# Patient Record
Sex: MALE | Race: WHITE | NOT HISPANIC OR LATINO | Employment: FULL TIME | URBAN - METROPOLITAN AREA
[De-identification: names, ages, dates, MRNs, and addresses within clinical notes are randomized per-mention and may not be internally consistent; named-entity substitution may affect disease eponyms.]

---

## 2017-01-05 RX ORDER — RIZATRIPTAN BENZOATE 10 MG/1
10 TABLET, ORALLY DISINTEGRATING ORAL ONCE AS NEEDED
Qty: 27 TABLET | Refills: 3 | Status: SHIPPED | OUTPATIENT
Start: 2017-01-05 | End: 2017-03-28 | Stop reason: SDUPTHER

## 2017-01-24 DIAGNOSIS — E78.00 ELEVATED CHOLESTEROL: ICD-10-CM

## 2017-01-24 DIAGNOSIS — E34.9 TESTOSTERONE DEFICIENCY: Primary | ICD-10-CM

## 2017-01-31 ENCOUNTER — RESULTS ENCOUNTER (OUTPATIENT)
Dept: FAMILY MEDICINE CLINIC | Facility: CLINIC | Age: 42
End: 2017-01-31

## 2017-01-31 DIAGNOSIS — E78.00 ELEVATED CHOLESTEROL: ICD-10-CM

## 2017-01-31 DIAGNOSIS — E34.9 TESTOSTERONE DEFICIENCY: ICD-10-CM

## 2017-02-07 ENCOUNTER — OFFICE VISIT (OUTPATIENT)
Dept: ORTHOPEDIC SURGERY | Facility: CLINIC | Age: 42
End: 2017-02-07

## 2017-02-07 VITALS — WEIGHT: 252 LBS | HEIGHT: 73 IN | BODY MASS INDEX: 33.4 KG/M2 | TEMPERATURE: 97.6 F

## 2017-02-07 DIAGNOSIS — M25.562 CHRONIC PAIN OF BOTH KNEES: Primary | ICD-10-CM

## 2017-02-07 DIAGNOSIS — G89.29 CHRONIC PAIN OF BOTH KNEES: Primary | ICD-10-CM

## 2017-02-07 DIAGNOSIS — M25.561 CHRONIC PAIN OF BOTH KNEES: Primary | ICD-10-CM

## 2017-02-07 LAB
ALBUMIN SERPL-MCNC: 4.7 G/DL (ref 3.5–5.5)
ALBUMIN/GLOB SERPL: 2.6 {RATIO} (ref 1.1–2.5)
ALP SERPL-CCNC: 65 IU/L (ref 39–117)
ALT SERPL-CCNC: 60 IU/L (ref 0–44)
AST SERPL-CCNC: 38 IU/L (ref 0–40)
BILIRUB SERPL-MCNC: 0.4 MG/DL (ref 0–1.2)
BUN SERPL-MCNC: 14 MG/DL (ref 6–24)
BUN/CREAT SERPL: 15 (ref 9–20)
CALCIUM SERPL-MCNC: 9.5 MG/DL (ref 8.7–10.2)
CHLORIDE SERPL-SCNC: 101 MMOL/L (ref 96–106)
CHOLEST SERPL-MCNC: 143 MG/DL (ref 100–199)
CO2 SERPL-SCNC: 27 MMOL/L (ref 18–29)
CONV COMMENT: ABNORMAL
CREAT SERPL-MCNC: 0.92 MG/DL (ref 0.76–1.27)
ERYTHROCYTE [DISTWIDTH] IN BLOOD BY AUTOMATED COUNT: 13.8 % (ref 12.3–15.4)
GLOBULIN SER CALC-MCNC: 1.8 G/DL (ref 1.5–4.5)
GLUCOSE SERPL-MCNC: 108 MG/DL (ref 65–99)
HCT VFR BLD AUTO: 45.2 % (ref 37.5–51)
HDLC SERPL-MCNC: 44 MG/DL
HGB BLD-MCNC: 15.3 G/DL (ref 12.6–17.7)
LDLC SERPL CALC-MCNC: 63 MG/DL (ref 0–99)
MCH RBC QN AUTO: 30.7 PG (ref 26.6–33)
MCHC RBC AUTO-ENTMCNC: 33.8 G/DL (ref 31.5–35.7)
MCV RBC AUTO: 91 FL (ref 79–97)
PLATELET # BLD AUTO: 285 X10E3/UL (ref 150–379)
POTASSIUM SERPL-SCNC: 4.5 MMOL/L (ref 3.5–5.2)
PROT SERPL-MCNC: 6.5 G/DL (ref 6–8.5)
RBC # BLD AUTO: 4.98 X10E6/UL (ref 4.14–5.8)
SODIUM SERPL-SCNC: 143 MMOL/L (ref 134–144)
TESTOST FREE SERPL-MCNC: 2.1 PG/ML (ref 6.8–21.5)
TESTOST SERPL-MCNC: 221 NG/DL (ref 348–1197)
TRIGL SERPL-MCNC: 178 MG/DL (ref 0–149)
VLDLC SERPL CALC-MCNC: 36 MG/DL (ref 5–40)
WBC # BLD AUTO: 6 X10E3/UL (ref 3.4–10.8)

## 2017-02-07 PROCEDURE — 20610 DRAIN/INJ JOINT/BURSA W/O US: CPT | Performed by: ORTHOPAEDIC SURGERY

## 2017-02-07 RX ORDER — METHYLPREDNISOLONE ACETATE 80 MG/ML
80 INJECTION, SUSPENSION INTRA-ARTICULAR; INTRALESIONAL; INTRAMUSCULAR; SOFT TISSUE
Status: COMPLETED | OUTPATIENT
Start: 2017-02-07 | End: 2017-02-07

## 2017-02-07 RX ORDER — LANOLIN ALCOHOL/MO/W.PET/CERES
1000 CREAM (GRAM) TOPICAL DAILY
COMMUNITY
End: 2017-12-27

## 2017-02-07 RX ORDER — BUPIVACAINE HYDROCHLORIDE 5 MG/ML
2 INJECTION, SOLUTION PERINEURAL
Status: COMPLETED | OUTPATIENT
Start: 2017-02-07 | End: 2017-02-07

## 2017-02-07 RX ORDER — PNV NO.95/FERROUS FUM/FOLIC AC 28MG-0.8MG
TABLET ORAL
COMMUNITY
End: 2017-09-21

## 2017-02-07 RX ORDER — PYRIDOXINE HCL (VITAMIN B6) 100 MG
100 TABLET ORAL DAILY
COMMUNITY
End: 2017-12-27

## 2017-02-07 RX ORDER — LIDOCAINE HYDROCHLORIDE 10 MG/ML
2 INJECTION, SOLUTION INFILTRATION; PERINEURAL
Status: COMPLETED | OUTPATIENT
Start: 2017-02-07 | End: 2017-02-07

## 2017-02-07 RX ORDER — TOPIRAMATE 25 MG/1
25 CAPSULE, COATED PELLETS ORAL 2 TIMES DAILY
COMMUNITY
End: 2017-03-28 | Stop reason: ALTCHOICE

## 2017-02-07 RX ADMIN — BUPIVACAINE HYDROCHLORIDE 2 ML: 5 INJECTION, SOLUTION PERINEURAL at 15:37

## 2017-02-07 RX ADMIN — METHYLPREDNISOLONE ACETATE 80 MG: 80 INJECTION, SUSPENSION INTRA-ARTICULAR; INTRALESIONAL; INTRAMUSCULAR; SOFT TISSUE at 15:37

## 2017-02-07 RX ADMIN — LIDOCAINE HYDROCHLORIDE 2 ML: 10 INJECTION, SOLUTION INFILTRATION; PERINEURAL at 15:37

## 2017-02-07 NOTE — PROGRESS NOTES
Knee Joint Injection      Patient: Arelis Munson  YOB: 1975    Chief Complaints: Knee pain  bilaterally    Subjective:  This problem is not new to this examiner.     History of Present Illness: Pt gets intermittent  injections with good relief. Is here for repeat injection. Understands options. The pain is a generalized joint line tenderness.  It has been progressive in nature but remains intermittent.  Worsened by prolonged standing or walking and squatting activities. Has had improvement in the past with ice/heat, rest, and injections. TIMING:  The pain is described as CHRONIC.  LOCATION: medial joint line tenderness. AGGRAVATING FACTORS:  Is worsened by prolonged standing, sitting, walking and squatting activities.  ASSOCIATED SYMPTOMS:  swelling, tenderness, and aching.      Allergies: No Known Allergies    Medications:   Home Medications:  Current Outpatient Prescriptions on File Prior to Visit   Medication Sig   • azelastine (ASTELIN) 0.1 % nasal spray 2 sprays into each nostril 2 (two) times a day. Use in each nostril as directed   • B Complex Vitamins (VITAMIN B COMPLEX PO) Take  by mouth daily.   • esomeprazole (nexIUM) 40 MG capsule Take 1 capsule by mouth Every Night.   • etodolac (LODINE) 500 MG tablet Take 500 mg by mouth 2 (two) times a day.   • fluticasone (VERAMYST) 27.5 MCG/SPRAY nasal spray 2 sprays into each nostril daily.   • gabapentin (NEURONTIN) 600 MG tablet Take 1 tablet by mouth 3 (Three) Times a Day.   • GuaiFENesin (MUCINEX PO) Take  by mouth 2 (two) times a day.   • Magnesium 500 MG capsule Take 500 mg by mouth every night.   • melatonin 1 MG tablet Take 3 mg by mouth every night.   • montelukast (SINGULAIR) 10 MG tablet Take 1 tablet by mouth Every Night.   • Multiple Vitamin (MULTI VITAMIN DAILY PO) Take  by mouth daily.   • olopatadine (PATANASE) 0.6 % solution nasal solution 2 sprays by Each Nare route 2 (Two) Times a Day.   • PROVENTIL  (90 BASE) MCG/ACT  inhaler Inhale 2 puffs every 6 (six) hours as needed.   • PULMICORT FLEXHALER 180 MCG/ACT inhaler Inhale 1 puff 2 (Two) Times a Day.   • rizatriptan MLT (MAXALT-MLT) 10 MG disintegrating tablet Take 1 tablet by mouth 1 (One) Time As Needed for migraine.   • selenium sulfide (SELSUN) 2.5 % shampoo Apply  topically Daily As Needed for dandruff.   • testosterone (ANDROGEL) 50 MG/5GM (1%) gel gel Place 100 mg on the skin Daily.   • traMADol (ULTRAM) 50 MG tablet Take 2 tablets by mouth 3 (Three) Times a Day As Needed for moderate pain (4-6).   • VIAGRA 100 MG tablet Take 1 tablet by mouth As Needed for erectile dysfunction.   • VYTORIN 10-40 MG per tablet Take 1 tablet by mouth Every Night.   • zolpidem CR (AMBIEN CR) 12.5 MG CR tablet Take 12.5 mg by mouth at night as needed.     No current facility-administered medications on file prior to visit.      Current Medications:  Scheduled Meds:  Continuous Infusions:  No current facility-administered medications for this visit.   PRN Meds:.    I have reviewed the patient's medical history in detail and updated the computerized patient record.  Review and summarization of old records include:    Past Medical History   Diagnosis Date   • Asthma    • Elevated cholesterol    • Environmental allergies    • Gastroesophageal reflux disease without esophagitis 7/11/2016   • Migraine with aura and without status migrainosus, not intractable 7/11/2016   • Sleep apnea    • Testosterone deficiency 7/11/2016        Past Surgical History   Procedure Laterality Date   • Nose surgery     • Carpal tunnel release     • Knee surgery          Social History     Occupational History   • Not on file.     Social History Main Topics   • Smoking status: Former Smoker     Years: 31.00     Types: Cigarettes   • Smokeless tobacco: Never Used      Comment: Quit 2004   • Alcohol use 1.2 oz/week     2 Shots of liquor per week   • Drug use: No   • Sexual activity: Defer    Social History     Social  History Narrative        Family History   Problem Relation Age of Onset   • Lung cancer Mother    • Lung cancer Father        ROS: 14 point review of systems was performed and was negative except for documented findings in HPI and today's encounter.     Allergies: No Known Allergies  Constitutional:  Denies fever, shaking or chills   Eyes:  Denies change in visual acuity   HENT:  Denies nasal congestion or sore throat   Respiratory:  Denies cough or shortness of breath   Cardiovascular:  Denies chest pain or severe LE edema   GI:  Denies abdominal pain, nausea, vomiting, bloody stools or diarrhea   Musculoskeletal:  Numbness, tingling, or loss of motor function only as noted above in history of present illness.  : Denies painful urination or hematuria  Integument:  Denies rash, lesion or ulceration   Neurologic:  Denies headache or focal weakness  Endocrine:  Denies lymphadenopathy  Psych:  Denies confusion or change in mental status   Hem:  Denies active bleeding    Physical Exam:  Body mass index is 33.25 kg/(m^2).  Vitals:    02/07/17 1531   Temp: 97.6 °F (36.4 °C)     Vital Signs:  reviewed  Constitutional: Awake alert and oriented x3, well developed, no acute distress, non-toxic appearance.  EYES: symmetric, sclera clear  ENT:  Normocephalic, Atraumatic.   Respiratory:  No respiratory distress, No wheezing  CV: pulse regular, no palpitations or pallor.  GI:  Abdomen soft, non-tender.   Vascular:  Intact distal pulses, No cyanosis, no signs or symptoms of DVT.  Neurologic: Sensation grossly intact to the involved extremity, No focal deficits noted.   Neck: No tenderness, Supple.  Integument: warm, dry, no ulcerations.   Psychiatric:  Oriented, no pathological affect.  Musculoskeletal:    Affected knee(s):  Painful gait with a subtle limp, positive for synovitis, swelling, joint effusion with crepitation.  Lachman negative  Posterior drawer negative  Paulo's negative  Patellofemoral grind +  Sensation  grossly intact to light touch throughout the lower extremity  Skin is intact  Distal pulses are palpable  No signs or symptoms of DVT        Procedure:  Large Joint Arthrocentesis  Date/Time: 2/7/2017 3:37 PM  Consent given by: patient  Site marked: site marked  Timeout: Immediately prior to procedure a time out was called to verify the correct patient, procedure, equipment, support staff and site/side marked as required   Supporting Documentation  Indications: pain and joint swelling   Procedure Details  [place] knee - R knee  Preparation: Patient was prepped and draped in the usual sterile fashion  Needle size: 22 G  Approach: anterolateral  Medications administered: 2 mL bupivacaine; 2 mL lidocaine 1 %; 80 mg methylPREDNISolone acetate 80 MG/ML  Patient tolerance: patient tolerated the procedure well with no immediate complications    Large Joint Arthrocentesis  Date/Time: 2/7/2017 3:37 PM  Consent given by: patient  Site marked: site marked  Timeout: Immediately prior to procedure a time out was called to verify the correct patient, procedure, equipment, support staff and site/side marked as required   Supporting Documentation  Indications: pain and joint swelling   Procedure Details  [place] knee - L knee  Preparation: Patient was prepped and draped in the usual sterile fashion  Needle size: 22 G  Approach: anterolateral  Medications administered: 2 mL bupivacaine; 2 mL lidocaine 1 %; 80 mg methylPREDNISolone acetate 80 MG/ML  Patient tolerance: patient tolerated the procedure well with no immediate complications          Assessment.  osteoarthritis bilateral knee(s).      Plan: Is to proceed with injection  Follow up as indicated.  Ice, elevate, and rest as needed.  Additional interventions include: Cortisone Injection. See procedure note.    2/7/2017

## 2017-02-15 ENCOUNTER — TELEPHONE (OUTPATIENT)
Dept: ORTHOPEDIC SURGERY | Facility: CLINIC | Age: 42
End: 2017-02-15

## 2017-02-15 ENCOUNTER — DOCUMENTATION (OUTPATIENT)
Dept: ORTHOPEDIC SURGERY | Facility: CLINIC | Age: 42
End: 2017-02-15

## 2017-02-27 DIAGNOSIS — G43.109 MIGRAINE WITH AURA AND WITHOUT STATUS MIGRAINOSUS, NOT INTRACTABLE: ICD-10-CM

## 2017-02-27 RX ORDER — GABAPENTIN 600 MG/1
TABLET ORAL
Qty: 270 TABLET | Refills: 3 | Status: SHIPPED | OUTPATIENT
Start: 2017-02-27 | End: 2017-03-28 | Stop reason: SDUPTHER

## 2017-03-27 ENCOUNTER — TELEPHONE (OUTPATIENT)
Dept: ORTHOPEDIC SURGERY | Facility: CLINIC | Age: 42
End: 2017-03-27

## 2017-03-27 RX ORDER — ETODOLAC 500 MG/1
500 TABLET, FILM COATED ORAL 2 TIMES DAILY
Qty: 180 TABLET | Refills: 3 | Status: SHIPPED | OUTPATIENT
Start: 2017-03-27 | End: 2018-01-24

## 2017-03-28 ENCOUNTER — OFFICE VISIT (OUTPATIENT)
Dept: FAMILY MEDICINE CLINIC | Facility: CLINIC | Age: 42
End: 2017-03-28

## 2017-03-28 VITALS
SYSTOLIC BLOOD PRESSURE: 138 MMHG | HEIGHT: 73 IN | OXYGEN SATURATION: 95 % | HEART RATE: 79 BPM | WEIGHT: 259.1 LBS | BODY MASS INDEX: 34.34 KG/M2 | DIASTOLIC BLOOD PRESSURE: 88 MMHG

## 2017-03-28 DIAGNOSIS — G43.109 MIGRAINE WITH AURA AND WITHOUT STATUS MIGRAINOSUS, NOT INTRACTABLE: ICD-10-CM

## 2017-03-28 DIAGNOSIS — E34.9 TESTOSTERONE DEFICIENCY: ICD-10-CM

## 2017-03-28 DIAGNOSIS — M17.0 PRIMARY OSTEOARTHRITIS OF BOTH KNEES: Primary | ICD-10-CM

## 2017-03-28 DIAGNOSIS — E78.00 ELEVATED CHOLESTEROL: ICD-10-CM

## 2017-03-28 PROCEDURE — 99214 OFFICE O/P EST MOD 30 MIN: CPT | Performed by: INTERNAL MEDICINE

## 2017-03-28 RX ORDER — TRAMADOL HYDROCHLORIDE 50 MG/1
100 TABLET ORAL 3 TIMES DAILY PRN
Qty: 180 TABLET | Refills: 2 | Status: SHIPPED | OUTPATIENT
Start: 2017-03-28 | End: 2017-06-29 | Stop reason: SDUPTHER

## 2017-03-28 RX ORDER — GABAPENTIN 600 MG/1
600 TABLET ORAL 3 TIMES DAILY
Qty: 270 TABLET | Refills: 3 | Status: SHIPPED | OUTPATIENT
Start: 2017-03-28 | End: 2017-06-29 | Stop reason: SDUPTHER

## 2017-03-28 RX ORDER — SELENIUM SULFIDE 2.5 MG/100ML
LOTION TOPICAL DAILY PRN
Qty: 354 ML | Refills: 3 | Status: SHIPPED | OUTPATIENT
Start: 2017-03-28 | End: 2017-12-27 | Stop reason: SDUPTHER

## 2017-03-28 RX ORDER — RIZATRIPTAN BENZOATE 10 MG/1
10 TABLET, ORALLY DISINTEGRATING ORAL ONCE AS NEEDED
Qty: 27 TABLET | Refills: 3 | Status: SHIPPED | OUTPATIENT
Start: 2017-03-28 | End: 2017-12-26 | Stop reason: SDUPTHER

## 2017-03-28 RX ORDER — TESTOSTERONE GEL, 1% 10 MG/G
100 GEL TRANSDERMAL DAILY
Qty: 180 PACKAGE | Refills: 1 | Status: SHIPPED | OUTPATIENT
Start: 2017-03-28 | End: 2017-08-09 | Stop reason: SDUPTHER

## 2017-03-28 RX ORDER — EZETIMIBE/SIMVASTATIN 10 MG-40MG
1 TABLET ORAL NIGHTLY
Qty: 90 TABLET | Refills: 3 | Status: SHIPPED | OUTPATIENT
Start: 2017-03-28 | End: 2017-07-10 | Stop reason: CLARIF

## 2017-03-28 RX ORDER — MONTELUKAST SODIUM 10 MG/1
10 TABLET ORAL NIGHTLY
Qty: 90 TABLET | Refills: 3 | Status: SHIPPED | OUTPATIENT
Start: 2017-03-28 | End: 2017-12-27 | Stop reason: SDUPTHER

## 2017-03-28 RX ORDER — SILDENAFIL CITRATE 100 MG
100 TABLET ORAL AS NEEDED
Qty: 30 TABLET | Refills: 3 | Status: SHIPPED | OUTPATIENT
Start: 2017-03-28 | End: 2017-11-14 | Stop reason: SDUPTHER

## 2017-03-28 RX ORDER — OLOPATADINE HYDROCHLORIDE 665 UG/1
2 SPRAY NASAL 2 TIMES DAILY
Qty: 91.5 G | Refills: 3 | Status: SHIPPED | OUTPATIENT
Start: 2017-03-28 | End: 2017-12-27 | Stop reason: SDUPTHER

## 2017-03-28 NOTE — PROGRESS NOTES
Subjective   Arelis Munson is a 42 y.o. male who presents today for:    Knee Pain (CSE 3 month)    History of Present Illness   Knee pain- OA. Chronic, intermittent, but in a recurrently persistent fashion, affecting both knees, worse with prolonged standing and bending. Treated by Dr. Tyrell Eddy with steroid injections with good relief. He also wears a hinged brace on the right knee and a brace on the left at times. He takes Lodine every day as well.      Between injections, he uses tramadol 100 mg on the order of 2 tablets 2-3 times per day, to keep himself functional. He reports that this helps take the edge off the pain, allows him to continue work, without causing excess sedation or constipation.      The tramadol also helps with burning right upper extremity pain that developed after an ulnar nerve release. It goes up to his shoulder at times, and is usually aggravated by work.     Migraine: Long-standing history of migraines, usually with an aura that allows him to take his Maxalt with good benefit. Topamax for prophylaxis decreased the frequency of his migraine headaches over the years. However, he felt forgetful, so we had him stop in September, with improvement in his alertness but an increase in migraine frequency.      We started gabapentin for the migraines with good benefit and improvement regarding his ulnar neuropathy/elbow pain.    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  Co-worker developed typical hepatitis symptoms; patient denies any similar symptoms.     Mr. Munson  reports that he has quit smoking. His smoking use included Cigarettes. He quit after 31.00 years of use. He has never used smokeless tobacco. He reports that he drinks about 1.2 oz of alcohol per week  He reports that he does not use illicit drugs.         Current Outpatient Prescriptions:   •  azelastine (ASTELIN) 0.1 % nasal  spray, 2 sprays into each nostril 2 (two) times a day. Use in each nostril as directed, Disp: , Rfl:   •  B Complex Vitamins (VITAMIN B COMPLEX PO), Take  by mouth daily., Disp: , Rfl:   •  Cholecalciferol (VITAMIN D3) 5000 UNITS capsule capsule, Take 5,000 Units by mouth Daily., Disp: , Rfl:   •  esomeprazole (nexIUM) 40 MG capsule, Take 1 capsule by mouth Every Night., Disp: 90 capsule, Rfl: 3  •  etodolac (LODINE) 500 MG tablet, Take 1 tablet by mouth 2 (Two) Times a Day., Disp: 180 tablet, Rfl: 3  •  fluticasone (VERAMYST) 27.5 MCG/SPRAY nasal spray, 2 sprays into each nostril daily., Disp: , Rfl:   •  gabapentin (NEURONTIN) 600 MG tablet, TAKE 1 TABLET THREE TIMES A DAY, Disp: 270 tablet, Rfl: 3  •  GuaiFENesin (MUCINEX PO), Take  by mouth 2 (two) times a day., Disp: , Rfl:   •  Krill Oil Omega-3 500 MG capsule, Take  by mouth., Disp: , Rfl:   •  Magnesium 500 MG capsule, Take 500 mg by mouth every night., Disp: , Rfl:   •  melatonin 1 MG tablet, Take 3 mg by mouth every night., Disp: , Rfl:   •  Misc Natural Products (OSTEO BI-FLEX TRIPLE STRENGTH PO), Take  by mouth., Disp: , Rfl:   •  montelukast (SINGULAIR) 10 MG tablet, Take 1 tablet by mouth Every Night., Disp: 90 tablet, Rfl: 3  •  Multiple Vitamin (MULTI VITAMIN DAILY PO), Take  by mouth daily., Disp: , Rfl:   •  olopatadine (PATANASE) 0.6 % solution nasal solution, 2 sprays by Each Nare route 2 (Two) Times a Day., Disp: 91.5 g, Rfl: 3  •  PROVENTIL  (90 BASE) MCG/ACT inhaler, Inhale 2 puffs every 6 (six) hours as needed., Disp: , Rfl:   •  PULMICORT FLEXHALER 180 MCG/ACT inhaler, Inhale 1 puff 2 (Two) Times a Day., Disp: 3 inhaler, Rfl: 3  •  pyridoxine (VITAMIN B-6) 100 MG tablet tablet, Take 100 mg by mouth Daily., Disp: , Rfl:   •  rizatriptan MLT (MAXALT-MLT) 10 MG disintegrating tablet, Take 1 tablet by mouth 1 (One) Time As Needed for migraine., Disp: 27 tablet, Rfl: 3  •  selenium sulfide (SELSUN) 2.5 % shampoo, Apply  topically Daily As  Needed for dandruff., Disp: 354 mL, Rfl: 3  •  testosterone (ANDROGEL) 50 MG/5GM (1%) gel gel, Place 100 mg on the skin Daily., Disp: 180 package, Rfl: 1  •  topiramate (TOPAMAX) 25 MG capsule, Take 25 mg by mouth 2 (Two) Times a Day., Disp: , Rfl:   •  traMADol (ULTRAM) 50 MG tablet, Take 2 tablets by mouth 3 (Three) Times a Day As Needed for moderate pain (4-6)., Disp: 180 tablet, Rfl: 2  •  VIAGRA 100 MG tablet, Take 1 tablet by mouth As Needed for erectile dysfunction., Disp: 30 tablet, Rfl: 3  •  vitamin B-12 (CYANOCOBALAMIN) 1000 MCG tablet, Take 1,000 mcg by mouth Daily., Disp: , Rfl:   •  VYTORIN 10-40 MG per tablet, Take 1 tablet by mouth Every Night., Disp: 90 tablet, Rfl: 1  •  zolpidem CR (AMBIEN CR) 12.5 MG CR tablet, Take 12.5 mg by mouth at night as needed., Disp: , Rfl:       The following portions of the patient's history were reviewed and updated as appropriate: allergies, current medications, past social history and problem list.    Review of Systems   Constitutional: Negative for diaphoresis and fatigue.   HENT: Positive for congestion, postnasal drip, rhinorrhea and sinus pressure. Negative for trouble swallowing.    Eyes: Negative for visual disturbance.   Respiratory: Positive for apnea, cough and shortness of breath. Negative for chest tightness and wheezing (rarely).    Cardiovascular: Negative for chest pain, palpitations and leg swelling.   Gastrointestinal: Negative for abdominal distention, abdominal pain, diarrhea and nausea.        Dyspepsia/reflux, controlled with the PPI.   Endocrine:        Low libido, improved with testosterone.   Genitourinary: Negative for difficulty urinating, hematuria and urgency.        Erectile dysfunction   Musculoskeletal: Negative for myalgias.   Skin: Negative for color change (No jaundice).        Scaling/flaking of the skin of the scalp.   Allergic/Immunologic: Positive for environmental allergies. Negative for immunocompromised state.   Neurological:  "Negative for dizziness, syncope, numbness and headaches.   Hematological: Does not bruise/bleed easily.   Psychiatric/Behavioral: Positive for sleep disturbance (treated by Dr. Kong). The patient is not nervous/anxious.        Objective   Vitals:    03/28/17 1101   BP: 138/88   BP [place] Right arm   Patient Position: Sitting   Cuff Size: Large Adult   Pulse: 79   SpO2: 95%   Weight: 259 lb 1.6 oz (118 kg)   Height: 73\" (185.4 cm)     Physical Exam   Constitutional: He is oriented to person, place, and time. He appears well-developed and well-nourished. No distress.   Eyes: No scleral icterus.   Cardiovascular: Normal rate and regular rhythm.    Pulmonary/Chest: Effort normal and breath sounds normal.   Neurological: He is alert and oriented to person, place, and time.   Psychiatric: He has a normal mood and affect. His behavior is normal.       Assessment/Plan   Arelis was seen today for knee pain.    Diagnoses and all orders for this visit:    Primary osteoarthritis of both knees  -     traMADol (ULTRAM) 50 MG tablet; Take 2 tablets by mouth 3 (Three) Times a Day As Needed for Moderate Pain (4-6).    Migraine with aura and without status migrainosus, not intractable  -     gabapentin (NEURONTIN) 600 MG tablet; Take 1 tablet by mouth 3 (Three) Times a Day.    Elevated cholesterol    Testosterone deficiency    Other orders  -     testosterone (ANDROGEL) 50 MG/5GM (1%) gel gel; Place 100 mg on the skin Daily.  -     selenium sulfide (SELSUN) 2.5 % shampoo; Apply  topically Daily As Needed for dandruff.  -     montelukast (SINGULAIR) 10 MG tablet; Take 1 tablet by mouth Every Night.  -     VIAGRA 100 MG tablet; Take 1 tablet by mouth As Needed for erectile dysfunction.  -     olopatadine (PATANASE) 0.6 % solution nasal solution; 2 sprays by Each Nare route 2 (Two) Times a Day.  -     rizatriptan MLT (MAXALT-MLT) 10 MG disintegrating tablet; Take 1 tablet by mouth 1 (One) Time As Needed for migraine.  -     " VYTORIN 10-40 MG per tablet; Take 1 tablet by mouth Every Night.    Labs reviewed.  We will keep an eye on the liver enzymes and test for Hep C with the next set of labs.    MARY BETH report was obtained and reviewed.  He will continue the tramadol (and ice and knee braces) for the bilateral knee pain.    He is doing well on the gabapentin; continue it unchanged.    Although testosterone levels were low on recent labs, he feels good on the current dose.  Therefore, we will make no change.  Continue to monitor the levels.    Lipids look good, with slightly elevated trigs.  Continue the Vytorin unchanged.

## 2017-04-13 ENCOUNTER — HOSPITAL ENCOUNTER (OUTPATIENT)
Dept: SLEEP MEDICINE | Facility: HOSPITAL | Age: 42
Discharge: HOME | End: 2017-04-13
Admitting: INTERNAL MEDICINE

## 2017-04-13 PROCEDURE — 99213 OFFICE O/P EST LOW 20 MIN: CPT | Performed by: INTERNAL MEDICINE

## 2017-04-13 PROCEDURE — G0463 HOSPITAL OUTPT CLINIC VISIT: HCPCS

## 2017-04-22 PROBLEM — Z99.89 OSA ON CPAP: Status: ACTIVE | Noted: 2017-04-22

## 2017-04-22 PROBLEM — G47.33 OSA ON CPAP: Status: ACTIVE | Noted: 2017-04-22

## 2017-04-22 NOTE — PROGRESS NOTES
[facility] Note April 13, 2017      Patient Care Team:  Jovon Aviles MD as PCP - General (Internal Medicine)    Chief Complaint:  NITA     Interval History:   The patient was last seen by me in June 2016.  He is stable unchanged without new complaints.  He goes to bed 11 PM and awakens at 5:30 AM.  His [place] Sleepiness Scale is normal between 2 and 4.    Review of Systems:  Recorded on the Sleep Questionnaire.  Unremarkable except for nasal congestion and postnasal drip, occasional wheezing with shortness of breath, and ear pain.    Social History:  He does not smoke cigarettes.  2 alcoholic drinks per month.  2 sodas, 2 coffees, and 1 tea a day.    Allergies:  No known medical allergies.     Medication Review:  His list was reviewed.      Vital Signs:  Height 70 inches and his weight has increased from 245 up to 260.  He is obese with a body mass index of 37-38.    Physical Exam:    Constitutional:  Well developed white male and appears in no apparent distress.  Awake & oriented times 3.  Normal mood with normal recent and remote memory and normal judgement.  Eyes:  Conjunctivae normal.  Oropharynx:  moist mucous membranes without exudate and a large tongue and normal uvula and class III MP airway.      Results Review:  DME is [facility] and he uses a fullface mask.  Downloads between October 15, 2016 and April 12, 2017 compliances 99% and average usage is 6 hours and 39 minutes and average AHI is normal without significant leak.  His average auto CPAP pressure is 12.2.  It has increased from 10.9 due to his weight gain.  His auto CPAP is 5-14.       Impression:   Obstructive sleep apnea adequately treated with auto titrating CPAP with good compliance and usage and no complaints of hypersomnolence.  Psychophysiologic insomnia adequately treated with Ambien CR 12.5 mg by mouth daily at bedtime.  The patient along for work shift work.      Plan:  Good sleep hygiene measures should be  maintained.  Weight loss would be beneficial in this patient who is obese by BMI.    Continue auto CPAP as he is doing.  His auto CPAP will be changed to 7-16.  Weight loss would be beneficial.    The patient will follow up 8-9 months.      Ronni Kong MD  04/22/17  12:15 PM      Addendum:  I did complete new prescriptions for Ambien CR 12.5 mg #90 with 1 refill.  Proventil HFA 2 puffs every 4 hours when necessary shortness of air.  Pulmicort 181 or 2 puffs every morning.  Ronni Kong M.D.  4/22/2017

## 2017-05-09 ENCOUNTER — CLINICAL SUPPORT (OUTPATIENT)
Dept: ORTHOPEDIC SURGERY | Facility: CLINIC | Age: 42
End: 2017-05-09

## 2017-05-09 VITALS — WEIGHT: 260 LBS | BODY MASS INDEX: 34.46 KG/M2 | TEMPERATURE: 98.2 F | HEIGHT: 73 IN

## 2017-05-09 DIAGNOSIS — M17.0 PRIMARY OSTEOARTHRITIS OF BOTH KNEES: Primary | ICD-10-CM

## 2017-05-09 PROCEDURE — 73562 X-RAY EXAM OF KNEE 3: CPT | Performed by: NURSE PRACTITIONER

## 2017-05-09 PROCEDURE — 20610 DRAIN/INJ JOINT/BURSA W/O US: CPT | Performed by: NURSE PRACTITIONER

## 2017-05-09 RX ORDER — METHYLPREDNISOLONE ACETATE 80 MG/ML
80 INJECTION, SUSPENSION INTRA-ARTICULAR; INTRALESIONAL; INTRAMUSCULAR; SOFT TISSUE
Status: COMPLETED | OUTPATIENT
Start: 2017-05-09 | End: 2017-05-09

## 2017-05-09 RX ORDER — LIDOCAINE HYDROCHLORIDE 10 MG/ML
2 INJECTION, SOLUTION INFILTRATION; PERINEURAL
Status: COMPLETED | OUTPATIENT
Start: 2017-05-09 | End: 2017-05-09

## 2017-05-09 RX ORDER — BUPIVACAINE HYDROCHLORIDE 2.5 MG/ML
2 INJECTION, SOLUTION INFILTRATION; PERINEURAL
Status: COMPLETED | OUTPATIENT
Start: 2017-05-09 | End: 2017-05-09

## 2017-05-09 RX ADMIN — LIDOCAINE HYDROCHLORIDE 2 ML: 10 INJECTION, SOLUTION INFILTRATION; PERINEURAL at 16:10

## 2017-05-09 RX ADMIN — BUPIVACAINE HYDROCHLORIDE 2 ML: 2.5 INJECTION, SOLUTION INFILTRATION; PERINEURAL at 16:10

## 2017-05-09 RX ADMIN — METHYLPREDNISOLONE ACETATE 80 MG: 80 INJECTION, SUSPENSION INTRA-ARTICULAR; INTRALESIONAL; INTRAMUSCULAR; SOFT TISSUE at 16:10

## 2017-05-26 ENCOUNTER — TRANSCRIBE ORDERS (OUTPATIENT)
Dept: ADMINISTRATIVE | Facility: HOSPITAL | Age: 42
End: 2017-05-26

## 2017-05-26 ENCOUNTER — LAB (OUTPATIENT)
Dept: LAB | Facility: HOSPITAL | Age: 42
End: 2017-05-26
Attending: ORTHOPAEDIC SURGERY

## 2017-05-26 ENCOUNTER — HOSPITAL ENCOUNTER (OUTPATIENT)
Dept: GENERAL RADIOLOGY | Facility: HOSPITAL | Age: 42
Discharge: HOME | End: 2017-05-26
Attending: ORTHOPAEDIC SURGERY | Admitting: ORTHOPAEDIC SURGERY

## 2017-05-26 ENCOUNTER — HOSPITAL ENCOUNTER (OUTPATIENT)
Dept: CARDIOLOGY | Facility: HOSPITAL | Age: 42
Discharge: HOME | End: 2017-05-26
Attending: ORTHOPAEDIC SURGERY

## 2017-05-26 DIAGNOSIS — Z01.818 PREOP TESTING: ICD-10-CM

## 2017-05-26 DIAGNOSIS — Z01.818 PREOP EXAMINATION: ICD-10-CM

## 2017-05-26 DIAGNOSIS — Z01.818 PREOP TESTING: Primary | ICD-10-CM

## 2017-05-26 DIAGNOSIS — Z01.818 PRE-OP TESTING: ICD-10-CM

## 2017-05-26 DIAGNOSIS — Z01.818 PREOP EXAMINATION: Primary | ICD-10-CM

## 2017-05-26 LAB
ALBUMIN SERPL-MCNC: 4.7 G/DL (ref 3.5–5.2)
ALBUMIN/GLOB SERPL: 1.7 G/DL
ALP SERPL-CCNC: 56 U/L (ref 39–117)
ALT SERPL W P-5'-P-CCNC: 51 U/L (ref 1–41)
ANION GAP SERPL CALCULATED.3IONS-SCNC: 14.1 MMOL/L
AST SERPL-CCNC: 28 U/L (ref 1–40)
BASOPHILS # BLD AUTO: 0.06 10*3/MM3 (ref 0–0.2)
BASOPHILS NFR BLD AUTO: 1 % (ref 0–1.5)
BILIRUB SERPL-MCNC: 0.6 MG/DL (ref 0.1–1.2)
BUN BLD-MCNC: 15 MG/DL (ref 6–20)
BUN/CREAT SERPL: 15.2 (ref 7–25)
CALCIUM SPEC-SCNC: 9.8 MG/DL (ref 8.6–10.5)
CHLORIDE SERPL-SCNC: 101 MMOL/L (ref 98–107)
CO2 SERPL-SCNC: 25.9 MMOL/L (ref 22–29)
CREAT BLD-MCNC: 0.99 MG/DL (ref 0.76–1.27)
DEPRECATED RDW RBC AUTO: 47.8 FL (ref 37–54)
EOSINOPHIL # BLD AUTO: 0.06 10*3/MM3 (ref 0–0.7)
EOSINOPHIL NFR BLD AUTO: 1 % (ref 0.3–6.2)
ERYTHROCYTE [DISTWIDTH] IN BLOOD BY AUTOMATED COUNT: 14.2 % (ref 11.5–14.5)
GFR SERPL CREATININE-BSD FRML MDRD: 82 ML/MIN/1.73
GLOBULIN UR ELPH-MCNC: 2.8 GM/DL
GLUCOSE BLD-MCNC: 91 MG/DL (ref 65–99)
HCT VFR BLD AUTO: 46.3 % (ref 40.4–52.2)
HGB BLD-MCNC: 15.9 G/DL (ref 13.7–17.6)
IMM GRANULOCYTES # BLD: 0 10*3/MM3 (ref 0–0.03)
IMM GRANULOCYTES NFR BLD: 0 % (ref 0–0.5)
LYMPHOCYTES # BLD AUTO: 2.37 10*3/MM3 (ref 0.9–4.8)
LYMPHOCYTES NFR BLD AUTO: 38 % (ref 19.6–45.3)
MCH RBC QN AUTO: 31.3 PG (ref 27–32.7)
MCHC RBC AUTO-ENTMCNC: 34.3 G/DL (ref 32.6–36.4)
MCV RBC AUTO: 91.1 FL (ref 79.8–96.2)
MONOCYTES # BLD AUTO: 0.41 10*3/MM3 (ref 0.2–1.2)
MONOCYTES NFR BLD AUTO: 6.6 % (ref 5–12)
NEUTROPHILS # BLD AUTO: 3.34 10*3/MM3 (ref 1.9–8.1)
NEUTROPHILS NFR BLD AUTO: 53.4 % (ref 42.7–76)
PLATELET # BLD AUTO: 289 10*3/MM3 (ref 140–500)
PMV BLD AUTO: 9.6 FL (ref 6–12)
POTASSIUM BLD-SCNC: 4.1 MMOL/L (ref 3.5–5.2)
PROT SERPL-MCNC: 7.5 G/DL (ref 6–8.5)
RBC # BLD AUTO: 5.08 10*6/MM3 (ref 4.6–6)
SODIUM BLD-SCNC: 141 MMOL/L (ref 136–145)
WBC NRBC COR # BLD: 6.24 10*3/MM3 (ref 4.5–10.7)

## 2017-05-26 PROCEDURE — 85025 COMPLETE CBC W/AUTO DIFF WBC: CPT

## 2017-05-26 PROCEDURE — 93005 ELECTROCARDIOGRAM TRACING: CPT | Performed by: ORTHOPAEDIC SURGERY

## 2017-05-26 PROCEDURE — 71020 HC CHEST PA AND LATERAL: CPT

## 2017-05-26 PROCEDURE — 36415 COLL VENOUS BLD VENIPUNCTURE: CPT

## 2017-05-26 PROCEDURE — 80053 COMPREHEN METABOLIC PANEL: CPT

## 2017-05-26 PROCEDURE — 93010 ELECTROCARDIOGRAM REPORT: CPT | Performed by: INTERNAL MEDICINE

## 2017-06-29 ENCOUNTER — OFFICE VISIT (OUTPATIENT)
Dept: FAMILY MEDICINE CLINIC | Facility: CLINIC | Age: 42
End: 2017-06-29

## 2017-06-29 VITALS
DIASTOLIC BLOOD PRESSURE: 74 MMHG | SYSTOLIC BLOOD PRESSURE: 114 MMHG | HEART RATE: 96 BPM | HEIGHT: 73 IN | OXYGEN SATURATION: 97 % | BODY MASS INDEX: 30.07 KG/M2 | WEIGHT: 226.9 LBS

## 2017-06-29 DIAGNOSIS — G43.109 MIGRAINE WITH AURA AND WITHOUT STATUS MIGRAINOSUS, NOT INTRACTABLE: ICD-10-CM

## 2017-06-29 DIAGNOSIS — M17.0 PRIMARY OSTEOARTHRITIS OF BOTH KNEES: ICD-10-CM

## 2017-06-29 PROCEDURE — 99214 OFFICE O/P EST MOD 30 MIN: CPT | Performed by: INTERNAL MEDICINE

## 2017-06-29 RX ORDER — GLUCOSAMINE/CHONDR SU A SOD 750-600 MG
TABLET ORAL
COMMUNITY
End: 2017-09-21

## 2017-06-29 RX ORDER — THEANINE 100 MG
CAPSULE ORAL
COMMUNITY
End: 2017-09-21

## 2017-06-29 RX ORDER — DOCUSATE SODIUM 250 MG
250 CAPSULE ORAL DAILY
COMMUNITY

## 2017-06-29 RX ORDER — TRAMADOL HYDROCHLORIDE 50 MG/1
100 TABLET ORAL 3 TIMES DAILY PRN
Qty: 180 TABLET | Refills: 2 | Status: SHIPPED | OUTPATIENT
Start: 2017-06-29 | End: 2017-09-21 | Stop reason: SDUPTHER

## 2017-06-29 RX ORDER — OXYCODONE HYDROCHLORIDE AND ACETAMINOPHEN 5; 325 MG/1; MG/1
TABLET ORAL
COMMUNITY
Start: 2017-06-12 | End: 2017-06-29

## 2017-06-29 RX ORDER — ASPIRIN 81 MG/1
81 TABLET ORAL DAILY
COMMUNITY
End: 2018-01-26 | Stop reason: HOSPADM

## 2017-06-29 RX ORDER — HYDROCODONE BITARTRATE AND ACETAMINOPHEN 5; 325 MG/1; MG/1
TABLET ORAL
COMMUNITY
Start: 2017-05-24 | End: 2017-06-29

## 2017-06-29 RX ORDER — CAFFEINE 200 MG
200 TABLET ORAL EVERY 4 HOURS PRN
COMMUNITY
End: 2017-09-21

## 2017-06-30 RX ORDER — GABAPENTIN 600 MG/1
600 TABLET ORAL 3 TIMES DAILY
Qty: 270 TABLET | Refills: 1
Start: 2017-06-30 | End: 2017-09-21 | Stop reason: SDUPTHER

## 2017-07-10 RX ORDER — EZETIMIBE AND SIMVASTATIN 10; 40 MG/1; MG/1
1 TABLET ORAL NIGHTLY
Qty: 90 TABLET | Refills: 3 | Status: SHIPPED | OUTPATIENT
Start: 2017-07-10 | End: 2017-12-27 | Stop reason: SDUPTHER

## 2017-07-17 DIAGNOSIS — E78.00 ELEVATED CHOLESTEROL: Primary | ICD-10-CM

## 2017-07-17 DIAGNOSIS — K21.9 GASTROESOPHAGEAL REFLUX DISEASE WITHOUT ESOPHAGITIS: ICD-10-CM

## 2017-07-17 DIAGNOSIS — J45.909 UNCOMPLICATED ASTHMA, UNSPECIFIED ASTHMA SEVERITY: ICD-10-CM

## 2017-07-17 DIAGNOSIS — Z13.9 SCREENING: ICD-10-CM

## 2017-07-17 DIAGNOSIS — E34.9 TESTOSTERONE DEFICIENCY: ICD-10-CM

## 2017-07-22 ENCOUNTER — RESULTS ENCOUNTER (OUTPATIENT)
Dept: FAMILY MEDICINE CLINIC | Facility: CLINIC | Age: 42
End: 2017-07-22

## 2017-07-22 DIAGNOSIS — Z13.9 SCREENING: ICD-10-CM

## 2017-07-23 ENCOUNTER — RESULTS ENCOUNTER (OUTPATIENT)
Dept: FAMILY MEDICINE CLINIC | Facility: CLINIC | Age: 42
End: 2017-07-23

## 2017-07-23 DIAGNOSIS — E78.00 ELEVATED CHOLESTEROL: ICD-10-CM

## 2017-07-23 DIAGNOSIS — J45.909 UNCOMPLICATED ASTHMA, UNSPECIFIED ASTHMA SEVERITY: ICD-10-CM

## 2017-07-23 DIAGNOSIS — K21.9 GASTROESOPHAGEAL REFLUX DISEASE WITHOUT ESOPHAGITIS: ICD-10-CM

## 2017-07-23 DIAGNOSIS — E34.9 TESTOSTERONE DEFICIENCY: ICD-10-CM

## 2017-07-26 LAB — HCV AB S/CO SERPL IA: 0.1 S/CO RATIO (ref 0–0.9)

## 2017-07-28 LAB
ALBUMIN SERPL-MCNC: 4.5 G/DL (ref 3.5–5.5)
ALBUMIN/GLOB SERPL: 2.3 {RATIO} (ref 1.2–2.2)
ALP SERPL-CCNC: 64 IU/L (ref 39–117)
ALT SERPL-CCNC: 37 IU/L (ref 0–44)
AST SERPL-CCNC: 24 IU/L (ref 0–40)
BILIRUB SERPL-MCNC: 0.3 MG/DL (ref 0–1.2)
BUN SERPL-MCNC: 15 MG/DL (ref 6–24)
BUN/CREAT SERPL: 17 (ref 9–20)
CALCIUM SERPL-MCNC: 9.4 MG/DL (ref 8.7–10.2)
CHLORIDE SERPL-SCNC: 101 MMOL/L (ref 96–106)
CHOLEST SERPL-MCNC: 151 MG/DL (ref 100–199)
CO2 SERPL-SCNC: 26 MMOL/L (ref 18–29)
CREAT SERPL-MCNC: 0.88 MG/DL (ref 0.76–1.27)
ERYTHROCYTE [DISTWIDTH] IN BLOOD BY AUTOMATED COUNT: 15.1 % (ref 12.3–15.4)
GLOBULIN SER CALC-MCNC: 2 G/DL (ref 1.5–4.5)
GLUCOSE SERPL-MCNC: 106 MG/DL (ref 65–99)
HCT VFR BLD AUTO: 42 % (ref 37.5–51)
HDLC SERPL-MCNC: 32 MG/DL
HGB BLD-MCNC: 13.8 G/DL (ref 12.6–17.7)
LDLC SERPL CALC-MCNC: ABNORMAL MG/DL (ref 0–99)
MCH RBC QN AUTO: 30.3 PG (ref 26.6–33)
MCHC RBC AUTO-ENTMCNC: 32.9 G/DL (ref 31.5–35.7)
MCV RBC AUTO: 92 FL (ref 79–97)
PLATELET # BLD AUTO: 301 X10E3/UL (ref 150–379)
POTASSIUM SERPL-SCNC: 4.2 MMOL/L (ref 3.5–5.2)
PROT SERPL-MCNC: 6.5 G/DL (ref 6–8.5)
RBC # BLD AUTO: 4.55 X10E6/UL (ref 4.14–5.8)
SODIUM SERPL-SCNC: 143 MMOL/L (ref 134–144)
TESTOST FREE MFR SERPL: 1.28 % (ref 1.5–4.2)
TESTOST FREE SERPL-MCNC: 1.52 NG/DL (ref 5–21)
TESTOST SERPL-MCNC: 119 NG/DL (ref 264–916)
TRIGL SERPL-MCNC: 582 MG/DL (ref 0–149)
VLDLC SERPL CALC-MCNC: ABNORMAL MG/DL (ref 5–40)
WBC # BLD AUTO: 6.7 X10E3/UL (ref 3.4–10.8)

## 2017-07-31 DIAGNOSIS — M17.0 PRIMARY OSTEOARTHRITIS OF BOTH KNEES: ICD-10-CM

## 2017-07-31 RX ORDER — TESTOSTERONE GEL, 1% 10 MG/G
100 GEL TRANSDERMAL DAILY
Qty: 180 PACKAGE | Refills: 1 | Status: CANCELLED | OUTPATIENT
Start: 2017-07-31

## 2017-07-31 RX ORDER — TRAMADOL HYDROCHLORIDE 50 MG/1
100 TABLET ORAL 3 TIMES DAILY PRN
Qty: 180 TABLET | Refills: 2 | Status: CANCELLED | OUTPATIENT
Start: 2017-07-31

## 2017-08-03 ENCOUNTER — OFFICE VISIT (OUTPATIENT)
Dept: FAMILY MEDICINE CLINIC | Facility: CLINIC | Age: 42
End: 2017-08-03

## 2017-08-03 VITALS
BODY MASS INDEX: 33.31 KG/M2 | HEART RATE: 91 BPM | OXYGEN SATURATION: 94 % | WEIGHT: 251.3 LBS | DIASTOLIC BLOOD PRESSURE: 86 MMHG | SYSTOLIC BLOOD PRESSURE: 126 MMHG | HEIGHT: 73 IN

## 2017-08-03 DIAGNOSIS — E34.9 TESTOSTERONE DEFICIENCY: Primary | ICD-10-CM

## 2017-08-03 DIAGNOSIS — E78.00 ELEVATED CHOLESTEROL: ICD-10-CM

## 2017-08-03 DIAGNOSIS — G43.109 MIGRAINE WITH AURA AND WITHOUT STATUS MIGRAINOSUS, NOT INTRACTABLE: ICD-10-CM

## 2017-08-03 DIAGNOSIS — G47.00 INSOMNIA, UNSPECIFIED TYPE: ICD-10-CM

## 2017-08-03 PROCEDURE — 99213 OFFICE O/P EST LOW 20 MIN: CPT | Performed by: INTERNAL MEDICINE

## 2017-08-03 RX ORDER — MELOXICAM 15 MG/1
TABLET ORAL
Refills: 0 | COMMUNITY
Start: 2017-07-10 | End: 2017-08-03 | Stop reason: ALTCHOICE

## 2017-08-03 RX ORDER — TRAZODONE HYDROCHLORIDE 50 MG/1
TABLET ORAL
Qty: 60 TABLET | Refills: 1 | Status: SHIPPED | OUTPATIENT
Start: 2017-08-03 | End: 2017-11-04 | Stop reason: SDUPTHER

## 2017-08-03 NOTE — PROGRESS NOTES
Subjective   Arelis Munson is a 42 y.o. male who presents today for:    Osteoarthritis (5 week f/u & review labs) and Hyperlipidemia    History of Present Illness   Knee pain- OA. Chronic, intermittent, but in a recurrently persistent fashion, affecting both knees, worse with prolonged standing and bending. Treated by Dr. Tyrell Eddy with steroid injections with good relief. He also wears a hinged brace on the right knee and a brace on the left at times. He takes Lodine every day as well.      Between injections, he uses tramadol 100 mg on the order of 2 tablets 2-3 times per day, to keep himself functional. He reports that this helps take the edge off the pain, allows him to continue work, without causing excess sedation or constipation.      The tramadol also helps with burning right upper extremity pain that developed after an ulnar nerve release. It goes up to his shoulder at times, and is usually aggravated by work.  We started gabapentin for the migraines with good benefit and improvement regarding his ulnar neuropathy/elbow pain.    History of testosterone deficiency going back at least to 2013, treated with injections initially, but now with topical testosterone supplements. He has tolerated these well, noting an improvement in his strength and stamina. It helps with his libido as well. He still uses Viagra for erectile dysfunction. He is maintained on Vytorin for hyperlipidemia; he denies any cardiovascular symptoms.  Levels were too high last year, so we had him cut back form 100 mg to 50 mg daily.  He felt less energetic and libido declined, so we increased the dose to 75 mg daily.  He has not felt much differently.    Mr. Munson  reports that he has quit smoking. His smoking use included Cigarettes. He quit after 31.00 years of use. He has never used smokeless tobacco. He reports that he drinks about 1.2 oz of alcohol per week  He reports that he does not use illicit drugs.     No Known  Allergies    Current Outpatient Prescriptions:   •  aspirin 81 MG EC tablet, Take 81 mg by mouth Daily., Disp: , Rfl:   •  azelastine (ASTELIN) 0.1 % nasal spray, 2 sprays into each nostril 2 (two) times a day. Use in each nostril as directed, Disp: , Rfl:   •  B Complex Vitamins (VITAMIN B COMPLEX PO), Take  by mouth daily., Disp: , Rfl:   •  caffeine 200 MG tablet, Take 200 mg by mouth Every 4 (Four) Hours As Needed., Disp: , Rfl:   •  Cholecalciferol (VITAMIN D3) 5000 UNITS capsule capsule, Take 5,000 Units by mouth Daily., Disp: , Rfl:   •  COCONUT OIL PO, Take  by mouth., Disp: , Rfl:   •  diclofenac (VOLTAREN) 1 % gel gel, Apply 4 g topically 4 (Four) Times a Day., Disp: 4 tube, Rfl: 3  •  docusate sodium (COLACE) 250 MG capsule, Take 250 mg by mouth Daily., Disp: , Rfl:   •  esomeprazole (nexIUM) 40 MG capsule, Take 1 capsule by mouth Every Night., Disp: 90 capsule, Rfl: 3  •  etodolac (LODINE) 500 MG tablet, Take 1 tablet by mouth 2 (Two) Times a Day., Disp: 180 tablet, Rfl: 3  •  ezetimibe-simvastatin (VYTORIN) 10-40 MG per tablet, Take 1 tablet by mouth Every Night., Disp: 90 tablet, Rfl: 3  •  FIBER PO, Take  by mouth., Disp: , Rfl:   •  fluticasone (VERAMYST) 27.5 MCG/SPRAY nasal spray, 2 sprays into each nostril daily., Disp: , Rfl:   •  gabapentin (NEURONTIN) 600 MG tablet, Take 1 tablet by mouth 3 (Three) Times a Day., Disp: 270 tablet, Rfl: 1  •  Ginkgo Biloba 120 MG tablet, Take  by mouth., Disp: , Rfl:   •  GuaiFENesin (MUCINEX PO), Take  by mouth 2 (two) times a day., Disp: , Rfl:   •  Krill Oil Omega-3 500 MG capsule, Take  by mouth., Disp: , Rfl:   •  L-Theanine 100 MG capsule, Take  by mouth., Disp: , Rfl:   •  Magnesium 500 MG capsule, Take 500 mg by mouth every night., Disp: , Rfl:   •  melatonin 1 MG tablet, Take 3 mg by mouth every night., Disp: , Rfl:   •  meloxicam (MOBIC) 15 MG tablet, TAKE 1 Tablet BY MOUTH ONCE DAILY, Disp: , Rfl: 0  •  Misc Natural Products (OSTEO BI-FLEX TRIPLE  STRENGTH PO), Take  by mouth., Disp: , Rfl:   •  montelukast (SINGULAIR) 10 MG tablet, Take 1 tablet by mouth Every Night., Disp: 90 tablet, Rfl: 3  •  Multiple Vitamin (MULTI VITAMIN DAILY PO), Take  by mouth daily., Disp: , Rfl:   •  olopatadine (PATANASE) 0.6 % solution nasal solution, 2 sprays by Each Nare route 2 (Two) Times a Day., Disp: 91.5 g, Rfl: 3  •  Potassium 99 MG tablet, Take  by mouth., Disp: , Rfl:   •  PROVENTIL  (90 BASE) MCG/ACT inhaler, Inhale 2 puffs every 6 (six) hours as needed., Disp: , Rfl:   •  PULMICORT FLEXHALER 180 MCG/ACT inhaler, Inhale 1 puff 2 (Two) Times a Day., Disp: 3 inhaler, Rfl: 3  •  pyridoxine (VITAMIN B-6) 100 MG tablet tablet, Take 100 mg by mouth Daily., Disp: , Rfl:   •  rizatriptan MLT (MAXALT-MLT) 10 MG disintegrating tablet, Take 1 tablet by mouth 1 (One) Time As Needed for migraine., Disp: 27 tablet, Rfl: 3  •  SALINE NASAL SPRAY NA, into each nostril., Disp: , Rfl:   •  selenium sulfide (SELSUN) 2.5 % shampoo, Apply  topically Daily As Needed for dandruff., Disp: 354 mL, Rfl: 3  •  testosterone (ANDROGEL) 50 MG/5GM (1%) gel gel, Place 100 mg on the skin Daily., Disp: 180 package, Rfl: 1  •  traMADol (ULTRAM) 50 MG tablet, Take 2 tablets by mouth 3 (Three) Times a Day As Needed for Moderate Pain (4-6)., Disp: 180 tablet, Rfl: 2  •  VIAGRA 100 MG tablet, Take 1 tablet by mouth As Needed for erectile dysfunction., Disp: 30 tablet, Rfl: 3  •  vitamin B-12 (CYANOCOBALAMIN) 1000 MCG tablet, Take 1,000 mcg by mouth Daily., Disp: , Rfl:   •  zolpidem CR (AMBIEN CR) 12.5 MG CR tablet, Take 12.5 mg by mouth at night as needed., Disp: , Rfl:       Review of Systems   Constitutional: Negative for diaphoresis and fatigue.   HENT: Positive for congestion, postnasal drip, rhinorrhea and sinus pressure. Negative for trouble swallowing.    Eyes: Negative for visual disturbance.   Respiratory: Positive for apnea, cough and shortness of breath. Negative for chest tightness and  "wheezing (rarely).    Cardiovascular: Negative for chest pain, palpitations and leg swelling.   Gastrointestinal: Negative for abdominal distention, abdominal pain, diarrhea and nausea.        Dyspepsia/reflux, controlled with the PPI.   Endocrine:        Low libido, improved with testosterone.   Genitourinary: Negative for difficulty urinating, hematuria and urgency.        Erectile dysfunction   Musculoskeletal: Negative for myalgias.   Skin: Negative for color change (No jaundice).        Scaling/flaking of the skin of the scalp.   Allergic/Immunologic: Positive for environmental allergies. Negative for immunocompromised state.   Neurological: Negative for dizziness, syncope, numbness and headaches.   Hematological: Does not bruise/bleed easily.   Psychiatric/Behavioral: Positive for sleep disturbance (treated by Dr. Kong). The patient is not nervous/anxious.        Objective   Vitals:    08/03/17 1609   BP: 126/86   BP [place] Left arm   Patient Position: Sitting   Cuff Size: Large Adult   Pulse: 91   SpO2: 94%   Weight: 251 lb 4.8 oz (114 kg)   Height: 73\" (185.4 cm)     Physical Exam   Constitutional: He is oriented to person, place, and time. He appears well-developed and well-nourished. No distress.   Eyes: Conjunctivae are normal. No scleral icterus.   No xanthelasma.   Cardiovascular: Normal rate, regular rhythm and normal heart sounds.    Neurological: He is alert and oriented to person, place, and time. Coordination normal.   Psychiatric: He has a normal mood and affect. His behavior is normal.       Assessment/Plan   Arelis was seen today for osteoarthritis and hyperlipidemia.    Diagnoses and all orders for this visit:    Testosterone deficiency  Comments:  Contnue the current dose, unchanged, until we find out about testosterone implants.  He will likely need a referral to a urologist or an endocrinologist for testosterone implants.    Insomnia, unspecified type  Comments:  Try to walk off the " Ambien.  Written instructions were given to the patient today.  Orders:  -     traZODone (DESYREL) 50 MG tablet; 1-2 po qhs prn insomnia    Migraine with aura and without status migrainosus, not intractable  Comments:  Continue the gabapentin. This has worked better than the Topamax.    Elevated cholesterol  Comments:  Numbers don't make sense, so we will recheck the next time labs are needed.

## 2017-08-08 ENCOUNTER — CLINICAL SUPPORT (OUTPATIENT)
Dept: ORTHOPEDIC SURGERY | Facility: CLINIC | Age: 42
End: 2017-08-08

## 2017-08-08 VITALS — TEMPERATURE: 97.6 F | BODY MASS INDEX: 33.13 KG/M2 | HEIGHT: 73 IN | WEIGHT: 250 LBS

## 2017-08-08 DIAGNOSIS — M25.561 CHRONIC PAIN OF BOTH KNEES: Primary | ICD-10-CM

## 2017-08-08 DIAGNOSIS — M25.562 CHRONIC PAIN OF BOTH KNEES: Primary | ICD-10-CM

## 2017-08-08 DIAGNOSIS — G89.29 CHRONIC PAIN OF BOTH KNEES: Primary | ICD-10-CM

## 2017-08-08 DIAGNOSIS — M17.0 PRIMARY OSTEOARTHRITIS OF BOTH KNEES: ICD-10-CM

## 2017-08-08 PROCEDURE — 99212 OFFICE O/P EST SF 10 MIN: CPT | Performed by: NURSE PRACTITIONER

## 2017-08-08 PROCEDURE — 20610 DRAIN/INJ JOINT/BURSA W/O US: CPT | Performed by: NURSE PRACTITIONER

## 2017-08-08 RX ORDER — BUPIVACAINE HYDROCHLORIDE 5 MG/ML
2 INJECTION, SOLUTION PERINEURAL
Status: COMPLETED | OUTPATIENT
Start: 2017-08-08 | End: 2017-08-08

## 2017-08-08 RX ORDER — METHYLPREDNISOLONE ACETATE 80 MG/ML
80 INJECTION, SUSPENSION INTRA-ARTICULAR; INTRALESIONAL; INTRAMUSCULAR; SOFT TISSUE
Status: COMPLETED | OUTPATIENT
Start: 2017-08-08 | End: 2017-08-08

## 2017-08-08 RX ORDER — LIDOCAINE HYDROCHLORIDE 10 MG/ML
2 INJECTION, SOLUTION INFILTRATION; PERINEURAL
Status: COMPLETED | OUTPATIENT
Start: 2017-08-08 | End: 2017-08-08

## 2017-08-08 RX ADMIN — LIDOCAINE HYDROCHLORIDE 2 ML: 10 INJECTION, SOLUTION INFILTRATION; PERINEURAL at 08:13

## 2017-08-08 RX ADMIN — BUPIVACAINE HYDROCHLORIDE 2 ML: 5 INJECTION, SOLUTION PERINEURAL at 08:13

## 2017-08-08 RX ADMIN — METHYLPREDNISOLONE ACETATE 80 MG: 80 INJECTION, SUSPENSION INTRA-ARTICULAR; INTRALESIONAL; INTRAMUSCULAR; SOFT TISSUE at 08:13

## 2017-08-08 NOTE — PROGRESS NOTES
Patient Name: Arelis Munson   YOB: 1975  Referring Primary Care Physician: Jovon Aviles MD      Chief Complaint:    Chief Complaint   Patient presents with   • Left Knee - Follow-up   • Right Knee - Follow-up        Subjective:    HPI:   Arelis Munson is a pleasant 42 y.o. year old who presents today for evaluation of   Chief Complaint   Patient presents with   • Left Knee - Follow-up   • Right Knee - Follow-up   .  KNEE: TIMING:  The pain is described as ACUTE on CHRONIC.  LOCATION: medial joint line tenderness. AGGRAVATING FACTORS:  Is worsened by prolonged standing, sitting, walking and squatting activities.  ASSOCIATED SYMPTOMS:  swelling, tenderness, and aching. OTHER SYMPTOMS denies popping, locking or catching. RELIEVING FACTORS:  Previous treatment has included cortisone injections  OTC Tylenol  activtiy modification  ice/heat/rest.    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     Medications:   Home Medications:  Current Outpatient Prescriptions on File Prior to Visit   Medication Sig   • aspirin 81 MG EC tablet Take 81 mg by mouth Daily.   • azelastine (ASTELIN) 0.1 % nasal spray 2 sprays into each nostril 2 (two) times a day. Use in each nostril as directed   • B Complex Vitamins (VITAMIN B COMPLEX PO) Take  by mouth daily.   • caffeine 200 MG tablet Take 200 mg by mouth Every 4 (Four) Hours As Needed.   • Cholecalciferol (VITAMIN D3) 5000 UNITS capsule capsule Take 5,000 Units by mouth Daily.   • COCONUT OIL PO Take  by mouth.   • diclofenac (VOLTAREN) 1 % gel gel Apply 4 g topically 4 (Four) Times a Day.   • docusate sodium (COLACE) 250 MG capsule Take 250 mg by mouth Daily.   • esomeprazole (nexIUM) 40 MG capsule Take 1 capsule by mouth Every Night.   • etodolac (LODINE) 500 MG tablet Take 1 tablet by mouth 2 (Two) Times a Day.   • ezetimibe-simvastatin (VYTORIN) 10-40 MG per tablet Take 1 tablet by mouth Every Night.   • FIBER PO Take  by mouth.   • fluticasone (VERAMYST)  27.5 MCG/SPRAY nasal spray 2 sprays into each nostril daily.   • gabapentin (NEURONTIN) 600 MG tablet Take 1 tablet by mouth 3 (Three) Times a Day.   • Ginkgo Biloba 120 MG tablet Take  by mouth.   • GuaiFENesin (MUCINEX PO) Take  by mouth 2 (two) times a day.   • Krill Oil Omega-3 500 MG capsule Take  by mouth.   • L-Theanine 100 MG capsule Take  by mouth.   • Magnesium 500 MG capsule Take 500 mg by mouth every night.   • melatonin 1 MG tablet Take 3 mg by mouth every night.   • Misc Natural Products (OSTEO BI-FLEX TRIPLE STRENGTH PO) Take  by mouth.   • montelukast (SINGULAIR) 10 MG tablet Take 1 tablet by mouth Every Night.   • Multiple Vitamin (MULTI VITAMIN DAILY PO) Take  by mouth daily.   • olopatadine (PATANASE) 0.6 % solution nasal solution 2 sprays by Each Nare route 2 (Two) Times a Day.   • Potassium 99 MG tablet Take  by mouth.   • PROVENTIL  (90 BASE) MCG/ACT inhaler Inhale 2 puffs every 6 (six) hours as needed.   • PULMICORT FLEXHALER 180 MCG/ACT inhaler Inhale 1 puff 2 (Two) Times a Day.   • pyridoxine (VITAMIN B-6) 100 MG tablet tablet Take 100 mg by mouth Daily.   • rizatriptan MLT (MAXALT-MLT) 10 MG disintegrating tablet Take 1 tablet by mouth 1 (One) Time As Needed for migraine.   • SALINE NASAL SPRAY NA into each nostril.   • selenium sulfide (SELSUN) 2.5 % shampoo Apply  topically Daily As Needed for dandruff.   • testosterone (ANDROGEL) 50 MG/5GM (1%) gel gel Place 100 mg on the skin Daily.   • traMADol (ULTRAM) 50 MG tablet Take 2 tablets by mouth 3 (Three) Times a Day As Needed for Moderate Pain (4-6).   • traZODone (DESYREL) 50 MG tablet 1-2 po qhs prn insomnia   • VIAGRA 100 MG tablet Take 1 tablet by mouth As Needed for erectile dysfunction.   • vitamin B-12 (CYANOCOBALAMIN) 1000 MCG tablet Take 1,000 mcg by mouth Daily.   • zolpidem CR (AMBIEN CR) 12.5 MG CR tablet Take 12.5 mg by mouth at night as needed.     No current facility-administered medications on file prior to visit.       Current Medications:  Scheduled Meds:  Continuous Infusions:  No current facility-administered medications for this visit.   PRN Meds:.    I have reviewed the patient's medical history in detail and updated the computerized patient record.  Review and summarization of old records includes:    Past Medical History:   Diagnosis Date   • Asthma    • Elevated cholesterol    • Environmental allergies    • Gastroesophageal reflux disease without esophagitis 7/11/2016   • Migraine with aura and without status migrainosus, not intractable 7/11/2016   • Sleep apnea    • Testosterone deficiency 7/11/2016        Past Surgical History:   Procedure Laterality Date   • CARPAL TUNNEL RELEASE     • KNEE SURGERY     • NOSE SURGERY          Social History     Occupational History   • Not on file.     Social History Main Topics   • Smoking status: Former Smoker     Years: 31.00     Types: Cigarettes   • Smokeless tobacco: Never Used      Comment: Quit 2004   • Alcohol use 1.2 oz/week     2 Shots of liquor per week   • Drug use: No   • Sexual activity: Defer      Social History     Social History Narrative        Family History   Problem Relation Age of Onset   • Lung cancer Mother    • Lung cancer Father        ROS: 14 point review of systems was performed and all other systems were reviewed and are negative except for documented findings in HPI and today's encounter.     Allergies: No Known Allergies  Constitutional:  Denies fever, shaking or chills   Eyes:  Denies change in visual acuity   HENT:  Denies nasal congestion or sore throat   Respiratory:  Denies cough or shortness of breath   Cardiovascular:  Denies chest pain or severe LE edema   GI:  Denies abdominal pain, nausea, vomiting, bloody stools or diarrhea   Musculoskeletal:  Numbness, tingling, pain, or loss of motor function only as noted above in history of present illness.  : Denies painful urination or hematuria  Integument:  Denies rash, lesion or ulceration    Neurologic:  Denies headache or focal weakness  Endocrine:  Denies lymphadenopathy  Psych:  Denies confusion or change in mental status   Hem:  Denies active bleeding    Objective:    Physical Exam: 42 y.o. male  Body mass index is 32.98 kg/(m^2)., @WT@, @HT@  Vitals:    08/08/17 0811   Temp: 97.6 °F (36.4 °C)     Vital signs reviewed.   General Appearance:    Alert, cooperative, in no acute distress                  Eyes: conjunctiva clear  ENT: external ears and nose atraumatic  CV: no peripheral edema  Resp: normal respiratory effort  Skin: no rashes or wounds; normal turgor  Psych: mood and affect appropriate  Lymph: no nodes appreciated  Neuro: gross sensation intact  Vascular:  Palpable peripheral pulse in noted extremity  Musculoskeletal Extremities: KNEE Exam: medial joint line tenderness with crepitation, synovitis, swelling, and joint effusion bilateral knee.    Radiology:   Imaging done previously in the office and discussed at length with the patient:    Indication: pain related symptoms,  Views: 3V AP, LAT & 40 degree PA bilateral knee(s)   Findings: severe end-stage arthritis (bone on bone, subchondral sclerosis/cysts, osteophytes)  Comparison views: viewed last xray done in the office.       Assessment:     ICD-10-CM ICD-9-CM   1. Chronic pain of both knees M25.561 719.46    M25.562 338.29    G89.29    2. Primary osteoarthritis of both knees M17.0 715.16        Large Joint Arthrocentesis  Date/Time: 8/8/2017 8:13 AM  Consent given by: patient  Site marked: site marked  Timeout: Immediately prior to procedure a time out was called to verify the correct patient, procedure, equipment, support staff and site/side marked as required   Supporting Documentation  Indications: pain and joint swelling   Procedure Details  [place] knee - R knee  Preparation: Patient was prepped and draped in the usual sterile fashion  Needle size: 22 G  Approach: anterolateral  Medications administered: 2 mL bupivacaine; 2  mL lidocaine 1 %; 80 mg methylPREDNISolone acetate 80 MG/ML  Patient tolerance: patient tolerated the procedure well with no immediate complications    Large Joint Arthrocentesis  Date/Time: 8/8/2017 8:13 AM  Consent given by: patient  Site marked: site marked  Timeout: Immediately prior to procedure a time out was called to verify the correct patient, procedure, equipment, support staff and site/side marked as required   Supporting Documentation  Indications: pain and joint swelling   Procedure Details  [place] knee - L knee  Preparation: Patient was prepped and draped in the usual sterile fashion  Needle size: 22 G  Approach: anterolateral  Medications administered: 2 mL bupivacaine; 2 mL lidocaine 1 %; 80 mg methylPREDNISolone acetate 80 MG/ML  Patient tolerance: patient tolerated the procedure well with no immediate complications             Plan: Biomechanics of pertinent body area discussed.  Risks, benefits, alternatives, comparisons, and complications of accepted medicines, injections, recommendations, surgical procedures, and therapies explained and education provided in laymen's terms. The patient was given the opportunity to ask questions and they were answerved to their satisfaction.   Natural history and expected course discussed. Questions answered.  Advice on benefits of, and types of regular/moderate exercise.  Lifestyle measures for weight loss with biomechanical explanations for weight loss and how this affects orthopedic condition.  Cortisone Injection. See procedure note.  OTC analgesics as needed with dosage warning and instructions given.  Cryotherapy/brachy therapy as indicated with instructions.   10 min spent face to face with patient >5 min spent counseling about natural history and expected course of assessed complaint. Questions answered.  Advised on knee strengthening exercises, stressed importance of these with progression of arthritis, demonstrated exercises to patient with repeat  demonstration and verb of understanding.   Rest, ice, compression, and elevation (RICE) therapy.  Pertinent educational materials distributed.  IS doing some PT following right ankle fx and encouraged to do some knee strengthening exercises.     8/8/2017   MJR

## 2017-08-08 NOTE — PATIENT INSTRUCTIONS
[facility]    KNEE HOME EXERCISES      It is important that you maintain and possibly improve your strength and range of motion of your knee.      •  Walk frequently for short intervals  •  Using a cane or walker to allow you to walk safely if needed  •  Avoid sitting for long periods of time to avoid cramping and swelling of your leg   •  Do exercises either on a bed or in a chair.  •  If any of the exercises cause you pain, either eliminate that exercise or decrease          the motion or repetitions      Start exercises with 10 repetitions and increase to 30 repetitions.  Do two sets of each exercise each day    ANKLE PUMPS    1. Move your feet up and down as if you are pumping on a gas pedal       STRAIGHT LEG RAISES    1. Lie flat with your injured leg straight.  Keep the other leg bent.  2. Tighten the injured leg's thigh muscle.  3. Lift leg, with knee locked in the straight position no higher than the other knee for  seconds  4. Lower leg slowly. Rest for 5 seconds      SHORT ARC QUAD    1. Lie with both knees bent over a pillow  2. Straighten both knees  3. Hold 5 seconds  4. Bend knees back to starting position and repeat sequence       QUADRICEPS     1. Lie flat with your injured let straight.  Keep the other leg bent.  2. Tighten the injured leg's thigh muscle by trying to move your kneecap toward the  thigh.  3. Make your leg as stiff as you can.  4. Hold for 5 seconds and relax for 5 seconds        HAMSTRING STRETCH    1. Lie flat with your injured leg straight. Keep the other leg bent  2. Press your heel of your injured leg into the floor for 5 seconds       OR  1. Sit with injured leg out straight.   2. Loop a sheet around the ball of foot and toes.  3. Gently pull on the sheet, keeping the leg straight  4. Hold for 10-30 seconds      KNEE FLEXION-EXTENSION SITTING     1. Sit with injured let bent as shown  2. Straighten leg at the knee  3. Hold 5 seconds  4. Bend knee back  to starting position   5. Rest for 2-5 seconds and repeat sequence       HEEL SLIDES     1. Lie on back with legs straight  2. Slide heels toward buttocks  3. Return to starting position       HEEL SLIDS WITH SHEET    1. Lie on your back with a sheet wrapped around your foot  2. Pull on the sheet to assist you in bending your knee and sliding your heel toward  your buttocks  3. Hold for 5 seconds        KNEE FLEXION STRETCH    1. Sit in a chair  2. Bend bad knee back as far as you can   3. Hold stretch for 5-10 seconds      CHAIR PUSH UPS    1. Sit in a chair with arm rests  2. Place hands on armrests  3. Straighten arms, raising buttocks up off of chair         WALL SLIDES    1. Stand with your back and head against a wall.    2. Keep your feet shoulder width apart and 6-12 inches from the wall  3. Slowly slide down the wall until your knees are slightly bent.    4. Hold for 5 seconds and then slowly slide back up  5. As you get stronger, then you can slowly increase the bend in your knees, but do  not drop your buttocks below the level of your knees       STEP UPS    1. Stand with your foot on your injured leg on a 3-5 inch support (such as a block of  wood)  2. Place other foot on the floor  3. Shift your weight onto the foot on the block and try to straighten the knee and  raising the other foot off of the floor  4. Slowly lower your foot until only the heel touches the floor

## 2017-08-09 RX ORDER — TESTOSTERONE GEL, 1% 10 MG/G
100 GEL TRANSDERMAL DAILY
Qty: 180 PACKAGE | Refills: 1 | OUTPATIENT
Start: 2017-08-09 | End: 2017-12-27 | Stop reason: SDUPTHER

## 2017-09-13 DIAGNOSIS — E34.9 TESTOSTERONE DEFICIENCY: ICD-10-CM

## 2017-09-13 DIAGNOSIS — E78.00 ELEVATED CHOLESTEROL: Primary | ICD-10-CM

## 2017-09-14 ENCOUNTER — TELEPHONE (OUTPATIENT)
Dept: SLEEP MEDICINE | Facility: HOSPITAL | Age: 42
End: 2017-09-14

## 2017-09-14 ENCOUNTER — HOSPITAL ENCOUNTER (OUTPATIENT)
Dept: SLEEP MEDICINE | Facility: HOSPITAL | Age: 42
Discharge: HOME | End: 2017-09-14
Admitting: INTERNAL MEDICINE

## 2017-09-14 PROCEDURE — 99213 OFFICE O/P EST LOW 20 MIN: CPT | Performed by: INTERNAL MEDICINE

## 2017-09-14 PROCEDURE — G0463 HOSPITAL OUTPT CLINIC VISIT: HCPCS

## 2017-09-16 NOTE — PROGRESS NOTES
[facility] Note       Patient Care Team:  Jovon Aviles MD as PCP - General (Internal Medicine)  Tyrell Eddy MD as Consulting Physician (Orthopedic Surgery)  Ronni Kong MD as Consulting Physician (Sleep Medicine)    Chief Complaint:  NITA     Interval History:   The patient was last seen by me in April of this year.  He states he is unchanged.  He goes to bed 11 PM and awakens between 5:30 or 6 AM.  He awakens once or twice due to congestion.  [place] Sleepiness Scale is normal at 4 or 5.  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    Since I last saw him, he broke his ankle and May of this year.  He required ORIF.    Review of Systems:  Recorded on the Sleep Questionnaire.  Unremarkable except for nasal congestion and postnasal drip, cough, ADAM, and ear pain.    Social History:  He does not smoke cigarettes.  2 or 3 alcoholic drinks per week.  He states he drinks a lot of soda and coffee.    Allergies:  No known medical allergies.     Medication Review:  His list was reviewed.      Vital Signs:  Height 70 inches and weight 245 and he is obese with a body mass index of 35-36.    Physical Exam:    Constitutional:  Well developed white male and appears in no apparent distress.  Awake & oriented times 3.  Normal mood with normal recent and remote memory and normal judgement.  Eyes:  Conjunctivae normal.  Oropharynx:  moist mucous membranes without exudate and a large tongue and normal uvula and class III MP airway.      Results Review:  DME is N[facility] and he uses a fullface mask.  Downloads between March 18 and September 13, 2017 compliances 95% and average usage is 6 hours and 35 minutes and average AHI is 5.7 with a borderline leak.  Average auto CPAP pressure is 12.1 and his auto CPAP is 7-16.       Impression:   Obstructive sleep apnea nearly adequately treated with auto titrating CPAP with good compliance and usage and no complaints of hypersomnolence.  Psychophysiologic insomnia  previously treated with Ambien.  The patient is trying to transition to trazodone.      Plan:  Good sleep hygiene measures should be maintained.  Weight loss would be beneficial in this patient who is obese by BMI.  The patient is benefiting from the treatment being provided.     The patient will continue auto CPAP.  Auto CPAP pressures will be changed to 8-14.  I've encouraged the patient did take trazodone 50 mg nightly.  He should take his Ambien every other day for 1 week then every third day for one week and then every fourth day for one week and stop it.    The patient will call for any problems and will follow up in 8-9 months.      Ronni Kong MD  09/16/17  9:05 AM

## 2017-09-17 ENCOUNTER — RESULTS ENCOUNTER (OUTPATIENT)
Dept: FAMILY MEDICINE CLINIC | Facility: CLINIC | Age: 42
End: 2017-09-17

## 2017-09-17 DIAGNOSIS — E78.00 ELEVATED CHOLESTEROL: ICD-10-CM

## 2017-09-17 DIAGNOSIS — E34.9 TESTOSTERONE DEFICIENCY: ICD-10-CM

## 2017-09-21 ENCOUNTER — OFFICE VISIT (OUTPATIENT)
Dept: FAMILY MEDICINE CLINIC | Facility: CLINIC | Age: 42
End: 2017-09-21

## 2017-09-21 VITALS
DIASTOLIC BLOOD PRESSURE: 86 MMHG | BODY MASS INDEX: 32.91 KG/M2 | WEIGHT: 248.3 LBS | OXYGEN SATURATION: 98 % | HEART RATE: 79 BPM | HEIGHT: 73 IN | SYSTOLIC BLOOD PRESSURE: 120 MMHG

## 2017-09-21 DIAGNOSIS — G47.00 INSOMNIA, UNSPECIFIED TYPE: ICD-10-CM

## 2017-09-21 DIAGNOSIS — M17.0 PRIMARY OSTEOARTHRITIS OF BOTH KNEES: ICD-10-CM

## 2017-09-21 DIAGNOSIS — M25.561 CHRONIC PAIN OF BOTH KNEES: Primary | ICD-10-CM

## 2017-09-21 DIAGNOSIS — G43.109 MIGRAINE WITH AURA AND WITHOUT STATUS MIGRAINOSUS, NOT INTRACTABLE: ICD-10-CM

## 2017-09-21 DIAGNOSIS — G89.29 CHRONIC PAIN OF BOTH KNEES: Primary | ICD-10-CM

## 2017-09-21 DIAGNOSIS — M25.562 CHRONIC PAIN OF BOTH KNEES: Primary | ICD-10-CM

## 2017-09-21 DIAGNOSIS — Z79.899 ENCOUNTER FOR LONG-TERM (CURRENT) USE OF MEDICATIONS: ICD-10-CM

## 2017-09-21 DIAGNOSIS — J30.9 ALLERGIC RHINITIS, UNSPECIFIED ALLERGIC RHINITIS TRIGGER, UNSPECIFIED RHINITIS SEASONALITY: ICD-10-CM

## 2017-09-21 PROCEDURE — 99214 OFFICE O/P EST MOD 30 MIN: CPT | Performed by: INTERNAL MEDICINE

## 2017-09-21 RX ORDER — LIDOCAINE 50 MG/G
OINTMENT TOPICAL
Refills: 1 | COMMUNITY
Start: 2017-09-19 | End: 2017-09-21

## 2017-09-21 RX ORDER — GABAPENTIN 600 MG/1
600 TABLET ORAL 3 TIMES DAILY
Qty: 270 TABLET | Refills: 1
Start: 2017-09-21 | End: 2017-09-21 | Stop reason: SDUPTHER

## 2017-09-21 RX ORDER — TRAMADOL HYDROCHLORIDE 50 MG/1
100 TABLET ORAL 3 TIMES DAILY PRN
Qty: 180 TABLET | Refills: 2 | Status: SHIPPED | OUTPATIENT
Start: 2017-09-21 | End: 2017-12-27 | Stop reason: SDUPTHER

## 2017-09-21 RX ORDER — GABAPENTIN 600 MG/1
600 TABLET ORAL 3 TIMES DAILY
Qty: 270 TABLET | Refills: 1 | Status: SHIPPED | OUTPATIENT
Start: 2017-09-21 | End: 2017-12-27 | Stop reason: SDUPTHER

## 2017-09-21 NOTE — PROGRESS NOTES
Subjective   Arelis Munson is a 42 y.o. male who presents today for:    Osteoarthritis (CSE)    History of Present Illness   Knee pain- OA. Chronic, intermittent, but in a recurrently persistent fashion, affecting both knees, worse with prolonged standing and bending. Treated by Dr. Tyrell Eddy with steroid injections with good relief. He also wears a hinged brace on the right knee and a brace on the left at times. He takes Lodine every day as well.      Between injections, he uses tramadol 100 mg on the order of 2 tablets 2-3 times per day, to keep himself functional. He reports that this helps take the edge off the pain, allows him to continue work, without causing excess sedation or constipation.      The tramadol also helps with burning right upper extremity pain that developed after an ulnar nerve release. It goes up to his shoulder at times, and is usually aggravated by work.  We started gabapentin for the migraines with good benefit and improvement regarding his ulnar neuropathy/elbow pain.    He also has chronic insomnia for which he has been taking Ambien. At our last visit, we asked him to start cutting back on Ambien and try taking trazodone instead, gradually increasing the dose if necessary. He has done so, but still takes Ambien, one half tablet nightly.    Mr. Munson  reports that he has quit smoking. His smoking use included Cigarettes. He quit after 31.00 years of use. He has never used smokeless tobacco. He reports that he drinks about 1.2 oz of alcohol per week  He reports that he does not use illicit drugs.     No Known Allergies    Current Outpatient Prescriptions:   •  aspirin 81 MG EC tablet, Take 81 mg by mouth Daily., Disp: , Rfl:   •  azelastine (ASTELIN) 0.1 % nasal spray, 2 sprays into each nostril 2 (two) times a day. Use in each nostril as directed, Disp: , Rfl:   •  B Complex Vitamins (VITAMIN B COMPLEX PO), Take  by mouth daily., Disp: , Rfl:   •  Cholecalciferol (VITAMIN D3) 5000  UNITS capsule capsule, Take 5,000 Units by mouth Daily., Disp: , Rfl:   •  diclofenac (VOLTAREN) 1 % gel gel, Apply 4 g topically 4 (Four) Times a Day., Disp: 4 tube, Rfl: 3  •  docusate sodium (COLACE) 250 MG capsule, Take 250 mg by mouth Daily., Disp: , Rfl:   •  esomeprazole (nexIUM) 40 MG capsule, Take 1 capsule by mouth Every Night., Disp: 90 capsule, Rfl: 3  •  etodolac (LODINE) 500 MG tablet, Take 1 tablet by mouth 2 (Two) Times a Day., Disp: 180 tablet, Rfl: 3  •  ezetimibe-simvastatin (VYTORIN) 10-40 MG per tablet, Take 1 tablet by mouth Every Night., Disp: 90 tablet, Rfl: 3  •  FIBER PO, Take  by mouth., Disp: , Rfl:   •  fluticasone (VERAMYST) 27.5 MCG/SPRAY nasal spray, 2 sprays into each nostril daily., Disp: , Rfl:   •  gabapentin (NEURONTIN) 600 MG tablet, Take 1 tablet by mouth 3 (Three) Times a Day., Disp: 270 tablet, Rfl: 1  •  melatonin 1 MG tablet, Take 3 mg by mouth every night., Disp: , Rfl:   •  Misc Natural Products (OSTEO BI-FLEX TRIPLE STRENGTH PO), Take  by mouth., Disp: , Rfl:   •  montelukast (SINGULAIR) 10 MG tablet, Take 1 tablet by mouth Every Night., Disp: 90 tablet, Rfl: 3  •  Multiple Vitamin (MULTI VITAMIN DAILY PO), Take  by mouth daily., Disp: , Rfl:   •  olopatadine (PATANASE) 0.6 % solution nasal solution, 2 sprays by Each Nare route 2 (Two) Times a Day., Disp: 91.5 g, Rfl: 3  •  Potassium 99 MG tablet, Take  by mouth., Disp: , Rfl:   •  PROVENTIL  (90 BASE) MCG/ACT inhaler, Inhale 2 puffs every 6 (six) hours as needed., Disp: , Rfl:   •  PULMICORT FLEXHALER 180 MCG/ACT inhaler, Inhale 1 puff 2 (Two) Times a Day., Disp: 3 inhaler, Rfl: 3  •  pyridoxine (VITAMIN B-6) 100 MG tablet tablet, Take 100 mg by mouth Daily., Disp: , Rfl:   •  rizatriptan MLT (MAXALT-MLT) 10 MG disintegrating tablet, Take 1 tablet by mouth 1 (One) Time As Needed for migraine., Disp: 27 tablet, Rfl: 3  •  SALINE NASAL SPRAY NA, into each nostril., Disp: , Rfl:   •  selenium sulfide (SELSUN) 2.5 %  "shampoo, Apply  topically Daily As Needed for dandruff., Disp: 354 mL, Rfl: 3  •  testosterone (ANDROGEL) 50 MG/5GM (1%) gel gel, Place 100 mg on the skin Daily., Disp: 180 package, Rfl: 1  •  traMADol (ULTRAM) 50 MG tablet, Take 2 tablets by mouth 3 (Three) Times a Day As Needed for Moderate Pain (4-6)., Disp: 180 tablet, Rfl: 2  •  traZODone (DESYREL) 50 MG tablet, 1-2 po qhs prn insomnia, Disp: 60 tablet, Rfl: 1  •  VIAGRA 100 MG tablet, Take 1 tablet by mouth As Needed for erectile dysfunction., Disp: 30 tablet, Rfl: 3  •  GuaiFENesin (MUCINEX PO), Take  by mouth 2 (two) times a day., Disp: , Rfl:   •  vitamin B-12 (CYANOCOBALAMIN) 1000 MCG tablet, Take 1,000 mcg by mouth Daily., Disp: , Rfl:   •  zolpidem CR (AMBIEN CR) 12.5 MG CR tablet, Take 12.5 mg by mouth at night as needed., Disp: , Rfl:       Review of Systems   HENT: Negative for trouble swallowing.    Respiratory: Positive for apnea. Negative for chest tightness and wheezing (rarely).    Cardiovascular: Negative for chest pain, palpitations and leg swelling.   Gastrointestinal: Negative for abdominal pain and nausea.        Dyspepsia/reflux, controlled with the PPI.   Endocrine:        Low libido, improved with testosterone.   Genitourinary: Negative for urgency.        Erectile dysfunction   Musculoskeletal: Positive for arthralgias.   Allergic/Immunologic: Positive for environmental allergies. Negative for immunocompromised state.   Neurological: Positive for headaches (rare on the gabapentin). Negative for dizziness, syncope and numbness.   Psychiatric/Behavioral: Positive for sleep disturbance. The patient is not nervous/anxious.          Objective   Vitals:    09/21/17 1627   BP: 120/86   BP [place] Left arm   Patient Position: Sitting   Cuff Size: Large Adult   Pulse: 79   SpO2: 98%   Weight: 248 lb 4.8 oz (113 kg)   Height: 73\" (185.4 cm)     Physical Exam  Well-developed, well-nourished, in no acute distress.  Mood is upbeat affect is " appropriate.  Insight and judgment are good.  No periorbital edema.  No evidence of active synovitis in any extremity. Functional range of motion present in all extremities.  Station and gait are normal. No gross motor neuro deficit.    Assessment/Plan   Arelis was seen today for osteoarthritis.    Diagnoses and all orders for this visit:    Chronic pain of both knees    Primary osteoarthritis of both knees  -     traMADol (ULTRAM) 50 MG tablet; Take 2 tablets by mouth 3 (Three) Times a Day As Needed for Moderate Pain .    Insomnia, unspecified type    Allergic rhinitis, unspecified allergic rhinitis trigger, unspecified rhinitis seasonality  -     fluticasone (VERAMYST) 27.5 MCG/SPRAY nasal spray; 1 spray into each nostril 2 (Two) Times a Day.    Migraine with aura and without status migrainosus, not intractable  -     gabapentin (NEURONTIN) 600 MG tablet; Take 1 tablet by mouth 3 (Three) Times a Day.    Encounter for long-term (current) use of medications  -     [id number] 7+Oxycodone-[facility] report was obtained and reviewed during the course of that office visit today. Medications were refilled as ordered. He continues to do well on the current regimen, so we'll make no changes. Return in 3 months.

## 2017-09-22 LAB
AMPHETAMINES UR QL SCN: NEGATIVE NG/ML
BARBITURATES UR QL SCN: NEGATIVE NG/ML
BENZODIAZ UR QL: NEGATIVE NG/ML
BZE UR QL: NEGATIVE NG/ML
CANNABINOIDS UR QL SCN: NEGATIVE NG/ML
OPIATES UR QL SCN: NEGATIVE NG/ML
OXYCODONE+OXYMORPHONE UR QL SCN: NEGATIVE NG/ML
PCP UR QL: NEGATIVE NG/ML

## 2017-11-04 DIAGNOSIS — G47.00 INSOMNIA, UNSPECIFIED TYPE: ICD-10-CM

## 2017-11-07 RX ORDER — TRAZODONE HYDROCHLORIDE 50 MG/1
TABLET ORAL
Qty: 60 TABLET | Refills: 1 | Status: SHIPPED | OUTPATIENT
Start: 2017-11-07 | End: 2017-12-27 | Stop reason: ALTCHOICE

## 2017-11-09 ENCOUNTER — TRANSCRIBE ORDERS (OUTPATIENT)
Dept: ADMINISTRATIVE | Facility: HOSPITAL | Age: 42
End: 2017-11-09

## 2017-11-09 DIAGNOSIS — S82.891A CLOSED RIGHT ANKLE FRACTURE, INITIAL ENCOUNTER: Primary | ICD-10-CM

## 2017-11-14 ENCOUNTER — HOSPITAL ENCOUNTER (OUTPATIENT)
Dept: CT IMAGING | Facility: HOSPITAL | Age: 42
Discharge: HOME | End: 2017-11-14
Attending: ORTHOPAEDIC SURGERY | Admitting: ORTHOPAEDIC SURGERY

## 2017-11-14 ENCOUNTER — CLINICAL SUPPORT (OUTPATIENT)
Dept: ORTHOPEDIC SURGERY | Facility: CLINIC | Age: 42
End: 2017-11-14

## 2017-11-14 VITALS — BODY MASS INDEX: 32.87 KG/M2 | HEIGHT: 73 IN | WEIGHT: 248 LBS | TEMPERATURE: 98.5 F

## 2017-11-14 DIAGNOSIS — G89.29 CHRONIC PAIN OF BOTH KNEES: Primary | ICD-10-CM

## 2017-11-14 DIAGNOSIS — S82.891A CLOSED RIGHT ANKLE FRACTURE, INITIAL ENCOUNTER: ICD-10-CM

## 2017-11-14 DIAGNOSIS — M25.562 CHRONIC PAIN OF BOTH KNEES: Primary | ICD-10-CM

## 2017-11-14 DIAGNOSIS — M25.561 CHRONIC PAIN OF BOTH KNEES: Primary | ICD-10-CM

## 2017-11-14 PROCEDURE — 73562 X-RAY EXAM OF KNEE 3: CPT | Performed by: NURSE PRACTITIONER

## 2017-11-14 PROCEDURE — 73700 CT LOWER EXTREMITY W/O DYE: CPT

## 2017-11-14 PROCEDURE — 99212 OFFICE O/P EST SF 10 MIN: CPT | Performed by: NURSE PRACTITIONER

## 2017-11-14 PROCEDURE — 20610 DRAIN/INJ JOINT/BURSA W/O US: CPT | Performed by: NURSE PRACTITIONER

## 2017-11-14 RX ORDER — BUPIVACAINE HYDROCHLORIDE 5 MG/ML
2 INJECTION, SOLUTION EPIDURAL; INTRACAUDAL
Status: COMPLETED | OUTPATIENT
Start: 2017-11-14 | End: 2017-11-14

## 2017-11-14 RX ORDER — LIDOCAINE HYDROCHLORIDE 10 MG/ML
2 INJECTION, SOLUTION EPIDURAL; INFILTRATION; INTRACAUDAL; PERINEURAL
Status: COMPLETED | OUTPATIENT
Start: 2017-11-14 | End: 2017-11-14

## 2017-11-14 RX ORDER — METHYLPREDNISOLONE ACETATE 80 MG/ML
80 INJECTION, SUSPENSION INTRA-ARTICULAR; INTRALESIONAL; INTRAMUSCULAR; SOFT TISSUE
Status: COMPLETED | OUTPATIENT
Start: 2017-11-14 | End: 2017-11-14

## 2017-11-14 RX ORDER — SILDENAFIL CITRATE 100 MG
TABLET ORAL
Qty: 30 TABLET | Refills: 3 | Status: SHIPPED | OUTPATIENT
Start: 2017-11-14 | End: 2017-12-27 | Stop reason: SDUPTHER

## 2017-11-14 RX ADMIN — BUPIVACAINE HYDROCHLORIDE 2 ML: 5 INJECTION, SOLUTION EPIDURAL; INTRACAUDAL at 14:18

## 2017-11-14 RX ADMIN — METHYLPREDNISOLONE ACETATE 80 MG: 80 INJECTION, SUSPENSION INTRA-ARTICULAR; INTRALESIONAL; INTRAMUSCULAR; SOFT TISSUE at 14:18

## 2017-11-14 RX ADMIN — LIDOCAINE HYDROCHLORIDE 2 ML: 10 INJECTION, SOLUTION EPIDURAL; INFILTRATION; INTRACAUDAL; PERINEURAL at 14:18

## 2017-11-14 NOTE — PROGRESS NOTES
Patient: Arelis Munson  YOB: 1975    Chief Complaints:  bilateral knee pain    Subjective:    History of Present Illness: Here today for knee pain. The pain is a generalized joint tenderness.  It has been progressive in nature but remains intermittent.  Worsened by prolonged standing or walking and squatting activities. Has had improvement in the past with ice/heat, rest, and injections.     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     Allergies: No Known Allergies    Medications:   Home Medications:  Current Outpatient Prescriptions on File Prior to Visit   Medication Sig   • aspirin 81 MG EC tablet Take 81 mg by mouth Daily.   • azelastine (ASTELIN) 0.1 % nasal spray 2 sprays into each nostril 2 (two) times a day. Use in each nostril as directed   • B Complex Vitamins (VITAMIN B COMPLEX PO) Take  by mouth daily.   • Cholecalciferol (VITAMIN D3) 5000 UNITS capsule capsule Take 5,000 Units by mouth Daily.   • diclofenac (VOLTAREN) 1 % gel gel Apply 4 g topically 4 (Four) Times a Day.   • docusate sodium (COLACE) 250 MG capsule Take 250 mg by mouth Daily.   • esomeprazole (nexIUM) 40 MG capsule Take 1 capsule by mouth Every Night.   • etodolac (LODINE) 500 MG tablet Take 1 tablet by mouth 2 (Two) Times a Day.   • ezetimibe-simvastatin (VYTORIN) 10-40 MG per tablet Take 1 tablet by mouth Every Night.   • FIBER PO Take  by mouth.   • fluticasone (VERAMYST) 27.5 MCG/SPRAY nasal spray 1 spray into each nostril 2 (Two) Times a Day.   • gabapentin (NEURONTIN) 600 MG tablet Take 1 tablet by mouth 3 (Three) Times a Day.   • GuaiFENesin (MUCINEX PO) Take  by mouth 2 (two) times a day.   • melatonin 1 MG tablet Take 3 mg by mouth every night.   • Misc Natural Products (OSTEO BI-FLEX TRIPLE STRENGTH PO) Take  by mouth.   • montelukast (SINGULAIR) 10 MG tablet Take 1 tablet by mouth Every Night.   • Multiple Vitamin (MULTI VITAMIN DAILY PO) Take  by mouth daily.   • olopatadine (PATANASE) 0.6 % solution nasal  solution 2 sprays by Each Nare route 2 (Two) Times a Day.   • Potassium 99 MG tablet Take  by mouth.   • PROVENTIL  (90 BASE) MCG/ACT inhaler Inhale 2 puffs every 6 (six) hours as needed.   • PULMICORT FLEXHALER 180 MCG/ACT inhaler Inhale 1 puff 2 (Two) Times a Day.   • pyridoxine (VITAMIN B-6) 100 MG tablet tablet Take 100 mg by mouth Daily.   • rizatriptan MLT (MAXALT-MLT) 10 MG disintegrating tablet Take 1 tablet by mouth 1 (One) Time As Needed for migraine.   • SALINE NASAL SPRAY NA into each nostril.   • selenium sulfide (SELSUN) 2.5 % shampoo Apply  topically Daily As Needed for dandruff.   • testosterone (ANDROGEL) 50 MG/5GM (1%) gel gel Place 100 mg on the skin Daily.   • traMADol (ULTRAM) 50 MG tablet Take 2 tablets by mouth 3 (Three) Times a Day As Needed for Moderate Pain .   • traZODone (DESYREL) 50 MG tablet TAKE ONE TO TWO TABLETS BY MOUTH EVERY NIGHT AT BEDTIME AS NEEDED FOR INSOMNIA   • vitamin B-12 (CYANOCOBALAMIN) 1000 MCG tablet Take 1,000 mcg by mouth Daily.   • zolpidem CR (AMBIEN CR) 12.5 MG CR tablet Take 12.5 mg by mouth at night as needed.   • [DISCONTINUED] VIAGRA 100 MG tablet Take 1 tablet by mouth As Needed for erectile dysfunction.     No current facility-administered medications on file prior to visit.      Current Medications:  Scheduled Meds:  Continuous Infusions:  No current facility-administered medications for this visit.   PRN Meds:.    I have reviewed the patient's medical history in detail and updated the computerized patient record.  Review and summarization of old records include:    Past Medical History:   Diagnosis Date   • Asthma    • Elevated cholesterol    • Environmental allergies    • Gastroesophageal reflux disease without esophagitis 7/11/2016   • Migraine with aura and without status migrainosus, not intractable 7/11/2016   • Sleep apnea    • Testosterone deficiency 7/11/2016        Past Surgical History:   Procedure Laterality Date   • CARPAL TUNNEL RELEASE      • KNEE SURGERY     • NOSE SURGERY          Social History     Occupational History   • Not on file.     Social History Main Topics   • Smoking status: Former Smoker     Years: 31.00     Types: Cigarettes   • Smokeless tobacco: Never Used      Comment: Quit 2004   • Alcohol use 1.2 oz/week     2 Shots of liquor per week   • Drug use: No   • Sexual activity: Defer    Social History     Social History Narrative        Family History   Problem Relation Age of Onset   • Lung cancer Mother    • Lung cancer Father        ROS: 14 point review of systems was performed and was negative except for documented findings in HPI and today's encounter.     Allergies: No Known Allergies  Constitutional:  Denies fever, shaking or chills   Eyes:  Denies change in visual acuity   HENT:  Denies nasal congestion or sore throat   Respiratory:  Denies cough or shortness of breath   Cardiovascular:  Denies chest pain or severe LE edema   GI:  Denies abdominal pain, nausea, vomiting, bloody stools or diarrhea   Musculoskeletal:  Numbness, tingling, or loss of motor function only as noted above in history of present illness.  : Denies painful urination or hematuria  Integument:  Denies rash, lesion or ulceration   Neurologic:  Denies headache or focal weakness  Endocrine:  Denies lymphadenopathy  Psych:  Denies confusion or change in mental status   Hem:  Denies active bleeding    Physical Exam:  Body mass index is 32.72 kg/(m^2).  Vitals:    11/14/17 1409   Temp: 98.5 °F (36.9 °C)     Vital Signs:  reviewed  Constitutional: Awake alert and oriented x3, well developed, no acute distress, non-toxic appearance.  EYES: symmetric, sclera clear  ENT:  Normocephalic, Atraumatic.   Respiratory:  No respiratory distress, No wheezing  CV: pulse regular, no palpitations or pallor.  GI:  Abdomen soft, non-tender.   Vascular:  Intact distal pulses, No cyanosis, no signs or symptoms of DVT.  Neurologic: Sensation grossly intact to the involved  extremity, No focal deficits noted.   Neck: No tenderness, Supple.  Integument: warm, dry, no ulcerations.   Psychiatric:  Oriented, no pathological affect.  Musculoskeletal:    Affected knee(s):  Painful gait with a subtle limp, positive for synovitis, swelling, joint effusion with crepitation.  Lachman negative  Posterior drawer negative  Paulo's negative  Patellofemoral grind +  Sensation grossly intact to light touch throughout the lower extremity  Skin is intact  Distal pulses are palpable  No signs or symptoms of DVT        Diagnostic Data:     Imaging was done today, images were personally viewed and discussed at length with the patient:    Indication: pain related symptoms,  Views: 3V AP, LAT & 40 degree PA bilateral knee(s)   Findings: severe end-stage arthritis (bone on bone, subchondral sclerosis/cysts, osteophytes)  Comparison views: viewed last xray done in the office.     Procedure:  Large Joint Arthrocentesis  Date/Time: 11/14/2017 2:18 PM  Consent given by: patient  Site marked: site marked  Timeout: Immediately prior to procedure a time out was called to verify the correct patient, procedure, equipment, support staff and site/side marked as required   Supporting Documentation  Indications: pain and joint swelling   Procedure Details  [place] knee - R knee  Preparation: Patient was prepped and draped in the usual sterile fashion  Needle size: 22 G  Approach: anterolateral  Medications administered: 80 mg methylPREDNISolone acetate 80 MG/ML; 2 mL bupivacaine (PF) 0.5 %; 2 mL lidocaine PF 1% 1 %  Patient tolerance: patient tolerated the procedure well with no immediate complications    Large Joint Arthrocentesis  Date/Time: 11/14/2017 2:18 PM  Consent given by: patient  Site marked: site marked  Timeout: Immediately prior to procedure a time out was called to verify the correct patient, procedure, equipment, support staff and site/side marked as required   Supporting Documentation  Indications:  pain and joint swelling   Procedure Details  [place] knee - L knee  Preparation: Patient was prepped and draped in the usual sterile fashion  Needle size: 22 G  Approach: anterolateral  Medications administered: 80 mg methylPREDNISolone acetate 80 MG/ML; 2 mL lidocaine PF 1% 1 %; 2 mL bupivacaine (PF) 0.5 %  Patient tolerance: patient tolerated the procedure well with no immediate complications          Assessment. Severe arthritis bilateral knee(s).      Plan: Is to proceed with injection  Follow up as indicated.  Ice, elevate, and rest as needed.  Additional interventions include:  15 min spent face to face with patient 9 min spent counseling about natural history and expected course of assessed complaint and reviewed treatment options that have been tried and not tried and those currently available. Questions answered.    Natural history and expected course of this patient's diagnosis discussed along with evaluation of therapies. Questions answered.  Advice on benefits of, and types of regular/moderate exercise including biomechanical forces involved as it pertains to this complaint, with a goal of 5 min at least 7 times a week.    Address and update of wt loss, physical exercises, use of assistive devices, and monitoring of Medications per orders to address ortho complaints; Evaluation and discussion of safety, precautions, side effects, and warnings given especially of long term NSAID therapy.  Lifestyle measures for weight loss, suggest starting at 0lbs, with biomechanical explanations for weight loss and how this affects orthopedic condition.  Cortisone Injection. See procedure note.  OTC analgesics as needed with dosage warning and instructions given.  Prescription medication given with instructions, warnings, and precautions.   Advised on strengthening exercises, stressed importance of these with progression of arthritis, demonstrated exercises to patient with repeat demonstration and verb of  understanding.   Has been doing PT exercises and these and knee brace are helping.   11/14/2017  MJR

## 2017-12-05 ENCOUNTER — TRANSCRIBE ORDERS (OUTPATIENT)
Dept: LAB | Facility: HOSPITAL | Age: 42
End: 2017-12-05

## 2017-12-05 ENCOUNTER — LAB (OUTPATIENT)
Dept: LAB | Facility: HOSPITAL | Age: 42
End: 2017-12-05
Attending: ORTHOPAEDIC SURGERY

## 2017-12-05 DIAGNOSIS — S82.401K: ICD-10-CM

## 2017-12-05 DIAGNOSIS — S82.402K: Primary | ICD-10-CM

## 2017-12-05 DIAGNOSIS — S82.401K: Primary | ICD-10-CM

## 2017-12-05 DIAGNOSIS — S82.402K: ICD-10-CM

## 2017-12-05 LAB
25(OH)D3 SERPL-MCNC: 33.8 NG/ML (ref 30–100)
CRP SERPL-MCNC: 0.16 MG/DL (ref 0–0.5)
DEPRECATED RDW RBC AUTO: 46.5 FL (ref 37–54)
ERYTHROCYTE [DISTWIDTH] IN BLOOD BY AUTOMATED COUNT: 13.7 % (ref 11.5–14.5)
ERYTHROCYTE [SEDIMENTATION RATE] IN BLOOD: 1 MM/HR (ref 0–15)
HCT VFR BLD AUTO: 44.3 % (ref 40.4–52.2)
HGB BLD-MCNC: 15 G/DL (ref 13.7–17.6)
MCH RBC QN AUTO: 31.3 PG (ref 27–32.7)
MCHC RBC AUTO-ENTMCNC: 33.9 G/DL (ref 32.6–36.4)
MCV RBC AUTO: 92.3 FL (ref 79.8–96.2)
PLATELET # BLD AUTO: 250 10*3/MM3 (ref 140–500)
PMV BLD AUTO: 9.5 FL (ref 6–12)
RBC # BLD AUTO: 4.8 10*6/MM3 (ref 4.6–6)
TSH SERPL DL<=0.05 MIU/L-ACNC: 4.8 MIU/ML (ref 0.27–4.2)
WBC NRBC COR # BLD: 5.69 10*3/MM3 (ref 4.5–10.7)

## 2017-12-05 PROCEDURE — 84443 ASSAY THYROID STIM HORMONE: CPT

## 2017-12-05 PROCEDURE — 85027 COMPLETE CBC AUTOMATED: CPT

## 2017-12-05 PROCEDURE — 36415 COLL VENOUS BLD VENIPUNCTURE: CPT

## 2017-12-05 PROCEDURE — 82306 VITAMIN D 25 HYDROXY: CPT

## 2017-12-05 PROCEDURE — 85652 RBC SED RATE AUTOMATED: CPT

## 2017-12-05 PROCEDURE — 86140 C-REACTIVE PROTEIN: CPT

## 2017-12-23 LAB
ALBUMIN SERPL-MCNC: 4.5 G/DL (ref 3.5–5.5)
ALBUMIN/GLOB SERPL: 2.4 {RATIO} (ref 1.2–2.2)
ALP SERPL-CCNC: 61 IU/L (ref 39–117)
ALT SERPL-CCNC: 31 IU/L (ref 0–44)
AST SERPL-CCNC: 14 IU/L (ref 0–40)
BILIRUB SERPL-MCNC: 0.2 MG/DL (ref 0–1.2)
BUN SERPL-MCNC: 23 MG/DL (ref 6–24)
BUN/CREAT SERPL: 23 (ref 9–20)
CALCIUM SERPL-MCNC: 9.4 MG/DL (ref 8.7–10.2)
CHLORIDE SERPL-SCNC: 102 MMOL/L (ref 96–106)
CHOLEST SERPL-MCNC: 129 MG/DL (ref 100–199)
CO2 SERPL-SCNC: 26 MMOL/L (ref 18–29)
CREAT SERPL-MCNC: 0.98 MG/DL (ref 0.76–1.27)
ERYTHROCYTE [DISTWIDTH] IN BLOOD BY AUTOMATED COUNT: 14.2 % (ref 12.3–15.4)
GLOBULIN SER CALC-MCNC: 1.9 G/DL (ref 1.5–4.5)
GLUCOSE SERPL-MCNC: 99 MG/DL (ref 65–99)
HCT VFR BLD AUTO: 44.6 % (ref 37.5–51)
HDLC SERPL-MCNC: 42 MG/DL
HGB BLD-MCNC: 15.3 G/DL (ref 13–17.7)
LDLC SERPL CALC-MCNC: 44 MG/DL (ref 0–99)
MCH RBC QN AUTO: 30.4 PG (ref 26.6–33)
MCHC RBC AUTO-ENTMCNC: 34.3 G/DL (ref 31.5–35.7)
MCV RBC AUTO: 89 FL (ref 79–97)
PLATELET # BLD AUTO: 305 X10E3/UL (ref 150–379)
POTASSIUM SERPL-SCNC: 4.6 MMOL/L (ref 3.5–5.2)
PROT SERPL-MCNC: 6.4 G/DL (ref 6–8.5)
RBC # BLD AUTO: 5.03 X10E6/UL (ref 4.14–5.8)
SODIUM SERPL-SCNC: 142 MMOL/L (ref 134–144)
TESTOST FREE MFR SERPL: 3.02 % (ref 1.5–4.2)
TESTOST FREE SERPL-MCNC: 10.12 NG/DL (ref 5–21)
TESTOST SERPL-MCNC: 335 NG/DL (ref 264–916)
TRIGL SERPL-MCNC: 215 MG/DL (ref 0–149)
VLDLC SERPL CALC-MCNC: 43 MG/DL (ref 5–40)
WBC # BLD AUTO: 6.4 X10E3/UL (ref 3.4–10.8)

## 2017-12-25 RX ORDER — ESOMEPRAZOLE MAGNESIUM 40 MG/1
CAPSULE, DELAYED RELEASE ORAL
Qty: 90 CAPSULE | Refills: 3 | Status: CANCELLED | OUTPATIENT
Start: 2017-12-25

## 2017-12-26 RX ORDER — RIZATRIPTAN BENZOATE 10 MG/1
TABLET, ORALLY DISINTEGRATING ORAL
Qty: 27 TABLET | Refills: 0 | Status: SHIPPED | OUTPATIENT
Start: 2017-12-26 | End: 2018-01-08 | Stop reason: SDUPTHER

## 2017-12-27 ENCOUNTER — OFFICE VISIT (OUTPATIENT)
Dept: FAMILY MEDICINE CLINIC | Facility: CLINIC | Age: 42
End: 2017-12-27

## 2017-12-27 VITALS
SYSTOLIC BLOOD PRESSURE: 128 MMHG | BODY MASS INDEX: 34.35 KG/M2 | OXYGEN SATURATION: 97 % | HEIGHT: 73 IN | DIASTOLIC BLOOD PRESSURE: 76 MMHG | HEART RATE: 88 BPM | WEIGHT: 259.2 LBS

## 2017-12-27 DIAGNOSIS — G47.00 INSOMNIA, UNSPECIFIED TYPE: ICD-10-CM

## 2017-12-27 DIAGNOSIS — E34.9 TESTOSTERONE DEFICIENCY: ICD-10-CM

## 2017-12-27 DIAGNOSIS — M17.0 PRIMARY OSTEOARTHRITIS OF BOTH KNEES: Primary | ICD-10-CM

## 2017-12-27 DIAGNOSIS — E78.00 ELEVATED CHOLESTEROL: ICD-10-CM

## 2017-12-27 DIAGNOSIS — G43.109 MIGRAINE WITH AURA AND WITHOUT STATUS MIGRAINOSUS, NOT INTRACTABLE: ICD-10-CM

## 2017-12-27 DIAGNOSIS — Z91.09 ENVIRONMENTAL ALLERGIES: ICD-10-CM

## 2017-12-27 PROCEDURE — 99214 OFFICE O/P EST MOD 30 MIN: CPT | Performed by: INTERNAL MEDICINE

## 2017-12-27 RX ORDER — SELENIUM SULFIDE 2.5 MG/100ML
LOTION TOPICAL DAILY PRN
Qty: 354 ML | Refills: 3 | Status: SHIPPED | OUTPATIENT
Start: 2017-12-27 | End: 2020-10-12 | Stop reason: SDUPTHER

## 2017-12-27 RX ORDER — MONTELUKAST SODIUM 10 MG/1
10 TABLET ORAL NIGHTLY
Qty: 90 TABLET | Refills: 3 | Status: SHIPPED | OUTPATIENT
Start: 2017-12-27 | End: 2018-04-09 | Stop reason: SDUPTHER

## 2017-12-27 RX ORDER — OLOPATADINE HYDROCHLORIDE 665 UG/1
2 SPRAY NASAL 2 TIMES DAILY
Qty: 91.5 G | Refills: 3 | Status: SHIPPED | OUTPATIENT
Start: 2017-12-27 | End: 2018-04-09 | Stop reason: SDUPTHER

## 2017-12-27 RX ORDER — TRAMADOL HYDROCHLORIDE 50 MG/1
100 TABLET ORAL 3 TIMES DAILY PRN
Qty: 180 TABLET | Refills: 2 | Status: SHIPPED | OUTPATIENT
Start: 2017-12-27 | End: 2018-01-26 | Stop reason: HOSPADM

## 2017-12-27 RX ORDER — ZOLPIDEM TARTRATE 12.5 MG/1
12.5 TABLET, FILM COATED, EXTENDED RELEASE ORAL NIGHTLY PRN
Start: 2017-12-27 | End: 2018-04-12

## 2017-12-27 RX ORDER — GABAPENTIN 600 MG/1
600 TABLET ORAL 3 TIMES DAILY
Qty: 270 TABLET | Refills: 1 | Status: SHIPPED | OUTPATIENT
Start: 2017-12-27 | End: 2018-06-08 | Stop reason: SDUPTHER

## 2017-12-27 RX ORDER — ESOMEPRAZOLE MAGNESIUM 40 MG/1
40 CAPSULE, DELAYED RELEASE ORAL NIGHTLY
Qty: 90 CAPSULE | Refills: 3 | Status: SHIPPED | OUTPATIENT
Start: 2017-12-27 | End: 2018-06-08 | Stop reason: SDUPTHER

## 2017-12-27 RX ORDER — TESTOSTERONE GEL, 1% 10 MG/G
100 GEL TRANSDERMAL DAILY
Qty: 180 PACKAGE | Refills: 2 | Status: SHIPPED | OUTPATIENT
Start: 2017-12-27 | End: 2018-06-08 | Stop reason: SDUPTHER

## 2017-12-27 RX ORDER — MELOXICAM 15 MG/1
TABLET ORAL
Refills: 0 | COMMUNITY
Start: 2017-12-07 | End: 2017-12-27 | Stop reason: ALTCHOICE

## 2017-12-27 RX ORDER — SILDENAFIL CITRATE 100 MG
100 TABLET ORAL AS NEEDED
Qty: 30 TABLET | Refills: 3 | Status: SHIPPED | OUTPATIENT
Start: 2017-12-27 | End: 2018-02-22 | Stop reason: SDUPTHER

## 2017-12-27 RX ORDER — EZETIMIBE AND SIMVASTATIN 10; 40 MG/1; MG/1
1 TABLET ORAL NIGHTLY
Qty: 90 TABLET | Refills: 3 | Status: SHIPPED | OUTPATIENT
Start: 2017-12-27 | End: 2018-06-08 | Stop reason: SDUPTHER

## 2017-12-27 NOTE — PROGRESS NOTES
Subjective   Arelis Munson is a 42 y.o. male who presents today for:    Knee Pain (CSE) and Insomnia    History of Present Illness     Knee pain- OA. Chronic, intermittent, but in a recurrently persistent fashion, affecting both knees, worse with prolonged standing and bending. Treated by Dr. Tyrell Eddy with steroid injections with good relief. He also wears a hinged brace on the right knee and a brace on the left at times. He takes Lodine every day as well.  He was also started on meloxicam by another provider (for his ankle) and has been taking both.      Between injections, he uses tramadol 100 mg on the order of 2 tablets 2-3 times per day, to keep himself functional. He reports that this helps take the edge off the pain, allows him to continue work, without causing excess sedation or constipation.      The tramadol also helps with burning right upper extremity pain that developed after an ulnar nerve release. It goes up to his shoulder at times, and is usually aggravated by work.  We started gabapentin for the migraines with good benefit and improvement regarding his ulnar neuropathy/elbow pain.     He also has chronic insomnia for which he has been taking Ambien. At our last visit, we asked him to start cutting back on Ambien and try taking trazodone instead, gradually increasing the dose if necessary. He has done away with the Ambien but still has some nights with poor sleep onset and  early awakenings (due to vivid dreams).  He feels his memory has been worsening since making this change (and since injuring his ankle and being off work).    He has testosterone deficiency treated with testosterone gel.  He has struggled with fluctuating levels until recently.  He feels his libido and stamina have not responded to the supplement.    He has chronic allergic rhinitis that has been better controlled with Patanase than Astelin.    Mr. Munson  reports that he has quit smoking. His smoking use included  Cigarettes. He quit after 31.00 years of use. He has never used smokeless tobacco. He reports that he drinks about 1.2 oz of alcohol per week  He reports that he does not use illicit drugs.     No Known Allergies    Current Outpatient Prescriptions:   •  aspirin 81 MG EC tablet, Take 81 mg by mouth Daily., Disp: , Rfl:   •  B Complex Vitamins (VITAMIN B COMPLEX PO), Take  by mouth daily., Disp: , Rfl:   •  Cholecalciferol (VITAMIN D3) 5000 UNITS capsule capsule, Take 5,000 Units by mouth Daily., Disp: , Rfl:   •  diclofenac (VOLTAREN) 1 % gel gel, Apply 4 g topically 4 (Four) Times a Day., Disp: 4 tube, Rfl: 3  •  docusate sodium (COLACE) 250 MG capsule, Take 250 mg by mouth Daily., Disp: , Rfl:   •  esomeprazole (nexIUM) 40 MG capsule, Take 1 capsule by mouth Every Night., Disp: 90 capsule, Rfl: 3  •  etodolac (LODINE) 500 MG tablet, Take 1 tablet by mouth 2 (Two) Times a Day., Disp: 180 tablet, Rfl: 3  •  ezetimibe-simvastatin (VYTORIN) 10-40 MG per tablet, Take 1 tablet by mouth Every Night., Disp: 90 tablet, Rfl: 3  •  FIBER PO, Take  by mouth., Disp: , Rfl:   •  fluticasone (VERAMYST) 27.5 MCG/SPRAY nasal spray, 1 spray into each nostril 2 (Two) Times a Day., Disp: 30 g, Rfl: 3  •  gabapentin (NEURONTIN) 600 MG tablet, Take 1 tablet by mouth 3 (Three) Times a Day., Disp: 270 tablet, Rfl: 1  •  GuaiFENesin (MUCINEX PO), Take  by mouth 2 (two) times a day., Disp: , Rfl:   •  melatonin 1 MG tablet, Take 3 mg by mouth every night., Disp: , Rfl:   •  montelukast (SINGULAIR) 10 MG tablet, Take 1 tablet by mouth Every Night., Disp: 90 tablet, Rfl: 3  •  Multiple Vitamin (MULTI VITAMIN DAILY PO), Take  by mouth daily., Disp: , Rfl:   •  olopatadine (PATANASE) 0.6 % solution nasal solution, 2 sprays by Each Nare route 2 (Two) Times a Day., Disp: 91.5 g, Rfl: 3  •  Potassium 99 MG tablet, Take  by mouth., Disp: , Rfl:   •  PROVENTIL  (90 BASE) MCG/ACT inhaler, Inhale 2 puffs every 6 (six) hours as needed., Disp: ,  Rfl:   •  PULMICORT FLEXHALER 180 MCG/ACT inhaler, Inhale 1 puff 2 (Two) Times a Day., Disp: 3 inhaler, Rfl: 3  •  rizatriptan MLT (MAXALT-MLT) 10 MG disintegrating tablet, PLACE 1 TABLET ON TONGUE ONE TIME AS NEEDED FOR MIGRAINE, Disp: 27 tablet, Rfl: 0  •  SALINE NASAL SPRAY NA, into each nostril., Disp: , Rfl:   •  selenium sulfide (SELSUN) 2.5 % shampoo, Apply  topically Daily As Needed for dandruff., Disp: 354 mL, Rfl: 3  •  testosterone (ANDROGEL) 50 MG/5GM (1%) gel gel, Place 100 mg on the skin Daily., Disp: 180 package, Rfl: 1  •  traMADol (ULTRAM) 50 MG tablet, Take 2 tablets by mouth 3 (Three) Times a Day As Needed for Moderate Pain ., Disp: 180 tablet, Rfl: 2  •  traZODone (DESYREL) 50 MG tablet, TAKE ONE TO TWO TABLETS BY MOUTH EVERY NIGHT AT BEDTIME AS NEEDED FOR INSOMNIA, Disp: 60 tablet, Rfl: 1  •  VIAGRA 100 MG tablet, TAKE 1 TABLET AS NEEDED FOR ERECTILE DYSFUNCTION, Disp: 30 tablet, Rfl: 3  •  meloxicam (MOBIC) 15 MG tablet, TAKE 1 Tablet BY MOUTH ONCE DAILY, Disp: , Rfl: 0      Review of Systems   HENT: Negative for trouble swallowing.    Respiratory: Positive for apnea. Negative for chest tightness and wheezing (rarely).    Cardiovascular: Negative for chest pain, palpitations and leg swelling.   Gastrointestinal: Negative for abdominal pain and nausea.        Dyspepsia/reflux, controlled with the PPI.   Endocrine:        Low libido, improved with testosterone.   Genitourinary: Negative for urgency.        Erectile dysfunction   Musculoskeletal: Positive for arthralgias.   Allergic/Immunologic: Positive for environmental allergies. Negative for immunocompromised state.   Neurological: Positive for headaches (rare on the gabapentin). Negative for dizziness, syncope and numbness.   Psychiatric/Behavioral: Positive for sleep disturbance. The patient is not nervous/anxious.          Objective   Vitals:    12/27/17 0933   BP: 128/76   BP [place] Right arm   Patient Position: Sitting   Cuff Size:  "Large Adult   Pulse: 88   SpO2: 97%   Weight: 118 kg (259 lb 3.2 oz)   Height: 185.4 cm (73\")     Physical Exam    Overweight male in no acute distress.  Sclerae are anicteric the conjunctiva are pink.  Turbinates are boggy. Clear discharge noted.  Regular rate and rhythm. No murmur appreciated.  Station, gait, and coordination are normal.  Alert and oriented x4 answers all questions appropriate. Insight and judgment appeared intact.    Assessment/Plan   Arelis was seen today for knee pain and insomnia.    Diagnoses and all orders for this visit:    Primary osteoarthritis of both knees  -     traMADol (ULTRAM) 50 MG tablet; Take 2 tablets by mouth 3 (Three) Times a Day As Needed for Moderate Pain .    Insomnia, unspecified type  -     zolpidem CR (AMBIEN CR) 12.5 MG CR tablet; Take 1 tablet by mouth At Night As Needed for Sleep.    Testosterone deficiency  -     testosterone (ANDROGEL) 50 MG/5GM (1%) gel gel; Place 100 mg on the skin Daily.  -     VIAGRA 100 MG tablet; Take 1 tablet by mouth As Needed for erectile dysfunction.    Elevated cholesterol  -     ezetimibe-simvastatin (VYTORIN) 10-40 MG per tablet; Take 1 tablet by mouth Every Night.    Migraine with aura and without status migrainosus, not intractable  -     gabapentin (NEURONTIN) 600 MG tablet; Take 1 tablet by mouth 3 (Three) Times a Day.    Chronic allergic rhinitis/Environmental allergies  -     fluticasone (VERAMYST) 27.5 MCG/SPRAY nasal spray; 1 spray into each nostril Daily.  -     olopatadine (PATANASE) 0.6 % solution nasal solution; 2 sprays by Each Nare route 2 (Two) Times a Day.      Where the knee pain is concerned, he will continue with the tramadol unchanged.  He will also continue with the Lodine twice a day; he will stop taking meloxicam was on his medication list.      Because of the memory issues, we will stop the trazodone and have him go back to taking the Ambien CR 12.5 mg at bedtime as prescribed by Dr. Kong.  Concern over the " memory issues that started while he was on Ambien was addressed today.  Increasing the trazodone is likely to cause more memory problems going forward.  We will see if there is some improvement once we stop the trazodone and restart the Ambien.    Labs were reviewed with the patient today as well.  His testosterone levels are all within the normal limits on the current dose of testosterone gel.  He would still like to pursue testosterone implants to see if that levels out his response to the testosterone.  We will see if a neurology group in [place] does testosterone implants and make that referral if possible.    Cholesterol levels look good, except for the triglycerides.      MARY BETH report was obtained and reviewed.

## 2018-01-04 ENCOUNTER — TRANSCRIBE ORDERS (OUTPATIENT)
Dept: ADMINISTRATIVE | Facility: HOSPITAL | Age: 43
End: 2018-01-04

## 2018-01-04 DIAGNOSIS — S82.853A CLOSED TRIMALLEOLAR FRACTURE OF ANKLE WITH HIGH FIBULAR FRACTURE: ICD-10-CM

## 2018-01-04 DIAGNOSIS — S82.839A CLOSED TRIMALLEOLAR FRACTURE OF ANKLE WITH HIGH FIBULAR FRACTURE: ICD-10-CM

## 2018-01-04 DIAGNOSIS — M25.571 RIGHT ANKLE PAIN, UNSPECIFIED CHRONICITY: Primary | ICD-10-CM

## 2018-01-08 RX ORDER — RIZATRIPTAN BENZOATE 10 MG/1
10 TABLET, ORALLY DISINTEGRATING ORAL ONCE AS NEEDED
Qty: 27 TABLET | Refills: 0 | Status: SHIPPED | OUTPATIENT
Start: 2018-01-08 | End: 2018-06-08 | Stop reason: SDUPTHER

## 2018-01-15 ENCOUNTER — HOSPITAL ENCOUNTER (OUTPATIENT)
Dept: CT IMAGING | Facility: HOSPITAL | Age: 43
Discharge: HOME | End: 2018-01-15
Attending: ORTHOPAEDIC SURGERY | Admitting: ORTHOPAEDIC SURGERY

## 2018-01-15 DIAGNOSIS — M25.571 RIGHT ANKLE PAIN, UNSPECIFIED CHRONICITY: ICD-10-CM

## 2018-01-15 DIAGNOSIS — S82.853A CLOSED TRIMALLEOLAR FRACTURE OF ANKLE WITH HIGH FIBULAR FRACTURE: ICD-10-CM

## 2018-01-15 DIAGNOSIS — S82.839A CLOSED TRIMALLEOLAR FRACTURE OF ANKLE WITH HIGH FIBULAR FRACTURE: ICD-10-CM

## 2018-01-15 PROCEDURE — 73700 CT LOWER EXTREMITY W/O DYE: CPT

## 2018-01-16 RX ORDER — OLOPATADINE HYDROCHLORIDE 665 UG/1
SPRAY NASAL
Qty: 91.5 G | Refills: 3 | Status: ON HOLD | OUTPATIENT
Start: 2018-01-16 | End: 2018-01-26 | Stop reason: SDUPTHER

## 2018-01-24 ENCOUNTER — APPOINTMENT (OUTPATIENT)
Dept: PREADMISSION TESTING | Facility: HOSPITAL | Age: 43
End: 2018-01-24

## 2018-01-24 VITALS
BODY MASS INDEX: 35.14 KG/M2 | HEIGHT: 71 IN | OXYGEN SATURATION: 96 % | HEART RATE: 69 BPM | TEMPERATURE: 98.2 F | RESPIRATION RATE: 16 BRPM | SYSTOLIC BLOOD PRESSURE: 127 MMHG | WEIGHT: 251 LBS | DIASTOLIC BLOOD PRESSURE: 80 MMHG

## 2018-01-24 LAB
ALBUMIN SERPL-MCNC: 4.8 G/DL (ref 3.5–5.2)
ALBUMIN/GLOB SERPL: 2.4 G/DL
ALP SERPL-CCNC: 62 U/L (ref 39–117)
ALT SERPL W P-5'-P-CCNC: 41 U/L (ref 1–41)
ANION GAP SERPL CALCULATED.3IONS-SCNC: 11.7 MMOL/L
AST SERPL-CCNC: 20 U/L (ref 1–40)
BASOPHILS # BLD AUTO: 0.09 10*3/MM3 (ref 0–0.2)
BASOPHILS NFR BLD AUTO: 1.3 % (ref 0–1.5)
BILIRUB SERPL-MCNC: 0.6 MG/DL (ref 0.1–1.2)
BUN BLD-MCNC: 15 MG/DL (ref 6–20)
BUN/CREAT SERPL: 13.4 (ref 7–25)
CALCIUM SPEC-SCNC: 9.6 MG/DL (ref 8.6–10.5)
CHLORIDE SERPL-SCNC: 101 MMOL/L (ref 98–107)
CO2 SERPL-SCNC: 30.3 MMOL/L (ref 22–29)
CREAT BLD-MCNC: 1.12 MG/DL (ref 0.76–1.27)
DEPRECATED RDW RBC AUTO: 48 FL (ref 37–54)
EOSINOPHIL # BLD AUTO: 0.1 10*3/MM3 (ref 0–0.7)
EOSINOPHIL NFR BLD AUTO: 1.5 % (ref 0.3–6.2)
ERYTHROCYTE [DISTWIDTH] IN BLOOD BY AUTOMATED COUNT: 14.1 % (ref 11.5–14.5)
GFR SERPL CREATININE-BSD FRML MDRD: 71 ML/MIN/1.73
GLOBULIN UR ELPH-MCNC: 2 GM/DL
GLUCOSE BLD-MCNC: 96 MG/DL (ref 65–99)
HCT VFR BLD AUTO: 45.9 % (ref 40.4–52.2)
HGB BLD-MCNC: 15.3 G/DL (ref 13.7–17.6)
IMM GRANULOCYTES # BLD: 0.02 10*3/MM3 (ref 0–0.03)
IMM GRANULOCYTES NFR BLD: 0.3 % (ref 0–0.5)
LYMPHOCYTES # BLD AUTO: 3.22 10*3/MM3 (ref 0.9–4.8)
LYMPHOCYTES NFR BLD AUTO: 46.9 % (ref 19.6–45.3)
MCH RBC QN AUTO: 31 PG (ref 27–32.7)
MCHC RBC AUTO-ENTMCNC: 33.3 G/DL (ref 32.6–36.4)
MCV RBC AUTO: 93.1 FL (ref 79.8–96.2)
MONOCYTES # BLD AUTO: 0.56 10*3/MM3 (ref 0.2–1.2)
MONOCYTES NFR BLD AUTO: 8.2 % (ref 5–12)
NEUTROPHILS # BLD AUTO: 2.88 10*3/MM3 (ref 1.9–8.1)
NEUTROPHILS NFR BLD AUTO: 41.8 % (ref 42.7–76)
PLATELET # BLD AUTO: 281 10*3/MM3 (ref 140–500)
PMV BLD AUTO: 9.1 FL (ref 6–12)
POTASSIUM BLD-SCNC: 4.3 MMOL/L (ref 3.5–5.2)
PROT SERPL-MCNC: 6.8 G/DL (ref 6–8.5)
RBC # BLD AUTO: 4.93 10*6/MM3 (ref 4.6–6)
SODIUM BLD-SCNC: 143 MMOL/L (ref 136–145)
WBC NRBC COR # BLD: 6.87 10*3/MM3 (ref 4.5–10.7)

## 2018-01-24 PROCEDURE — 36415 COLL VENOUS BLD VENIPUNCTURE: CPT

## 2018-01-24 PROCEDURE — 80053 COMPREHEN METABOLIC PANEL: CPT | Performed by: ORTHOPAEDIC SURGERY

## 2018-01-24 PROCEDURE — 85025 COMPLETE CBC W/AUTO DIFF WBC: CPT | Performed by: ORTHOPAEDIC SURGERY

## 2018-01-24 PROCEDURE — 93010 ELECTROCARDIOGRAM REPORT: CPT | Performed by: INTERNAL MEDICINE

## 2018-01-24 PROCEDURE — 93005 ELECTROCARDIOGRAM TRACING: CPT

## 2018-01-24 NOTE — DISCHARGE INSTRUCTIONS
Take the following medications the morning of surgery with a small sip of water:    GABAPENTIN  TRAMADOL IFNEEDED  PULMICORT INHALER  PROVENTIL INHALER      General Instructions:  • Do not eat solid food after midnight the night before surgery.  • You may drink clear liquids day of surgery but must stop at least one hour before your hospital arrival time @ 0445 AM STOP DRINKING!!!  • It is beneficial for you to have a clear drink that contains carbohydrates the day of surgery.  We suggest a 12 to 20 ounce bottle of Gatorade or Powerade for non-diabetic patients or a 12 to 20 ounce bottle of G2 or Powerade Zero for diabetic patients.     Clear liquids are liquids you can see through.  Nothing red in color.     Plain water                               Sports drinks  Sodas                                   Gelatin (Jell-O)  Fruit juices without pulp such as white grape juice and apple juice  Popsicles that contain no fruit or yogurt  Tea or coffee (no cream or milk added)  Gatorade / Powerade  G2 / Powerade Zero    • Patients who avoid smoking, chewing tobacco and alcohol for 4 weeks prior to surgery have a reduced risk of post-operative complications.  Quit smoking as many days before surgery as you can.  • Do not smoke, use chewing tobacco or drink alcohol the day of surgery.   • Bring your C-PAP machine.  • Bring any papers given to you in the doctor’s office.  • Wear clean comfortable clothes and socks.  • Do not wear contact lenses or make-up.  Bring a case for your glasses.   • Bring crutches or walker if applicable.  • Remove all piercings.  Leave jewelry and any other valuables at home.  • Hair extensions with metal clips must be removed prior to surgery.  • The Pre-Admission Testing nurse will instruct you to bring medications if unable to obtain an accurate list in Pre-Admission Testing.          Preventing a Surgical Site Infection:  • For 2 to 3 days before surgery, avoid shaving with a razor because the  razor can irritate skin and make it easier to develop an infection.  • The night prior to surgery sleep in a clean bed with clean clothing.  Do not allow pets to sleep with you.  • Shower on the morning of surgery using a fresh bar of anti-bacterial soap (such as Dial) and clean washcloth.  Dry with a clean towel and dress in clean clothing.  • Ask your surgeon if you will be receiving antibiotics prior to surgery.  • Make sure you, your family, and all healthcare providers clean their hands with soap and water or an alcohol based hand  before caring for you or your wound.    Day of surgery: 01/26/2018 ARRIVE @ 0545 AM REPORT TO [facility]  Upon arrival, a Pre-op nurse and Anesthesiologist will review your health history, obtain vital signs, and answer questions you may have.  The only belongings needed at this time will be your home medications and if applicable your C-PAP machine.  If you are staying overnight your family can leave the rest of your belongings in the car and bring them to your room later.  A Pre-op nurse will start an IV and you may receive medication in preparation for surgery, including something to help you relax.  Your family will be able to see you in the Pre-op area.  While you are in surgery your family should notify the waiting room  if they leave the waiting room area and provide a contact phone number.    Please be aware that surgery does come with discomfort.  We want to make every effort to control your discomfort so please discuss any uncontrolled symptoms with your nurse.   Your doctor will most likely have prescribed pain medications.      If you are going home after surgery you will receive individualized written care instructions before being discharged.  A responsible adult must drive you to and from the hospital on the day of your surgery and stay with you for 24 hours.    If you are staying overnight following surgery, you will be transported to your hospital  room following the recovery period.  [facility] has all private rooms.    If you have any questions please call Pre-Admission Testing at [phone].  Deductibles and co-payments are collected on the day of service. Please be prepared to pay the required co-pay, deductible or deposit on the day of service as defined by your plan.

## 2018-01-26 ENCOUNTER — APPOINTMENT (OUTPATIENT)
Dept: GENERAL RADIOLOGY | Facility: HOSPITAL | Age: 43
End: 2018-01-26

## 2018-01-26 ENCOUNTER — ANESTHESIA (OUTPATIENT)
Dept: PERIOP | Facility: HOSPITAL | Age: 43
End: 2018-01-26

## 2018-01-26 ENCOUNTER — ANESTHESIA EVENT (OUTPATIENT)
Dept: PERIOP | Facility: HOSPITAL | Age: 43
End: 2018-01-26

## 2018-01-26 ENCOUNTER — HOSPITAL ENCOUNTER (OUTPATIENT)
Facility: HOSPITAL | Age: 43
Setting detail: HOSPITAL OUTPATIENT SURGERY
Discharge: HOME | End: 2018-01-26
Attending: ORTHOPAEDIC SURGERY | Admitting: ORTHOPAEDIC SURGERY

## 2018-01-26 VITALS
HEART RATE: 83 BPM | TEMPERATURE: 98 F | OXYGEN SATURATION: 97 % | RESPIRATION RATE: 16 BRPM | DIASTOLIC BLOOD PRESSURE: 78 MMHG | SYSTOLIC BLOOD PRESSURE: 121 MMHG

## 2018-01-26 PROCEDURE — 25010000002 ONDANSETRON PER 1 MG: Performed by: NURSE ANESTHETIST, CERTIFIED REGISTERED

## 2018-01-26 PROCEDURE — 25010000003 CEFAZOLIN IN DEXTROSE 2-4 GM/100ML-% SOLUTION: Performed by: ORTHOPAEDIC SURGERY

## 2018-01-26 PROCEDURE — 25010000002 ROPIVACAINE PER 1 MG: Performed by: ANESTHESIOLOGY

## 2018-01-26 PROCEDURE — 76000 FLUOROSCOPY <1 HR PHYS/QHP: CPT

## 2018-01-26 PROCEDURE — 25010000002 MIDAZOLAM PER 1 MG: Performed by: ANESTHESIOLOGY

## 2018-01-26 PROCEDURE — 25010000002 PROPOFOL 10 MG/ML EMULSION: Performed by: NURSE ANESTHETIST, CERTIFIED REGISTERED

## 2018-01-26 PROCEDURE — 25010000002 DEXAMETHASONE PER 1 MG: Performed by: ANESTHESIOLOGY

## 2018-01-26 PROCEDURE — 73610 X-RAY EXAM OF ANKLE: CPT

## 2018-01-26 PROCEDURE — C1713 ANCHOR/SCREW BN/BN,TIS/BN: HCPCS | Performed by: ORTHOPAEDIC SURGERY

## 2018-01-26 DEVICE — IMPLANTABLE DEVICE: Type: IMPLANTABLE DEVICE | Site: ANKLE | Status: FUNCTIONAL

## 2018-01-26 DEVICE — SUT/ANCH JUGGERKNOT SFT RIGID SHT SZ1 1.4MM W/BIT: Type: IMPLANTABLE DEVICE | Site: ANKLE | Status: FUNCTIONAL

## 2018-01-26 RX ORDER — PROMETHAZINE HYDROCHLORIDE 25 MG/ML
6.25 INJECTION, SOLUTION INTRAMUSCULAR; INTRAVENOUS ONCE AS NEEDED
Status: DISCONTINUED | OUTPATIENT
Start: 2018-01-26 | End: 2018-01-26 | Stop reason: HOSPADM

## 2018-01-26 RX ORDER — NALOXONE HCL 0.4 MG/ML
0.4 VIAL (ML) INJECTION AS NEEDED
Status: DISCONTINUED | OUTPATIENT
Start: 2018-01-26 | End: 2018-01-26 | Stop reason: HOSPADM

## 2018-01-26 RX ORDER — DIPHENHYDRAMINE HYDROCHLORIDE 50 MG/ML
12.5 INJECTION INTRAMUSCULAR; INTRAVENOUS
Status: DISCONTINUED | OUTPATIENT
Start: 2018-01-26 | End: 2018-01-26 | Stop reason: HOSPADM

## 2018-01-26 RX ORDER — ACETAMINOPHEN 650 MG/1
650 SUPPOSITORY RECTAL ONCE AS NEEDED
Status: DISCONTINUED | OUTPATIENT
Start: 2018-01-26 | End: 2018-01-26 | Stop reason: HOSPADM

## 2018-01-26 RX ORDER — PROMETHAZINE HYDROCHLORIDE 25 MG/1
25 TABLET ORAL ONCE AS NEEDED
Status: DISCONTINUED | OUTPATIENT
Start: 2018-01-26 | End: 2018-01-26 | Stop reason: HOSPADM

## 2018-01-26 RX ORDER — ROPIVACAINE HYDROCHLORIDE 2 MG/ML
INJECTION, SOLUTION EPIDURAL; INFILTRATION; PERINEURAL AS NEEDED
Status: DISCONTINUED | OUTPATIENT
Start: 2018-01-26 | End: 2018-01-26 | Stop reason: SURG

## 2018-01-26 RX ORDER — CEFAZOLIN SODIUM 2 G/100ML
2 INJECTION, SOLUTION INTRAVENOUS ONCE
Status: COMPLETED | OUTPATIENT
Start: 2018-01-26 | End: 2018-01-26

## 2018-01-26 RX ORDER — MIDAZOLAM HYDROCHLORIDE 1 MG/ML
1 INJECTION INTRAMUSCULAR; INTRAVENOUS
Status: DISCONTINUED | OUTPATIENT
Start: 2018-01-26 | End: 2018-01-26 | Stop reason: HOSPADM

## 2018-01-26 RX ORDER — HYDRALAZINE HYDROCHLORIDE 20 MG/ML
5 INJECTION INTRAMUSCULAR; INTRAVENOUS
Status: DISCONTINUED | OUTPATIENT
Start: 2018-01-26 | End: 2018-01-26 | Stop reason: HOSPADM

## 2018-01-26 RX ORDER — PROPOFOL 10 MG/ML
VIAL (ML) INTRAVENOUS AS NEEDED
Status: DISCONTINUED | OUTPATIENT
Start: 2018-01-26 | End: 2018-01-26 | Stop reason: SURG

## 2018-01-26 RX ORDER — FAMOTIDINE 10 MG/ML
20 INJECTION, SOLUTION INTRAVENOUS ONCE
Status: COMPLETED | OUTPATIENT
Start: 2018-01-26 | End: 2018-01-26

## 2018-01-26 RX ORDER — NALBUPHINE HCL 10 MG/ML
2 AMPUL (ML) INJECTION EVERY 4 HOURS PRN
Status: DISCONTINUED | OUTPATIENT
Start: 2018-01-26 | End: 2018-01-26 | Stop reason: HOSPADM

## 2018-01-26 RX ORDER — LIDOCAINE HYDROCHLORIDE 10 MG/ML
0.5 INJECTION, SOLUTION EPIDURAL; INFILTRATION; INTRACAUDAL; PERINEURAL ONCE AS NEEDED
Status: DISCONTINUED | OUTPATIENT
Start: 2018-01-26 | End: 2018-01-26 | Stop reason: HOSPADM

## 2018-01-26 RX ORDER — SODIUM CHLORIDE 0.9 % (FLUSH) 0.9 %
1-10 SYRINGE (ML) INJECTION AS NEEDED
Status: DISCONTINUED | OUTPATIENT
Start: 2018-01-26 | End: 2018-01-26 | Stop reason: HOSPADM

## 2018-01-26 RX ORDER — SODIUM CHLORIDE, SODIUM LACTATE, POTASSIUM CHLORIDE, AND CALCIUM CHLORIDE .6; .31; .03; .02 G/100ML; G/100ML; G/100ML; G/100ML
IRRIGANT IRRIGATION AS NEEDED
Status: DISCONTINUED | OUTPATIENT
Start: 2018-01-26 | End: 2018-01-26 | Stop reason: HOSPADM

## 2018-01-26 RX ORDER — ROCURONIUM BROMIDE 10 MG/ML
INJECTION, SOLUTION INTRAVENOUS AS NEEDED
Status: DISCONTINUED | OUTPATIENT
Start: 2018-01-26 | End: 2018-01-26 | Stop reason: SURG

## 2018-01-26 RX ORDER — SODIUM CHLORIDE, SODIUM LACTATE, POTASSIUM CHLORIDE, CALCIUM CHLORIDE 600; 310; 30; 20 MG/100ML; MG/100ML; MG/100ML; MG/100ML
9 INJECTION, SOLUTION INTRAVENOUS CONTINUOUS
Status: DISCONTINUED | OUTPATIENT
Start: 2018-01-26 | End: 2018-01-26 | Stop reason: HOSPADM

## 2018-01-26 RX ORDER — ACETAMINOPHEN 325 MG/1
650 TABLET ORAL ONCE
Status: COMPLETED | OUTPATIENT
Start: 2018-01-26 | End: 2018-01-26

## 2018-01-26 RX ORDER — MIDAZOLAM HYDROCHLORIDE 1 MG/ML
2 INJECTION INTRAMUSCULAR; INTRAVENOUS
Status: DISCONTINUED | OUTPATIENT
Start: 2018-01-26 | End: 2018-01-26 | Stop reason: HOSPADM

## 2018-01-26 RX ORDER — DEXAMETHASONE SODIUM PHOSPHATE 4 MG/ML
INJECTION, SOLUTION INTRA-ARTICULAR; INTRALESIONAL; INTRAMUSCULAR; INTRAVENOUS; SOFT TISSUE AS NEEDED
Status: DISCONTINUED | OUTPATIENT
Start: 2018-01-26 | End: 2018-01-26 | Stop reason: SURG

## 2018-01-26 RX ORDER — ONDANSETRON 2 MG/ML
INJECTION INTRAMUSCULAR; INTRAVENOUS AS NEEDED
Status: DISCONTINUED | OUTPATIENT
Start: 2018-01-26 | End: 2018-01-26 | Stop reason: SURG

## 2018-01-26 RX ORDER — FENTANYL CITRATE 50 UG/ML
50 INJECTION, SOLUTION INTRAMUSCULAR; INTRAVENOUS
Status: DISCONTINUED | OUTPATIENT
Start: 2018-01-26 | End: 2018-01-26 | Stop reason: HOSPADM

## 2018-01-26 RX ORDER — LIDOCAINE HYDROCHLORIDE 20 MG/ML
INJECTION, SOLUTION INFILTRATION; PERINEURAL AS NEEDED
Status: DISCONTINUED | OUTPATIENT
Start: 2018-01-26 | End: 2018-01-26 | Stop reason: SURG

## 2018-01-26 RX ORDER — OXYCODONE AND ACETAMINOPHEN 7.5; 325 MG/1; MG/1
1 TABLET ORAL EVERY 4 HOURS PRN
Qty: 60 TABLET | Refills: 0 | Status: SHIPPED | OUTPATIENT
Start: 2018-01-26 | End: 2018-03-09

## 2018-01-26 RX ORDER — PROMETHAZINE HYDROCHLORIDE 25 MG/1
25 SUPPOSITORY RECTAL ONCE AS NEEDED
Status: DISCONTINUED | OUTPATIENT
Start: 2018-01-26 | End: 2018-01-26 | Stop reason: HOSPADM

## 2018-01-26 RX ORDER — ACETAMINOPHEN 325 MG/1
650 TABLET ORAL ONCE AS NEEDED
Status: DISCONTINUED | OUTPATIENT
Start: 2018-01-26 | End: 2018-01-26 | Stop reason: HOSPADM

## 2018-01-26 RX ORDER — OXYCODONE HYDROCHLORIDE AND ACETAMINOPHEN 5; 325 MG/1; MG/1
1 TABLET ORAL ONCE AS NEEDED
Status: DISCONTINUED | OUTPATIENT
Start: 2018-01-26 | End: 2018-01-26 | Stop reason: HOSPADM

## 2018-01-26 RX ORDER — ROPIVACAINE HYDROCHLORIDE 5 MG/ML
INJECTION, SOLUTION EPIDURAL; INFILTRATION; PERINEURAL AS NEEDED
Status: DISCONTINUED | OUTPATIENT
Start: 2018-01-26 | End: 2018-01-26 | Stop reason: SURG

## 2018-01-26 RX ORDER — NALBUPHINE HCL 10 MG/ML
10 AMPUL (ML) INJECTION EVERY 4 HOURS PRN
Status: DISCONTINUED | OUTPATIENT
Start: 2018-01-26 | End: 2018-01-26 | Stop reason: HOSPADM

## 2018-01-26 RX ADMIN — ROCURONIUM BROMIDE 40 MG: 10 INJECTION INTRAVENOUS at 07:13

## 2018-01-26 RX ADMIN — ACETAMINOPHEN 650 MG: 325 TABLET ORAL at 06:09

## 2018-01-26 RX ADMIN — PROPOFOL 200 MG: 10 INJECTION, EMULSION INTRAVENOUS at 07:13

## 2018-01-26 RX ADMIN — ROPIVACAINE HYDROCHLORIDE 20 ML: 5 INJECTION, SOLUTION EPIDURAL; INFILTRATION; PERINEURAL at 06:28

## 2018-01-26 RX ADMIN — FAMOTIDINE 20 MG: 10 INJECTION INTRAVENOUS at 06:10

## 2018-01-26 RX ADMIN — ROPIVACAINE HYDROCHLORIDE 10 ML: 2 INJECTION, SOLUTION EPIDURAL; INFILTRATION at 06:28

## 2018-01-26 RX ADMIN — SUGAMMADEX 200 MG: 100 INJECTION, SOLUTION INTRAVENOUS at 09:13

## 2018-01-26 RX ADMIN — SODIUM CHLORIDE, POTASSIUM CHLORIDE, SODIUM LACTATE AND CALCIUM CHLORIDE 9 ML/HR: 600; 310; 30; 20 INJECTION, SOLUTION INTRAVENOUS at 09:43

## 2018-01-26 RX ADMIN — CEFAZOLIN SODIUM 2 G: 2 INJECTION, SOLUTION INTRAVENOUS at 07:17

## 2018-01-26 RX ADMIN — MIDAZOLAM 1 MG: 1 INJECTION INTRAMUSCULAR; INTRAVENOUS at 06:19

## 2018-01-26 RX ADMIN — DEXAMETHASONE SODIUM PHOSPHATE 8 MG: 4 INJECTION, SOLUTION INTRAMUSCULAR; INTRAVENOUS at 07:26

## 2018-01-26 RX ADMIN — SODIUM CHLORIDE, POTASSIUM CHLORIDE, SODIUM LACTATE AND CALCIUM CHLORIDE 9 ML/HR: 600; 310; 30; 20 INJECTION, SOLUTION INTRAVENOUS at 06:01

## 2018-01-26 RX ADMIN — MIDAZOLAM 2 MG: 1 INJECTION INTRAMUSCULAR; INTRAVENOUS at 06:11

## 2018-01-26 RX ADMIN — LIDOCAINE HYDROCHLORIDE 100 MG: 20 INJECTION, SOLUTION INFILTRATION; PERINEURAL at 07:13

## 2018-01-26 RX ADMIN — ONDANSETRON 4 MG: 2 INJECTION INTRAMUSCULAR; INTRAVENOUS at 09:05

## 2018-01-26 RX ADMIN — DEXAMETHASONE SODIUM PHOSPHATE 4 MG: 4 INJECTION, SOLUTION INTRAMUSCULAR; INTRAVENOUS at 06:28

## 2018-01-26 NOTE — PLAN OF CARE
Problem: Patient Care Overview (Adult)  Goal: Discharge Needs Assessment  Outcome: Outcome(s) achieved Date Met: 01/26/18 01/26/18 1102   Discharge Needs Assessment   Concerns To Be Addressed denies needs/concerns at this time   Discharge Disposition home or self-care

## 2018-01-26 NOTE — ANESTHESIA PROCEDURE NOTES
Peripheral Block    Patient location during procedure: pre-op  Start time: 1/26/2018 6:29 AM  Stop time: 1/26/2018 6:35 AM  Reason for block: at surgeon's request and post-op pain management  Performed by  CRNA: TEVIN TOBAR  Preanesthetic Checklist  Completed: patient identified, site marked, surgical consent, pre-op evaluation, timeout performed, IV checked, risks and benefits discussed and monitors and equipment checked  Prep:  Sterile barriers:gloves  Prep: ChloraPrep  Patient monitoring: blood pressure monitoring, continuous pulse oximetry and EKG  Procedure  Sedation:yes    Guidance:ultrasound guided  ULTRASOUND INTERPRETATION. Using ultrasound guidance a 22 G gauge needle was placed in close proximity to the nerve, at which point, under ultrasound guidance anesthetic was injected in the area of the nerve and spread of the anesthesia was seen on ultrasound in close proximity thereto.  There were no abnormalities seen on ultrasound; a digital image was taken; and the patient tolerated the procedure with no complications. Images:still images obtained    Laterality:right  Block Type:saphenous  Injection Technique:single-shot  Needle Type:short-bevel  Needle Gauge:22 G    Medications  Comment:    Local Injected:ropivacaine 0.2% Local Amount Injected:10mL  Post Assessment  Injection Assessment: negative aspiration for heme, no paresthesia on injection and incremental injection  Patient Tolerance:comfortable throughout block  Complications:no

## 2018-01-26 NOTE — PLAN OF CARE
Problem: Patient Care Overview (Adult)  Goal: Plan of Care Review  Outcome: Ongoing (interventions implemented as appropriate)   01/26/18 0705   Coping/Psychosocial Response Interventions   Plan Of Care Reviewed With patient;spouse   Patient Care Overview   Progress improving

## 2018-01-26 NOTE — ANESTHESIA PROCEDURE NOTES
Peripheral Block    Patient location during procedure: post-op  Start time: 1/26/2018 6:22 AM  Stop time: 1/26/2018 6:29 AM  Reason for block: at surgeon's request and post-op pain management  Performed by  Anesthesiologist: TEVIN TOBAR  Preanesthetic Checklist  Completed: patient identified, site marked, surgical consent, pre-op evaluation, timeout performed, IV checked, risks and benefits discussed and monitors and equipment checked  Prep:  Sterile barriers:gloves  Prep: ChloraPrep  Patient monitoring: blood pressure monitoring, continuous pulse oximetry and EKG  Procedure  Sedation:yes    Guidance:ultrasound guided  ULTRASOUND INTERPRETATION.  Using ultrasound guidance a 22 G gauge needle was placed in close proximity to the sciatic nerve, at which point, under ultrasound guidance anesthetic was injected in the area of the nerve and spread of the anesthesia was seen on ultrasound in close proximity thereto.  There were no abnormalities seen on ultrasound; a digital image was taken; and the patient tolerated the procedure with no complications. Images:still images obtained    Laterality:right  Block Type:sciatic  Injection Technique:single-shot  Needle Type:short-bevel  Needle Gauge:22 G    Medications  Comment:Dexamethasone 4mg added to injectate  Local Injected:ropivacaine 0.5% Local Amount Injected:20mL  Post Assessment  Injection Assessment: negative aspiration for heme, no paresthesia on injection and incremental injection  Patient Tolerance:comfortable throughout block  Complications:no

## 2018-01-26 NOTE — ANESTHESIA POSTPROCEDURE EVALUATION
Patient: Arelis Munson    Procedure Summary     Date Anesthesia Start Anesthesia Stop Room / Location    01/26/18 0709 0922  [facility]  [facility]       Procedure Diagnosis Surgeon Provider    RT ANKLE ARTHROSCOPY SUBCHONDROPLASTY TALUS  (Right Ankle); HARDWARE REMOVAL POSS LATERAL LIGAMENT RECONSTRUCTION  (Right Ankle) No diagnosis on file. MD Wellington Joseph Jr., MD          Anesthesia Type: regional  Last vitals  BP   113/76 (01/26/18 1031)   Temp   36.7 °C (98 °F) (01/26/18 1031)   Pulse   83 (01/26/18 1031)   Resp   16 (01/26/18 1031)     SpO2   94 % (01/26/18 1031)     Post Anesthesia Care and Evaluation    Patient location during evaluation: bedside  Patient participation: complete - patient participated  Level of consciousness: awake and alert  Pain management: adequate  Airway patency: patent  Anesthetic complications: No anesthetic complications    Cardiovascular status: acceptable  Respiratory status: acceptable  Hydration status: acceptable    Comments: /76 (BP [place] Left arm, Patient Position: Sitting)  Pulse 83  Temp 36.7 °C (98 °F)  Resp 16  SpO2 94%

## 2018-01-26 NOTE — OP NOTE
Procedure Note    Arelis Munson  1/26/2018    Pre-op Diagnosis:   1.  Right ankle medial talar dome/body stress response/fracture  2.  Painful retained hardware, right ankle  3.  Right ankle instability       Post-Op Diagnosis Codes:  Same    Procedure:  1.  Right ankle arthroscopy with extensive debridement  2.  Open treatment of right medial talar dome/body stress response/fracture  3.  Removal of hardware, right fibula  4.  Right ankle lateral ligament reconstruction    Surgeon(s):  Lebron Spivey Jr., MD    Anesthesia: Regional plus LMA    Estimated Blood Loss: 5cc    Specimens:                None      Drains:   None    Complications:   None apparent    Disposition:   Stable to PACU for recovery    Indications for procedure:  The patient is a pleasant 42-year-old male who underwent open reduction internal fixation of a displaced right lateral malleolus fracture by another surgeon.  He had persistent medial and lateral ankle pain as well as subjective instability.  CT confirmed successful union of the fracture in near-anatomic position.  MRI confirmed severe medial talar body/dome edema/stress fracture.  This was refractory to all appropriate nonoperative management.  The above listed procedures were recommended. The risks/benefits of surgery, including pain, infection, wound healing problems, need for future procedures, mal/nonunion, DVT/PE, cardiac event, and/or death were discussed, and the patient elected to proceed with surgery.    Procedure in detail:  The correct patient was identified in preoperative holding.  All risks and benefits of surgery were again discussed in detail, and the patient agreed to proceed with surgery.  The operative extremity was confirmed and marked by myself.  Operative consent reviewed and confirmed to be signed by the patient and myself.    At this time, the patient was wheeled to the operative theatre and placed supine on the OR table.  FELECIA/SCD placed on nonoperative leg.  Anesthesia was induced smoothly by our anesthesia colleagues.   Well padded nonsterile tourniquet placed on the upper thigh. The right lower extremity was prepped and draped in standard sterile fashion.  Appropriate presurgical timeout was performed, confirming correct patient, correct extremity, correct procedure, availability of sterile instruments/implants, and the administration of intravenous antibiotics in the form of Ancef within one hour of skin incision.      The ankle was first examined under anesthesia.   He was found to have moderate anterior translation of the talus without firm endpoint on anterior drawer.      The foot was plantarflexed/inverted and the course of the superficial peroneal nerve is identified across the ankle and marked out.  The ankle stirrup ([facility] ankle distractor) was applied to the foot and gentle distraction applied across the ankle joint.  The ankle mortise, anterior tibialis tendon, and standard anteromedial and anterolateral ankle portals were marked out. The anteromedial portal (just medial to the anterior tibialis tendon) was tested by injection of several cc's of sterile saline solution into the ankle with an 18-gauge needle, confirming adequate placement by visualization/palpation of the outpouching of the anterolateral ankle capsule. The anteromedial portal was established with a vertical skin nick through skin only, followed by subcutaneous dissection down to and through capsule with a small curved hemostat, being careful to avoid the saphenous nerve and vein and anterior tibialis tendon.  A blunt-tipped trochar with arthroscopic cannula is used to enter the anteromedial ankle joint and then the small joint arthroscope was introduced into the ankle joint. The anterolateral portal is established with an 18-gauge needle under direct arthroscopic visualization.  A vertical portal was made through skin only and careful dissection down to capsule made with a small curved  hemostat, avoiding any branches of the superficial peroneal nerve. Diagnostic arthroscopy ensued with the following findings:     Medial gutter: Significant adhesions and impinging synovitis.  Deep deltoid ligament intact. Chondral surfaces of medial talar body and medial malleolus intact.  Lateral gutter/trifurcation (tibiotalofibular region): Significant adhesions and impinging synovitis.  AITFL intact superiorly.  Syndesmosis normal.  Chondral surfaces of distal fibula and lateral talar body intact.   Evidence of anterior translation of the talus with stress exam.  Anterior ankle gutter: Significant impinging synovitis in the anterior joint.  Posterior ankle: PITFL/transverse tibiofibular ligament intact, no loose body   Distal tibial plafond chondral surface: Mild grade I/II chondromalacia in the central portion  Dorsal talar chondral surface: Mild grade I/II chondromalacia in the central portion    A 3.5mm shaver was used to debride the impinging synovitis in the medial gutter, anterior joint, lateral gutter.  It was also used to perform a chondroplasty of the area of mild chondromalacia in the central joint on both the tibial plafond and talar chondral surface.    At this time, the trocar from the Guille Accufill set was advanced under biplanar fluoroscopy into the medial talar body.  Idealized position was confirmed on biplanar fluoroscopy and was confirmed to be in the area of severe stress response/fracture identified on preoperative MRI.  2 cc of Accufill were gently introduced to the medial talar body, and confirmed to have excellent penetration into the area of the stress edema/fracture in the medial talar body.  It was confirmed that there was no egress of Accufill into the ankle joint via arthroscopy.    Once the Accufill had cured, any remaining debris was removed from the joint with the shaver.  Joint fluid evacuated and all arthroscopic instruments removed from the ankle.  The portal incisions were  closed with Nylon in interrupted fashion.     Attention was then turned to the lateral ankle.  His prior longitudinal incision over the distal fibula was reopened with a 15 blade.  Dissection was carried down bluntly taking care to identify and protect the branches of the superficial peroneal nerve.  The Synthes distal fibular anatomic plate was encountered and cleared of soft tissue using an elevator.  Using the appropriate screwdrivers, the distal locking screws and proximal cortical screws were removed without difficulty.  The plate was then freed from the lateral fibular cortex and removed.  The interfrag screw was identified and removed with the appropriate screwdriver.  The lateral fibular surface was examined.  All fractures appeared well healed.  The ankle was examined under stress fluoroscopy, there is no evidence of syndesmotic or medial instability.    The incision was then extended in curvilinear fashion from the distal tip of the fibula. Careful subcutaneous dissection is carried down. The inferior extensor retinaculum is identified and mobilized. The peroneals are protected and retracted inferiorly allowing visualization and assessment of the calcaneofibular ligament, which was found to be insufficient. The lateral ankle capsuloligamentous complex (ATFL and CFL) is incised and carefully taken off the distal anterior fibula. A subperiosteal dissection is used to elevate a small periosteal flap on the distal fibula. A rongeur is used to create a trough of cancellous bone across the anterior distal fibula. Two 1.4 Biomet Juggernaut suture anchors (double loaded with #1 Maxbraid) are placed in the anterior distal fibula after predrilling at the anatomic footprints for the ATFL and CFL. The anchors are tested and achieve stable fixation. The exposed portion of the ankle joint is thoroughly irrigated out of any debris. The sutures are passed in the appropriate position through the elevated,  "capsuloligamentous sleeve, which is then advanced back to the prepared cancellous bed on the distal fibula, retensioning the ligaments. The sutures are tied sequentially while holding a posterior drawer on the talus, with the ATFL and CFL tensioned in dorsiflexion/eversion and plantarflexion/eversion, respectively. The inferior extensor retinaculum is advanced over this repair in a pants-over-vest fashion back to the periosteal flap with polysorb suture to create the Harrison modification. Excellent stability is restored to the ankle. The incision is then closed with Biosyn in the subcutaneous tissue and staples on the skin. The skin was cleaned and dried and a sterile dressing applied, followed by a well-padded, short leg splint (posterior slab and stirrup) with the ankle in neutral/slight eversion position. The tourniquet had been released and brisk capillary refill returned to all toes.  The patient was awoken from anesthesia and taken to PACU for recovery without apparent complication.    Postoperative Plan:  Weightbearing status: Non-weightbearing in the splint  DVT Prophylaxis: Aspirin 325mg daily;  Patient will be instructed to elevate as much as possible (\"toes above the nose\") and to get up and move around at least once per hour while awake to help minimize risk of blood clots.          Leborn Spivey Jr, MD     Date: 1/26/2018  Time: 9:47 AM        "

## 2018-01-26 NOTE — PLAN OF CARE
Problem: Patient Care Overview (Adult)  Goal: Plan of Care Review  Outcome: Ongoing (interventions implemented as appropriate)   01/26/18 0978   Coping/Psychosocial Response Interventions   Plan Of Care Reviewed With patient   Patient Care Overview   Progress improving

## 2018-01-26 NOTE — PLAN OF CARE
Problem: Perioperative Period (Adult)  Goal: Signs and Symptoms of Listed Potential Problems Will be Absent or Manageable (Perioperative Period)  Outcome: Ongoing (interventions implemented as appropriate)   01/26/18 0933   Perioperative Period   Problems Assessed (Perioperative Period) all   Problems Present (Perioperative Period) none

## 2018-01-26 NOTE — PLAN OF CARE
Problem: Perioperative Period (Adult)  Goal: Signs and Symptoms of Listed Potential Problems Will be Absent or Manageable (Perioperative Period)  Outcome: Ongoing (interventions implemented as appropriate)   01/26/18 0700   Perioperative Period   Problems Assessed (Perioperative Period) all   Problems Present (Perioperative Period) none

## 2018-01-26 NOTE — ANESTHESIA PROCEDURE NOTES
Airway  Urgency: elective    Airway not difficult    General Information and Staff    Patient location during procedure: OR  Anesthesiologist: SHAKA FRANCO  CRNA: LUCIEN GONZALEZ    Indications and Patient Condition  Indications for airway management: airway protection    Preoxygenated: yes  MILS maintained throughout  Mask difficulty assessment: 1 - vent by mask    Final Airway Details  Final airway type: endotracheal airway      Successful airway: ETT  Cuffed: yes   Successful intubation technique: direct laryngoscopy  Facilitating devices/methods: intubating stylet  Endotracheal tube insertion site: oral  Blade: Manzanares  Blade size: #2  ETT size: 7.5 mm  Cormack-Lehane Classification: grade I - full view of glottis  Placement verified by: chest auscultation and capnometry   Measured from: lips  ETT to lips (cm): 22  Number of attempts at approach: 1    Additional Comments  Smooth iv induction with no complications

## 2018-01-26 NOTE — ANESTHESIA PREPROCEDURE EVALUATION
Anesthesia Evaluation     no history of anesthetic complications:  NPO Solid Status: > 8 hours  NPO Liquid Status: > 2 hours     Airway   Mallampati: III  TM distance: >3 FB  Neck ROM: full  possible difficult intubation  Comment: Small left sided tongue apthous like  ulcer  Dental - normal exam     Comment: Permanent upper retainer    Pulmonary - normal exam   (+) asthma, sleep apnea,   Cardiovascular   Exercise tolerance: good (4-7 METS)    ECG reviewed  Rhythm: regular    (+) hyperlipidemia  (-) murmur, carotid bruits      Neuro/Psych  (+) headaches,     GI/Hepatic/Renal/Endo    (+)  GERD,     Musculoskeletal     Abdominal  - normal exam   Substance History      OB/GYN          Other                                              Anesthesia Plan    ASA 2     regional   (  D/W R&B of GA including but not limited to: heart, lung, liver, kidney, neurologic problems, positioning injuries, dental damage, corneal abrasion and TMJ.  .Risks of peripheral nerve block were discussed with patient including but not limited to: inadequate block, bleeding, infection, persistent numbness or weakness, nerve damage, painful dysasthesia and need to protect limb while numb.)  intravenous induction   Anesthetic plan and risks discussed with patient.

## 2018-01-26 NOTE — PLAN OF CARE
Problem: Patient Care Overview (Adult)  Goal: Plan of Care Review  Outcome: Outcome(s) achieved Date Met: 01/26/18 01/26/18 1102   Coping/Psychosocial Response Interventions   Plan Of Care Reviewed With patient;spouse   Patient Care Overview   Progress improving

## 2018-01-26 NOTE — PLAN OF CARE
Problem: Perioperative Period (Adult)  Goal: Signs and Symptoms of Listed Potential Problems Will be Absent or Manageable (Perioperative Period)  Outcome: Outcome(s) achieved Date Met: 01/26/18 01/26/18 1102   Perioperative Period   Problems Assessed (Perioperative Period) all   Problems Present (Perioperative Period) none

## 2018-01-26 NOTE — PLAN OF CARE
Problem: Patient Care Overview (Adult)  Goal: Discharge Needs Assessment  Outcome: Ongoing (interventions implemented as appropriate)   01/26/18 0705   Discharge Needs Assessment   Concerns To Be Addressed denies needs/concerns at this time   Equipment Needed After Discharge crutches;other (see comments)  (already has crutches)

## 2018-01-26 NOTE — PERIOPERATIVE NURSING NOTE
HARDWARE REMOVED FROM RIGHT ANKLE WAS PLACED IN STERILE SPECIMEN CUP AND LABELED WITH PT STICKER AND SENT TO [facility] IN [facility] WITH STICKERS AND PAPER WORK

## 2018-02-22 RX ORDER — SILDENAFIL 100 MG/1
100 TABLET, FILM COATED ORAL AS NEEDED
Qty: 30 TABLET | Refills: 3 | Status: SHIPPED | OUTPATIENT
Start: 2018-02-22 | End: 2018-03-19 | Stop reason: SDUPTHER

## 2018-02-24 ENCOUNTER — TELEPHONE (OUTPATIENT)
Dept: SLEEP MEDICINE | Facility: HOSPITAL | Age: 43
End: 2018-02-24

## 2018-02-24 NOTE — TELEPHONE ENCOUNTER
The patient's wife called requesting a refill for Ambien CR 12.5 mg 1 by mouth daily at bedtime.  #90 with 1 refill completed.

## 2018-03-08 DIAGNOSIS — E78.00 ELEVATED CHOLESTEROL: ICD-10-CM

## 2018-03-08 DIAGNOSIS — Z79.899 ENCOUNTER FOR LONG-TERM (CURRENT) USE OF MEDICATIONS: ICD-10-CM

## 2018-03-08 DIAGNOSIS — E34.9 TESTOSTERONE DEFICIENCY: Primary | ICD-10-CM

## 2018-03-09 ENCOUNTER — OFFICE VISIT (OUTPATIENT)
Dept: FAMILY MEDICINE CLINIC | Facility: CLINIC | Age: 43
End: 2018-03-09

## 2018-03-09 VITALS
SYSTOLIC BLOOD PRESSURE: 106 MMHG | BODY MASS INDEX: 35.5 KG/M2 | DIASTOLIC BLOOD PRESSURE: 86 MMHG | OXYGEN SATURATION: 96 % | HEIGHT: 71 IN | WEIGHT: 253.6 LBS | HEART RATE: 80 BPM

## 2018-03-09 DIAGNOSIS — M25.562 CHRONIC PAIN OF BOTH KNEES: Primary | ICD-10-CM

## 2018-03-09 DIAGNOSIS — G43.109 MIGRAINE WITH AURA AND WITHOUT STATUS MIGRAINOSUS, NOT INTRACTABLE: ICD-10-CM

## 2018-03-09 DIAGNOSIS — M25.561 CHRONIC PAIN OF BOTH KNEES: Primary | ICD-10-CM

## 2018-03-09 DIAGNOSIS — G89.29 CHRONIC PAIN OF BOTH KNEES: Primary | ICD-10-CM

## 2018-03-09 DIAGNOSIS — M17.0 PRIMARY OSTEOARTHRITIS OF BOTH KNEES: ICD-10-CM

## 2018-03-09 PROCEDURE — 99213 OFFICE O/P EST LOW 20 MIN: CPT | Performed by: INTERNAL MEDICINE

## 2018-03-09 RX ORDER — ASPIRIN 81 MG/1
81 TABLET ORAL DAILY
COMMUNITY

## 2018-03-09 RX ORDER — ETODOLAC 500 MG/1
500 TABLET, FILM COATED ORAL 2 TIMES DAILY
COMMUNITY
End: 2018-04-09 | Stop reason: SDUPTHER

## 2018-03-09 RX ORDER — TRAMADOL HYDROCHLORIDE 50 MG/1
50 TABLET ORAL EVERY 6 HOURS PRN
Qty: 180 TABLET | Refills: 2 | Status: SHIPPED | OUTPATIENT
Start: 2018-03-09 | End: 2018-05-02 | Stop reason: SDUPTHER

## 2018-03-09 RX ORDER — TRAMADOL HYDROCHLORIDE 50 MG/1
50 TABLET ORAL EVERY 6 HOURS PRN
COMMUNITY
Start: 2018-03-01 | End: 2018-03-09 | Stop reason: SDUPTHER

## 2018-03-09 NOTE — PROGRESS NOTES
Subjective   Arelis Munson is a 43 y.o. male who presents today for:    Knee Pain (CSE)    History of Present Illness     Knee pain- OA. Chronic, intermittent, but in a recurrently persistent fashion, affecting both knees, worse with prolonged standing and bending. Treated by Dr. Tyrell Eddy with steroid injections with good relief. He also wears a hinged brace on the right knee and a brace on the left at times. He takes Lodine every day as well.  He was also started on meloxicam by another provider (for his ankle) and has been taking both.      Between injections, he uses tramadol 100 mg on the order of 2 tablets 2-3 times per day, to keep himself functional. He reports that this helps take the edge off the pain, allows him to continue work, without causing excess sedation or constipation.      The tramadol also helps with burning right upper extremity pain that developed after an ulnar nerve release. It goes up to his shoulder at times, and is usually aggravated by work.  We started gabapentin for the migraines with good benefit and improvement regarding his ulnar neuropathy/elbow pain.      He has testosterone deficiency treated with testosterone gel.  He has struggled with fluctuating levels until recently.  He feels his libido and stamina have not responded to the supplement.    He also has chronic insomnia for which he has been taking Ambien.  We tried switching to trazodone, but he felt his memory worsened after making this change.      Mr. Munson  reports that he quit smoking about 14 years ago. His smoking use included Cigarettes. He has a 62.00 pack-year smoking history. He has never used smokeless tobacco. He reports that he drinks alcohol. He reports that he does not use illicit drugs.     No Known Allergies    Current Outpatient Prescriptions:   •  aspirin 81 MG EC tablet, Take 81 mg by mouth Daily., Disp: , Rfl:   •  B Complex Vitamins (VITAMIN B COMPLEX PO), Take  by mouth Daily. HOLD PRIOR TO  SURGERY, Disp: , Rfl:   •  Cholecalciferol (VITAMIN D3) 5000 UNITS capsule capsule, Take 5,000 Units by mouth Daily. HOLD PRIOR TO SURGERY, Disp: , Rfl:   •  diclofenac (VOLTAREN) 1 % gel gel, Apply 4 g topically 4 (Four) Times a Day., Disp: 4 tube, Rfl: 3  •  docusate sodium (COLACE) 250 MG capsule, Take 250 mg by mouth Daily., Disp: , Rfl:   •  esomeprazole (nexIUM) 40 MG capsule, Take 1 capsule by mouth Every Night., Disp: 90 capsule, Rfl: 3  •  etodolac (LODINE) 500 MG tablet, Take 500 mg by mouth 2 (Two) Times a Day., Disp: , Rfl:   •  ezetimibe-simvastatin (VYTORIN) 10-40 MG per tablet, Take 1 tablet by mouth Every Night., Disp: 90 tablet, Rfl: 3  •  FIBER PO, Take  by mouth., Disp: , Rfl:   •  fluticasone (VERAMYST) 27.5 MCG/SPRAY nasal spray, 1 spray into each nostril Daily., Disp: 30 g, Rfl: 3  •  gabapentin (NEURONTIN) 600 MG tablet, Take 1 tablet by mouth 3 (Three) Times a Day., Disp: 270 tablet, Rfl: 1  •  melatonin 1 MG tablet, Take 3 mg by mouth Every Night. HOLD PRIOR TO SURGERY, Disp: , Rfl:   •  montelukast (SINGULAIR) 10 MG tablet, Take 1 tablet by mouth Every Night., Disp: 90 tablet, Rfl: 3  •  Multiple Vitamin (MULTI VITAMIN DAILY PO), Take  by mouth daily., Disp: , Rfl:   •  olopatadine (PATANASE) 0.6 % solution nasal solution, 2 sprays by Each Nare route 2 (Two) Times a Day., Disp: 91.5 g, Rfl: 3  •  Potassium 99 MG tablet, Take  by mouth., Disp: , Rfl:   •  PROVENTIL  (90 BASE) MCG/ACT inhaler, Inhale 2 puffs every 6 (six) hours as needed., Disp: , Rfl:   •  PULMICORT FLEXHALER 180 MCG/ACT inhaler, Inhale 1 puff 2 (Two) Times a Day., Disp: 3 inhaler, Rfl: 3  •  rizatriptan MLT (MAXALT-MLT) 10 MG disintegrating tablet, Take 1 tablet by mouth 1 (One) Time As Needed for Migraine for up to 1 dose. May repeat in 2 hours if needed, Disp: 27 tablet, Rfl: 0  •  SALINE NASAL SPRAY NA, into each nostril., Disp: , Rfl:   •  selenium sulfide (SELSUN) 2.5 % shampoo, Apply  topically Daily As Needed  "for dandruff., Disp: 354 mL, Rfl: 3  •  sildenafil (VIAGRA) 100 MG tablet, Take 1 tablet by mouth As Needed for erectile dysfunction., Disp: 30 tablet, Rfl: 3  •  testosterone (ANDROGEL) 50 MG/5GM (1%) gel gel, Place 100 mg on the skin Daily., Disp: 180 package, Rfl: 2  •  traMADol (ULTRAM) 50 MG tablet, Take 50 mg by mouth Every 6 (Six) Hours As Needed for Moderate Pain ., Disp: , Rfl:   •  zolpidem CR (AMBIEN CR) 12.5 MG CR tablet, Take 1 tablet by mouth At Night As Needed for Sleep., Disp: , Rfl:       Review of Systems   Constitutional: Negative for unexpected weight change.   HENT: Positive for tinnitus. Negative for hearing loss.    Respiratory: Negative for shortness of breath.    Cardiovascular: Negative for chest pain.   Musculoskeletal: Positive for arthralgias.   Psychiatric/Behavioral: Positive for sleep disturbance.         Objective   Vitals:    03/09/18 1059   BP: 106/86   BP [place] Right arm   Patient Position: Sitting   Cuff Size: Large Adult   Pulse: 80   SpO2: 96%   Weight: 115 kg (253 lb 9.6 oz)   Height: 180.3 cm (71\")     Physical Exam  Overweight, in NAD.  Sclerae anicteric and conj pink.  RRR; no murmur. No rub.  CTA bilateral.  Wearing a boot on the right leg.  TM normal on the left and sclerotic on the right. Canals patent.    Assessment/Plan   Arelis was seen today for knee pain.    Diagnoses and all orders for this visit:    Chronic pain of both knees    Primary osteoarthritis of both knees    Migraine with aura and without status migrainosus, not intractable    Other orders  -     traMADol (ULTRAM) 50 MG tablet; Take 1 tablet by mouth Every 6 (Six) Hours As Needed for Moderate Pain .    MARY BETH report was obtained and reviewed.  We'll continue the tramadol and Lodine for his OA, and he will continue to see his orthopedic surgeon for periodic injections.    He will also continue the gabapentin for his migraines, although no prescription was provided today.  "

## 2018-03-12 LAB
ALBUMIN SERPL-MCNC: 4.7 G/DL (ref 3.5–5.5)
ALBUMIN/GLOB SERPL: 2.9 {RATIO} (ref 1.2–2.2)
ALP SERPL-CCNC: 66 IU/L (ref 39–117)
ALT SERPL-CCNC: 38 IU/L (ref 0–44)
AST SERPL-CCNC: 18 IU/L (ref 0–40)
BILIRUB SERPL-MCNC: 0.4 MG/DL (ref 0–1.2)
BUN SERPL-MCNC: 15 MG/DL (ref 6–24)
BUN/CREAT SERPL: 16 (ref 9–20)
CALCIUM SERPL-MCNC: 9.5 MG/DL (ref 8.7–10.2)
CHLORIDE SERPL-SCNC: 99 MMOL/L (ref 96–106)
CO2 SERPL-SCNC: 28 MMOL/L (ref 18–29)
CREAT SERPL-MCNC: 0.96 MG/DL (ref 0.76–1.27)
ERYTHROCYTE [DISTWIDTH] IN BLOOD BY AUTOMATED COUNT: 14.2 % (ref 12.3–15.4)
GFR SERPLBLD CREATININE-BSD FMLA CKD-EPI: 110 ML/MIN/1.73
GFR SERPLBLD CREATININE-BSD FMLA CKD-EPI: 95 ML/MIN/1.73
GLOBULIN SER CALC-MCNC: 1.6 G/DL (ref 1.5–4.5)
GLUCOSE SERPL-MCNC: 98 MG/DL (ref 65–99)
HCT VFR BLD AUTO: 43.1 % (ref 37.5–51)
HGB BLD-MCNC: 14.6 G/DL (ref 13–17.7)
MCH RBC QN AUTO: 30 PG (ref 26.6–33)
MCHC RBC AUTO-ENTMCNC: 33.9 G/DL (ref 31.5–35.7)
MCV RBC AUTO: 89 FL (ref 79–97)
PLATELET # BLD AUTO: 264 X10E3/UL (ref 150–379)
POTASSIUM SERPL-SCNC: 4.3 MMOL/L (ref 3.5–5.2)
PROT SERPL-MCNC: 6.3 G/DL (ref 6–8.5)
RBC # BLD AUTO: 4.86 X10E6/UL (ref 4.14–5.8)
SODIUM SERPL-SCNC: 142 MMOL/L (ref 134–144)
TESTOST FREE MFR SERPL: 1.33 % (ref 1.5–4.2)
TESTOST FREE SERPL-MCNC: 4.19 NG/DL (ref 5–21)
TESTOST SERPL-MCNC: 315 NG/DL (ref 264–916)
WBC # BLD AUTO: 6.8 X10E3/UL (ref 3.4–10.8)

## 2018-03-20 ENCOUNTER — TELEPHONE (OUTPATIENT)
Dept: ORTHOPEDIC SURGERY | Facility: CLINIC | Age: 43
End: 2018-03-20

## 2018-03-20 RX ORDER — SILDENAFIL 100 MG/1
100 TABLET, FILM COATED ORAL AS NEEDED
Qty: 30 TABLET | Refills: 3 | Status: SHIPPED | OUTPATIENT
Start: 2018-03-20 | End: 2018-06-08 | Stop reason: SDUPTHER

## 2018-03-20 NOTE — TELEPHONE ENCOUNTER
----- Message from Arelis Munson sent at 3/20/2018  5:56 AM EDT -----  Regarding: Visit Follow-Up Question  MY CHART MESSAGE:    Jude Arredondon I have an appointment scheduled on March 22nd; however it says it’s with Malathi Camacho vs. you.  Is there a reason why?    Shouldn’t it be updated to you or Dr. Eddy since I don’t know who Malathi Camacho is?    Please let me know and please update accordingly.    Thank you,  Arelis Dos Santos” Arpit

## 2018-04-03 ENCOUNTER — CLINICAL SUPPORT (OUTPATIENT)
Dept: ORTHOPEDIC SURGERY | Facility: CLINIC | Age: 43
End: 2018-04-03

## 2018-04-03 VITALS — WEIGHT: 251.2 LBS | TEMPERATURE: 98.9 F | HEIGHT: 71 IN | BODY MASS INDEX: 35.17 KG/M2

## 2018-04-03 DIAGNOSIS — M17.0 PRIMARY OSTEOARTHRITIS OF BOTH KNEES: Primary | ICD-10-CM

## 2018-04-03 PROCEDURE — 20610 DRAIN/INJ JOINT/BURSA W/O US: CPT | Performed by: NURSE PRACTITIONER

## 2018-04-03 PROCEDURE — 99212 OFFICE O/P EST SF 10 MIN: CPT | Performed by: NURSE PRACTITIONER

## 2018-04-03 RX ORDER — METHYLPREDNISOLONE ACETATE 80 MG/ML
80 INJECTION, SUSPENSION INTRA-ARTICULAR; INTRALESIONAL; INTRAMUSCULAR; SOFT TISSUE
Status: COMPLETED | OUTPATIENT
Start: 2018-04-03 | End: 2018-04-03

## 2018-04-03 RX ORDER — LIDOCAINE HYDROCHLORIDE 20 MG/ML
4 INJECTION, SOLUTION EPIDURAL; INFILTRATION; INTRACAUDAL; PERINEURAL
Status: COMPLETED | OUTPATIENT
Start: 2018-04-03 | End: 2018-04-03

## 2018-04-03 RX ADMIN — LIDOCAINE HYDROCHLORIDE 4 ML: 20 INJECTION, SOLUTION EPIDURAL; INFILTRATION; INTRACAUDAL; PERINEURAL at 08:37

## 2018-04-03 RX ADMIN — LIDOCAINE HYDROCHLORIDE 4 ML: 20 INJECTION, SOLUTION EPIDURAL; INFILTRATION; INTRACAUDAL; PERINEURAL at 08:38

## 2018-04-03 RX ADMIN — METHYLPREDNISOLONE ACETATE 80 MG: 80 INJECTION, SUSPENSION INTRA-ARTICULAR; INTRALESIONAL; INTRAMUSCULAR; SOFT TISSUE at 08:37

## 2018-04-03 RX ADMIN — METHYLPREDNISOLONE ACETATE 80 MG: 80 INJECTION, SUSPENSION INTRA-ARTICULAR; INTRALESIONAL; INTRAMUSCULAR; SOFT TISSUE at 08:38

## 2018-04-03 NOTE — PROGRESS NOTES
Patient: Arelis Munson  YOB: 1975    Chief Complaints:  bilateral knee pain    Subjective:    History of Present Illness: Here today for knee pain. The pain is a generalized joint tenderness.  It has been progressive in nature but remains intermittent.  Worsened by prolonged standing or walking and squatting activities. Has had improvement in the past with ice/heat, rest, and injections.     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     Allergies: No Known Allergies    Medications:   Home Medications:  Current Outpatient Prescriptions on File Prior to Visit   Medication Sig   • aspirin 81 MG EC tablet Take 81 mg by mouth Daily.   • B Complex Vitamins (VITAMIN B COMPLEX PO) Take  by mouth Daily. HOLD PRIOR TO SURGERY   • Cholecalciferol (VITAMIN D3) 5000 UNITS capsule capsule Take 5,000 Units by mouth Daily. HOLD PRIOR TO SURGERY   • diclofenac (VOLTAREN) 1 % gel gel Apply 4 g topically 4 (Four) Times a Day.   • docusate sodium (COLACE) 250 MG capsule Take 250 mg by mouth Daily.   • esomeprazole (nexIUM) 40 MG capsule Take 1 capsule by mouth Every Night.   • etodolac (LODINE) 500 MG tablet Take 500 mg by mouth 2 (Two) Times a Day.   • ezetimibe-simvastatin (VYTORIN) 10-40 MG per tablet Take 1 tablet by mouth Every Night.   • FIBER PO Take  by mouth.   • fluticasone (VERAMYST) 27.5 MCG/SPRAY nasal spray 1 spray into each nostril Daily.   • gabapentin (NEURONTIN) 600 MG tablet Take 1 tablet by mouth 3 (Three) Times a Day.   • melatonin 1 MG tablet Take 3 mg by mouth Every Night. HOLD PRIOR TO SURGERY   • montelukast (SINGULAIR) 10 MG tablet Take 1 tablet by mouth Every Night.   • Multiple Vitamin (MULTI VITAMIN DAILY PO) Take  by mouth daily.   • olopatadine (PATANASE) 0.6 % solution nasal solution 2 sprays by Each Nare route 2 (Two) Times a Day.   • Potassium 99 MG tablet Take  by mouth.   • PROVENTIL  (90 BASE) MCG/ACT inhaler Inhale 2 puffs every 6 (six) hours as needed.   • PULMICORT  FLEXHALER 180 MCG/ACT inhaler Inhale 1 puff 2 (Two) Times a Day.   • rizatriptan MLT (MAXALT-MLT) 10 MG disintegrating tablet Take 1 tablet by mouth 1 (One) Time As Needed for Migraine for up to 1 dose. May repeat in 2 hours if needed   • SALINE NASAL SPRAY NA into each nostril.   • selenium sulfide (SELSUN) 2.5 % shampoo Apply  topically Daily As Needed for dandruff.   • sildenafil (VIAGRA) 100 MG tablet Take 1 tablet by mouth As Needed for erectile dysfunction.   • testosterone (ANDROGEL) 50 MG/5GM (1%) gel gel Place 100 mg on the skin Daily.   • traMADol (ULTRAM) 50 MG tablet Take 1 tablet by mouth Every 6 (Six) Hours As Needed for Moderate Pain .   • zolpidem CR (AMBIEN CR) 12.5 MG CR tablet Take 1 tablet by mouth At Night As Needed for Sleep.     No current facility-administered medications on file prior to visit.      Current Medications:  Scheduled Meds:  Continuous Infusions:  No current facility-administered medications for this visit.   PRN Meds:.    I have reviewed the patient's medical history in detail and updated the computerized patient record.  Review and summarization of old records include:    Past Medical History:   Diagnosis Date   • Arthritis     OSTEO   • Asthma    • ED (erectile dysfunction)    • Elevated cholesterol    • Environmental allergies    • Fracture, fibula     RIGHT AND ANKLE   • Gastroesophageal reflux disease without esophagitis 7/11/2016   • Migraine with aura and without status migrainosus, not intractable 7/11/2016   • Pain and swelling of toe of right foot    • Seasonal allergies    • Sleep apnea     CPAP   • Testosterone deficiency 7/11/2016        Past Surgical History:   Procedure Laterality Date   • ANKLE ARTHROSCOPY Right 1/26/2018    Procedure: RT ANKLE ARTHROSCOPY SUBCHONDROPLASTY TALUS ;  Surgeon: Lebron Spivey Jr., MD;  [place] [facility] [place] [facility] [place]:    • ANKLE LIGAMENT RECONSTRUCTION Right 1/26/2018    Procedure: HARDWARE REMOVAL POSS LATERAL LIGAMENT  RECONSTRUCTION ;  Surgeon: Lebron Spivey Jr., MD;  [place] [facility] [place] [facility] [place];  Service:    • CARPAL TUNNEL RELEASE Right    • CUBITAL TUNNEL RELEASE Right     NERVE RELEASE   • EAR BIOPSY Right     MYRINGOTOMY W/BONES REPLACED INNER EAR   • KNEE SURGERY Right     MENISCUS REPAIR   • NOSE SURGERY     • ORIF FIBULA FRACTURE Right 05/17/2017    ANKLE INCLUDED        Social History     Occupational History   • Not on file.     Social History Main Topics   • Smoking status: Former Smoker     Packs/day: 2.00     Years: 31.00     Types: Cigarettes     Quit date: 2004   • Smokeless tobacco: Never Used      Comment: Quit 2004   • Alcohol use Yes      Comment: ONCE OR TWICE MONTHLY   • Drug use: No   • Sexual activity: Defer      Social History     Social History Narrative   • No narrative on file        Family History   Problem Relation Age of Onset   • Lung cancer Mother    • Lung cancer Father    • Malig Hyperthermia Neg Hx        ROS: 14 point review of systems was performed and was negative except for documented findings in HPI and today's encounter.     Allergies: No Known Allergies  Constitutional:  Denies fever, shaking or chills   Eyes:  Denies change in visual acuity   HENT:  Denies nasal congestion or sore throat   Respiratory:  Denies cough or shortness of breath   Cardiovascular:  Denies chest pain or severe LE edema   GI:  Denies abdominal pain, nausea, vomiting, bloody stools or diarrhea   Musculoskeletal:  Numbness, tingling, or loss of motor function only as noted above in history of present illness.  : Denies painful urination or hematuria  Integument:  Denies rash, lesion or ulceration   Neurologic:  Denies headache or focal weakness  Endocrine:  Denies lymphadenopathy  Psych:  Denies confusion or change in mental status   Hem:  Denies active bleeding    Physical Exam:  Wt Readings from Last 3 Encounters:   04/03/18 114 kg (251 lb 3.2 oz)   03/09/18 115 kg (253 lb 9.6 oz)   01/24/18 114 kg (251 lb)     Ht  "Readings from Last 3 Encounters:   04/03/18 180.3 cm (71\")   03/09/18 180.3 cm (71\")   01/24/18 180.3 cm (71\")     Body mass index is 35.04 kg/m².  Facility age limit for growth percentiles is 20 years.  Vitals:    04/03/18 0835   Temp: 98.9 °F (37.2 °C)     Vital Signs:  reviewed  Constitutional: Awake alert and oriented x3, well developed, no acute distress, non-toxic appearance.  EYES: symmetric, sclera clear  ENT:  Normocephalic, Atraumatic.   Respiratory:  No respiratory distress, No wheezing  CV: pulse regular, no palpitations or pallor.  GI:  Abdomen soft, non-tender.   Vascular:  Intact distal pulses, No cyanosis, no signs or symptoms of DVT.  Neurologic: Sensation grossly intact to the involved extremity, No focal deficits noted.   Neck: No tenderness, Supple.  Integument: warm, dry, no ulcerations.   Psychiatric:  Oriented, no pathological affect.  Musculoskeletal:    Affected knee(s):  Painful gait with a subtle limp, positive for synovitis, swelling, joint effusion with crepitation.  Lachman negative  Posterior drawer negative  Paulo's negative  Patellofemoral grind +  Sensation grossly intact to light touch throughout the lower extremity  Skin is intact  Distal pulses are palpable  No signs or symptoms of DVT        Diagnostic Data:     Imaging was done previously in the office, images were personally viewed and discussed with the patient:    Indication: pain related symptoms,  Views: 3V AP, LAT & 40 degree PA bilateral knee(s)   Findings: severe end-stage arthritis (bone on bone, subchondral sclerosis/cysts, osteophytes)  Comparison views: viewed last xray done in the office.     Procedure:  Large Joint Arthrocentesis  Date/Time: 4/3/2018 8:37 AM  Consent given by: patient  Site marked: site marked  Timeout: Immediately prior to procedure a time out was called to verify the correct patient, procedure, equipment, support staff and site/side marked as required   Supporting Documentation  Indications: " pain and joint swelling   Procedure Details  [place] knee - R knee  Preparation: Patient was prepped and draped in the usual sterile fashion  Needle size: 22 G  Approach: anterolateral  Medications administered: 80 mg methylPREDNISolone acetate 80 MG/ML; 4 mL lidocaine PF 2% 2 %  Patient tolerance: patient tolerated the procedure well with no immediate complications    Large Joint Arthrocentesis  Date/Time: 4/3/2018 8:38 AM  Consent given by: patient  Site marked: site marked  Timeout: Immediately prior to procedure a time out was called to verify the correct patient, procedure, equipment, support staff and site/side marked as required   Supporting Documentation  Indications: pain and joint swelling   Procedure Details  [place] knee - L knee  Preparation: Patient was prepped and draped in the usual sterile fashion  Needle size: 22 G  Approach: anterolateral  Medications administered: 4 mL lidocaine PF 2% 2 %; 80 mg methylPREDNISolone acetate 80 MG/ML  Patient tolerance: patient tolerated the procedure well with no immediate complications          Assessment:     ICD-10-CM ICD-9-CM   1. Primary osteoarthritis of both knees M17.0 715.16           Plan: Is to proceed with injection  Follow up as indicated.  Ice, elevate, and rest as needed.  Additional interventions include:  15 min spent face to face with patient 11 min spent counseling about natural history and expected course of assessed complaint and reviewed treatment options that have been tried and not tried and those currently available. Questions answered.    Natural history and expected course of this patient's diagnosis discussed along with evaluation of therapies. Questions answered.  Advice on benefits of, and types of regular/moderate exercise including biomechanical forces involved as it pertains to this complaint, with a goal of 5 min at least 7 times a week.    Address and update of wt loss, physical exercises, use of assistive devices, and  monitoring of Medications per orders to address ortho complaints; Evaluation and discussion of safety, precautions, side effects, and warnings given especially of long term NSAID therapy.  Lifestyle measures for weight loss, suggest starting at 0lbs, with biomechanical explanations for weight loss and how this affects orthopedic condition.  Cortisone Injection. See procedure note.  OTC analgesics as needed with dosage warning and instructions given.  Cryotherapy/brachy therapy as indicated with instructions.   Advised on strengthening exercises, stressed importance of these with progression of arthritis, demonstrated exercises to patient with repeat demonstration and verb of understanding.     4/3/2018  MJR

## 2018-04-07 ENCOUNTER — PATIENT MESSAGE (OUTPATIENT)
Dept: ORTHOPEDIC SURGERY | Facility: CLINIC | Age: 43
End: 2018-04-07

## 2018-04-09 ENCOUNTER — TELEPHONE (OUTPATIENT)
Dept: ORTHOPEDIC SURGERY | Facility: CLINIC | Age: 43
End: 2018-04-09

## 2018-04-09 RX ORDER — OLOPATADINE HYDROCHLORIDE 665 UG/1
2 SPRAY NASAL 2 TIMES DAILY
Qty: 91.5 G | Refills: 3 | Status: SHIPPED | OUTPATIENT
Start: 2018-04-09 | End: 2018-06-08 | Stop reason: SDUPTHER

## 2018-04-09 RX ORDER — ETODOLAC 500 MG/1
500 TABLET, FILM COATED ORAL DAILY
Qty: 90 TABLET | Refills: 3 | Status: SHIPPED | OUTPATIENT
Start: 2018-04-09 | End: 2018-06-08 | Stop reason: SDUPTHER

## 2018-04-09 RX ORDER — MONTELUKAST SODIUM 10 MG/1
10 TABLET ORAL NIGHTLY
Qty: 90 TABLET | Refills: 3 | Status: SHIPPED | OUTPATIENT
Start: 2018-04-09 | End: 2018-08-06 | Stop reason: SDUPTHER

## 2018-04-09 NOTE — TELEPHONE ENCOUNTER
"----- Message from Arelis Munson sent at 4/7/2018  4:44 PM EDT -----  Regarding: Prescription Question  Contact: [phone]    MY CHART MESSAGE:    I have no more Losing RX's as of the end of Sunday April 8th. Can you please call a new script into [facility] on [address] in [facility]?    Thank you,  Arelis \"Ankit\" Arpit    "

## 2018-04-09 NOTE — TELEPHONE ENCOUNTER
Can you find out what medication he is requesting?  Dr. Aviles ordered tramadol for him on 3/8.  I can send in voltaren gel for him if this is what he needs.  Thanks SUPA

## 2018-04-10 DIAGNOSIS — M17.0 PRIMARY OSTEOARTHRITIS OF BOTH KNEES: Primary | ICD-10-CM

## 2018-04-10 NOTE — TELEPHONE ENCOUNTER
"From: Arelis Munson  To: LORENZO Gallardo  Sent: 4/7/2018 4:44 PM EDT  Subject: Prescription Question    I have no more Losing RX's as of the end of Sunday April 8th. Can you please call a new script into [facility] on [address] in [place], [place] please?    Thank you,  Arelis \"Ankit\" Arpit  "

## 2018-04-11 ENCOUNTER — TELEPHONE (OUTPATIENT)
Dept: ORTHOPEDIC SURGERY | Facility: CLINIC | Age: 43
End: 2018-04-11

## 2018-04-11 NOTE — TELEPHONE ENCOUNTER
Please call him and find out exactly what he is taking and put in the prescription request for this as he has been taking it.  So I can sign it correctly.  Thanks

## 2018-04-11 NOTE — TELEPHONE ENCOUNTER
"----- Message from Arelis Munson sent at 4/10/2018  6:30 PM EDT -----  Regarding: RE: Prescription Question  Contact: [phone]  I appreciate your prompt response however that does not answer the question at all. Why did Dr. Eddy change it from twice a day for the last 10 years to all of the sudden out of no where with zero discussion to once a day?      I believe the script is incorrect and should be resubmitted as twice a day. Please ask him directly and check my file before responding.     Thank you,  Arelis  ----- Message -----  From: ANGLE WHELAN  Sent: 4/10/2018  5:25 PM EDT  To: Arelis Munson  Subject: RE: Prescription Question  Mr. Munson,   This is what has been sent to [facility]    etodolac (LODINE) 500 MG tablet   Order Details   Dose: 500 mg Route: Oral Frequency: Daily  Dispense Quantity:  90 tablet Refills:  3         Sig: Take 1 tablet by mouth Daily for 360 days.    KARISHMA Tang         ----- Message -----     From: Arelis Munson     Sent: 4/10/2018  5:16 PM EDT       To: LORENZO Ibrahim  Subject: Prescription Question    Thank you for filling my lodine. When I picked it up it said take once a day. For years I've taken it twice a day. Can you please clarify if the prescription sent to [facility] is correct? Thank you  ----- Message -----  From: Carol SCHILLING  Sent: 4/9/2018  9:31 AM EDT  To: Arelis Munson  Subject: RE: Prescription Question  LORENZO Gallardo,     What medication are you requesting?  Dr. Aviles ordered tramadol for you on 3/8.  I can send in voltaren gel for him if this is what he needs.    Thanks MJR      ----- Message -----     From: Arelis Munson     Sent: 4/7/2018  4:44 PM EDT       To: LORENZO Ibrahim  Subject: Prescription Question    I have no more Losing RX's as of the end of Sunday April 8th. Can you please call a new script into [facility] on [address] in [place], [place] please?    Thank you,  Arelis \"Ankit\" Arpit  "

## 2018-04-12 ENCOUNTER — OFFICE VISIT (OUTPATIENT)
Dept: SLEEP MEDICINE | Facility: HOSPITAL | Age: 43
End: 2018-04-12
Attending: INTERNAL MEDICINE

## 2018-04-12 DIAGNOSIS — F51.04 PSYCHOPHYSIOLOGICAL INSOMNIA: ICD-10-CM

## 2018-04-12 DIAGNOSIS — Z99.89 OSA ON CPAP: Primary | ICD-10-CM

## 2018-04-12 DIAGNOSIS — G47.33 OSA ON CPAP: Primary | ICD-10-CM

## 2018-04-12 PROCEDURE — G0463 HOSPITAL OUTPT CLINIC VISIT: HCPCS

## 2018-04-12 PROCEDURE — 99213 OFFICE O/P EST LOW 20 MIN: CPT | Performed by: INTERNAL MEDICINE

## 2018-04-12 RX ORDER — BUDESONIDE 180 UG/1
1 AEROSOL, POWDER RESPIRATORY (INHALATION)
Qty: 3 INHALER | Refills: 3 | Status: SHIPPED | OUTPATIENT
Start: 2018-04-12 | End: 2018-04-17 | Stop reason: SDUPTHER

## 2018-04-12 RX ORDER — ZOLPIDEM TARTRATE 12.5 MG/1
12.5 TABLET, FILM COATED, EXTENDED RELEASE ORAL NIGHTLY
Qty: 90 TABLET | Refills: 1 | Status: SHIPPED | OUTPATIENT
Start: 2018-04-12 | End: 2018-10-29 | Stop reason: SDUPTHER

## 2018-04-12 NOTE — PROGRESS NOTES
[facility] Note       Patient Care Team:  Jovon Aviles MD as PCP - General (Internal Medicine)  Tyrell Eddy MD as Consulting Physician (Orthopedic Surgery)  Lebron Spivey Jr., MD as Surgeon (Orthopedic Surgery)  Ronni Kong MD as Consulting Physician (Sleep Medicine)    Chief Complaint:  NITA     Interval History:   The patient was last seen by me in September 2017.  He states he is unchanged.  Due to injuries of his right ankle and foot, he's been off from work for greater than one year.  His bedtime and wake time varies.  [place] Sleepiness Scale is normal at 5.    Review of Systems:  Recorded on the Sleep Questionnaire.  Unremarkable except for nasal congestion and postnasal drip, occasional wheezing, ADAM, and ear pain.    Social History:  He will have 3 cups of coffee a day  Social History     Social History   • Marital status:      Social History Main Topics   • Smoking status: Former Smoker     Packs/day: 2.00     Years: 31.00     Types: Cigarettes     Quit date: 2004   • Smokeless tobacco: Never Used      Comment: Quit 2004   • Alcohol use Yes      Comment: ONCE OR TWICE MONTHLY   • Drug use: No   • Sexual activity: Defer     Other Topics Concern   • Not on file       Allergies:  Review of patient's allergies indicates no known allergies.     Medication Review:  Reviewed.  Ambien CR 12.5 daily at bedtime    Vital Signs:  Height 70 inches, weight 250 pounds and BMI obese at 36.    Physical Exam:    Constitutional:  Well developed white male and appears in no apparent distress.  Awake & oriented times 3.  Normal mood with normal recent and remote memory and normal judgement.  Eyes:  Conjunctivae normal.  Oropharynx:  moist mucous membranes without exudate and a large tongue and normal uvula and class III MP airway      Results Review:  DME is [facility] and he uses a fullface mask.  Downloads between October 14 and April 11, 2018 compliances 93%.  Average usage is 6  hours and 43 minutes.  Average AHI is mildly abnormal 7.8 without leak.  Average AutoCPAP pressure is 12.3 and his auto CPAP is 8-14.       Impression:   Obstructive sleep apnea nearly adequately treated with auto titrating CPAP with good compliance and usage and no complaints of hypersomnolence.  Psychophysiologic insomnia previously treated with Ambien CR 12.5 mg.       Plan:  Good sleep hygiene measures should be maintained.  Weight loss would be beneficial in this patient who is obese by BMI.  The patient is benefiting from the treatment being provided.     The patient will continue auto CPAP.  His auto CPAP will be changed to 9-15.    I refilled his Pulmicort electronically.  A prescription for Ambien CR, #90 with 1 refill given.    The patient will call for any problems and will follow up in 9 months.      Ronni Kong MD  04/15/18  9:12 AM

## 2018-04-15 PROBLEM — F51.04 PSYCHOPHYSIOLOGICAL INSOMNIA: Status: ACTIVE | Noted: 2017-09-21

## 2018-04-16 RX ORDER — ETODOLAC 500 MG/1
500 TABLET, FILM COATED ORAL DAILY
Qty: 90 TABLET | Refills: 3 | OUTPATIENT
Start: 2018-04-16 | End: 2019-04-11

## 2018-04-16 RX ORDER — ETODOLAC 500 MG/1
500 TABLET, FILM COATED ORAL 2 TIMES DAILY
Qty: 180 TABLET | Refills: 3 | Status: SHIPPED | OUTPATIENT
Start: 2018-04-16 | End: 2018-08-09 | Stop reason: SDUPTHER

## 2018-04-16 NOTE — TELEPHONE ENCOUNTER
Please call his pharmacy and let them know this is the correct amount as he is taking twice daily not once daily.  Thanks SUPA/jessica

## 2018-04-17 RX ORDER — BUDESONIDE 180 UG/1
AEROSOL, POWDER RESPIRATORY (INHALATION)
Qty: 2 INHALER | Refills: 2 | Status: SHIPPED | OUTPATIENT
Start: 2018-04-17 | End: 2019-04-21

## 2018-04-27 RX ORDER — TRAMADOL HYDROCHLORIDE 50 MG/1
TABLET ORAL
Qty: 180 TABLET | Refills: 1 | OUTPATIENT
Start: 2018-04-27

## 2018-05-03 RX ORDER — TRAMADOL HYDROCHLORIDE 50 MG/1
TABLET ORAL
Qty: 180 TABLET | Refills: 0 | Status: SHIPPED | OUTPATIENT
Start: 2018-05-03 | End: 2018-06-08 | Stop reason: SDUPTHER

## 2018-05-29 RX ORDER — TRAMADOL HYDROCHLORIDE 50 MG/1
TABLET ORAL
Qty: 180 TABLET | Refills: 0 | OUTPATIENT
Start: 2018-05-29

## 2018-06-01 DIAGNOSIS — E34.9 TESTOSTERONE DEFICIENCY: ICD-10-CM

## 2018-06-01 DIAGNOSIS — E78.00 ELEVATED CHOLESTEROL: Primary | ICD-10-CM

## 2018-06-08 ENCOUNTER — OFFICE VISIT (OUTPATIENT)
Dept: FAMILY MEDICINE CLINIC | Facility: CLINIC | Age: 43
End: 2018-06-08

## 2018-06-08 VITALS
HEART RATE: 84 BPM | WEIGHT: 241.7 LBS | SYSTOLIC BLOOD PRESSURE: 114 MMHG | OXYGEN SATURATION: 96 % | DIASTOLIC BLOOD PRESSURE: 74 MMHG | BODY MASS INDEX: 33.84 KG/M2 | HEIGHT: 71 IN

## 2018-06-08 DIAGNOSIS — M17.0 PRIMARY OSTEOARTHRITIS OF BOTH KNEES: ICD-10-CM

## 2018-06-08 DIAGNOSIS — G43.109 MIGRAINE WITH AURA AND WITHOUT STATUS MIGRAINOSUS, NOT INTRACTABLE: Primary | ICD-10-CM

## 2018-06-08 DIAGNOSIS — E34.9 TESTOSTERONE DEFICIENCY: ICD-10-CM

## 2018-06-08 LAB
ALBUMIN SERPL-MCNC: 4.8 G/DL (ref 3.5–5.5)
ALBUMIN/GLOB SERPL: 2.4 {RATIO} (ref 1.2–2.2)
ALP SERPL-CCNC: 63 IU/L (ref 39–117)
ALT SERPL-CCNC: 34 IU/L (ref 0–44)
AST SERPL-CCNC: 19 IU/L (ref 0–40)
BILIRUB SERPL-MCNC: 0.3 MG/DL (ref 0–1.2)
BUN SERPL-MCNC: 14 MG/DL (ref 6–24)
BUN/CREAT SERPL: 15 (ref 9–20)
CALCIUM SERPL-MCNC: 9.2 MG/DL (ref 8.7–10.2)
CHLORIDE SERPL-SCNC: 102 MMOL/L (ref 96–106)
CHOLEST SERPL-MCNC: 151 MG/DL (ref 100–199)
CO2 SERPL-SCNC: 26 MMOL/L (ref 18–29)
CREAT SERPL-MCNC: 0.92 MG/DL (ref 0.76–1.27)
ERYTHROCYTE [DISTWIDTH] IN BLOOD BY AUTOMATED COUNT: 14.4 % (ref 12.3–15.4)
GFR SERPLBLD CREATININE-BSD FMLA CKD-EPI: 100 ML/MIN/1.73
GFR SERPLBLD CREATININE-BSD FMLA CKD-EPI: 116 ML/MIN/1.73
GLOBULIN SER CALC-MCNC: 2 G/DL (ref 1.5–4.5)
GLUCOSE SERPL-MCNC: 100 MG/DL (ref 65–99)
HCT VFR BLD AUTO: 47.9 % (ref 37.5–51)
HDLC SERPL-MCNC: 53 MG/DL
HGB BLD-MCNC: 15.8 G/DL (ref 13–17.7)
LDLC SERPL CALC-MCNC: 77 MG/DL (ref 0–99)
MCH RBC QN AUTO: 31 PG (ref 26.6–33)
MCHC RBC AUTO-ENTMCNC: 33 G/DL (ref 31.5–35.7)
MCV RBC AUTO: 94 FL (ref 79–97)
PLATELET # BLD AUTO: 332 X10E3/UL (ref 150–379)
POTASSIUM SERPL-SCNC: 4.8 MMOL/L (ref 3.5–5.2)
PROT SERPL-MCNC: 6.8 G/DL (ref 6–8.5)
RBC # BLD AUTO: 5.1 X10E6/UL (ref 4.14–5.8)
SODIUM SERPL-SCNC: 142 MMOL/L (ref 134–144)
TESTOST FREE MFR SERPL: 2.82 % (ref 1.5–4.2)
TESTOST FREE SERPL-MCNC: 3.69 NG/DL (ref 5–21)
TESTOST SERPL-MCNC: 131 NG/DL (ref 264–916)
TRIGL SERPL-MCNC: 103 MG/DL (ref 0–149)
VLDLC SERPL CALC-MCNC: 21 MG/DL (ref 5–40)
WBC # BLD AUTO: 6.5 X10E3/UL (ref 3.4–10.8)

## 2018-06-08 PROCEDURE — 99214 OFFICE O/P EST MOD 30 MIN: CPT | Performed by: INTERNAL MEDICINE

## 2018-06-08 RX ORDER — SILDENAFIL 100 MG/1
100 TABLET, FILM COATED ORAL AS NEEDED
Qty: 30 TABLET | Refills: 3 | Status: SHIPPED | OUTPATIENT
Start: 2018-06-08 | End: 2019-04-11 | Stop reason: SDUPTHER

## 2018-06-08 RX ORDER — EZETIMIBE AND SIMVASTATIN 10; 40 MG/1; MG/1
1 TABLET ORAL NIGHTLY
Qty: 90 TABLET | Refills: 3 | Status: SHIPPED | OUTPATIENT
Start: 2018-06-08 | End: 2018-12-17 | Stop reason: SDUPTHER

## 2018-06-08 RX ORDER — ESOMEPRAZOLE MAGNESIUM 40 MG/1
40 CAPSULE, DELAYED RELEASE ORAL NIGHTLY
Qty: 90 CAPSULE | Refills: 3 | Status: SHIPPED | OUTPATIENT
Start: 2018-06-08 | End: 2019-06-14 | Stop reason: SDUPTHER

## 2018-06-08 RX ORDER — GABAPENTIN 600 MG/1
600 TABLET ORAL 3 TIMES DAILY
Qty: 270 TABLET | Refills: 1 | Status: SHIPPED | OUTPATIENT
Start: 2018-06-08 | End: 2018-12-05 | Stop reason: SDUPTHER

## 2018-06-08 RX ORDER — TESTOSTERONE GEL, 1% 10 MG/G
100 GEL TRANSDERMAL DAILY
Qty: 180 PACKAGE | Refills: 2 | Status: SHIPPED | OUTPATIENT
Start: 2018-06-08 | End: 2018-12-05 | Stop reason: ALTCHOICE

## 2018-06-08 RX ORDER — RIZATRIPTAN BENZOATE 10 MG/1
10 TABLET, ORALLY DISINTEGRATING ORAL ONCE AS NEEDED
Qty: 27 TABLET | Refills: 3 | Status: SHIPPED | OUTPATIENT
Start: 2018-06-08 | End: 2018-10-27 | Stop reason: SDUPTHER

## 2018-06-08 RX ORDER — OLOPATADINE HYDROCHLORIDE 665 UG/1
2 SPRAY NASAL 2 TIMES DAILY
Qty: 91.5 G | Refills: 3 | Status: SHIPPED | OUTPATIENT
Start: 2018-06-08 | End: 2019-05-09 | Stop reason: SDUPTHER

## 2018-06-08 RX ORDER — TRAMADOL HYDROCHLORIDE 50 MG/1
100 TABLET ORAL EVERY 8 HOURS PRN
Qty: 180 TABLET | Refills: 2 | Status: SHIPPED | OUTPATIENT
Start: 2018-06-08 | End: 2018-12-05 | Stop reason: ALTCHOICE

## 2018-06-08 NOTE — PROGRESS NOTES
Subjective   Arelis Munson is a 43 y.o. male who presents today for:    Knee Pain (CSE)    History of Present Illness   Knee pain- OA. Chronic, intermittent, but in a recurrently persistent fashion, affecting both knees, worse with prolonged standing and bending. Treated by Dr. Tyrell Eddy with steroid injections with good relief. He also wears a hinged brace on the right knee and a brace on the left at times. He takes Lodine every day as well.  He was also started on meloxicam by another provider (for his ankle) and has been taking both.  On the advice of a therapist at [facility] (Stephanie), he has started doing other exercises and trying to go without the knee brace, with no increase in pain.      Between injections, he uses tramadol 100 mg on the order of 2 tablets 2-3 times per day, to keep himself functional. He reports that this helps take the edge off the pain, allows him to continue work, without causing excess sedation or constipation.      The tramadol also helps with burning right upper extremity pain that developed after an ulnar nerve release. It goes up to his shoulder at times, and is usually aggravated by work.  We started gabapentin for the migraines with good benefit and improvement regarding his ulnar neuropathy/elbow pain.  He has not needed his Maxalt but a handful of times overt the past few months.  The Maxalt has worked well when he has taken it.    He has testosterone deficiency treated with testosterone gel.  He has struggled with fluctuating levels until recently.  He feels his libido and stamina have not responded to the supplement.     He also has chronic insomnia for which he has been taking Ambien.  We tried switching to trazodone, but he felt his memory worsened after making this change.    Mr. Munson  reports that he quit smoking about 14 years ago. His smoking use included Cigarettes. He has a 62.00 pack-year smoking history. He has never used smokeless tobacco. He reports  that he drinks alcohol. He reports that he does not use drugs.     No Known Allergies    Current Outpatient Prescriptions:   •  aspirin 81 MG EC tablet, Take 81 mg by mouth Daily., Disp: , Rfl:   •  B Complex Vitamins (VITAMIN B COMPLEX PO), Take  by mouth Daily. HOLD PRIOR TO SURGERY, Disp: , Rfl:   •  Cholecalciferol (VITAMIN D3) 5000 UNITS capsule capsule, Take 5,000 Units by mouth Daily. HOLD PRIOR TO SURGERY, Disp: , Rfl:   •  diclofenac (VOLTAREN) 1 % gel gel, APPLY 4 GRAMS TO AFFECTED AREA(S) FOUR TIMES A DAY AS NEEDED FOR INFLAMMATION, Disp: 4 tube, Rfl: 3  •  docusate sodium (COLACE) 250 MG capsule, Take 250 mg by mouth Daily., Disp: , Rfl:   •  esomeprazole (nexIUM) 40 MG capsule, Take 1 capsule by mouth Every Night., Disp: 90 capsule, Rfl: 3  •  etodolac (LODINE) 500 MG tablet, Take 1 tablet by mouth 2 (Two) Times a Day., Disp: 180 tablet, Rfl: 3  •  ezetimibe-simvastatin (VYTORIN) 10-40 MG per tablet, Take 1 tablet by mouth Every Night., Disp: 90 tablet, Rfl: 3  •  FIBER PO, Take  by mouth., Disp: , Rfl:   •  fluticasone (VERAMYST) 27.5 MCG/SPRAY nasal spray, 1 spray into each nostril Daily., Disp: 30 g, Rfl: 3  •  gabapentin (NEURONTIN) 600 MG tablet, Take 1 tablet by mouth 3 (Three) Times a Day., Disp: 270 tablet, Rfl: 1  •  melatonin 1 MG tablet, Take 3 mg by mouth Every Night. HOLD PRIOR TO SURGERY, Disp: , Rfl:   •  montelukast (SINGULAIR) 10 MG tablet, Take 1 tablet by mouth Every Night., Disp: 90 tablet, Rfl: 3  •  Multiple Vitamin (MULTI VITAMIN DAILY PO), Take  by mouth daily., Disp: , Rfl:   •  olopatadine (PATANASE) 0.6 % solution nasal solution, 2 sprays by Each Nare route 2 (Two) Times a Day., Disp: 91.5 g, Rfl: 3  •  Potassium 99 MG tablet, Take  by mouth., Disp: , Rfl:   •  PROVENTIL  (90 BASE) MCG/ACT inhaler, Inhale 2 puffs every 6 (six) hours as needed., Disp: , Rfl:   •  PULMICORT FLEXHALER 180 MCG/ACT inhaler, USE 1 TO 2 INHALATIONS EVERY MORNING, Disp: 2 inhaler, Rfl: 2  •   "rizatriptan MLT (MAXALT-MLT) 10 MG disintegrating tablet, Take 1 tablet by mouth 1 (One) Time As Needed for Migraine for up to 1 dose. May repeat in 2 hours if needed, Disp: 27 tablet, Rfl: 0  •  SALINE NASAL SPRAY NA, into each nostril., Disp: , Rfl:   •  selenium sulfide (SELSUN) 2.5 % shampoo, Apply  topically Daily As Needed for dandruff., Disp: 354 mL, Rfl: 3  •  sildenafil (VIAGRA) 100 MG tablet, Take 1 tablet by mouth As Needed for erectile dysfunction., Disp: 30 tablet, Rfl: 3  •  testosterone (ANDROGEL) 50 MG/5GM (1%) gel gel, Place 100 mg on the skin Daily., Disp: 180 package, Rfl: 2  •  traMADol (ULTRAM) 50 MG tablet, TAKE TWO TABLETS BY MOUTH THREE TIMES A DAY AS NEEDED FOR MODERATE PAIN, Disp: 180 tablet, Rfl: 0  •  zolpidem CR (AMBIEN CR) 12.5 MG CR tablet, Take 1 tablet by mouth Every Night., Disp: 90 tablet, Rfl: 1      Review of Systems   Constitutional: Positive for fatigue.   HENT: Positive for postnasal drip and rhinorrhea.    Eyes: Negative.    Respiratory: Negative.    Cardiovascular: Negative.    Musculoskeletal: Positive for arthralgias and back pain.   Allergic/Immunologic: Positive for environmental allergies.   Neurological: Positive for headaches.   Hematological: Negative.    Psychiatric/Behavioral: Positive for sleep disturbance.         Objective   Vitals:    06/08/18 1607   BP: 114/74   BP [place] Right arm   Patient Position: Sitting   Cuff Size: Large Adult   Pulse: 84   SpO2: 96%   Weight: 110 kg (241 lb 11.2 oz)   Height: 180.3 cm (71\")     Physical Exam    Overweight male in no acute distress.  Sclerae are anicteric the conjunctiva are pink.  Turbinates are boggy. Clear discharge noted.  Regular rate and rhythm. No murmur appreciated.  Station, gait, and coordination are normal.  He remains tender over the medial aspects of both knees. There is no erythema, warmth, or effusion of either knee. He does have crepitus with flexion and extension.  Mildly tender to palpation over the " L-spine.  Functional range of motion of the lumbar spine.  Tight hamstrings bilaterally. Full range of motion of both hips.  Negative bowstring sign and negative straight leg raise bilaterally. Station, gait, and coordination are normal in both lower extremities.  Alert and oriented x4; answers all questions appropriately. Insight and judgment appear to be intact.        Arelis was seen today for knee pain.    Diagnoses and all orders for this visit:    Migraine with aura and without status migrainosus, not intractable  -     gabapentin (NEURONTIN) 600 MG tablet; Take 1 tablet by mouth 3 (Three) Times a Day.    Primary osteoarthritis of both knees  -     traMADol (ULTRAM) 50 MG tablet; Take 2 tablets by mouth Every 8 (Eight) Hours As Needed for Moderate Pain .    Testosterone deficiency  -     Ambulatory Referral to Urology  -     testosterone (ANDROGEL) 50 MG/5GM (1%) gel gel; Place 100 mg on the skin Daily.    -     rizatriptan MLT (MAXALT-MLT) 10 MG disintegrating tablet; Take 1 tablet by mouth 1 (One) Time As Needed for Migraine for up to 1 dose. May repeat in 2 hours if needed  -     fluticasone (VERAMYST) 27.5 MCG/SPRAY nasal spray; 1 spray into each nostril Daily.  -     ezetimibe-simvastatin (VYTORIN) 10-40 MG per tablet; Take 1 tablet by mouth Every Night.  -     sildenafil (VIAGRA) 100 MG tablet; Take 1 tablet by mouth As Needed for erectile dysfunction.  -     olopatadine (PATANASE) 0.6 % solution nasal solution; 2 sprays by Each Nare route 2 (Two) Times a Day.  -     esomeprazole (nexIUM) 40 MG capsule; Take 1 capsule by mouth Every Night.    MARY BETH report was obtained and reviewed during the course of today's office visit.  Gabapentin, tramadol, and testosterone will be continued. He will be referred to urology for consideration of testosterone implants and stent of the AndroGel. He should discuss any problems with erectile dysfunction, despite the Viagra, with them as well.

## 2018-08-06 RX ORDER — MONTELUKAST SODIUM 10 MG/1
10 TABLET ORAL NIGHTLY
Qty: 90 TABLET | Refills: 3 | Status: SHIPPED | OUTPATIENT
Start: 2018-08-06 | End: 2019-07-30 | Stop reason: SDUPTHER

## 2018-08-08 ENCOUNTER — CLINICAL SUPPORT (OUTPATIENT)
Dept: ORTHOPEDIC SURGERY | Facility: CLINIC | Age: 43
End: 2018-08-08

## 2018-08-08 VITALS — WEIGHT: 242.4 LBS | HEIGHT: 71 IN | TEMPERATURE: 99.1 F | BODY MASS INDEX: 33.94 KG/M2

## 2018-08-08 DIAGNOSIS — M25.561 CHRONIC PAIN OF BOTH KNEES: Primary | ICD-10-CM

## 2018-08-08 DIAGNOSIS — M17.0 ARTHRITIS OF BOTH KNEES: ICD-10-CM

## 2018-08-08 DIAGNOSIS — M25.562 CHRONIC PAIN OF BOTH KNEES: Primary | ICD-10-CM

## 2018-08-08 DIAGNOSIS — G89.29 CHRONIC PAIN OF BOTH KNEES: Primary | ICD-10-CM

## 2018-08-08 PROCEDURE — 99212 OFFICE O/P EST SF 10 MIN: CPT | Performed by: ORTHOPAEDIC SURGERY

## 2018-08-08 PROCEDURE — 20610 DRAIN/INJ JOINT/BURSA W/O US: CPT | Performed by: ORTHOPAEDIC SURGERY

## 2018-08-08 PROCEDURE — 73562 X-RAY EXAM OF KNEE 3: CPT | Performed by: ORTHOPAEDIC SURGERY

## 2018-08-08 RX ORDER — METHYLPREDNISOLONE ACETATE 80 MG/ML
80 INJECTION, SUSPENSION INTRA-ARTICULAR; INTRALESIONAL; INTRAMUSCULAR; SOFT TISSUE
Status: COMPLETED | OUTPATIENT
Start: 2018-08-08 | End: 2018-08-08

## 2018-08-08 RX ORDER — LIDOCAINE HYDROCHLORIDE 10 MG/ML
4 INJECTION, SOLUTION EPIDURAL; INFILTRATION; INTRACAUDAL; PERINEURAL
Status: COMPLETED | OUTPATIENT
Start: 2018-08-08 | End: 2018-08-08

## 2018-08-08 RX ADMIN — METHYLPREDNISOLONE ACETATE 80 MG: 80 INJECTION, SUSPENSION INTRA-ARTICULAR; INTRALESIONAL; INTRAMUSCULAR; SOFT TISSUE at 13:25

## 2018-08-08 RX ADMIN — LIDOCAINE HYDROCHLORIDE 4 ML: 10 INJECTION, SOLUTION EPIDURAL; INFILTRATION; INTRACAUDAL; PERINEURAL at 13:25

## 2018-08-08 NOTE — PROGRESS NOTES
Patient: Arelis Munson  YOB: 1975    Chief Complaints:  bilateral knee pain    Subjective:    History of Present Illness: Here today for knee pain. The pain is a generalized joint tenderness.  It has been progressive in nature but remains intermittent.  Worsened by prolonged standing or walking and squatting activities. Has had improvement in the past with ice/heat, rest, and injections.     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     Allergies: No Known Allergies    Medications:   Home Medications:  Current Outpatient Prescriptions on File Prior to Visit   Medication Sig   • aspirin 81 MG EC tablet Take 81 mg by mouth Daily.   • B Complex Vitamins (VITAMIN B COMPLEX PO) Take  by mouth Daily. HOLD PRIOR TO SURGERY   • Cholecalciferol (VITAMIN D3) 5000 UNITS capsule capsule Take 5,000 Units by mouth Daily. HOLD PRIOR TO SURGERY   • diclofenac (VOLTAREN) 1 % gel gel APPLY 4 GRAMS TO AFFECTED AREA(S) FOUR TIMES A DAY AS NEEDED FOR INFLAMMATION   • docusate sodium (COLACE) 250 MG capsule Take 250 mg by mouth Daily.   • esomeprazole (nexIUM) 40 MG capsule Take 1 capsule by mouth Every Night.   • etodolac (LODINE) 500 MG tablet Take 1 tablet by mouth 2 (Two) Times a Day.   • ezetimibe-simvastatin (VYTORIN) 10-40 MG per tablet Take 1 tablet by mouth Every Night.   • FIBER PO Take  by mouth.   • fluticasone (VERAMYST) 27.5 MCG/SPRAY nasal spray 1 spray into each nostril Daily.   • gabapentin (NEURONTIN) 600 MG tablet Take 1 tablet by mouth 3 (Three) Times a Day.   • melatonin 1 MG tablet Take 3 mg by mouth Every Night. HOLD PRIOR TO SURGERY   • montelukast (SINGULAIR) 10 MG tablet Take 1 tablet by mouth Every Night.   • Multiple Vitamin (MULTI VITAMIN DAILY PO) Take  by mouth daily.   • olopatadine (PATANASE) 0.6 % solution nasal solution 2 sprays by Each Nare route 2 (Two) Times a Day.   • Potassium 99 MG tablet Take  by mouth.   • PROVENTIL  (90 BASE) MCG/ACT inhaler Inhale 2 puffs every 6  (six) hours as needed.   • PULMICORT FLEXHALER 180 MCG/ACT inhaler USE 1 TO 2 INHALATIONS EVERY MORNING   • rizatriptan MLT (MAXALT-MLT) 10 MG disintegrating tablet Take 1 tablet by mouth 1 (One) Time As Needed for Migraine for up to 1 dose. May repeat in 2 hours if needed   • SALINE NASAL SPRAY NA into each nostril.   • selenium sulfide (SELSUN) 2.5 % shampoo Apply  topically Daily As Needed for dandruff.   • sildenafil (VIAGRA) 100 MG tablet Take 1 tablet by mouth As Needed for erectile dysfunction.   • testosterone (ANDROGEL) 50 MG/5GM (1%) gel gel Place 100 mg on the skin Daily.   • traMADol (ULTRAM) 50 MG tablet Take 2 tablets by mouth Every 8 (Eight) Hours As Needed for Moderate Pain .   • zolpidem CR (AMBIEN CR) 12.5 MG CR tablet Take 1 tablet by mouth Every Night.     No current facility-administered medications on file prior to visit.      Current Medications:  Scheduled Meds:  Continuous Infusions:  No current facility-administered medications for this visit.   PRN Meds:.    I have reviewed the patient's medical history in detail and updated the computerized patient record.  Review and summarization of old records include:    Past Medical History:   Diagnosis Date   • Arthritis     OSTEO   • Asthma    • ED (erectile dysfunction)    • Elevated cholesterol    • Environmental allergies    • Fracture, fibula     RIGHT AND ANKLE   • Gastroesophageal reflux disease without esophagitis 7/11/2016   • Migraine with aura and without status migrainosus, not intractable 7/11/2016   • Pain and swelling of toe of right foot    • Seasonal allergies    • Sleep apnea     CPAP   • Testosterone deficiency 7/11/2016        Past Surgical History:   Procedure Laterality Date   • ANKLE ARTHROSCOPY Right 1/26/2018    Procedure: RT ANKLE ARTHROSCOPY SUBCHONDROPLASTY TALUS ;  Surgeon: Lebron Spivey Jr., MD;  [facility] OR [facility];  Service:    • ANKLE LIGAMENT RECONSTRUCTION Right 1/26/2018    Procedure: HARDWARE REMOVAL POSS  LATERAL LIGAMENT RECONSTRUCTION ;  Surgeon: Lebron Spivey Jr., MD;  [place] [facility] OR [facility];  Service:    • CARPAL TUNNEL RELEASE Right    • CUBITAL TUNNEL RELEASE Right     NERVE RELEASE   • EAR BIOPSY Right     MYRINGOTOMY W/BONES REPLACED INNER EAR   • KNEE SURGERY Right     MENISCUS REPAIR   • NOSE SURGERY     • ORIF FIBULA FRACTURE Right 05/17/2017    ANKLE INCLUDED        Social History     Occupational History   • Not on file.     Social History Main Topics   • Smoking status: Former Smoker     Packs/day: 2.00     Years: 31.00     Types: Cigarettes     Quit date: 2004   • Smokeless tobacco: Never Used      Comment: Quit 2004   • Alcohol use Yes      Comment: ONCE OR TWICE MONTHLY   • Drug use: No   • Sexual activity: Defer      Social History     Social History Narrative   • No narrative on file        Family History   Problem Relation Age of Onset   • Lung cancer Mother    • Lung cancer Father    • Malig Hyperthermia Neg Hx        ROS: 14 point review of systems was performed and was negative except for documented findings in HPI and today's encounter.     Allergies: No Known Allergies  Constitutional:  Denies fever, shaking or chills   Eyes:  Denies change in visual acuity   HENT:  Denies nasal congestion or sore throat   Respiratory:  Denies cough or shortness of breath   Cardiovascular:  Denies chest pain or severe LE edema   GI:  Denies abdominal pain, nausea, vomiting, bloody stools or diarrhea   Musculoskeletal:  Numbness, tingling, or loss of motor function only as noted above in history of present illness.  : Denies painful urination or hematuria  Integument:  Denies rash, lesion or ulceration   Neurologic:  Denies headache or focal weakness  Endocrine:  Denies lymphadenopathy  Psych:  Denies confusion or change in mental status   Hem:  Denies active bleeding    Physical Exam:  Wt Readings from Last 3 Encounters:   08/08/18 110 kg (242 lb 6.4 oz)   06/08/18 110 kg (241 lb 11.2 oz)   04/03/18 114 kg  "(251 lb 3.2 oz)     Ht Readings from Last 3 Encounters:   08/08/18 180.3 cm (71\")   06/08/18 180.3 cm (71\")   04/03/18 180.3 cm (71\")     Body mass index is 33.81 kg/m².  Facility age limit for growth percentiles is 20 years.  Vitals:    08/08/18 1323   Temp: 99.1 °F (37.3 °C)     Vital Signs:  reviewed  Constitutional: Awake alert and oriented x3, well developed, no acute distress, non-toxic appearance.  EYES: symmetric, sclera clear  ENT:  Normocephalic, Atraumatic.   Respiratory:  No respiratory distress, No wheezing  CV: pulse regular, no palpitations or pallor.  GI:  Abdomen soft, non-tender.   Vascular:  Intact distal pulses, No cyanosis, no signs or symptoms of DVT.  Neurologic: Sensation grossly intact to the involved extremity, No focal deficits noted.   Neck: No tenderness, Supple.  Integument: warm, dry, no ulcerations.   Psychiatric:  Oriented, no pathological affect.  Musculoskeletal:    Affected knee(s):  Painful gait with a subtle limp, positive for synovitis, swelling, joint effusion with crepitation.  Lachman negative  Posterior drawer negative  Paulo's negative  Patellofemoral grind +  Sensation grossly intact to light touch throughout the lower extremity  Skin is intact  Distal pulses are palpable  No signs or symptoms of DVT        Diagnostic Data:     Imaging was done today, images were personally viewed and discussed with the patient:    Indication: pain related symptoms,  Views: 3V AP, LAT & 40 degree PA bilateral knee(s)   Findings: severe end-stage arthritis (bone on bone, subchondral sclerosis/cysts, osteophytes)  Comparison views: viewed last xray done in the office.     Procedure:  Large Joint Arthrocentesis  Date/Time: 8/8/2018 1:25 PM  Consent given by: patient  Site marked: site marked  Timeout: Immediately prior to procedure a time out was called to verify the correct patient, procedure, equipment, support staff and site/side marked as required   Supporting " Documentation  Indications: pain and joint swelling   Procedure Details  [place] knee - R knee  Preparation: Patient was prepped and draped in the usual sterile fashion  Needle size: 22 G  Approach: anterolateral  Medications administered: 80 mg methylPREDNISolone acetate 80 MG/ML; 4 mL lidocaine PF 1% 1 %  Patient tolerance: patient tolerated the procedure well with no immediate complications    Large Joint Arthrocentesis  Date/Time: 8/8/2018 1:25 PM  Consent given by: patient  Site marked: site marked  Timeout: Immediately prior to procedure a time out was called to verify the correct patient, procedure, equipment, support staff and site/side marked as required   Supporting Documentation  Indications: pain and joint swelling   Procedure Details  [place] knee - L knee  Preparation: Patient was prepped and draped in the usual sterile fashion  Needle size: 22 G  Approach: anterolateral  Medications administered: 80 mg methylPREDNISolone acetate 80 MG/ML; 4 mL lidocaine PF 1% 1 %  Patient tolerance: patient tolerated the procedure well with no immediate complications          Assessment:     ICD-10-CM ICD-9-CM   1. Chronic pain of both knees M25.561 719.46    M25.562 338.29    G89.29    2. Arthritis of both knees M17.0 716.96           Plan: Is to proceed with injection  Follow up as indicated.  Ice, elevate, and rest as needed.  Additional interventions include:  15 min spent face to face with patient 9 min spent counseling about natural history and expected course of assessed complaint and reviewed treatment options that have been tried and not tried and those currently available. Questions answered.    Natural history and expected course of this patient's diagnosis discussed along with evaluation of therapies. Questions answered.  Advice on benefits of, and types of regular/moderate exercise including biomechanical forces involved as it pertains to this complaint, with a goal of 5 min at least 7 times a week.     Address and update of wt loss, physical exercises, use of assistive devices, and monitoring of Medications per orders to address ortho complaints; Evaluation and discussion of safety, precautions, side effects, and warnings given especially of long term NSAID therapy.  Lifestyle measures for weight loss, suggest starting at 10lbs, with biomechanical explanations for weight loss and how this affects orthopedic condition.  Cortisone Injection. See procedure note.  Cryotherapy/brachy therapy as indicated with instructions.   Advised on strengthening exercises, stressed importance of these with progression of arthritis, demonstrated exercises to patient with repeat demonstration and verb of understanding.   Rest, ice, compression, and elevation (RICE) therapy.  Do Daily THIGH exercises as demonstrated in the office sitting up straight in a supportive chair and leaning forward as you lift the thigh.  Give assist with your hand under the knee to lift as needed. 15x on each thigh once a day.       8/8/2018  MJR

## 2018-08-08 NOTE — PATIENT INSTRUCTIONS
Do Daily THIGH exercises as demonstrated in the office sitting up straight in a supportive chair and leaning forward as you lift the thigh.  Give assist with your hand under the knee to lift as needed. 15x on each thigh once a day.

## 2018-08-09 RX ORDER — ETODOLAC 500 MG/1
500 TABLET, FILM COATED ORAL 2 TIMES DAILY
Qty: 180 TABLET | Refills: 3 | Status: SHIPPED | OUTPATIENT
Start: 2018-08-09 | End: 2018-11-08 | Stop reason: SDUPTHER

## 2018-08-09 NOTE — TELEPHONE ENCOUNTER
"----- Message from Arelis Munson sent at 8/9/2018  6:10 AM EDT -----  Regarding: Prescription Question  Contact: [phone]  Jude Monge when you get the opportunity could you please send a new Rx for my Lodine that I take twice a day (1 in the AM and 1 in the PM) to the [facility] in [place], [place]?    Thank you,  Arelis \"Ankit\" Arpit  "

## 2018-09-12 ENCOUNTER — OFFICE VISIT (OUTPATIENT)
Dept: FAMILY MEDICINE CLINIC | Facility: CLINIC | Age: 43
End: 2018-09-12

## 2018-09-12 VITALS
HEART RATE: 84 BPM | BODY MASS INDEX: 34.65 KG/M2 | SYSTOLIC BLOOD PRESSURE: 114 MMHG | DIASTOLIC BLOOD PRESSURE: 80 MMHG | WEIGHT: 247.5 LBS | OXYGEN SATURATION: 91 % | HEIGHT: 71 IN

## 2018-09-12 DIAGNOSIS — M25.562 CHRONIC PAIN OF BOTH KNEES: Primary | ICD-10-CM

## 2018-09-12 DIAGNOSIS — Z91.09 ENVIRONMENTAL ALLERGIES: ICD-10-CM

## 2018-09-12 DIAGNOSIS — M25.561 CHRONIC PAIN OF BOTH KNEES: Primary | ICD-10-CM

## 2018-09-12 DIAGNOSIS — G43.109 MIGRAINE WITH AURA AND WITHOUT STATUS MIGRAINOSUS, NOT INTRACTABLE: ICD-10-CM

## 2018-09-12 DIAGNOSIS — G89.29 CHRONIC PAIN OF BOTH KNEES: Primary | ICD-10-CM

## 2018-09-12 DIAGNOSIS — Z79.899 ENCOUNTER FOR LONG-TERM (CURRENT) USE OF MEDICATIONS: ICD-10-CM

## 2018-09-12 PROCEDURE — 99214 OFFICE O/P EST MOD 30 MIN: CPT | Performed by: INTERNAL MEDICINE

## 2018-09-12 RX ORDER — CROMOLYN SODIUM 40 MG/ML
1 SOLUTION/ DROPS OPHTHALMIC 4 TIMES DAILY
Qty: 10 ML | Refills: 12 | Status: SHIPPED | OUTPATIENT
Start: 2018-09-12 | End: 2019-05-23 | Stop reason: SDUPTHER

## 2018-09-12 NOTE — PROGRESS NOTES
Subjective   Arelis Munson is a 43 y.o. male who presents today for:    Knee Pain (CSE)    History of Present Illness     Knee pain- OA. Chronic, intermittent, but in a recurrently persistent fashion, affecting both knees, worse with prolonged standing and bending. Treated by Dr. Tyrell Eddy with steroid injections with good relief. He also wears a hinged brace on the right knee and a brace on the left at times. He takes Lodine every day as well.  He was also started on meloxicam by another provider (for his ankle) but we stopped it in June.     Between injections, he uses tramadol 100 mg on the order of 2 tablets 2-3 times per day, to keep himself functional. He reports that this helps take the edge off the pain, allows him to continue work, without causing excess sedation or constipation.      The tramadol also helps with burning right upper extremity pain that developed after an ulnar nerve release. It goes up to his shoulder at times, and is usually aggravated by work.  We started gabapentin for the migraines with good benefit and improvement regarding his ulnar neuropathy/elbow pain.  He has not needed his Maxalt but only needs it once or twice a month.  The Maxalt has worked well when he has taken it, especially when he takes it early in the headache.    He has chronic allergies, which he reports are worsening.  He is having a globus sensation in the back of his throat, associated with some postnasal drainage.  He has watery eyes with some blurriness.  This is worse after working, but it affects him around the clock.  He has tried his wife's allergy eyedrop with good benefit.  He does not know what the name of the medication is.  He is already on Singulair and Veramyst; he also has Patanase and or Astelin at home to use for breakthrough runny nose symptoms.    Mr. Munson  reports that he quit smoking about 14 years ago. His smoking use included Cigarettes. He has a 62.00 pack-year smoking history. He has  never used smokeless tobacco. He reports that he drinks alcohol. He reports that he does not use drugs.     No Known Allergies    Current Outpatient Prescriptions:   •  aspirin 81 MG EC tablet, Take 81 mg by mouth Daily., Disp: , Rfl:   •  B Complex Vitamins (VITAMIN B COMPLEX PO), Take  by mouth Daily. HOLD PRIOR TO SURGERY, Disp: , Rfl:   •  Cholecalciferol (VITAMIN D3) 5000 UNITS capsule capsule, Take 5,000 Units by mouth Daily. HOLD PRIOR TO SURGERY, Disp: , Rfl:   •  diclofenac (VOLTAREN) 1 % gel gel, APPLY 4 GRAMS TO AFFECTED AREA(S) FOUR TIMES A DAY AS NEEDED FOR INFLAMMATION, Disp: 4 tube, Rfl: 3  •  docusate sodium (COLACE) 250 MG capsule, Take 250 mg by mouth Daily., Disp: , Rfl:   •  esomeprazole (nexIUM) 40 MG capsule, Take 1 capsule by mouth Every Night., Disp: 90 capsule, Rfl: 3  •  etodolac (LODINE) 500 MG tablet, Take 1 tablet by mouth 2 (Two) Times a Day., Disp: 180 tablet, Rfl: 3  •  ezetimibe-simvastatin (VYTORIN) 10-40 MG per tablet, Take 1 tablet by mouth Every Night., Disp: 90 tablet, Rfl: 3  •  FIBER PO, Take  by mouth., Disp: , Rfl:   •  fluticasone (VERAMYST) 27.5 MCG/SPRAY nasal spray, 1 spray into each nostril Daily., Disp: 30 g, Rfl: 3  •  gabapentin (NEURONTIN) 600 MG tablet, Take 1 tablet by mouth 3 (Three) Times a Day., Disp: 270 tablet, Rfl: 1  •  melatonin 1 MG tablet, Take 3 mg by mouth Every Night. HOLD PRIOR TO SURGERY, Disp: , Rfl:   •  montelukast (SINGULAIR) 10 MG tablet, Take 1 tablet by mouth Every Night., Disp: 90 tablet, Rfl: 3  •  Multiple Vitamin (MULTI VITAMIN DAILY PO), Take  by mouth daily., Disp: , Rfl:   •  olopatadine (PATANASE) 0.6 % solution nasal solution, 2 sprays by Each Nare route 2 (Two) Times a Day., Disp: 91.5 g, Rfl: 3  •  Potassium 99 MG tablet, Take  by mouth., Disp: , Rfl:   •  PROVENTIL  (90 BASE) MCG/ACT inhaler, Inhale 2 puffs every 6 (six) hours as needed., Disp: , Rfl:   •  PULMICORT FLEXHALER 180 MCG/ACT inhaler, USE 1 TO 2 INHALATIONS  "EVERY MORNING, Disp: 2 inhaler, Rfl: 2  •  rizatriptan MLT (MAXALT-MLT) 10 MG disintegrating tablet, Take 1 tablet by mouth 1 (One) Time As Needed for Migraine for up to 1 dose. May repeat in 2 hours if needed, Disp: 27 tablet, Rfl: 3  •  SALINE NASAL SPRAY NA, into each nostril., Disp: , Rfl:   •  selenium sulfide (SELSUN) 2.5 % shampoo, Apply  topically Daily As Needed for dandruff., Disp: 354 mL, Rfl: 3  •  sildenafil (VIAGRA) 100 MG tablet, Take 1 tablet by mouth As Needed for erectile dysfunction., Disp: 30 tablet, Rfl: 3  •  testosterone (ANDROGEL) 50 MG/5GM (1%) gel gel, Place 100 mg on the skin Daily., Disp: 180 package, Rfl: 2  •  traMADol (ULTRAM) 50 MG tablet, Take 2 tablets by mouth Every 8 (Eight) Hours As Needed for Moderate Pain ., Disp: 180 tablet, Rfl: 2  •  zolpidem CR (AMBIEN CR) 12.5 MG CR tablet, Take 1 tablet by mouth Every Night., Disp: 90 tablet, Rfl: 1      Review of Systems   Constitutional: Positive for fatigue.   HENT: Positive for postnasal drip and rhinorrhea.    Eyes: Positive for discharge (Watery).   Respiratory: Negative.    Cardiovascular: Negative.    Gastrointestinal: Negative for abdominal pain.   Musculoskeletal: Positive for arthralgias and back pain.   Allergic/Immunologic: Positive for environmental allergies.   Neurological: Positive for headaches.   Hematological: Negative.    Psychiatric/Behavioral: Positive for sleep disturbance.         Objective   Vitals:    09/12/18 1233   BP: 114/80   BP [place] Left arm   Patient Position: Sitting   Cuff Size: Large Adult   Pulse: 84   SpO2: 91%   Weight: 112 kg (247 lb 8 oz)   Height: 180.3 cm (71\")     Physical Exam    Well-developed, well-nourished, mildly overweight, in no acute distress.  Mood is upbeat and affect is appropriate.  Sclerae are anicteric and the conjunctivae are pink.  Turbinates are boggy with clear discharge bilaterally.  Oropharynx shows clear drainage posteriorly.  Marked tympanosclerosis noted on the " right; moderate sclerosis on the left.  No erythema or effusion appreciated.  No cervical or supraclavicular lymphadenopathy.  Chest is clear bilaterally.  Regular rate and rhythm.  No murmur noted.  Station, gait, and coordination are normal.  Strength is 5+/5 throughout all extremities.            Arelis was seen today for knee pain.    Diagnoses and all orders for this visit:    Chronic pain of both knees  -     ToxASSURE Select 13 (MW) - Urine, Clean Catch    Migraine with aura and without status migrainosus, not intractable    Environmental allergies    Encounter for long-term (current) use of medications  -     ToxASSURE Select 13 (MW) - Urine, Clean Catch    Other orders  -     cromolyn (OPTICROM) 4 % ophthalmic solution; Administer 1 drop to both eyes 4 (Four) Times a Day.    MARY BETH report was obtained and reviewed during the course of today's office visit.  He continues to do well with tramadol for his arthritis, in addition to the NSAID prescribed by his orthopedic surgeon.  He will continue to follow-up with Dr. Eddy for steroid injections in the knee as well.  Urine drug test was obtained today.    [Note: Written prescriptions were given to the patient today for his gabapentin and his tramadol.  Problems with electronic medical record precluded us from using it to prescribe his medicine.  See the attached MARY BETH for quantities and refills prescribed.]    Gabapentin has done well to help with some of his pain, but is doing particularly well to curtail his headaches.  We may try dropping the dose down to 300 mg twice a day and 600 mg at night.  He and his wife has expressed an interest in cutting back on some of his medication.    Where the allergies are concerned, we discussed multiple treatment modalities to get on top of his symptoms.  He will increase the Veramyst to twice a day; he will use either the Patanase or Astelin up to 4 times a day as needed, weaning it as able; and he will start loratadine or  cetirizine 10 mg once a day as needed, also weaning it when symptoms improve.  For the eye symptoms, he may start using cromolyn as prescribed, starting with 4 times a day and decreasing it as able.

## 2018-09-16 LAB — DRUGS UR: NORMAL

## 2018-10-01 RX ORDER — FLUTICASONE FUROATE 27.5 UG/1
SPRAY, METERED NASAL
Qty: 9.1 ML | Refills: 12 | Status: SHIPPED | OUTPATIENT
Start: 2018-10-01

## 2018-10-01 RX ORDER — TRAMADOL HYDROCHLORIDE 50 MG/1
TABLET ORAL
Qty: 180 TABLET | Refills: 1 | OUTPATIENT
Start: 2018-10-01

## 2018-10-29 RX ORDER — ZOLPIDEM TARTRATE 12.5 MG/1
12.5 TABLET, FILM COATED, EXTENDED RELEASE ORAL NIGHTLY
Qty: 90 TABLET | Refills: 1 | Status: SHIPPED | OUTPATIENT
Start: 2018-10-29 | End: 2018-12-20

## 2018-10-29 RX ORDER — RIZATRIPTAN BENZOATE 10 MG/1
TABLET, ORALLY DISINTEGRATING ORAL
Qty: 27 TABLET | Refills: 2 | Status: SHIPPED | OUTPATIENT
Start: 2018-10-29

## 2018-11-08 ENCOUNTER — OFFICE VISIT (OUTPATIENT)
Dept: ORTHOPEDIC SURGERY | Facility: CLINIC | Age: 43
End: 2018-11-08

## 2018-11-08 VITALS — BODY MASS INDEX: 35 KG/M2 | HEIGHT: 71 IN | WEIGHT: 250 LBS | TEMPERATURE: 98.2 F

## 2018-11-08 DIAGNOSIS — M17.0 ARTHRITIS OF BOTH KNEES: Primary | ICD-10-CM

## 2018-11-08 PROCEDURE — 99212 OFFICE O/P EST SF 10 MIN: CPT | Performed by: NURSE PRACTITIONER

## 2018-11-08 PROCEDURE — 20610 DRAIN/INJ JOINT/BURSA W/O US: CPT | Performed by: NURSE PRACTITIONER

## 2018-11-08 RX ORDER — METHYLPREDNISOLONE ACETATE 80 MG/ML
80 INJECTION, SUSPENSION INTRA-ARTICULAR; INTRALESIONAL; INTRAMUSCULAR; SOFT TISSUE
Status: COMPLETED | OUTPATIENT
Start: 2018-11-08 | End: 2018-11-08

## 2018-11-08 RX ORDER — LIDOCAINE HYDROCHLORIDE 20 MG/ML
4 INJECTION, SOLUTION EPIDURAL; INFILTRATION; INTRACAUDAL; PERINEURAL
Status: COMPLETED | OUTPATIENT
Start: 2018-11-08 | End: 2018-11-08

## 2018-11-08 RX ORDER — ETODOLAC 500 MG/1
500 TABLET, FILM COATED ORAL 2 TIMES DAILY
Qty: 180 TABLET | Refills: 3 | Status: SHIPPED | OUTPATIENT
Start: 2018-11-08 | End: 2019-12-09 | Stop reason: SDUPTHER

## 2018-11-08 RX ADMIN — LIDOCAINE HYDROCHLORIDE 4 ML: 20 INJECTION, SOLUTION EPIDURAL; INFILTRATION; INTRACAUDAL; PERINEURAL at 08:20

## 2018-11-08 RX ADMIN — METHYLPREDNISOLONE ACETATE 80 MG: 80 INJECTION, SUSPENSION INTRA-ARTICULAR; INTRALESIONAL; INTRAMUSCULAR; SOFT TISSUE at 08:20

## 2018-11-08 NOTE — PROGRESS NOTES
Patient: Arelis Munson  YOB: 1975    Chief Complaints:  bilateral knee pain    Subjective:    History of Present Illness: Here today for knee pain. The pain is a generalized joint tenderness.  It has been progressive in nature but remains intermittent.  Worsened by prolonged standing or walking and squatting activities. Has had improvement in the past with ice/heat, rest, and injections.     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     Allergies: No Known Allergies    Medications:   Home Medications:  Current Outpatient Prescriptions on File Prior to Visit   Medication Sig   • aspirin 81 MG EC tablet Take 81 mg by mouth Daily.   • B Complex Vitamins (VITAMIN B COMPLEX PO) Take  by mouth Daily. HOLD PRIOR TO SURGERY   • Cholecalciferol (VITAMIN D3) 5000 UNITS capsule capsule Take 5,000 Units by mouth Daily. HOLD PRIOR TO SURGERY   • cromolyn (OPTICROM) 4 % ophthalmic solution Administer 1 drop to both eyes 4 (Four) Times a Day.   • docusate sodium (COLACE) 250 MG capsule Take 250 mg by mouth Daily.   • esomeprazole (nexIUM) 40 MG capsule Take 1 capsule by mouth Every Night.   • ezetimibe-simvastatin (VYTORIN) 10-40 MG per tablet Take 1 tablet by mouth Every Night.   • FIBER PO Take  by mouth.   • FLONASE SENSIMIST 27.5 MCG/SPRAY nasal spray USE ONE SPRAY IN EACH NOSTRIL ONCE DAILY   • gabapentin (NEURONTIN) 600 MG tablet Take 1 tablet by mouth 3 (Three) Times a Day.   • melatonin 1 MG tablet Take 3 mg by mouth Every Night. HOLD PRIOR TO SURGERY   • montelukast (SINGULAIR) 10 MG tablet Take 1 tablet by mouth Every Night.   • Multiple Vitamin (MULTI VITAMIN DAILY PO) Take  by mouth daily.   • olopatadine (PATANASE) 0.6 % solution nasal solution 2 sprays by Each Nare route 2 (Two) Times a Day.   • Potassium 99 MG tablet Take  by mouth.   • PROVENTIL  (90 BASE) MCG/ACT inhaler Inhale 2 puffs every 6 (six) hours as needed.   • PULMICORT FLEXHALER 180 MCG/ACT inhaler USE 1 TO 2 INHALATIONS EVERY  MORNING   • rizatriptan MLT (MAXALT-MLT) 10 MG disintegrating tablet DISSOLVE ONE TABLET BY MOUTH AS NEEDED FOR MIGRAINE; MAY REPEAT ONE TABLET IN 2 HOURS IF NEEDED.   • SALINE NASAL SPRAY NA into each nostril.   • selenium sulfide (SELSUN) 2.5 % shampoo Apply  topically Daily As Needed for dandruff.   • sildenafil (VIAGRA) 100 MG tablet Take 1 tablet by mouth As Needed for erectile dysfunction.   • testosterone (ANDROGEL) 50 MG/5GM (1%) gel gel Place 100 mg on the skin Daily.   • traMADol (ULTRAM) 50 MG tablet Take 2 tablets by mouth Every 8 (Eight) Hours As Needed for Moderate Pain .   • zolpidem CR (AMBIEN CR) 12.5 MG CR tablet Take 1 tablet by mouth Every Night.   • [DISCONTINUED] diclofenac (VOLTAREN) 1 % gel gel APPLY 4 GRAMS TO AFFECTED AREA(S) FOUR TIMES A DAY AS NEEDED FOR INFLAMMATION   • [DISCONTINUED] etodolac (LODINE) 500 MG tablet Take 1 tablet by mouth 2 (Two) Times a Day.     No current facility-administered medications on file prior to visit.      Current Medications:  Scheduled Meds:  Continuous Infusions:  No current facility-administered medications for this visit.   PRN Meds:.    I have reviewed the patient's medical history in detail and updated the computerized patient record.  Review and summarization of old records include:    Past Medical History:   Diagnosis Date   • Arthritis     OSTEO   • Asthma    • ED (erectile dysfunction)    • Elevated cholesterol    • Environmental allergies    • Fracture, fibula     RIGHT AND ANKLE   • Gastroesophageal reflux disease without esophagitis 7/11/2016   • Migraine with aura and without status migrainosus, not intractable 7/11/2016   • Pain and swelling of toe of right foot    • Seasonal allergies    • Sleep apnea     CPAP   • Testosterone deficiency 7/11/2016        Past Surgical History:   Procedure Laterality Date   • ANKLE ARTHROSCOPY Right 1/26/2018    Procedure: RT ANKLE ARTHROSCOPY SUBCHONDROPLASTY TALUS ;  Surgeon: Lebron Spivey Jr., MD;   [place] [facility] OR [facility];  Service:    • ANKLE LIGAMENT RECONSTRUCTION Right 1/26/2018    Procedure: HARDWARE REMOVAL POSS LATERAL LIGAMENT RECONSTRUCTION ;  Surgeon: Lebron Spivey Jr., MD;  [place] [facility] OR [facility];  Service:    • CARPAL TUNNEL RELEASE Right    • CUBITAL TUNNEL RELEASE Right     NERVE RELEASE   • EAR BIOPSY Right     MYRINGOTOMY W/BONES REPLACED INNER EAR   • KNEE SURGERY Right     MENISCUS REPAIR   • NOSE SURGERY     • ORIF FIBULA FRACTURE Right 05/17/2017    ANKLE INCLUDED        Social History     Occupational History   • Not on file.     Social History Main Topics   • Smoking status: Former Smoker     Packs/day: 2.00     Years: 31.00     Types: Cigarettes     Quit date: 2004   • Smokeless tobacco: Never Used      Comment: Quit 2004   • Alcohol use Yes      Comment: ONCE OR TWICE MONTHLY   • Drug use: No   • Sexual activity: Defer      Social History     Social History Narrative   • No narrative on file        Family History   Problem Relation Age of Onset   • Lung cancer Mother    • Lung cancer Father    • Malig Hyperthermia Neg Hx        ROS: 14 point review of systems was performed and was negative except for documented findings in HPI and today's encounter.     Allergies: No Known Allergies  Constitutional:  Denies fever, shaking or chills   Eyes:  Denies change in visual acuity   HENT:  Denies nasal congestion or sore throat   Respiratory:  Denies cough or shortness of breath   Cardiovascular:  Denies chest pain or severe LE edema   GI:  Denies abdominal pain, nausea, vomiting, bloody stools or diarrhea   Musculoskeletal:  Numbness, tingling, or loss of motor function only as noted above in history of present illness.  : Denies painful urination or hematuria  Integument:  Denies rash, lesion or ulceration   Neurologic:  Denies headache or focal weakness  Endocrine:  Denies lymphadenopathy  Psych:  Denies confusion or change in mental status   Hem:  Denies active bleeding    Physical  "Exam:  Wt Readings from Last 3 Encounters:   11/08/18 113 kg (250 lb)   09/12/18 112 kg (247 lb 8 oz)   08/08/18 110 kg (242 lb 6.4 oz)     Ht Readings from Last 3 Encounters:   11/08/18 180.3 cm (71\")   09/12/18 180.3 cm (71\")   08/08/18 180.3 cm (71\")     Body mass index is 34.87 kg/m².  Facility age limit for growth percentiles is 20 years.  Vitals:    11/08/18 0818   Temp: 98.2 °F (36.8 °C)     Vital Signs:  reviewed  Constitutional: Awake alert and oriented x3, well developed, no acute distress, non-toxic appearance.  EYES: symmetric, sclera clear  ENT:  Normocephalic, Atraumatic.   Respiratory:  No respiratory distress, No wheezing  CV: pulse regular, no palpitations or pallor.  GI:  Abdomen soft, non-tender.   Vascular:  Intact distal pulses, No cyanosis, no signs or symptoms of DVT.  Neurologic: Sensation grossly intact to the involved extremity, No focal deficits noted.   Neck: No tenderness, Supple.  Integument: warm, dry, no ulcerations.   Psychiatric:  Oriented, no pathological affect.  Musculoskeletal:    Affected knee(s):  Painful gait with a subtle limp, positive for synovitis, swelling, joint effusion with crepitation.  Lachman negative  Posterior drawer negative  Paulo's negative  Patellofemoral grind +  Sensation grossly intact to light touch throughout the lower extremity  Skin is intact  Distal pulses are palpable  No signs or symptoms of DVT        Diagnostic Data:     Imaging was done previously in the office, images were personally viewed and discussed with the patient:    Indication: pain related symptoms,  Views: 3V AP, LAT & 40 degree PA bilateral knee(s)   Findings: severe end-stage arthritis (bone on bone, subchondral sclerosis/cysts, osteophytes)  Comparison views: viewed last xray done in the office.     Procedure:  Large Joint Arthrocentesis  Date/Time: 11/8/2018 8:20 AM  Consent given by: patient  Site marked: site marked  Timeout: Immediately prior to procedure a time out was " called to verify the correct patient, procedure, equipment, support staff and site/side marked as required   Supporting Documentation  Indications: pain and joint swelling   Procedure Details  [place] knee - R knee  Preparation: Patient was prepped and draped in the usual sterile fashion  Needle size: 22 G  Approach: anterolateral  Medications administered: 4 mL lidocaine PF 2% 2 %; 80 mg methylPREDNISolone acetate 80 MG/ML  Patient tolerance: patient tolerated the procedure well with no immediate complications    Large Joint Arthrocentesis  Date/Time: 11/8/2018 8:20 AM  Consent given by: patient  Site marked: site marked  Timeout: Immediately prior to procedure a time out was called to verify the correct patient, procedure, equipment, support staff and site/side marked as required   Supporting Documentation  Indications: pain and joint swelling   Procedure Details  [place] knee - L knee  Preparation: Patient was prepped and draped in the usual sterile fashion  Needle size: 22 G  Approach: anterolateral  Medications administered: 4 mL lidocaine PF 2% 2 %; 80 mg methylPREDNISolone acetate 80 MG/ML  Patient tolerance: patient tolerated the procedure well with no immediate complications          Assessment:     ICD-10-CM ICD-9-CM   1. Arthritis of both knees M17.0 716.96           Plan: Is to proceed with injection  Follow up as indicated.  Ice, elevate, and rest as needed.  Additional interventions include:  15 min spent face to face with patient 9 min spent counseling about natural history and expected course of assessed complaint and reviewed treatment options that have been tried and not tried and those currently available. Questions answered.    Natural history and expected course of this patient's diagnosis discussed along with evaluation of therapies. Questions answered.  Advice on benefits of, and types of regular/moderate exercise including biomechanical forces involved as it pertains to this complaint,  with a goal of 5 min at least 7 times a week.    Cortisone Injection. See procedure note.  Cryotherapy/brachy therapy as indicated with instructions.   Advised on strengthening exercises, stressed importance of these with progression of arthritis, demonstrated exercises to patient with repeat demonstration and verb of understanding.   Do Daily THIGH exercises sitting up straight in a supportive chair, lean forward as you do when you go to stand up and in that forward leaning position, lift the thigh slowly up and slowly down.  Give assist with your hand under the knee to lift as needed. 15x on each thigh once a day, every day.   Renewed prescriptions and reviewed precautions for these with kidney function and stomach is on nexium and has regular pcp check ups on these.   11/8/2018  SUPA

## 2018-12-03 DIAGNOSIS — Z79.899 ENCOUNTER FOR LONG-TERM (CURRENT) USE OF MEDICATIONS: ICD-10-CM

## 2018-12-03 DIAGNOSIS — E34.9 TESTOSTERONE DEFICIENCY: ICD-10-CM

## 2018-12-03 DIAGNOSIS — E78.00 ELEVATED CHOLESTEROL: Primary | ICD-10-CM

## 2018-12-05 ENCOUNTER — OFFICE VISIT (OUTPATIENT)
Dept: SLEEP MEDICINE | Facility: HOSPITAL | Age: 43
End: 2018-12-05
Attending: INTERNAL MEDICINE

## 2018-12-05 ENCOUNTER — OFFICE VISIT (OUTPATIENT)
Dept: FAMILY MEDICINE CLINIC | Facility: CLINIC | Age: 43
End: 2018-12-05

## 2018-12-05 VITALS
WEIGHT: 250.2 LBS | OXYGEN SATURATION: 95 % | SYSTOLIC BLOOD PRESSURE: 108 MMHG | HEIGHT: 73 IN | HEART RATE: 74 BPM | DIASTOLIC BLOOD PRESSURE: 82 MMHG | BODY MASS INDEX: 33.16 KG/M2

## 2018-12-05 VITALS
HEART RATE: 67 BPM | HEIGHT: 73 IN | BODY MASS INDEX: 33.66 KG/M2 | DIASTOLIC BLOOD PRESSURE: 82 MMHG | SYSTOLIC BLOOD PRESSURE: 153 MMHG | WEIGHT: 254 LBS

## 2018-12-05 DIAGNOSIS — G47.33 OSA ON CPAP: ICD-10-CM

## 2018-12-05 DIAGNOSIS — M17.0 PRIMARY OSTEOARTHRITIS OF BOTH KNEES: Primary | ICD-10-CM

## 2018-12-05 DIAGNOSIS — Z99.89 OSA ON CPAP: ICD-10-CM

## 2018-12-05 DIAGNOSIS — F51.04 PSYCHOPHYSIOLOGICAL INSOMNIA: Primary | ICD-10-CM

## 2018-12-05 DIAGNOSIS — G43.109 MIGRAINE WITH AURA AND WITHOUT STATUS MIGRAINOSUS, NOT INTRACTABLE: ICD-10-CM

## 2018-12-05 DIAGNOSIS — G47.26 CIRCADIAN RHYTHM SLEEP DISORDER, SHIFT WORK TYPE: ICD-10-CM

## 2018-12-05 PROCEDURE — 99213 OFFICE O/P EST LOW 20 MIN: CPT | Performed by: INTERNAL MEDICINE

## 2018-12-05 PROCEDURE — G0463 HOSPITAL OUTPT CLINIC VISIT: HCPCS

## 2018-12-05 RX ORDER — TRAMADOL HYDROCHLORIDE 50 MG/1
100 TABLET ORAL EVERY 8 HOURS PRN
Qty: 180 TABLET | Refills: 2 | Status: SHIPPED | OUTPATIENT
Start: 2018-12-05 | End: 2019-02-27 | Stop reason: SDUPTHER

## 2018-12-05 RX ORDER — GABAPENTIN 600 MG/1
600 TABLET ORAL 3 TIMES DAILY
Qty: 270 TABLET | Refills: 1 | Status: SHIPPED | OUTPATIENT
Start: 2018-12-05 | End: 2019-02-27 | Stop reason: SDUPTHER

## 2018-12-05 NOTE — PROGRESS NOTES
"[facility] Up [facility] Note     Chief Complaint:  NITA     Primary Care Physician: Jovon Aviles MD    Interval History:   I last saw the patient in April.  He states he is unchanged.  However, he has been changed back to his rotating shifts, as before.  One week he works first shift and then the second week he works second shift in the third week he works third shift.    Review of Systems:  Recorded on the Sleep Questionnaire.  Unremarkable except for nasal congestion and postnasal drip and occasional shortness of breath and wheezing and cough, ADAM, and occasional ear pain    Social History:    Social History     Socioeconomic History   • Marital status:      Spouse name: Not on file   • Number of children: Not on file   • Years of education: Not on file   • Highest education level: Not on file   Tobacco Use   • Smoking status: Former Smoker     Packs/day: 2.00     Years: 31.00     Pack years: 62.00     Types: Cigarettes     Last attempt to quit:      Years since quittin.9   • Smokeless tobacco: Never Used   • Tobacco comment: Quit    Substance and Sexual Activity   • Alcohol use: Yes     Comment: ONCE OR TWICE MONTHLY   • Drug use: No   • Sexual activity: Defer       Allergies:  Patient has no known allergies.     Medication Review:  Reviewed.      Vital Signs:    Vitals:    18 0900   BP: 153/82   BP [place] Left arm   Patient Position: Sitting   Pulse: 67   Weight: 115 kg (254 lb)   Height: 185.4 cm (73\")     Body mass index is 33.51 kg/m².    Physical Exam:    Constitutional:  Well developed white male and appears in no apparent distress.  Awake & oriented times 3.  Normal mood with normal recent and remote memory and normal judgement.  Eyes:  Conjunctivae normal.  Oropharynx:  moist mucous membranes without exudate and a large tongue and normal uvula and class III MP airway      Results Review:  DME is [employer] and he uses a fullface mask.  Downloads between " and December 4, 2018 compliances 96%.  Average usage is 6 hours and 21 minutes.  Average AHI is normal without leak.  Average AutoCPAP pressure is 12.7 and his auto CPAP is 9-15.       Impression:   Obstructive sleep apnea adequately treated with auto CPAP with good compliance and usage with no significant complaints of hypersomnolence  Circadian rhythm sleep disorder, shiftwork type  Psychophysiologic insomnia adequately treated with Ambien or Ambien CR depending on his shiftwork.      Plan:  Good sleep hygiene measures should be maintained.  Weight loss would be beneficial in this patient who is obese by BMI.  The patient is benefiting from the treatment being provided.     The patient will continue auto CPAP as he is doing.  The patient will take Ambien CR 10 mg or Ambien 10 mg depending on his work shift.    The patient will call for any problems and will follow up in 6 months.      Ronni Kong MD  Sleep Medicine  12/05/18  9:45 AM

## 2018-12-05 NOTE — PROGRESS NOTES
Subjective   Arelis Munson is a 43 y.o. male who presents today for:    Knee Pain (CSE)    History of Present Illness     Knee pain- OA. Chronic, intermittent, but in a recurrently persistent fashion, affecting both knees, worse with prolonged standing and bending. Treated by Dr. Tyrell Eddy with steroid injections with good relief. He also wears a hinged brace on the right knee and a brace on the left at times. He takes Lodine every day as well.  He was also started on meloxicam by another provider (for his ankle) but we stopped it in June.      Between injections, he uses tramadol 100 mg on the order of 2 tablets 2-3 times per day, to keep himself functional. He reports that this helps take the edge off the pain, allows him to continue work, without causing excess sedation or constipation.      The tramadol also helps with burning right upper extremity pain that developed after an ulnar nerve release. It goes up to his shoulder at times, and is usually aggravated by work.  We started gabapentin for the migraines with good benefit and improvement regarding his ulnar neuropathy/elbow pain.  He has not needed his Maxalt but only needs it once or twice at the most in a month. The Maxalt has worked well when he has taken it, especially when he takes it early in the headache.    Mr. Munson  reports that he quit smoking about 14 years ago. His smoking use included cigarettes. He has a 62.00 pack-year smoking history. he has never used smokeless tobacco. He reports that he drinks alcohol. He reports that he does not use drugs.     No Known Allergies    Current Outpatient Medications:   •  aspirin 81 MG EC tablet, Take 81 mg by mouth Daily., Disp: , Rfl:   •  B Complex Vitamins (VITAMIN B COMPLEX PO), Take  by mouth Daily. HOLD PRIOR TO SURGERY, Disp: , Rfl:   •  Cholecalciferol (VITAMIN D3) 5000 UNITS capsule capsule, Take 5,000 Units by mouth Daily. HOLD PRIOR TO SURGERY, Disp: , Rfl:   •  cromolyn (OPTICROM) 4 %  ophthalmic solution, Administer 1 drop to both eyes 4 (Four) Times a Day., Disp: 10 mL, Rfl: 12  •  diclofenac (VOLTAREN) 1 % gel gel, APPLY 4 GRAMS TO AFFECTED AREA(S) FOUR TIMES A DAY AS NEEDED FOR INFLAMMATION, Disp: 4 tube, Rfl: 3  •  docusate sodium (COLACE) 250 MG capsule, Take 250 mg by mouth Daily., Disp: , Rfl:   •  esomeprazole (nexIUM) 40 MG capsule, Take 1 capsule by mouth Every Night., Disp: 90 capsule, Rfl: 3  •  etodolac (LODINE) 500 MG tablet, Take 1 tablet by mouth 2 (Two) Times a Day., Disp: 180 tablet, Rfl: 3  •  ezetimibe-simvastatin (VYTORIN) 10-40 MG per tablet, Take 1 tablet by mouth Every Night., Disp: 90 tablet, Rfl: 3  •  FIBER PO, Take  by mouth., Disp: , Rfl:   •  FLONASE SENSIMIST 27.5 MCG/SPRAY nasal spray, USE ONE SPRAY IN EACH NOSTRIL ONCE DAILY, Disp: 9.1 mL, Rfl: 12  •  gabapentin (NEURONTIN) 600 MG tablet, Take 1 tablet by mouth 3 (Three) Times a Day., Disp: 270 tablet, Rfl: 1  •  melatonin 1 MG tablet, Take 3 mg by mouth Every Night. HOLD PRIOR TO SURGERY, Disp: , Rfl:   •  montelukast (SINGULAIR) 10 MG tablet, Take 1 tablet by mouth Every Night., Disp: 90 tablet, Rfl: 3  •  Multiple Vitamin (MULTI VITAMIN DAILY PO), Take  by mouth daily., Disp: , Rfl:   •  olopatadine (PATANASE) 0.6 % solution nasal solution, 2 sprays by Each Nare route 2 (Two) Times a Day., Disp: 91.5 g, Rfl: 3  •  Potassium 99 MG tablet, Take  by mouth., Disp: , Rfl:   •  PROVENTIL  (90 BASE) MCG/ACT inhaler, Inhale 2 puffs every 6 (six) hours as needed., Disp: , Rfl:   •  PULMICORT FLEXHALER 180 MCG/ACT inhaler, USE 1 TO 2 INHALATIONS EVERY MORNING, Disp: 2 inhaler, Rfl: 2  •  rizatriptan MLT (MAXALT-MLT) 10 MG disintegrating tablet, DISSOLVE ONE TABLET BY MOUTH AS NEEDED FOR MIGRAINE; MAY REPEAT ONE TABLET IN 2 HOURS IF NEEDED., Disp: 27 tablet, Rfl: 2  •  SALINE NASAL SPRAY NA, into each nostril., Disp: , Rfl:   •  selenium sulfide (SELSUN) 2.5 % shampoo, Apply  topically Daily As Needed for  "dandruff., Disp: 354 mL, Rfl: 3  •  sildenafil (VIAGRA) 100 MG tablet, Take 1 tablet by mouth As Needed for erectile dysfunction., Disp: 30 tablet, Rfl: 3  •  zolpidem CR (AMBIEN CR) 12.5 MG CR tablet, Take 1 tablet by mouth Every Night., Disp: 90 tablet, Rfl: 1      Review of Systems   Constitutional: Negative for unexpected weight change.   Respiratory: Positive for shortness of breath.    Musculoskeletal: Positive for arthralgias.   Allergic/Immunologic: Positive for environmental allergies.   Neurological: Positive for headaches (rare, intermittent).   Psychiatric/Behavioral: Positive for sleep disturbance.         Objective   Vitals:    12/05/18 1118   BP: 108/82   BP [place] Left arm   Patient Position: Sitting   Cuff Size: Large Adult   Pulse: 74   SpO2: 95%   Weight: 113 kg (250 lb 3.2 oz)   Height: 185.4 cm (73\")     Physical Exam     Obese male in no acute distress.  Speech is normal. Mood is upbeat affect is appropriate.  Insight and judgment are intact.  No gross motor neurologic deficit.  Mildly antalgic gait.  Lower extremity strength is 5+/5 bilaterally.        Arelis was seen today for knee pain.    Diagnoses and all orders for this visit:    Primary osteoarthritis of both knees  -     traMADol (ULTRAM) 50 MG tablet; Take 2 tablets by mouth Every 8 (Eight) Hours As Needed for Moderate Pain.    Migraine with aura and without status migrainosus, not intractable  -     gabapentin (NEURONTIN) 600 MG tablet; Take 1 tablet by mouth 3 (Three) Times a Day.    MARY BETH report was obtained and reviewed during the course of the office visit today.  Will continue medications unchanged. He will continue to follow with his orthopedic surgeon for continued injection of that right knee. He is not interested in pursuing knee replacement anytime soon.    Gabapentin as his migraine headaches under very good control, so we'll make no change there. I suspect it is also helping with his pain control.    "

## 2018-12-09 PROBLEM — G47.26 CIRCADIAN RHYTHM SLEEP DISORDER, SHIFT WORK TYPE: Status: ACTIVE | Noted: 2018-12-09

## 2018-12-17 RX ORDER — EZETIMIBE AND SIMVASTATIN 10; 40 MG/1; MG/1
1 TABLET ORAL NIGHTLY
Qty: 90 TABLET | Refills: 3 | Status: SHIPPED | OUTPATIENT
Start: 2018-12-17 | End: 2019-06-20 | Stop reason: SDUPTHER

## 2018-12-20 RX ORDER — ZOLPIDEM TARTRATE 12.5 MG/1
TABLET, FILM COATED, EXTENDED RELEASE ORAL
Qty: 21 TABLET | Refills: 1 | Status: SHIPPED | OUTPATIENT
Start: 2018-12-20 | End: 2019-05-16

## 2018-12-20 RX ORDER — ZOLPIDEM TARTRATE 10 MG/1
TABLET ORAL
Qty: 63 TABLET | Refills: 1 | Status: SHIPPED | OUTPATIENT
Start: 2018-12-20 | End: 2019-05-16

## 2019-02-19 ENCOUNTER — CLINICAL SUPPORT (OUTPATIENT)
Dept: ORTHOPEDIC SURGERY | Facility: CLINIC | Age: 44
End: 2019-02-19

## 2019-02-19 VITALS — TEMPERATURE: 98.7 F | HEIGHT: 72 IN | BODY MASS INDEX: 34.67 KG/M2 | WEIGHT: 256 LBS

## 2019-02-19 DIAGNOSIS — M25.562 BILATERAL CHRONIC KNEE PAIN: Primary | ICD-10-CM

## 2019-02-19 DIAGNOSIS — G89.29 BILATERAL CHRONIC KNEE PAIN: Primary | ICD-10-CM

## 2019-02-19 DIAGNOSIS — M17.0 PRIMARY OSTEOARTHRITIS OF BOTH KNEES: ICD-10-CM

## 2019-02-19 DIAGNOSIS — M25.561 BILATERAL CHRONIC KNEE PAIN: Primary | ICD-10-CM

## 2019-02-19 PROCEDURE — 99212 OFFICE O/P EST SF 10 MIN: CPT | Performed by: NURSE PRACTITIONER

## 2019-02-19 PROCEDURE — 73562 X-RAY EXAM OF KNEE 3: CPT | Performed by: NURSE PRACTITIONER

## 2019-02-19 PROCEDURE — 20610 DRAIN/INJ JOINT/BURSA W/O US: CPT | Performed by: NURSE PRACTITIONER

## 2019-02-19 RX ORDER — LIDOCAINE HYDROCHLORIDE 10 MG/ML
4 INJECTION, SOLUTION EPIDURAL; INFILTRATION; INTRACAUDAL; PERINEURAL
Status: COMPLETED | OUTPATIENT
Start: 2019-02-19 | End: 2019-02-19

## 2019-02-19 RX ORDER — METHYLPREDNISOLONE ACETATE 80 MG/ML
80 INJECTION, SUSPENSION INTRA-ARTICULAR; INTRALESIONAL; INTRAMUSCULAR; SOFT TISSUE
Status: COMPLETED | OUTPATIENT
Start: 2019-02-19 | End: 2019-02-19

## 2019-02-19 RX ADMIN — METHYLPREDNISOLONE ACETATE 80 MG: 80 INJECTION, SUSPENSION INTRA-ARTICULAR; INTRALESIONAL; INTRAMUSCULAR; SOFT TISSUE at 08:25

## 2019-02-19 RX ADMIN — LIDOCAINE HYDROCHLORIDE 4 ML: 10 INJECTION, SOLUTION EPIDURAL; INFILTRATION; INTRACAUDAL; PERINEURAL at 08:25

## 2019-02-19 RX ADMIN — LIDOCAINE HYDROCHLORIDE 4 ML: 10 INJECTION, SOLUTION EPIDURAL; INFILTRATION; INTRACAUDAL; PERINEURAL at 08:24

## 2019-02-19 RX ADMIN — METHYLPREDNISOLONE ACETATE 80 MG: 80 INJECTION, SUSPENSION INTRA-ARTICULAR; INTRALESIONAL; INTRAMUSCULAR; SOFT TISSUE at 08:24

## 2019-02-19 NOTE — PROGRESS NOTES
Patient: Arelis Munson  YOB: 1975    Chief Complaints:  bilateral knee pain    Subjective:    History of Present Illness: Here today for knee pain. The pain is a generalized joint tenderness.  It has been progressive in nature but remains intermittent.  Worsened by prolonged standing or walking and squatting activities. Has had improvement in the past with ice/heat, rest, and injections.     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     Allergies: No Known Allergies    Medications:   Home Medications:  Current Outpatient Medications on File Prior to Visit   Medication Sig   • aspirin 81 MG EC tablet Take 81 mg by mouth Daily.   • B Complex Vitamins (VITAMIN B COMPLEX PO) Take  by mouth Daily. HOLD PRIOR TO SURGERY   • Cholecalciferol (VITAMIN D3) 5000 UNITS capsule capsule Take 5,000 Units by mouth Daily. HOLD PRIOR TO SURGERY   • cromolyn (OPTICROM) 4 % ophthalmic solution Administer 1 drop to both eyes 4 (Four) Times a Day.   • diclofenac (VOLTAREN) 1 % gel gel APPLY 4 GRAMS TO AFFECTED AREA(S) FOUR TIMES A DAY AS NEEDED FOR INFLAMMATION   • docusate sodium (COLACE) 250 MG capsule Take 250 mg by mouth Daily.   • esomeprazole (nexIUM) 40 MG capsule Take 1 capsule by mouth Every Night.   • etodolac (LODINE) 500 MG tablet Take 1 tablet by mouth 2 (Two) Times a Day.   • ezetimibe-simvastatin (VYTORIN) 10-40 MG per tablet Take 1 tablet by mouth Every Night.   • FIBER PO Take  by mouth.   • FLONASE SENSIMIST 27.5 MCG/SPRAY nasal spray USE ONE SPRAY IN EACH NOSTRIL ONCE DAILY   • gabapentin (NEURONTIN) 600 MG tablet Take 1 tablet by mouth 3 (Three) Times a Day.   • melatonin 1 MG tablet Take 3 mg by mouth Every Night. HOLD PRIOR TO SURGERY   • montelukast (SINGULAIR) 10 MG tablet Take 1 tablet by mouth Every Night.   • Multiple Vitamin (MULTI VITAMIN DAILY PO) Take  by mouth daily.   • olopatadine (PATANASE) 0.6 % solution nasal solution 2 sprays by Each Nare route 2 (Two) Times a Day.   • Potassium 99  MG tablet Take  by mouth.   • PROVENTIL  (90 BASE) MCG/ACT inhaler Inhale 2 puffs every 6 (six) hours as needed.   • PULMICORT FLEXHALER 180 MCG/ACT inhaler USE 1 TO 2 INHALATIONS EVERY MORNING   • rizatriptan MLT (MAXALT-MLT) 10 MG disintegrating tablet DISSOLVE ONE TABLET BY MOUTH AS NEEDED FOR MIGRAINE; MAY REPEAT ONE TABLET IN 2 HOURS IF NEEDED.   • SALINE NASAL SPRAY NA into each nostril.   • selenium sulfide (SELSUN) 2.5 % shampoo Apply  topically Daily As Needed for dandruff.   • sildenafil (VIAGRA) 100 MG tablet Take 1 tablet by mouth As Needed for erectile dysfunction.   • traMADol (ULTRAM) 50 MG tablet Take 2 tablets by mouth Every 8 (Eight) Hours As Needed for Moderate Pain .   • zolpidem CR (AMBIEN CR) 12.5 MG CR tablet Take 1 tablet by mouth at bedtime when working 1st shift.   • zolpidem (AMBIEN) 10 MG tablet Take 1 tablet by mouth at sleep onset when working 2nd or 3rd shift.     No current facility-administered medications on file prior to visit.      Current Medications:  Scheduled Meds:  Continuous Infusions:  No current facility-administered medications for this visit.   PRN Meds:.    I have reviewed the patient's medical history in detail and updated the computerized patient record.  Review and summarization of old records include:    Past Medical History:   Diagnosis Date   • Arthritis     OSTEO   • Asthma    • ED (erectile dysfunction)    • Elevated cholesterol    • Environmental allergies    • Fracture, fibula     RIGHT AND ANKLE   • Gastroesophageal reflux disease without esophagitis 7/11/2016   • Migraine with aura and without status migrainosus, not intractable 7/11/2016   • Pain and swelling of toe of right foot    • Seasonal allergies    • Sleep apnea     CPAP   • Testosterone deficiency 7/11/2016        Past Surgical History:   Procedure Laterality Date   • ANKLE ARTHROSCOPY Right 1/26/2018    Procedure: RT ANKLE ARTHROSCOPY SUBCHONDROPLASTY TALUS ;  Surgeon: Lebron Spivey Jr., MD;   [place] [facility] [place] [facility] [place]:    • ANKLE LIGAMENT RECONSTRUCTION Right 1/26/2018    Procedure: HARDWARE REMOVAL POSS LATERAL LIGAMENT RECONSTRUCTION ;  Surgeon: Lebron Spivey Jr., MD;  [place] [facility] [place] [facility] [place]  Service:    • CARPAL TUNNEL RELEASE Right    • CUBITAL TUNNEL RELEASE Right     NERVE RELEASE   • EAR BIOPSY Right     MYRINGOTOMY W/BONES REPLACED INNER EAR   • KNEE SURGERY Right     MENISCUS REPAIR   • NOSE SURGERY     • ORIF FIBULA FRACTURE Right 05/17/2017    ANKLE INCLUDED        Social History     Occupational History   • Not on file   Tobacco Use   • Smoking status: Former Smoker     Packs/day: 2.00     Years: 31.00     Pack years: 62.00     Types: Cigarettes     Last attempt to quit: 2004     Years since quitting: 15.1   • Smokeless tobacco: Never Used   • Tobacco comment: Quit 2004   Substance and Sexual Activity   • Alcohol use: Yes     Comment: ONCE OR TWICE MONTHLY   • Drug use: No   • Sexual activity: Defer      Social History     Social History Narrative   • Not on file        Family History   Problem Relation Age of Onset   • Lung cancer Mother    • Lung cancer Father    • Malig Hyperthermia Neg Hx        ROS: 14 point review of systems was performed and was negative except for documented findings in HPI and today's encounter.     Allergies: No Known Allergies  Constitutional:  Denies fever, shaking or chills   Eyes:  Denies change in visual acuity   HENT:  Denies nasal congestion or sore throat   Respiratory:  Denies cough or shortness of breath   Cardiovascular:  Denies chest pain or severe LE edema   GI:  Denies abdominal pain, nausea, vomiting, bloody stools or diarrhea   Musculoskeletal:  Numbness, tingling, or loss of motor function only as noted above in history of present illness.  : Denies painful urination or hematuria  Integument:  Denies rash, lesion or ulceration   Neurologic:  Denies headache or focal weakness  Endocrine:  Denies lymphadenopathy  Psych:  Denies confusion  "or change in mental status   Hem:  Denies active bleeding    Physical Exam:  Wt Readings from Last 3 Encounters:   02/19/19 116 kg (256 lb)   12/05/18 113 kg (250 lb 3.2 oz)   12/05/18 115 kg (254 lb)     Ht Readings from Last 3 Encounters:   02/19/19 182.9 cm (72\")   12/05/18 185.4 cm (73\")   12/05/18 185.4 cm (73\")     Body mass index is 34.72 kg/m².  Facility age limit for growth percentiles is 20 years.  Vitals:    02/19/19 0820   Temp: 98.7 °F (37.1 °C)     Vital Signs:  reviewed  Constitutional: Awake alert and oriented x3, well developed, no acute distress, non-toxic appearance.  EYES: symmetric, sclera clear  ENT:  Normocephalic, Atraumatic.   Respiratory:  No respiratory distress, No wheezing  CV: pulse regular, no palpitations or pallor.  GI:  Abdomen soft, non-tender.   Vascular:  Intact distal pulses, No cyanosis, no signs or symptoms of DVT.  Neurologic: Sensation grossly intact to the involved extremity, No focal deficits noted.   Neck: No tenderness, Supple.  Integument: warm, dry, no ulcerations.   Psychiatric:  Oriented, no pathological affect.  Musculoskeletal:    Affected knee(s):  Painful gait with a subtle limp, positive for synovitis, swelling, joint effusion with crepitation.  Lachman negative  Posterior drawer negative  Paulo's negative  Patellofemoral grind +  Sensation grossly intact to light touch throughout the lower extremity  Skin is intact  Distal pulses are palpable  No signs or symptoms of DVT        Diagnostic Data:     Imaging was done today, images were personally viewed and discussed with the patient:    Indication: pain related symptoms,  Views: 3V AP, LAT & 40 degree PA bilateral knee(s)   Findings: severe end-stage arthritis (bone on bone, subchondral sclerosis/cysts, osteophytes)  Comparison views: viewed last xray done in the office.     Procedure:  Large Joint Arthrocentesis: R knee  Date/Time: 2/19/2019 8:24 AM  Consent given by: patient  Site marked: site " marked  Timeout: Immediately prior to procedure a time out was called to verify the correct patient, procedure, equipment, support staff and site/side marked as required   Supporting Documentation  Indications: pain   Procedure Details  [place] knee - R knee  Preparation: Patient was prepped and draped in the usual sterile fashion  Needle size: 22 G  Approach: anterolateral  Medications administered: 80 mg methylPREDNISolone acetate 80 MG/ML; 4 mL lidocaine PF 1% 1 %  Patient tolerance: patient tolerated the procedure well with no immediate complications    Large Joint Arthrocentesis: L knee  Date/Time: 2/19/2019 8:25 AM  Consent given by: patient  Site marked: site marked  Timeout: Immediately prior to procedure a time out was called to verify the correct patient, procedure, equipment, support staff and site/side marked as required   Supporting Documentation  Indications: pain   Procedure Details  [place] knee - L knee  Preparation: Patient was prepped and draped in the usual sterile fashion  Needle size: 22 G  Approach: anterolateral  Medications administered: 4 mL lidocaine PF 1% 1 %; 80 mg methylPREDNISolone acetate 80 MG/ML  Patient tolerance: patient tolerated the procedure well with no immediate complications          Assessment:     ICD-10-CM ICD-9-CM   1. Bilateral chronic knee pain M25.561 719.46    M25.562 338.29    G89.29    2. Primary osteoarthritis of both knees M17.0 715.16           Plan: Is to proceed with injection  Follow up as indicated.  Ice, elevate, and rest as needed.  Additional interventions include:  15 min spent face to face with patient 11 min spent counseling about natural history and expected course of assessed complaint and reviewed treatment options that have been tried and not tried and those currently available. Questions answered.    Natural history and expected course of this patient's diagnosis discussed along with evaluation of therapies. Questions answered.  Advice on  benefits of, and types of regular/moderate exercise including biomechanical forces involved as it pertains to this complaint, with a goal of 5 min at least 7 times a week.    Cortisone Injection. See procedure note.  Cryotherapy/brachy therapy as indicated with instructions.   Advised on strengthening exercises, stressed importance of these with progression of arthritis, demonstrated exercises to patient with repeat demonstration and verb of understanding.   Do Daily THIGH exercises sitting up straight in a supportive chair, lean forward at the hips keeping the back as straight as possible, and in that forward leaning position, lift the thigh very slowly up and down.  Give assist with your hand under the knee to lift as needed. 15x on each thigh once a day, every day.   Is doing knee exercises and does well in between injections but is flared by the time 3 months comes after injection.  Enc to continue with strengthening exercises.   2/19/2019  SUPA

## 2019-02-20 ENCOUNTER — OFFICE VISIT (OUTPATIENT)
Dept: FAMILY MEDICINE CLINIC | Facility: CLINIC | Age: 44
End: 2019-02-20

## 2019-02-20 VITALS
TEMPERATURE: 99.2 F | WEIGHT: 254 LBS | SYSTOLIC BLOOD PRESSURE: 110 MMHG | OXYGEN SATURATION: 98 % | RESPIRATION RATE: 18 BRPM | HEIGHT: 72 IN | BODY MASS INDEX: 34.4 KG/M2 | DIASTOLIC BLOOD PRESSURE: 64 MMHG | HEART RATE: 74 BPM

## 2019-02-20 DIAGNOSIS — J10.1 INFLUENZA A: ICD-10-CM

## 2019-02-20 DIAGNOSIS — R68.89 FLU-LIKE SYMPTOMS: Primary | ICD-10-CM

## 2019-02-20 LAB
EXPIRATION DATE: ABNORMAL
FLUAV AG NPH QL: POSITIVE
FLUBV AG NPH QL: NEGATIVE
INTERNAL CONTROL: ABNORMAL
Lab: ABNORMAL

## 2019-02-20 PROCEDURE — 99213 OFFICE O/P EST LOW 20 MIN: CPT | Performed by: NURSE PRACTITIONER

## 2019-02-20 PROCEDURE — 87804 INFLUENZA ASSAY W/OPTIC: CPT | Performed by: NURSE PRACTITIONER

## 2019-02-20 RX ORDER — OSELTAMIVIR PHOSPHATE 75 MG/1
75 CAPSULE ORAL 2 TIMES DAILY
Qty: 10 CAPSULE | Refills: 0 | Status: SHIPPED | OUTPATIENT
Start: 2019-02-20 | End: 2019-02-27

## 2019-02-20 RX ORDER — DEXTROMETHORPHAN HYDROBROMIDE AND PROMETHAZINE HYDROCHLORIDE 15; 6.25 MG/5ML; MG/5ML
5 SYRUP ORAL 4 TIMES DAILY PRN
Qty: 240 ML | Refills: 0 | Status: SHIPPED | OUTPATIENT
Start: 2019-02-20 | End: 2019-05-23

## 2019-02-20 NOTE — PROGRESS NOTES
Subjective   Arelis Munson is a 44 y.o. male.     Chief Complaint   Patient presents with   • Cough   • Nasal Congestion   • Fatigue     Influenza   This is a new (He is a patient of Dr. Aviles and this is a new problem to me.) problem. The current episode started yesterday. The problem occurs constantly. The problem has been rapidly worsening. Associated symptoms include anorexia, arthralgias, chills, congestion, coughing, fatigue, a fever, headaches, myalgias, a sore throat and weakness. Nothing aggravates the symptoms. Treatments tried: OTC flu medications. The treatment provided no relief.      I have reviewed the patient's medical history in detail and updated the computerized patient record.    The following portions of the patient's history were reviewed and updated as appropriate: allergies, current medications, past family history, past medical history, past social history, past surgical history and problem list.       Current Outpatient Medications:   •  aspirin 81 MG EC tablet, Take 81 mg by mouth Daily., Disp: , Rfl:   •  B Complex Vitamins (VITAMIN B COMPLEX PO), Take  by mouth Daily. HOLD PRIOR TO SURGERY, Disp: , Rfl:   •  Cholecalciferol (VITAMIN D3) 5000 UNITS capsule capsule, Take 5,000 Units by mouth Daily. HOLD PRIOR TO SURGERY, Disp: , Rfl:   •  cromolyn (OPTICROM) 4 % ophthalmic solution, Administer 1 drop to both eyes 4 (Four) Times a Day., Disp: 10 mL, Rfl: 12  •  diclofenac (VOLTAREN) 1 % gel gel, APPLY 4 GRAMS TO AFFECTED AREA(S) FOUR TIMES A DAY AS NEEDED FOR INFLAMMATION, Disp: 4 tube, Rfl: 3  •  docusate sodium (COLACE) 250 MG capsule, Take 250 mg by mouth Daily., Disp: , Rfl:   •  esomeprazole (nexIUM) 40 MG capsule, Take 1 capsule by mouth Every Night., Disp: 90 capsule, Rfl: 3  •  etodolac (LODINE) 500 MG tablet, Take 1 tablet by mouth 2 (Two) Times a Day., Disp: 180 tablet, Rfl: 3  •  ezetimibe-simvastatin (VYTORIN) 10-40 MG per tablet, Take 1 tablet by mouth Every Night.,  Disp: 90 tablet, Rfl: 3  •  FIBER PO, Take  by mouth., Disp: , Rfl:   •  FLONASE SENSIMIST 27.5 MCG/SPRAY nasal spray, USE ONE SPRAY IN EACH NOSTRIL ONCE DAILY, Disp: 9.1 mL, Rfl: 12  •  gabapentin (NEURONTIN) 600 MG tablet, Take 1 tablet by mouth 3 (Three) Times a Day., Disp: 270 tablet, Rfl: 1  •  melatonin 1 MG tablet, Take 3 mg by mouth Every Night. HOLD PRIOR TO SURGERY, Disp: , Rfl:   •  montelukast (SINGULAIR) 10 MG tablet, Take 1 tablet by mouth Every Night., Disp: 90 tablet, Rfl: 3  •  Multiple Vitamin (MULTI VITAMIN DAILY PO), Take  by mouth daily., Disp: , Rfl:   •  olopatadine (PATANASE) 0.6 % solution nasal solution, 2 sprays by Each Nare route 2 (Two) Times a Day., Disp: 91.5 g, Rfl: 3  •  Potassium 99 MG tablet, Take  by mouth., Disp: , Rfl:   •  PROVENTIL  (90 BASE) MCG/ACT inhaler, Inhale 2 puffs every 6 (six) hours as needed., Disp: , Rfl:   •  PULMICORT FLEXHALER 180 MCG/ACT inhaler, USE 1 TO 2 INHALATIONS EVERY MORNING, Disp: 2 inhaler, Rfl: 2  •  rizatriptan MLT (MAXALT-MLT) 10 MG disintegrating tablet, DISSOLVE ONE TABLET BY MOUTH AS NEEDED FOR MIGRAINE; MAY REPEAT ONE TABLET IN 2 HOURS IF NEEDED., Disp: 27 tablet, Rfl: 2  •  SALINE NASAL SPRAY NA, into each nostril., Disp: , Rfl:   •  selenium sulfide (SELSUN) 2.5 % shampoo, Apply  topically Daily As Needed for dandruff., Disp: 354 mL, Rfl: 3  •  sildenafil (VIAGRA) 100 MG tablet, Take 1 tablet by mouth As Needed for erectile dysfunction., Disp: 30 tablet, Rfl: 3  •  traMADol (ULTRAM) 50 MG tablet, Take 2 tablets by mouth Every 8 (Eight) Hours As Needed for Moderate Pain ., Disp: 180 tablet, Rfl: 2  •  zolpidem (AMBIEN) 10 MG tablet, Take 1 tablet by mouth at sleep onset when working 2nd or 3rd shift., Disp: 63 tablet, Rfl: 1  •  zolpidem CR (AMBIEN CR) 12.5 MG CR tablet, Take 1 tablet by mouth at bedtime when working 1st shift., Disp: 21 tablet, Rfl: 1    Review of Systems   Constitutional: Positive for chills, fatigue and fever.  "  HENT: Positive for congestion and sore throat.    Respiratory: Positive for cough.    Cardiovascular: Negative.    Gastrointestinal: Positive for anorexia.   Musculoskeletal: Positive for arthralgias and myalgias.   Skin: Negative.    Neurological: Positive for weakness.        Vitals:    02/20/19 0950   BP: 110/64   BP [place] Left arm   Patient Position: Sitting   Cuff Size: Adult   Pulse: 74   Resp: 18   Temp: 99.2 °F (37.3 °C)   TempSrc: Oral   SpO2: 98%   Weight: 115 kg (254 lb)   Height: 182.9 cm (72\")       Objective   Physical Exam   Constitutional: He is oriented to person, place, and time. He appears well-developed and well-nourished.   Temp is 99.2   HENT:   Right Ear: Tympanic membrane is not injected, not erythematous and not bulging. A middle ear effusion (TM is dull) is present.   Left Ear: Left ear tenderness: TM is dull. Tympanic membrane is not injected and not erythematous. A middle ear effusion is present.   Nose: Mucosal edema (red and swollen) and congestion present.   Mouth/Throat: Uvula is midline and mucous membranes are normal. Posterior oropharyngeal erythema present. No oropharyngeal exudate.   Cardiovascular: Normal rate and regular rhythm.   Pulmonary/Chest: Effort normal and breath sounds normal. No respiratory distress. He has no wheezes. He has no rales.   Neurological: He is alert and oriented to person, place, and time.   Skin: Skin is warm and dry.   Psychiatric:   No acute distress   Vitals reviewed.        Assessment/Plan   Arelis was seen today for cough, nasal congestion and fatigue.    Diagnoses and all orders for this visit:    Flu-like symptoms  -     POCT Influenza A/B    Influenza A    Other orders  -     oseltamivir (TAMIFLU) 75 MG capsule; Take 1 capsule by mouth 2 (Two) Times a Day.  -     promethazine-dextromethorphan (PROMETHAZINE-DM) 6.25-15 MG/5ML syrup; Take 5 mL by mouth 4 (Four) Times a Day As Needed for Cough.      1. POCT is positive for influenza A. He is " to start Tamiflu 75 mg twice a day for 5 day. He can also take Promethazine DM 6.25-5 mg/ml four times a day as needed for cough. He has been instructed to treat his other symptoms with OTC medications as needed.  2. He is to remain off of work until 2/25/19.  3. Follow up if symptoms worsen or do not resolve.

## 2019-02-27 ENCOUNTER — OFFICE VISIT (OUTPATIENT)
Dept: FAMILY MEDICINE CLINIC | Facility: CLINIC | Age: 44
End: 2019-02-27

## 2019-02-27 VITALS
HEIGHT: 72 IN | OXYGEN SATURATION: 97 % | HEART RATE: 81 BPM | SYSTOLIC BLOOD PRESSURE: 122 MMHG | DIASTOLIC BLOOD PRESSURE: 88 MMHG | BODY MASS INDEX: 34.8 KG/M2 | WEIGHT: 256.9 LBS

## 2019-02-27 DIAGNOSIS — M17.0 ARTHRITIS OF BOTH KNEES: ICD-10-CM

## 2019-02-27 DIAGNOSIS — G43.109 MIGRAINE WITH AURA AND WITHOUT STATUS MIGRAINOSUS, NOT INTRACTABLE: Primary | ICD-10-CM

## 2019-02-27 PROCEDURE — 99213 OFFICE O/P EST LOW 20 MIN: CPT | Performed by: INTERNAL MEDICINE

## 2019-02-27 RX ORDER — TESTOSTERONE CYPIONATE 200 MG/ML
INJECTION, SOLUTION INTRAMUSCULAR
COMMUNITY
Start: 2019-02-23

## 2019-02-27 RX ORDER — TRAMADOL HYDROCHLORIDE 50 MG/1
100 TABLET ORAL EVERY 8 HOURS PRN
Qty: 180 TABLET | Refills: 2 | Status: SHIPPED | OUTPATIENT
Start: 2019-02-27 | End: 2019-05-23 | Stop reason: SDUPTHER

## 2019-02-27 RX ORDER — GABAPENTIN 600 MG/1
600 TABLET ORAL 3 TIMES DAILY
Qty: 270 TABLET | Refills: 1 | Status: SHIPPED | OUTPATIENT
Start: 2019-02-27 | End: 2019-05-23 | Stop reason: SDUPTHER

## 2019-02-27 RX ORDER — TRAMADOL HYDROCHLORIDE 50 MG/1
100 TABLET ORAL EVERY 8 HOURS PRN
Qty: 180 TABLET | Refills: 2 | Status: SHIPPED | OUTPATIENT
Start: 2019-02-27 | End: 2019-02-27 | Stop reason: SDUPTHER

## 2019-04-11 RX ORDER — SILDENAFIL 100 MG/1
100 TABLET, FILM COATED ORAL AS NEEDED
Qty: 30 TABLET | Refills: 3 | Status: SHIPPED | OUTPATIENT
Start: 2019-04-11 | End: 2019-09-09 | Stop reason: SDUPTHER

## 2019-05-09 RX ORDER — OLOPATADINE HYDROCHLORIDE 665 UG/1
2 SPRAY NASAL 2 TIMES DAILY
Qty: 91.5 G | Refills: 3 | Status: SHIPPED | OUTPATIENT
Start: 2019-05-09 | End: 2020-07-20

## 2019-05-16 RX ORDER — ZOLPIDEM TARTRATE 10 MG/1
TABLET ORAL
Qty: 60 TABLET | Refills: 1 | Status: SHIPPED | OUTPATIENT
Start: 2019-05-16 | End: 2019-11-27 | Stop reason: SDUPTHER

## 2019-05-16 RX ORDER — ZOLPIDEM TARTRATE 12.5 MG/1
TABLET, FILM COATED, EXTENDED RELEASE ORAL
Qty: 30 TABLET | Refills: 1 | Status: SHIPPED | OUTPATIENT
Start: 2019-05-16 | End: 2019-08-08 | Stop reason: SDUPTHER

## 2019-05-23 ENCOUNTER — OFFICE VISIT (OUTPATIENT)
Dept: FAMILY MEDICINE CLINIC | Facility: CLINIC | Age: 44
End: 2019-05-23

## 2019-05-23 VITALS
RESPIRATION RATE: 18 BRPM | TEMPERATURE: 98.2 F | OXYGEN SATURATION: 99 % | WEIGHT: 256 LBS | HEART RATE: 84 BPM | BODY MASS INDEX: 34.67 KG/M2 | SYSTOLIC BLOOD PRESSURE: 122 MMHG | DIASTOLIC BLOOD PRESSURE: 84 MMHG | HEIGHT: 72 IN

## 2019-05-23 DIAGNOSIS — Z79.899 ENCOUNTER FOR LONG-TERM (CURRENT) USE OF HIGH-RISK MEDICATION: Primary | ICD-10-CM

## 2019-05-23 DIAGNOSIS — G43.109 MIGRAINE WITH AURA AND WITHOUT STATUS MIGRAINOSUS, NOT INTRACTABLE: ICD-10-CM

## 2019-05-23 PROCEDURE — 99214 OFFICE O/P EST MOD 30 MIN: CPT | Performed by: NURSE PRACTITIONER

## 2019-05-23 RX ORDER — CROMOLYN SODIUM 40 MG/ML
1 SOLUTION/ DROPS OPHTHALMIC 4 TIMES DAILY
Qty: 10 ML | Refills: 12 | Status: SHIPPED | OUTPATIENT
Start: 2019-05-23 | End: 2020-07-20

## 2019-05-23 RX ORDER — GABAPENTIN 600 MG/1
600 TABLET ORAL 3 TIMES DAILY
Qty: 270 TABLET | Refills: 1 | Status: SHIPPED | OUTPATIENT
Start: 2019-05-23 | End: 2019-05-23 | Stop reason: SDUPTHER

## 2019-05-23 RX ORDER — TRAMADOL HYDROCHLORIDE 50 MG/1
100 TABLET ORAL EVERY 8 HOURS PRN
Qty: 180 TABLET | Refills: 2 | Status: SHIPPED | OUTPATIENT
Start: 2019-05-23 | End: 2019-09-25

## 2019-05-23 RX ORDER — GABAPENTIN 600 MG/1
600 TABLET ORAL 3 TIMES DAILY
Qty: 270 TABLET | Refills: 1 | Status: SHIPPED | OUTPATIENT
Start: 2019-05-23 | End: 2019-11-27 | Stop reason: SDUPTHER

## 2019-05-25 LAB — GABAPENTIN UR-MCNC: 764.1 UG/ML

## 2019-05-30 LAB — DRUGS UR: NORMAL

## 2019-06-03 ENCOUNTER — CLINICAL SUPPORT (OUTPATIENT)
Dept: ORTHOPEDIC SURGERY | Facility: CLINIC | Age: 44
End: 2019-06-03

## 2019-06-03 VITALS — TEMPERATURE: 98.2 F | BODY MASS INDEX: 32.1 KG/M2 | WEIGHT: 242.2 LBS | HEIGHT: 73 IN

## 2019-06-03 DIAGNOSIS — M17.0 ARTHRITIS OF BOTH KNEES: Primary | ICD-10-CM

## 2019-06-03 PROCEDURE — 20610 DRAIN/INJ JOINT/BURSA W/O US: CPT | Performed by: NURSE PRACTITIONER

## 2019-06-03 PROCEDURE — 99212 OFFICE O/P EST SF 10 MIN: CPT | Performed by: NURSE PRACTITIONER

## 2019-06-03 RX ADMIN — METHYLPREDNISOLONE ACETATE 80 MG: 80 INJECTION, SUSPENSION INTRA-ARTICULAR; INTRALESIONAL; INTRAMUSCULAR; SOFT TISSUE at 08:35

## 2019-06-03 RX ADMIN — METHYLPREDNISOLONE ACETATE 80 MG: 80 INJECTION, SUSPENSION INTRA-ARTICULAR; INTRALESIONAL; INTRAMUSCULAR; SOFT TISSUE at 08:11

## 2019-06-03 NOTE — PROGRESS NOTES
Patient: Arelis Munson  YOB: 1975    Chief Complaints:  bilateral knee pain    Subjective:    History of Present Illness: Here today for knee pain. The pain is a generalized joint tenderness.  It has been progressive in nature but remains intermittent.  Worsened by prolonged standing or walking and squatting activities. Has had improvement in the past with ice/heat, rest, and injections.     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     Allergies: No Known Allergies    Medications:   Home Medications:  Current Outpatient Medications on File Prior to Visit   Medication Sig   • diclofenac (VOLTAREN) 1 % gel gel APPLY 4 GRAMS TO AFFECTED AREA(S) FOUR TIMES A DAY AS NEEDED FOR INFLAMMATION   • etodolac (LODINE) 500 MG tablet Take 1 tablet by mouth 2 (Two) Times a Day.   • aspirin 81 MG EC tablet Take 81 mg by mouth Daily.   • B Complex Vitamins (VITAMIN B COMPLEX PO) Take  by mouth Daily. HOLD PRIOR TO SURGERY   • budesonide (PULMICORT FLEXHALER) 180 MCG/ACT inhaler Use 1 or 2 inhalations each AM. Rinse and spit after use.   • Cholecalciferol (VITAMIN D3) 5000 UNITS capsule capsule Take 5,000 Units by mouth Daily. HOLD PRIOR TO SURGERY   • cromolyn (OPTICROM) 4 % ophthalmic solution Administer 1 drop to both eyes 4 (Four) Times a Day.   • docusate sodium (COLACE) 250 MG capsule Take 250 mg by mouth Daily.   • esomeprazole (nexIUM) 40 MG capsule Take 1 capsule by mouth Every Night.   • ezetimibe-simvastatin (VYTORIN) 10-40 MG per tablet Take 1 tablet by mouth Every Night.   • FIBER PO Take  by mouth.   • FLONASE SENSIMIST 27.5 MCG/SPRAY nasal spray USE ONE SPRAY IN EACH NOSTRIL ONCE DAILY   • gabapentin (NEURONTIN) 600 MG tablet Take 1 tablet by mouth 3 (Three) Times a Day.   • melatonin 1 MG tablet Take 3 mg by mouth Every Night. HOLD PRIOR TO SURGERY   • montelukast (SINGULAIR) 10 MG tablet Take 1 tablet by mouth Every Night.   • Multiple Vitamin (MULTI VITAMIN DAILY PO) Take  by mouth daily.   •  olopatadine (PATANASE) 0.6 % solution nasal solution 2 sprays by Each Nare route 2 (Two) Times a Day.   • Potassium 99 MG tablet Take  by mouth.   • PROVENTIL  (90 BASE) MCG/ACT inhaler Inhale 2 puffs every 6 (six) hours as needed.   • rizatriptan MLT (MAXALT-MLT) 10 MG disintegrating tablet DISSOLVE ONE TABLET BY MOUTH AS NEEDED FOR MIGRAINE; MAY REPEAT ONE TABLET IN 2 HOURS IF NEEDED.   • SALINE NASAL SPRAY NA into each nostril.   • selenium sulfide (SELSUN) 2.5 % shampoo Apply  topically Daily As Needed for dandruff.   • sildenafil (VIAGRA) 100 MG tablet Take 1 tablet by mouth As Needed for erectile dysfunction.   • Testosterone Cypionate (DEPOTESTOTERONE CYPIONATE) 200 MG/ML injection Inject  into the appropriate muscle as directed by prescriber Every 14 (Fourteen) Days.   • traMADol (ULTRAM) 50 MG tablet Take 2 tablets by mouth Every 8 (Eight) Hours As Needed for Moderate Pain .   • zolpidem (AMBIEN) 10 MG tablet Take 1 tablet by mouth at sleep onset when working 2nd or 3rd shift.   • zolpidem CR (AMBIEN CR) 12.5 MG CR tablet Take 1 tablet by mouth at bedtime when working 1st shift.     No current facility-administered medications on file prior to visit.      Current Medications:  Scheduled Meds:  Continuous Infusions:  No current facility-administered medications for this visit.   PRN Meds:.    I have reviewed the patient's medical history in detail and updated the computerized patient record.  Review and summarization of old records include:    Past Medical History:   Diagnosis Date   • Arthritis     OSTEO   • Asthma    • ED (erectile dysfunction)    • Elevated cholesterol    • Environmental allergies    • Fracture, fibula     RIGHT AND ANKLE   • Gastroesophageal reflux disease without esophagitis 7/11/2016   • Migraine with aura and without status migrainosus, not intractable 7/11/2016   • Pain and swelling of toe of right foot    • Seasonal allergies    • Sleep apnea     CPAP   • Testosterone  deficiency 7/11/2016        Past Surgical History:   Procedure Laterality Date   • ANKLE ARTHROSCOPY Right 1/26/2018    Procedure: RT ANKLE ARTHROSCOPY SUBCHONDROPLASTY TALUS ;  Surgeon: Lebron Spivey Jr., MD;  [place] [facility] [place];  Service:    • ANKLE LIGAMENT RECONSTRUCTION Right 1/26/2018    Procedure: HARDWARE REMOVAL POSS LATERAL LIGAMENT RECONSTRUCTION ;  Surgeon: Lebron Spivey Jr., MD;  [place] [facility];  Service:    • CARPAL TUNNEL RELEASE Right    • CUBITAL TUNNEL RELEASE Right     NERVE RELEASE   • EAR BIOPSY Right     MYRINGOTOMY W/BONES REPLACED INNER EAR   • KNEE SURGERY Right     MENISCUS REPAIR   • NOSE SURGERY     • ORIF FIBULA FRACTURE Right 05/17/2017    ANKLE INCLUDED        Social History     Occupational History   • Not on file   Tobacco Use   • Smoking status: Former Smoker     Packs/day: 2.00     Years: 31.00     Pack years: 62.00     Types: Cigarettes     Last attempt to quit: 2004     Years since quitting: 15.4   • Smokeless tobacco: Never Used   • Tobacco comment: Quit 2004   Substance and Sexual Activity   • Alcohol use: Yes     Comment: ONCE OR TWICE MONTHLY   • Drug use: No   • Sexual activity: Defer      Social History     Social History Narrative   • Not on file        Family History   Problem Relation Age of Onset   • Lung cancer Mother    • Lung cancer Father    • Malig Hyperthermia Neg Hx        ROS: 14 point review of systems was performed and was negative except for documented findings in HPI and today's encounter.     Allergies: No Known Allergies  Constitutional:  Denies fever, shaking or chills   Eyes:  Denies change in visual acuity   HENT:  Denies nasal congestion or sore throat   Respiratory:  Denies cough or shortness of breath   Cardiovascular:  Denies chest pain or severe LE edema   GI:  Denies abdominal pain, nausea, vomiting, bloody stools or diarrhea   Musculoskeletal:  Numbness, tingling, or loss of motor function only as noted above in history of present  "illness.  : Denies painful urination or hematuria  Integument:  Denies rash, lesion or ulceration   Neurologic:  Denies headache or focal weakness  Endocrine:  Denies lymphadenopathy  Psych:  Denies confusion or change in mental status   Hem:  Denies active bleeding    Physical Exam:  Wt Readings from Last 3 Encounters:   06/03/19 110 kg (242 lb 3.2 oz)   05/23/19 116 kg (256 lb)   02/27/19 117 kg (256 lb 14.4 oz)     Ht Readings from Last 3 Encounters:   06/03/19 185.4 cm (73\")   05/23/19 182.9 cm (72\")   02/27/19 182.9 cm (72\")     Body mass index is 31.95 kg/m².  Facility age limit for growth percentiles is 20 years.  Vitals:    06/03/19 0806   Temp: 98.2 °F (36.8 °C)     Vital Signs:  reviewed  Constitutional: Awake alert and oriented x3, well developed, no acute distress, non-toxic appearance.  EYES: symmetric, sclera clear  ENT:  Normocephalic, Atraumatic.   Respiratory:  No respiratory distress, No wheezing  CV: pulse regular, no palpitations or pallor.  GI:  Abdomen soft, non-tender.   Vascular:  Intact distal pulses, No cyanosis, no signs or symptoms of DVT.  Neurologic: Sensation grossly intact to the involved extremity, No focal deficits noted.   Neck: No tenderness, Supple.  Integument: warm, dry, no ulcerations.   Psychiatric:  Oriented, no pathological affect.  Musculoskeletal:    Affected knee(s):  Painful gait with a subtle limp, positive for synovitis, swelling, joint effusion with crepitation.  Lachman negative  Posterior drawer negative  Paulo's negative  Patellofemoral grind +  Sensation grossly intact to light touch throughout the lower extremity  Skin is intact  Distal pulses are palpable  No signs or symptoms of DVT        Diagnostic Data:     Imaging was done previously in the office, images were personally viewed and discussed with the patient:    Indication: pain related symptoms,  Views: 3V AP, LAT & 40 degree PA bilateral knee(s)   Findings: severe end-stage arthritis (bone on bone, " subchondral sclerosis/cysts, osteophytes)  Comparison views: viewed last xray done in the office.     Procedure:  Large Joint Arthrocentesis: L knee  Date/Time: 6/3/2019 8:11 AM  Consent given by: patient  Site marked: site marked  Timeout: Immediately prior to procedure a time out was called to verify the correct patient, procedure, equipment, support staff and site/side marked as required   Supporting Documentation  Indications: pain and joint swelling   Procedure Details  [place] knee - L knee  Preparation: Patient was prepped and draped in the usual sterile fashion  Needle size: 22 G  Approach: anterolateral  Medications administered: 4 mL lidocaine (cardiac); 80 mg methylPREDNISolone acetate 80 MG/ML  Patient tolerance: patient tolerated the procedure well with no immediate complications    Large Joint Arthrocentesis: R knee  Date/Time: 6/3/2019 8:35 AM  Consent given by: patient  Site marked: site marked  Timeout: Immediately prior to procedure a time out was called to verify the correct patient, procedure, equipment, support staff and site/side marked as required   Supporting Documentation  Indications: pain and joint swelling   Procedure Details  [place] knee - R knee  Preparation: Patient was prepped and draped in the usual sterile fashion  Needle size: 22 G  Approach: anterolateral  Medications administered: 80 mg methylPREDNISolone acetate 80 MG/ML; 4 mL lidocaine (cardiac)  Patient tolerance: patient tolerated the procedure well with no immediate complications          Assessment:     ICD-10-CM ICD-9-CM   1. Arthritis of both knees M17.0 716.96           Plan: Is to proceed with injection  Follow up as indicated.  Ice, elevate, and rest as needed.  Additional interventions include:  15 min spent face to face with patient 11 min spent counseling about:  Biomechanics of pertinent body area discussed.  Risks, benefits, alternatives, comparisons, and complications of accepted medicines, injections,  recommendations, surgical procedures, and therapies explained and education provided in laymen's terms. Natural history and expected course of this patient's diagnosis discussed along with evaluation of therapies. Questions answered. When appropriate I also discussed proper use of cane, walker, trekking poles.   BMI:  The concept of BMI body mass index and its importance and implications discussed.  BMI suggested to be < 40 or as low as possible. Lifestyle measures for weight loss and how this affects orthopedic condition.  EXERCISES:  Advice on benefits of, and types of regular/moderate exercise including biomechanical forces involved as it pertains to this complaint.  RICE: Rest, ice, compression, and elevation therapy, Cryotherapy/brachy therapy, and or OTC linaments as indicated with instructions.   Cortisone Injection. See procedure note.  Does well with intermitten inj depending on how much work he has to do kneeling.  6/3/2019  Mariposa Henley, APRN

## 2019-06-06 RX ORDER — METHYLPREDNISOLONE ACETATE 80 MG/ML
80 INJECTION, SUSPENSION INTRA-ARTICULAR; INTRALESIONAL; INTRAMUSCULAR; SOFT TISSUE
Status: COMPLETED | OUTPATIENT
Start: 2019-06-03 | End: 2019-06-03

## 2019-06-12 ENCOUNTER — OFFICE VISIT (OUTPATIENT)
Dept: SLEEP MEDICINE | Facility: HOSPITAL | Age: 44
End: 2019-06-12
Attending: INTERNAL MEDICINE

## 2019-06-12 VITALS — OXYGEN SATURATION: 93 % | BODY MASS INDEX: 32.47 KG/M2 | WEIGHT: 245 LBS | HEIGHT: 73 IN

## 2019-06-12 DIAGNOSIS — F51.04 PSYCHOPHYSIOLOGICAL INSOMNIA: ICD-10-CM

## 2019-06-12 DIAGNOSIS — Z99.89 OSA ON CPAP: ICD-10-CM

## 2019-06-12 DIAGNOSIS — G47.26 CIRCADIAN RHYTHM SLEEP DISORDER, SHIFT WORK TYPE: Primary | ICD-10-CM

## 2019-06-12 DIAGNOSIS — G47.33 OSA ON CPAP: ICD-10-CM

## 2019-06-12 PROCEDURE — G0463 HOSPITAL OUTPT CLINIC VISIT: HCPCS

## 2019-06-12 PROCEDURE — 99214 OFFICE O/P EST MOD 30 MIN: CPT | Performed by: INTERNAL MEDICINE

## 2019-06-12 NOTE — PROGRESS NOTES
"[facility] Note     Chief Complaint:  NITA     Primary Care Physician: Nessa Gaston APRN    Interval History:   I last saw the patient in December.  He states he is unchanged.  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  [place] Sleepiness Scale is normal at 2.    Review of Systems:    A complete review of systems was done and all were negative with the exception of nasal congestion and postnasal drip, some KING with occasional cough and wheezing, and some ear pain    Social History:    Social History     Socioeconomic History   • Marital status:      Spouse name: Not on file   • Number of children: Not on file   • Years of education: Not on file   • Highest education level: Not on file   Tobacco Use   • Smoking status: Former Smoker     Packs/day: 2.00     Years: 31.00     Pack years: 62.00     Types: Cigarettes     Last attempt to quit: 2004     Years since quitting: 15.4   • Smokeless tobacco: Never Used   • Tobacco comment: Quit 2004   Substance and Sexual Activity   • Alcohol use: Yes     Comment: ONCE OR TWICE MONTHLY   • Drug use: No   • Sexual activity: Defer       Allergies:  Patient has no known allergies.     Medication Review: His list was reviewed.      Vital Signs:    Vitals:    06/12/19 0900   SpO2: 93%   Weight: 111 kg (245 lb)   Height: 185.4 cm (73\")     Body mass index is 32.32 kg/m².    Physical Exam:    Constitutional:  Well developed 44 y.o. male that appears in no apparent distress.  Awake & oriented times 3.  Normal mood with normal recent and remote memory and normal judgement.  Eyes:  Conjunctivae normal.  Oropharynx:  moist mucous membranes without exudate and a large tongue and normal uvula and class III MP airway  Neck: Trachea midline  Respiratory: Respirations not labored  Musculoskeletal: Gait appears normal and there is no evidence of clubbing or pretibial edema     Results Review:  DME is [facility] and he uses a fullface mask.  Downloads between " 3/14 and 6/11/2019 compliance 99%.  Average usage is 6 hours and 20 minutes.  Average AHI is normal without leak.  Average AutoCPAP pressure is 11.7 and his auto CPAP is 9-15.       Impression:   Obstructive sleep apnea adequately treated with auto CPAP with good compliance and usage and no significant complaints of hypersomnolence.  Circadian rhythm sleep disorder, shiftwork type  Psychophysiologic insomnia adequately treated with Ambien or Ambien CR depending on his work shift.    Plan:  Good sleep hygiene measures should be maintained.  Weight loss would be beneficial in this patient who is obese by BMI.  The patient is benefiting from the treatment being provided.     The patient will continue auto CPAP.  The patient complains about a sore on his nose.  An Air Fit F 30, medium, is recommended.  A new order will be sent to his DME.  The patient will continue with Ambien or Ambien CR depending on his work shift.    The patient will call for any problems and will follow up in 6 months.      Ronni Kong MD  Sleep Medicine  06/12/19  9:50 AM

## 2019-06-14 RX ORDER — ESOMEPRAZOLE MAGNESIUM 40 MG/1
40 CAPSULE, DELAYED RELEASE ORAL NIGHTLY
Qty: 90 CAPSULE | Refills: 3 | Status: SHIPPED | OUTPATIENT
Start: 2019-06-14 | End: 2020-06-09

## 2019-06-20 RX ORDER — EZETIMIBE AND SIMVASTATIN 10; 40 MG/1; MG/1
1 TABLET ORAL NIGHTLY
Qty: 90 TABLET | Refills: 3 | Status: SHIPPED | OUTPATIENT
Start: 2019-06-20 | End: 2020-06-24

## 2019-07-30 RX ORDER — MONTELUKAST SODIUM 10 MG/1
10 TABLET ORAL NIGHTLY
Qty: 90 TABLET | Refills: 3 | Status: SHIPPED | OUTPATIENT
Start: 2019-07-30 | End: 2020-07-20

## 2019-08-01 DIAGNOSIS — Z13.0 SCREENING FOR DEFICIENCY ANEMIA: ICD-10-CM

## 2019-08-01 DIAGNOSIS — Z13.220 SCREENING FOR HYPERLIPIDEMIA: ICD-10-CM

## 2019-08-01 DIAGNOSIS — Z13.29 SCREENING FOR THYROID DISORDER: ICD-10-CM

## 2019-08-01 DIAGNOSIS — Z13.1 SCREENING FOR DIABETES MELLITUS: Primary | ICD-10-CM

## 2019-08-04 ENCOUNTER — RESULTS ENCOUNTER (OUTPATIENT)
Dept: FAMILY MEDICINE CLINIC | Facility: CLINIC | Age: 44
End: 2019-08-04

## 2019-08-04 DIAGNOSIS — Z13.0 SCREENING FOR DEFICIENCY ANEMIA: ICD-10-CM

## 2019-08-04 DIAGNOSIS — Z13.29 SCREENING FOR THYROID DISORDER: ICD-10-CM

## 2019-08-04 DIAGNOSIS — Z13.220 SCREENING FOR HYPERLIPIDEMIA: ICD-10-CM

## 2019-08-04 DIAGNOSIS — Z13.1 SCREENING FOR DIABETES MELLITUS: ICD-10-CM

## 2019-08-08 LAB
ALBUMIN SERPL-MCNC: 4.5 G/DL (ref 3.5–5.5)
ALBUMIN/GLOB SERPL: 2.3 {RATIO} (ref 1.2–2.2)
ALP SERPL-CCNC: 55 IU/L (ref 39–117)
ALT SERPL-CCNC: 38 IU/L (ref 0–44)
AST SERPL-CCNC: 20 IU/L (ref 0–40)
BILIRUB SERPL-MCNC: 0.3 MG/DL (ref 0–1.2)
BUN SERPL-MCNC: 11 MG/DL (ref 6–24)
BUN/CREAT SERPL: 11 (ref 9–20)
CALCIUM SERPL-MCNC: 9.3 MG/DL (ref 8.7–10.2)
CHLORIDE SERPL-SCNC: 104 MMOL/L (ref 96–106)
CHOLEST SERPL-MCNC: 161 MG/DL (ref 100–199)
CHOLEST/HDLC SERPL: 3.4 RATIO (ref 0–5)
CO2 SERPL-SCNC: 23 MMOL/L (ref 20–29)
CREAT SERPL-MCNC: 0.97 MG/DL (ref 0.76–1.27)
ERYTHROCYTE [DISTWIDTH] IN BLOOD BY AUTOMATED COUNT: 15.1 % (ref 12.3–15.4)
GLOBULIN SER CALC-MCNC: 2 G/DL (ref 1.5–4.5)
GLUCOSE SERPL-MCNC: 97 MG/DL (ref 65–99)
HBA1C MFR BLD: 5.6 % (ref 4.8–5.6)
HCT VFR BLD AUTO: 49.5 % (ref 37.5–51)
HDLC SERPL-MCNC: 48 MG/DL
HGB BLD-MCNC: 16.7 G/DL (ref 13–17.7)
IRON SATN MFR SERPL: 27 % (ref 15–55)
IRON SERPL-MCNC: 85 UG/DL (ref 38–169)
LDLC SERPL CALC-MCNC: 80 MG/DL (ref 0–99)
MCH RBC QN AUTO: 31.4 PG (ref 26.6–33)
MCHC RBC AUTO-ENTMCNC: 33.7 G/DL (ref 31.5–35.7)
MCV RBC AUTO: 93 FL (ref 79–97)
PLATELET # BLD AUTO: 291 X10E3/UL (ref 150–450)
POTASSIUM SERPL-SCNC: 4.9 MMOL/L (ref 3.5–5.2)
PROT SERPL-MCNC: 6.5 G/DL (ref 6–8.5)
RBC # BLD AUTO: 5.32 X10E6/UL (ref 4.14–5.8)
SODIUM SERPL-SCNC: 142 MMOL/L (ref 134–144)
TIBC SERPL-MCNC: 316 UG/DL (ref 250–450)
TRIGL SERPL-MCNC: 167 MG/DL (ref 0–149)
TSH SERPL DL<=0.005 MIU/L-ACNC: 1.73 UIU/ML (ref 0.45–4.5)
UIBC SERPL-MCNC: 231 UG/DL (ref 111–343)
VLDLC SERPL CALC-MCNC: 33 MG/DL (ref 5–40)
WBC # BLD AUTO: 6 X10E3/UL (ref 3.4–10.8)

## 2019-08-08 RX ORDER — ZOLPIDEM TARTRATE 12.5 MG/1
TABLET, FILM COATED, EXTENDED RELEASE ORAL
Qty: 30 TABLET | Refills: 1 | Status: SHIPPED | OUTPATIENT
Start: 2019-08-08 | End: 2019-10-16 | Stop reason: SDUPTHER

## 2019-08-14 ENCOUNTER — OFFICE VISIT (OUTPATIENT)
Dept: FAMILY MEDICINE CLINIC | Facility: CLINIC | Age: 44
End: 2019-08-14

## 2019-08-14 VITALS
OXYGEN SATURATION: 94 % | RESPIRATION RATE: 18 BRPM | WEIGHT: 243 LBS | TEMPERATURE: 98 F | SYSTOLIC BLOOD PRESSURE: 120 MMHG | HEART RATE: 88 BPM | BODY MASS INDEX: 32.2 KG/M2 | DIASTOLIC BLOOD PRESSURE: 78 MMHG | HEIGHT: 73 IN

## 2019-08-14 DIAGNOSIS — G89.29 BILATERAL CHRONIC KNEE PAIN: Primary | ICD-10-CM

## 2019-08-14 DIAGNOSIS — M25.562 BILATERAL CHRONIC KNEE PAIN: Primary | ICD-10-CM

## 2019-08-14 DIAGNOSIS — M25.561 BILATERAL CHRONIC KNEE PAIN: Primary | ICD-10-CM

## 2019-08-14 PROCEDURE — 99213 OFFICE O/P EST LOW 20 MIN: CPT | Performed by: NURSE PRACTITIONER

## 2019-08-14 RX ORDER — DULOXETIN HYDROCHLORIDE 30 MG/1
30 CAPSULE, DELAYED RELEASE ORAL DAILY
Qty: 30 CAPSULE | Refills: 5 | Status: SHIPPED | OUTPATIENT
Start: 2019-08-14 | End: 2019-09-25 | Stop reason: DRUGHIGH

## 2019-08-14 NOTE — PROGRESS NOTES
Subjective   Arelis Munson is a 44 y.o. male.     Chief Complaint   Patient presents with   • Pain     CSE      Mr. Munson presents today for a 3 month CSE to get refills on Tramadol. He is currently trying to wean off the Tramadol. He is now down to 1 tab three times a day.      I have reviewed the patient's medical history in detail and updated the computerized patient record.    The following portions of the patient's history were reviewed and updated as appropriate: allergies, current medications, past family history, past medical history, past social history, past surgical history and problem list.       Current Outpatient Medications:   •  aspirin 81 MG EC tablet, Take 81 mg by mouth Daily., Disp: , Rfl:   •  B Complex Vitamins (VITAMIN B COMPLEX PO), Take  by mouth Daily. HOLD PRIOR TO SURGERY, Disp: , Rfl:   •  budesonide (PULMICORT FLEXHALER) 180 MCG/ACT inhaler, Use 1 or 2 inhalations each AM. Rinse and spit after use., Disp: 2 inhaler, Rfl: 2  •  Cholecalciferol (VITAMIN D3) 5000 UNITS capsule capsule, Take 5,000 Units by mouth Daily. HOLD PRIOR TO SURGERY, Disp: , Rfl:   •  cromolyn (OPTICROM) 4 % ophthalmic solution, Administer 1 drop to both eyes 4 (Four) Times a Day., Disp: 10 mL, Rfl: 12  •  diclofenac (VOLTAREN) 1 % gel gel, APPLY 4 GRAMS TO AFFECTED AREA(S) FOUR TIMES A DAY AS NEEDED FOR INFLAMMATION, Disp: 4 tube, Rfl: 3  •  docusate sodium (COLACE) 250 MG capsule, Take 250 mg by mouth Daily., Disp: , Rfl:   •  esomeprazole (nexIUM) 40 MG capsule, Take 1 capsule by mouth Every Night., Disp: 90 capsule, Rfl: 3  •  etodolac (LODINE) 500 MG tablet, Take 1 tablet by mouth 2 (Two) Times a Day., Disp: 180 tablet, Rfl: 3  •  ezetimibe-simvastatin (VYTORIN) 10-40 MG per tablet, Take 1 tablet by mouth Every Night., Disp: 90 tablet, Rfl: 3  •  FIBER PO, Take  by mouth., Disp: , Rfl:   •  FLONASE SENSIMIST 27.5 MCG/SPRAY nasal spray, USE ONE SPRAY IN EACH NOSTRIL ONCE DAILY, Disp: 9.1 mL, Rfl: 12  •   gabapentin (NEURONTIN) 600 MG tablet, Take 1 tablet by mouth 3 (Three) Times a Day., Disp: 270 tablet, Rfl: 1  •  melatonin 1 MG tablet, Take 3 mg by mouth Every Night. HOLD PRIOR TO SURGERY, Disp: , Rfl:   •  montelukast (SINGULAIR) 10 MG tablet, Take 1 tablet by mouth Every Night., Disp: 90 tablet, Rfl: 3  •  Multiple Vitamin (MULTI VITAMIN DAILY PO), Take  by mouth daily., Disp: , Rfl:   •  olopatadine (PATANASE) 0.6 % solution nasal solution, 2 sprays by Each Nare route 2 (Two) Times a Day., Disp: 91.5 g, Rfl: 3  •  Potassium 99 MG tablet, Take  by mouth., Disp: , Rfl:   •  PROVENTIL  (90 BASE) MCG/ACT inhaler, Inhale 2 puffs every 6 (six) hours as needed., Disp: , Rfl:   •  rizatriptan MLT (MAXALT-MLT) 10 MG disintegrating tablet, DISSOLVE ONE TABLET BY MOUTH AS NEEDED FOR MIGRAINE; MAY REPEAT ONE TABLET IN 2 HOURS IF NEEDED., Disp: 27 tablet, Rfl: 2  •  SALINE NASAL SPRAY NA, into each nostril., Disp: , Rfl:   •  selenium sulfide (SELSUN) 2.5 % shampoo, Apply  topically Daily As Needed for dandruff., Disp: 354 mL, Rfl: 3  •  sildenafil (VIAGRA) 100 MG tablet, Take 1 tablet by mouth As Needed for erectile dysfunction., Disp: 30 tablet, Rfl: 3  •  Testosterone Cypionate (DEPOTESTOTERONE CYPIONATE) 200 MG/ML injection, Inject  into the appropriate muscle as directed by prescriber Every 14 (Fourteen) Days., Disp: , Rfl:   •  traMADol (ULTRAM) 50 MG tablet, Take 2 tablets by mouth Every 8 (Eight) Hours As Needed for Moderate Pain ., Disp: 180 tablet, Rfl: 2  •  zolpidem (AMBIEN) 10 MG tablet, Take 1 tablet by mouth at sleep onset when working 2nd or 3rd shift., Disp: 60 tablet, Rfl: 1  •  zolpidem CR (AMBIEN CR) 12.5 MG CR tablet, Take 1 tablet by mouth at bedtime when working 1st shift., Disp: 30 tablet, Rfl: 1    Review of Systems   Constitutional: Negative.    Respiratory: Negative.    Cardiovascular: Negative.    Musculoskeletal: Positive for arthralgias (bilateral knees.).        Vitals:    08/14/19  "0907   BP: 120/78   BP [place] Right arm   Patient Position: Sitting   Cuff Size: Adult   Pulse: 88   Resp: 18   Temp: 98 °F (36.7 °C)   TempSrc: Oral   SpO2: 94%   Weight: 110 kg (243 lb)   Height: 185.4 cm (73\")       Objective   Physical Exam   Constitutional: He is oriented to person, place, and time. He appears well-developed and well-nourished.   Cardiovascular: Normal rate, regular rhythm and normal heart sounds.   Pulmonary/Chest: Effort normal and breath sounds normal.   Musculoskeletal: Normal range of motion. He exhibits no edema.   Neurological: He is alert and oriented to person, place, and time.   Skin: Skin is warm and dry.   Psychiatric:   No acute distress   Vitals reviewed.        Assessment/Plan   Arelis was seen today for pain.    Diagnoses and all orders for this visit:    Bilateral chronic knee pain  -     DULoxetine (CYMBALTA) 30 MG capsule; Take 1 capsule by mouth Daily.      Mr. Munson presents today for a 3 month CSE to get refills on Tramadol. He is currently trying to wean off the Tramadol. He is now down to 1 tab three times a day.   I will not refill the Tramadol today because he just refilled his prescription on 8/11/19 and he is also weaning off the medication.   He is to start taking Cymbalta 30 mg daily as he weans off of the tramadol. This will help with his pain and anxiety as he decreased the tramadol.   He is to return in 6 weeks to follow up on the medication changes.        "

## 2019-09-03 ENCOUNTER — TELEPHONE (OUTPATIENT)
Dept: FAMILY MEDICINE CLINIC | Facility: CLINIC | Age: 44
End: 2019-09-03

## 2019-09-03 NOTE — TELEPHONE ENCOUNTER
----- Message from LORENZO Matthews sent at 8/29/2019  9:54 AM EDT -----  Regarding: FW: lab codes      ----- Message -----  From: Netta Ramirez  Sent: 8/27/2019   9:00 AM  To: LORENZO Matthews  Subject: lab codes                                        Patient has a lab bill, all of his labs were under screening for   Anemia, screening thyroid, screening for DM    These are not covered codes and will not be paid    Can you change your codes on this?    I advise: fatigue, IFG, elevated cholesterol. You can try the fatigue for the iron profile, not really sure what else you can use.     Someone will have to call [facility] and ask for the codes to be changed.     Screening codes are never covered- the only screening you can do is z00.00 (prev exam, commercial insurance only not [employer]) other then that you have to use symptoms.

## 2019-09-04 ENCOUNTER — TELEPHONE (OUTPATIENT)
Dept: FAMILY MEDICINE CLINIC | Facility: CLINIC | Age: 44
End: 2019-09-04

## 2019-09-04 NOTE — TELEPHONE ENCOUNTER
Lab codes changed, patient notified via [facility]    ----- Message from LORENZO Matthews sent at 9/3/2019  4:15 PM EDT -----  Regarding: FW: lab codes  Christy said that you can contact the billing office at [facility] and change the billing codes to (IFG - R73.1, Hyperlipid - E78.2 and Fatigue - R53.83.) then [facility] can reprocess the bill.   ----- Message -----  From: Bryce Alarcon MA/LMR  Sent: 9/3/2019   9:28 AM  To: LORENZO Matthews  Subject: FW: lab codes                                        ----- Message -----  From: Nessa Gaston APRN  Sent: 8/29/2019   9:54 AM  To: Bryce Alarcon MA/LMR  Subject: FW: lab codes                                        ----- Message -----  From: Netta Ramirez  Sent: 8/27/2019   9:00 AM  To: LORENZO Matthews  Subject: lab codes                                        Patient has a lab bill, all of his labs were under screening for   Anemia, screening thyroid, screening for DM    These are not covered codes and will not be paid    Can you change your codes on this?    I advise: fatigue, IFG, elevated cholesterol. You can try the fatigue for the iron profile, not really sure what else you can use.     Someone will have to call [facility] and ask for the codes to be changed.     Screening codes are never covered- the only screening you can do is z00.00 (prev exam, commercial insurance only not [employer]) other then that you have to use symptoms.

## 2019-09-09 RX ORDER — SILDENAFIL 100 MG/1
TABLET, FILM COATED ORAL
Qty: 6 TABLET | Refills: 2 | Status: SHIPPED | OUTPATIENT
Start: 2019-09-09 | End: 2019-12-09 | Stop reason: SDUPTHER

## 2019-09-11 ENCOUNTER — OFFICE VISIT (OUTPATIENT)
Dept: ORTHOPEDIC SURGERY | Facility: CLINIC | Age: 44
End: 2019-09-11

## 2019-09-11 VITALS — HEIGHT: 73 IN | WEIGHT: 245 LBS | BODY MASS INDEX: 32.47 KG/M2 | TEMPERATURE: 98.4 F

## 2019-09-11 DIAGNOSIS — G89.29 CHRONIC PAIN OF BOTH KNEES: Primary | ICD-10-CM

## 2019-09-11 DIAGNOSIS — M25.561 CHRONIC PAIN OF BOTH KNEES: Primary | ICD-10-CM

## 2019-09-11 DIAGNOSIS — M17.0 ARTHRITIS OF BOTH KNEES: ICD-10-CM

## 2019-09-11 DIAGNOSIS — M25.562 CHRONIC PAIN OF BOTH KNEES: Primary | ICD-10-CM

## 2019-09-11 PROCEDURE — 73562 X-RAY EXAM OF KNEE 3: CPT | Performed by: NURSE PRACTITIONER

## 2019-09-11 PROCEDURE — 99213 OFFICE O/P EST LOW 20 MIN: CPT | Performed by: NURSE PRACTITIONER

## 2019-09-11 PROCEDURE — 20610 DRAIN/INJ JOINT/BURSA W/O US: CPT | Performed by: NURSE PRACTITIONER

## 2019-09-11 RX ORDER — METHYLPREDNISOLONE ACETATE 80 MG/ML
80 INJECTION, SUSPENSION INTRA-ARTICULAR; INTRALESIONAL; INTRAMUSCULAR; SOFT TISSUE
Status: COMPLETED | OUTPATIENT
Start: 2019-09-11 | End: 2019-09-11

## 2019-09-11 RX ADMIN — METHYLPREDNISOLONE ACETATE 80 MG: 80 INJECTION, SUSPENSION INTRA-ARTICULAR; INTRALESIONAL; INTRAMUSCULAR; SOFT TISSUE at 13:17

## 2019-09-11 NOTE — PROGRESS NOTES
Patient: Arelis Munson  YOB: 1975    Chief Complaints:  bilateral knee pain    Subjective:    History of Present Illness: Here today for knee pain. The pain is a generalized joint tenderness.  It has been progressive in nature but remains intermittent.  Worsened by prolonged standing or walking and squatting activities. Has had improvement in the past with ice/heat, rest, and injections.     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     Allergies: No Known Allergies    Medications:   Home Medications:  Current Outpatient Medications on File Prior to Visit   Medication Sig   • Ascorbic Acid (VITAMIN C PO) Take  by mouth.   • aspirin 81 MG EC tablet Take 81 mg by mouth Daily.   • B Complex Vitamins (VITAMIN B COMPLEX PO) Take  by mouth Daily. HOLD PRIOR TO SURGERY   • budesonide (PULMICORT FLEXHALER) 180 MCG/ACT inhaler Use 1 or 2 inhalations each AM. Rinse and spit after use.   • Cholecalciferol (VITAMIN D3) 5000 UNITS capsule capsule Take 5,000 Units by mouth Daily. HOLD PRIOR TO SURGERY   • cromolyn (OPTICROM) 4 % ophthalmic solution Administer 1 drop to both eyes 4 (Four) Times a Day.   • Dextromethorphan-guaiFENesin (MUCINEX DM PO) Take  by mouth.   • diclofenac (VOLTAREN) 1 % gel gel APPLY 4 GRAMS TO AFFECTED AREA(S) FOUR TIMES A DAY AS NEEDED FOR INFLAMMATION   • docusate sodium (COLACE) 250 MG capsule Take 250 mg by mouth Daily.   • DULoxetine (CYMBALTA) 30 MG capsule Take 1 capsule by mouth Daily.   • esomeprazole (nexIUM) 40 MG capsule Take 1 capsule by mouth Every Night.   • etodolac (LODINE) 500 MG tablet Take 1 tablet by mouth 2 (Two) Times a Day.   • ezetimibe-simvastatin (VYTORIN) 10-40 MG per tablet Take 1 tablet by mouth Every Night.   • FIBER PO Take  by mouth.   • FLONASE SENSIMIST 27.5 MCG/SPRAY nasal spray USE ONE SPRAY IN EACH NOSTRIL ONCE DAILY   • gabapentin (NEURONTIN) 600 MG tablet Take 1 tablet by mouth 3 (Three) Times a Day.   • melatonin 1 MG tablet Take 3 mg by mouth  Every Night. HOLD PRIOR TO SURGERY   • montelukast (SINGULAIR) 10 MG tablet Take 1 tablet by mouth Every Night.   • Multiple Vitamin (MULTI VITAMIN DAILY PO) Take  by mouth daily.   • olopatadine (PATANASE) 0.6 % solution nasal solution 2 sprays by Each Nare route 2 (Two) Times a Day.   • Potassium 99 MG tablet Take  by mouth.   • PROVENTIL  (90 BASE) MCG/ACT inhaler Inhale 2 puffs every 6 (six) hours as needed.   • rizatriptan MLT (MAXALT-MLT) 10 MG disintegrating tablet DISSOLVE ONE TABLET BY MOUTH AS NEEDED FOR MIGRAINE; MAY REPEAT ONE TABLET IN 2 HOURS IF NEEDED.   • SALINE NASAL SPRAY NA into each nostril.   • selenium sulfide (SELSUN) 2.5 % shampoo Apply  topically Daily As Needed for dandruff.   • sildenafil (VIAGRA) 100 MG tablet TAKE ONE TABLET BY MOUTH AS NEEDED FOR ERECTILE DYSFUNCTION   • Testosterone Cypionate (DEPOTESTOTERONE CYPIONATE) 200 MG/ML injection Inject  into the appropriate muscle as directed by prescriber Every 14 (Fourteen) Days.   • traMADol (ULTRAM) 50 MG tablet Take 2 tablets by mouth Every 8 (Eight) Hours As Needed for Moderate Pain .   • Unable to find 1 each 1 (One) Time. Apple cidaer vinegar 500mg, q AM   • Unable to find 1 each 1 (One) Time. [employer] allergy relief, q AM   • zolpidem (AMBIEN) 10 MG tablet Take 1 tablet by mouth at sleep onset when working 2nd or 3rd shift.   • zolpidem CR (AMBIEN CR) 12.5 MG CR tablet Take 1 tablet by mouth at bedtime when working 1st shift.     No current facility-administered medications on file prior to visit.      Current Medications:  Scheduled Meds:  Continuous Infusions:  No current facility-administered medications for this visit.   PRN Meds:.    I have reviewed the patient's medical history in detail and updated the computerized patient record.  Review and summarization of old records include:    Past Medical History:   Diagnosis Date   • Arthritis     OSTEO   • Asthma    • ED (erectile dysfunction)    • Elevated cholesterol    •  Environmental allergies    • Fracture, fibula     RIGHT AND ANKLE   • Gastroesophageal reflux disease without esophagitis 7/11/2016   • Migraine with aura and without status migrainosus, not intractable 7/11/2016   • Pain and swelling of toe of right foot    • Seasonal allergies    • Sleep apnea     CPAP   • Testosterone deficiency 7/11/2016        Past Surgical History:   Procedure Laterality Date   • ANKLE ARTHROSCOPY Right 1/26/2018    Procedure: RT ANKLE ARTHROSCOPY SUBCHONDROPLASTY TALUS ;  Surgeon: Lebron Spivey Jr., MD;  [place] [facility] [place] [facility] [place]  Service:    • ANKLE LIGAMENT RECONSTRUCTION Right 1/26/2018    Procedure: HARDWARE REMOVAL POSS LATERAL LIGAMENT RECONSTRUCTION ;  Surgeon: Lebron Spivey Jr., MD;  [place] [facility] [place] [facility] [place]  Service:    • CARPAL TUNNEL RELEASE Right    • CUBITAL TUNNEL RELEASE Right     NERVE RELEASE   • EAR BIOPSY Right     MYRINGOTOMY W/BONES REPLACED INNER EAR   • KNEE SURGERY Right     MENISCUS REPAIR   • NOSE SURGERY     • ORIF FIBULA FRACTURE Right 05/17/2017    ANKLE INCLUDED        Social History     Occupational History   • Not on file   Tobacco Use   • Smoking status: Former Smoker     Packs/day: 2.00     Years: 31.00     Pack years: 62.00     Types: Cigarettes     Last attempt to quit: 2004     Years since quitting: 15.7   • Smokeless tobacco: Never Used   • Tobacco comment: Quit 2004   Substance and Sexual Activity   • Alcohol use: Yes     Comment: ONCE OR TWICE MONTHLY   • Drug use: No   • Sexual activity: Defer      Social History     Social History Narrative   • Not on file        Family History   Problem Relation Age of Onset   • Lung cancer Mother    • Lung cancer Father    • Malig Hyperthermia Neg Hx        ROS: 14 point review of systems was performed and was negative except for documented findings in HPI and today's encounter.     Allergies: No Known Allergies  Constitutional:  Denies fever, shaking or chills   Eyes:  Denies change in visual acuity   HENT:  Denies  "nasal congestion or sore throat   Respiratory:  Denies cough or shortness of breath   Cardiovascular:  Denies chest pain or severe LE edema   GI:  Denies abdominal pain, nausea, vomiting, bloody stools or diarrhea   Musculoskeletal:  Numbness, tingling, or loss of motor function only as noted above in history of present illness.  : Denies painful urination or hematuria  Integument:  Denies rash, lesion or ulceration   Neurologic:  Denies headache or focal weakness  Endocrine:  Denies lymphadenopathy  Psych:  Denies confusion or change in mental status   Hem:  Denies active bleeding    Physical Exam: 44 y.o. male  Wt Readings from Last 3 Encounters:   09/11/19 111 kg (245 lb)   08/14/19 110 kg (243 lb)   06/12/19 111 kg (245 lb)     Ht Readings from Last 1 Encounters:   09/11/19 185.4 cm (73\")     Body mass index is 32.32 kg/m².  Vitals:    09/11/19 1307   Temp: 98.4 °F (36.9 °C)     Vital signs reviewed.   Constitutional: Awake alert and oriented x3, well developed, no acute distress, non-toxic appearance.  EYES: symmetric, sclera clear  ENT:  Normocephalic, Atraumatic.   Respiratory:  No respiratory distress, No wheezing  CV: pulse regular, no palpitations or pallor.  GI:  Abdomen soft, non-tender.   Vascular:  Intact distal pulses, No cyanosis, no signs or symptoms of DVT.  Neurologic: Sensation grossly intact to the involved extremity, No focal deficits noted.   Neck: No tenderness, Supple.  Integument: warm, dry, no ulcerations.   Psychiatric:  Oriented, no pathological affect.  Musculoskeletal:    Affected knee(s):  Painful gait with a subtle limp, positive for synovitis, swelling, joint effusion with crepitation.  Lachman negative  Posterior drawer negative  Paulo's negative  Patellofemoral grind +  Sensation grossly intact to light touch throughout the lower extremity  Skin is intact  Distal pulses are palpable  No signs or symptoms of DVT        Diagnostic Data:     Imaging was done today, images were " personally viewed and discussed with the patient:    Indication: pain related symptoms,  Views: 3V AP, LAT & 40 degree PA bilateral knee(s)   Findings: severe end-stage arthritis (bone on bone, subchondral sclerosis/cysts, osteophytes)  Comparison views: viewed last xray done in the office.     Procedure:  Large Joint Arthrocentesis: R knee  Date/Time: 9/11/2019 1:17 PM  Consent given by: patient  Site marked: site marked  Timeout: Immediately prior to procedure a time out was called to verify the correct patient, procedure, equipment, support staff and site/side marked as required   Supporting Documentation  Indications: pain   Procedure Details  [place] knee - R knee  Needle size: 25 G  Approach: anteromedial  Medications administered: 80 mg methylPREDNISolone acetate 80 MG/ML; 4 mL lidocaine (cardiac)      Large Joint Arthrocentesis: L knee  Date/Time: 9/11/2019 1:17 PM  Consent given by: patient  Site marked: site marked  Timeout: Immediately prior to procedure a time out was called to verify the correct patient, procedure, equipment, support staff and site/side marked as required   Supporting Documentation  Indications: pain   Procedure Details  [place] knee - L knee  Needle size: 25 G  Approach: anteromedial  Medications administered: 4 mL lidocaine (cardiac); 80 mg methylPREDNISolone acetate 80 MG/ML  Patient tolerance: patient tolerated the procedure well with no immediate complications          Assessment:     ICD-10-CM ICD-9-CM   1. Chronic pain of both knees M25.561 719.46    M25.562 338.29    G89.29    2. Arthritis of both knees M17.0 716.96           Plan: Is to proceed with injection  Follow up as indicated.  Ice, elevate, and rest as needed.  Additional interventions include:  15 min spent face to face with patient 11 min spent counseling about:  Biomechanics of pertinent body area discussed.  Risks, benefits, alternatives, comparisons, and complications of accepted medicines, injections,  recommendations, surgical procedures, and therapies explained and education provided in laymen's terms. Natural history and expected course of this patient's diagnosis discussed along with evaluation of therapies. Questions answered. When appropriate I also discussed proper use of cane, walker, trekking poles.   BMI:  The concept of BMI body mass index and its importance and implications discussed.  BMI suggested to be < 40 or as low as possible. Lifestyle measures for weight loss and how this affects orthopedic condition.  EXERCISES:  Advice on benefits of, and types of regular/moderate exercise including biomechanical forces involved as it pertains to this complaint.  RICE: Rest, ice, compression, and elevation therapy, Cryotherapy/brachy therapy, and or OTC linaments as indicated with instructions.   Cortisone Injection. See procedure note.    9/11/2019  Mariposa Henley, APRN

## 2019-09-25 ENCOUNTER — OFFICE VISIT (OUTPATIENT)
Dept: FAMILY MEDICINE CLINIC | Facility: CLINIC | Age: 44
End: 2019-09-25

## 2019-09-25 VITALS
HEIGHT: 73 IN | HEART RATE: 77 BPM | WEIGHT: 245 LBS | DIASTOLIC BLOOD PRESSURE: 84 MMHG | OXYGEN SATURATION: 98 % | BODY MASS INDEX: 32.47 KG/M2 | RESPIRATION RATE: 18 BRPM | SYSTOLIC BLOOD PRESSURE: 122 MMHG

## 2019-09-25 DIAGNOSIS — G89.29 BILATERAL CHRONIC KNEE PAIN: Primary | ICD-10-CM

## 2019-09-25 DIAGNOSIS — M25.561 BILATERAL CHRONIC KNEE PAIN: Primary | ICD-10-CM

## 2019-09-25 DIAGNOSIS — M25.562 BILATERAL CHRONIC KNEE PAIN: Primary | ICD-10-CM

## 2019-09-25 PROCEDURE — 99213 OFFICE O/P EST LOW 20 MIN: CPT | Performed by: NURSE PRACTITIONER

## 2019-09-25 RX ORDER — DULOXETIN HYDROCHLORIDE 60 MG/1
60 CAPSULE, DELAYED RELEASE ORAL DAILY
Qty: 30 CAPSULE | Refills: 1 | Status: SHIPPED | OUTPATIENT
Start: 2019-09-25 | End: 2019-11-23 | Stop reason: SDUPTHER

## 2019-10-16 RX ORDER — ZOLPIDEM TARTRATE 12.5 MG/1
TABLET, FILM COATED, EXTENDED RELEASE ORAL
Qty: 90 TABLET | Refills: 0 | Status: SHIPPED | OUTPATIENT
Start: 2019-10-16 | End: 2020-04-21

## 2019-11-23 DIAGNOSIS — M25.562 BILATERAL CHRONIC KNEE PAIN: ICD-10-CM

## 2019-11-23 DIAGNOSIS — G89.29 BILATERAL CHRONIC KNEE PAIN: ICD-10-CM

## 2019-11-23 DIAGNOSIS — M25.561 BILATERAL CHRONIC KNEE PAIN: ICD-10-CM

## 2019-11-25 RX ORDER — DULOXETIN HYDROCHLORIDE 60 MG/1
CAPSULE, DELAYED RELEASE ORAL
Qty: 90 CAPSULE | Refills: 3 | Status: SHIPPED | OUTPATIENT
Start: 2019-11-25 | End: 2019-11-27

## 2019-11-27 ENCOUNTER — OFFICE VISIT (OUTPATIENT)
Dept: FAMILY MEDICINE CLINIC | Facility: CLINIC | Age: 44
End: 2019-11-27

## 2019-11-27 VITALS
HEART RATE: 79 BPM | HEIGHT: 73 IN | BODY MASS INDEX: 32.63 KG/M2 | DIASTOLIC BLOOD PRESSURE: 82 MMHG | WEIGHT: 246.2 LBS | OXYGEN SATURATION: 98 % | SYSTOLIC BLOOD PRESSURE: 122 MMHG | RESPIRATION RATE: 18 BRPM

## 2019-11-27 DIAGNOSIS — G43.109 MIGRAINE WITH AURA AND WITHOUT STATUS MIGRAINOSUS, NOT INTRACTABLE: ICD-10-CM

## 2019-11-27 DIAGNOSIS — Z76.0 MEDICATION REFILL: ICD-10-CM

## 2019-11-27 DIAGNOSIS — M17.0 ARTHRITIS OF BOTH KNEES: Primary | ICD-10-CM

## 2019-11-27 PROCEDURE — 99213 OFFICE O/P EST LOW 20 MIN: CPT | Performed by: NURSE PRACTITIONER

## 2019-11-27 RX ORDER — DULOXETIN HYDROCHLORIDE 60 MG/1
60 CAPSULE, DELAYED RELEASE ORAL 2 TIMES DAILY
Qty: 180 CAPSULE | Refills: 3 | Status: SHIPPED | OUTPATIENT
Start: 2019-11-27 | End: 2020-06-10

## 2019-11-27 RX ORDER — DESONIDE 0.5 MG/G
CREAM TOPICAL 3 TIMES DAILY
Qty: 60 G | Refills: 5 | Status: SHIPPED | OUTPATIENT
Start: 2019-11-27 | End: 2020-07-20

## 2019-11-27 RX ORDER — KETOCONAZOLE 20 MG/G
CREAM TOPICAL DAILY
Qty: 60 G | Refills: 5 | Status: SHIPPED | OUTPATIENT
Start: 2019-11-27 | End: 2020-07-20

## 2019-11-27 RX ORDER — GABAPENTIN 600 MG/1
600 TABLET ORAL 3 TIMES DAILY
Qty: 270 TABLET | Refills: 1 | Status: SHIPPED | OUTPATIENT
Start: 2019-11-27 | End: 2020-06-10

## 2019-11-27 RX ORDER — DULOXETIN HYDROCHLORIDE 30 MG/1
CAPSULE, DELAYED RELEASE ORAL
COMMUNITY
Start: 2019-11-07 | End: 2019-11-27 | Stop reason: DRUGHIGH

## 2019-11-27 RX ORDER — DULOXETIN HYDROCHLORIDE 60 MG/1
60 CAPSULE, DELAYED RELEASE ORAL DAILY
COMMUNITY
End: 2019-11-27 | Stop reason: SDUPTHER

## 2019-11-27 RX ORDER — GABAPENTIN 600 MG/1
600 TABLET ORAL 3 TIMES DAILY
Qty: 270 TABLET | Refills: 1 | Status: SHIPPED | OUTPATIENT
Start: 2019-11-27 | End: 2019-11-27 | Stop reason: SDUPTHER

## 2019-11-27 RX ORDER — KETOCONAZOLE 20 MG/G
CREAM TOPICAL DAILY
COMMUNITY
End: 2019-11-27 | Stop reason: SDUPTHER

## 2019-11-27 RX ORDER — CYCLOSPORINE 0.5 MG/ML
EMULSION OPHTHALMIC
COMMUNITY
Start: 2019-11-22

## 2019-11-27 RX ORDER — KETOCONAZOLE 20 MG/ML
SHAMPOO TOPICAL 2 TIMES WEEKLY
COMMUNITY
End: 2020-10-12 | Stop reason: SDUPTHER

## 2019-11-27 RX ORDER — SYRINGE WITH NEEDLE, 1 ML 25GX5/8"
SYRINGE, EMPTY DISPOSABLE MISCELLANEOUS
COMMUNITY
Start: 2019-11-22

## 2019-12-04 ENCOUNTER — OFFICE VISIT (OUTPATIENT)
Dept: ORTHOPEDIC SURGERY | Facility: CLINIC | Age: 44
End: 2019-12-04

## 2019-12-04 ENCOUNTER — OFFICE VISIT (OUTPATIENT)
Dept: SLEEP MEDICINE | Facility: HOSPITAL | Age: 44
End: 2019-12-04

## 2019-12-04 VITALS — HEIGHT: 73 IN | BODY MASS INDEX: 32.2 KG/M2 | WEIGHT: 243 LBS

## 2019-12-04 VITALS — HEIGHT: 73 IN | WEIGHT: 243.6 LBS | TEMPERATURE: 98.6 F | BODY MASS INDEX: 32.29 KG/M2

## 2019-12-04 DIAGNOSIS — Z99.89 OSA ON CPAP: Primary | ICD-10-CM

## 2019-12-04 DIAGNOSIS — G47.26 CIRCADIAN RHYTHM SLEEP DISORDER, SHIFT WORK TYPE: ICD-10-CM

## 2019-12-04 DIAGNOSIS — M17.0 ARTHRITIS OF BOTH KNEES: Primary | ICD-10-CM

## 2019-12-04 DIAGNOSIS — G47.33 OSA ON CPAP: Primary | ICD-10-CM

## 2019-12-04 DIAGNOSIS — F51.04 PSYCHOPHYSIOLOGICAL INSOMNIA: ICD-10-CM

## 2019-12-04 PROCEDURE — 20610 DRAIN/INJ JOINT/BURSA W/O US: CPT | Performed by: NURSE PRACTITIONER

## 2019-12-04 PROCEDURE — 99213 OFFICE O/P EST LOW 20 MIN: CPT | Performed by: NURSE PRACTITIONER

## 2019-12-04 PROCEDURE — G0463 HOSPITAL OUTPT CLINIC VISIT: HCPCS

## 2019-12-04 PROCEDURE — 99213 OFFICE O/P EST LOW 20 MIN: CPT | Performed by: INTERNAL MEDICINE

## 2019-12-04 RX ORDER — METHYLPREDNISOLONE ACETATE 80 MG/ML
80 INJECTION, SUSPENSION INTRA-ARTICULAR; INTRALESIONAL; INTRAMUSCULAR; SOFT TISSUE
Status: COMPLETED | OUTPATIENT
Start: 2019-12-04 | End: 2019-12-04

## 2019-12-04 RX ADMIN — METHYLPREDNISOLONE ACETATE 80 MG: 80 INJECTION, SUSPENSION INTRA-ARTICULAR; INTRALESIONAL; INTRAMUSCULAR; SOFT TISSUE at 13:07

## 2019-12-04 NOTE — PROGRESS NOTES
"[facility] Note     Chief Complaint:  NITA     Primary Care Physician: Nessa Gaston, LORENZO    Interval History:   I last saw the patient in June.  He states he remains the same.  He continues to work rotating shifts; first then second and third.  His bedtime and wake time varies.  Perry Sleepiness Scale is normal at 2.    Review of Systems:    A complete review of systems was done and all were negative with the exception of nasal congestion and postnasal drip, occasional shortness of breath and wheezing, occasional cough, ADAM, ear pain, and anxiety    Social History:    Social History     Socioeconomic History   • Marital status:      Spouse name: Not on file   • Number of children: Not on file   • Years of education: Not on file   • Highest education level: Not on file   Tobacco Use   • Smoking status: Former Smoker     Packs/day: 2.00     Years: 31.00     Pack years: 62.00     Types: Cigarettes     Last attempt to quit: 2004     Years since quitting: 15.9   • Smokeless tobacco: Never Used   • Tobacco comment: Quit 2004   Substance and Sexual Activity   • Alcohol use: Yes     Comment: ONCE OR TWICE MONTHLY   • Drug use: No   • Sexual activity: Defer       Allergies:  Patient has no known allergies.     Medication Review: His list was reviewed.      Vital Signs:    Vitals:    12/04/19 1028   Weight: 110 kg (243 lb)   Height: 185.4 cm (73\")     Body mass index is 32.06 kg/m².    Physical Exam:    Constitutional:  Well developed 44 y.o. male that appears in no apparent distress.  Awake & oriented times 3.  Normal mood with normal recent and remote memory and normal judgement.  Eyes:  Conjunctivae normal.  Oropharynx:  moist mucous membranes without exudate and a large tongue and normal uvula and class III MP airway     Results Review:  DME is [employer] and he uses a fullface mask; Air Fit F 30.  Downloads between 9/5 and 12/3/2019 compliance 99%.  Average usage is 6 hours and 51 minutes.  " Average AHI is normal without leak.  Average AutoCPAP pressure is 11.6 and his auto CPAP is 9-15.       Impression:   Obstructive sleep apnea adequately treated with auto CPAP with good compliance and usage and no complaints of hypersomnolence.  Circadian rhythm sleep disorder, shift work type  Psychophysiologic insomnia adequately treated with Ambien CR 12.5    Plan:  Good sleep hygiene measures should be maintained.  Weight loss would be beneficial in this patient who is obese by BMI.  The patient is benefiting from the treatment being provided.     The patient will continue auto CPAP and also continue Ambien CR.    The patient will call for any problems and will follow up in 6 months.      Ronni Kong MD  Sleep Medicine  12/04/19  10:39 AM

## 2019-12-04 NOTE — PROGRESS NOTES
"  Patient: Arelis Munson  YOB: 1975    Chief Complaints:  bilateral knee pain    Subjective:    History of Present Illness: Here today for knee pain. He gets good relief with injections right is worse than left.  His ankles bother him too and all are achy today. The pain is a generalized joint tenderness.  It has been progressive in nature but remains intermittent.  Worsened by prolonged standing or walking and squatting activities. Has had improvement in the past with ice/heat, rest, and injections.     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     Allergies: No Known Allergies    Medications:   Home Medications:  Current Outpatient Medications on File Prior to Visit   Medication Sig   • Ascorbic Acid (VITAMIN C PO) Take  by mouth.   • aspirin 81 MG EC tablet Take 81 mg by mouth Daily.   • B Complex Vitamins (VITAMIN B COMPLEX PO) Take  by mouth Daily. HOLD PRIOR TO SURGERY   • B-D 3CC LUER-PUMA SYR 22GX1\" 22G X 1\" 3 ML misc    • budesonide (PULMICORT FLEXHALER) 180 MCG/ACT inhaler Use 1 or 2 inhalations each AM. Rinse and spit after use.   • Cholecalciferol (VITAMIN D3) 5000 UNITS capsule capsule Take 5,000 Units by mouth Daily. HOLD PRIOR TO SURGERY   • cromolyn (OPTICROM) 4 % ophthalmic solution Administer 1 drop to both eyes 4 (Four) Times a Day.   • desonide (DESOWEN) 0.05 % cream Apply  topically to the appropriate area as directed 3 (Three) Times a Day.   • Dextromethorphan-guaiFENesin (MUCINEX DM PO) Take  by mouth.   • diclofenac (VOLTAREN) 1 % gel gel APPLY 4 GRAMS TO AFFECTED AREA(S) FOUR TIMES A DAY AS NEEDED FOR INFLAMMATION   • docusate sodium (COLACE) 250 MG capsule Take 250 mg by mouth Daily.   • DULoxetine (CYMBALTA) 60 MG capsule Take 1 capsule by mouth 2 (Two) Times a Day.   • esomeprazole (nexIUM) 40 MG capsule Take 1 capsule by mouth Every Night.   • etodolac (LODINE) 500 MG tablet Take 1 tablet by mouth 2 (Two) Times a Day.   • ezetimibe-simvastatin (VYTORIN) 10-40 MG per " tablet Take 1 tablet by mouth Every Night.   • FIBER PO Take  by mouth.   • FLONASE SENSIMIST 27.5 MCG/SPRAY nasal spray USE ONE SPRAY IN EACH NOSTRIL ONCE DAILY   • gabapentin (NEURONTIN) 600 MG tablet Take 1 tablet by mouth 3 (Three) Times a Day.   • ketoconazole (NIZORAL) 2 % cream Apply  topically to the appropriate area as directed Daily.   • ketoconazole (NIZORAL) 2 % shampoo Apply  topically to the appropriate area as directed 2 (Two) Times a Week.   • melatonin 1 MG tablet Take 3 mg by mouth Every Night. HOLD PRIOR TO SURGERY   • montelukast (SINGULAIR) 10 MG tablet Take 1 tablet by mouth Every Night.   • Multiple Vitamin (MULTI VITAMIN DAILY PO) Take  by mouth daily.   • olopatadine (PATANASE) 0.6 % solution nasal solution 2 sprays by Each Nare route 2 (Two) Times a Day.   • Potassium 99 MG tablet Take  by mouth.   • PROVENTIL  (90 BASE) MCG/ACT inhaler Inhale 2 puffs every 6 (six) hours as needed.   • RESTASIS 0.05 % ophthalmic emulsion    • rizatriptan MLT (MAXALT-MLT) 10 MG disintegrating tablet DISSOLVE ONE TABLET BY MOUTH AS NEEDED FOR MIGRAINE; MAY REPEAT ONE TABLET IN 2 HOURS IF NEEDED.   • SALINE NASAL SPRAY NA into each nostril.   • selenium sulfide (SELSUN) 2.5 % shampoo Apply  topically Daily As Needed for dandruff.   • sildenafil (VIAGRA) 100 MG tablet TAKE ONE TABLET BY MOUTH AS NEEDED FOR ERECTILE DYSFUNCTION   • Testosterone Cypionate (DEPOTESTOTERONE CYPIONATE) 200 MG/ML injection Inject  into the appropriate muscle as directed by prescriber Every 14 (Fourteen) Days.   • Unable to find 1 each 1 (One) Time. Apple cidaer vinegar 500mg, q AM   • Unable to find 1 each 1 (One) Time. [employer] allergy relief, q AM   • zolpidem CR (AMBIEN CR) 12.5 MG CR tablet Take 1 tablet by mouth at bedtime when working 1st shift.     No current facility-administered medications on file prior to visit.      Current Medications:  Scheduled Meds:  Continuous Infusions:  No current facility-administered  medications for this visit.   PRN Meds:.    I have reviewed the patient's medical history in detail and updated the computerized patient record.  Review and summarization of old records include:    Past Medical History:   Diagnosis Date   • Arthritis     OSTEO   • Asthma    • ED (erectile dysfunction)    • Elevated cholesterol    • Environmental allergies    • Fracture, fibula     RIGHT AND ANKLE   • Gastroesophageal reflux disease without esophagitis 7/11/2016   • Migraine with aura and without status migrainosus, not intractable 7/11/2016   • Pain and swelling of toe of right foot    • Seasonal allergies    • Sleep apnea     CPAP   • Testosterone deficiency 7/11/2016        Past Surgical History:   Procedure Laterality Date   • ANKLE ARTHROSCOPY Right 1/26/2018    Procedure: RT ANKLE ARTHROSCOPY SUBCHONDROPLASTY TALUS ;  Surgeon: Lebron Spivey Jr., MD;  [place] [facility] OR [facility] [place];  Service:    • ANKLE LIGAMENT RECONSTRUCTION Right 1/26/2018    Procedure: HARDWARE REMOVAL POSS LATERAL LIGAMENT RECONSTRUCTION ;  Surgeon: Lebron Spivey Jr., MD;  [place] [facility] OR [facility] [place];  Service:    • CARPAL TUNNEL RELEASE Right    • CUBITAL TUNNEL RELEASE Right     NERVE RELEASE   • EAR BIOPSY Right     MYRINGOTOMY W/BONES REPLACED INNER EAR   • KNEE SURGERY Right     MENISCUS REPAIR   • NOSE SURGERY     • ORIF FIBULA FRACTURE Right 05/17/2017    ANKLE INCLUDED        Social History     Occupational History   • Not on file   Tobacco Use   • Smoking status: Former Smoker     Packs/day: 2.00     Years: 31.00     Pack years: 62.00     Types: Cigarettes     Last attempt to quit: 2004     Years since quitting: 15.9   • Smokeless tobacco: Never Used   • Tobacco comment: Quit 2004   Substance and Sexual Activity   • Alcohol use: Yes     Comment: ONCE OR TWICE MONTHLY   • Drug use: No   • Sexual activity: Defer      Social History     Social History Narrative   • Not on file        Family History   Problem Relation Age of Onset   • Lung cancer  "Mother    • Lung cancer Father    • Mabel Hyperthermia Neg Hx        ROS: 14 point review of systems was performed and was negative except for documented findings in HPI and today's encounter.     Allergies: No Known Allergies  Constitutional:  Denies fever, shaking or chills   Eyes:  Denies change in visual acuity   HENT:  Denies nasal congestion or sore throat   Respiratory:  Denies cough or shortness of breath   Cardiovascular:  Denies chest pain or severe LE edema   GI:  Denies abdominal pain, nausea, vomiting, bloody stools or diarrhea   Musculoskeletal:  Numbness, tingling, or loss of motor function only as noted above in history of present illness.  : Denies painful urination or hematuria  Integument:  Denies rash, lesion or ulceration   Neurologic:  Denies headache or focal weakness  Endocrine:  Denies lymphadenopathy  Psych:  Denies confusion or change in mental status   Hem:  Denies active bleeding    Physical Exam: 44 y.o. male  Wt Readings from Last 3 Encounters:   12/04/19 110 kg (243 lb 9.6 oz)   12/04/19 110 kg (243 lb)   11/27/19 112 kg (246 lb 3.2 oz)     Ht Readings from Last 1 Encounters:   12/04/19 185.4 cm (73\")     Body mass index is 32.14 kg/m².  Vitals:    12/04/19 1251   Temp: 98.6 °F (37 °C)     Vital signs reviewed.   Constitutional: Awake alert and oriented x3, well developed, no acute distress, non-toxic appearance.  EYES: symmetric, sclera clear  ENT:  Normocephalic, Atraumatic.   Respiratory:  No respiratory distress, No wheezing  CV: pulse regular, no palpitations or pallor.  GI:  Abdomen soft, non-tender.   Vascular:  Intact distal pulses, No cyanosis, no signs or symptoms of DVT.  Neurologic: Sensation grossly intact to the involved extremity, No focal deficits noted.   Neck: No tenderness, Supple.  Integument: warm, dry, no ulcerations.   Psychiatric:  Oriented, no pathological affect.  Musculoskeletal:    Affected knee(s):  Painful gait with a subtle limp, positive for " synovitis, swelling, joint effusion with crepitation.  Lachman negative  Posterior drawer negative  Paulo's negative  Patellofemoral grind +  Sensation grossly intact to light touch throughout the lower extremity  Skin is intact  Distal pulses are palpable  No signs or symptoms of DVT        Diagnostic Data:     Imaging was done today, images were personally viewed and discussed with the patient:    Indication: pain related symptoms,  Views: 3V AP, LAT & 40 degree PA bilateral knee(s)   Findings: severe end-stage arthritis (bone on bone, subchondral sclerosis/cysts, osteophytes)  Comparison views: viewed last xray done in the office.     Procedure:  Large Joint Arthrocentesis: R knee  Date/Time: 12/4/2019 1:07 PM  Consent given by: patient  Site marked: site marked  Timeout: Immediately prior to procedure a time out was called to verify the correct patient, procedure, equipment, support staff and site/side marked as required   Supporting Documentation  Indications: pain and joint swelling   Procedure Details  [place] knee - R knee  Preparation: Patient was prepped and draped in the usual sterile fashion  Needle size: 22 G  Approach: anterolateral  Medications administered: 80 mg methylPREDNISolone acetate 80 MG/ML; 4 mL lidocaine (cardiac)  Patient tolerance: patient tolerated the procedure well with no immediate complications    Large Joint Arthrocentesis: L knee  Date/Time: 12/4/2019 1:07 PM  Consent given by: patient  Site marked: site marked  Timeout: Immediately prior to procedure a time out was called to verify the correct patient, procedure, equipment, support staff and site/side marked as required   Supporting Documentation  Indications: pain and joint swelling   Procedure Details  [place] knee - L knee  Preparation: Patient was prepped and draped in the usual sterile fashion  Needle size: 22 G  Approach: anterolateral  Medications administered: 80 mg methylPREDNISolone acetate 80 MG/ML; 4 mL  lidocaine (cardiac)  Patient tolerance: patient tolerated the procedure well with no immediate complications          Assessment:     ICD-10-CM ICD-9-CM   1. Arthritis of both knees M17.0 716.96           Plan: Is to proceed with injection  Follow up as indicated.  Ice, elevate, and rest as needed.  Additional interventions include:  15 min spent face to face with patient 11 min spent counseling about:  Biomechanics of pertinent body area discussed.  Risks, benefits, alternatives, comparisons, and complications of accepted medicines, injections, recommendations, surgical procedures, and therapies explained and education provided in laymen's terms. Natural history and expected course of this patient's diagnosis discussed along with evaluation of therapies. Questions answered. When appropriate I also discussed proper use of cane, walker, trekking poles.   BMI:  The concept of BMI body mass index and its importance and implications discussed.  BMI suggested to be < 40 or as low as possible. Lifestyle measures for weight loss and how this affects orthopedic condition.  EXERCISES:  Advice on benefits of, and types of regular/moderate exercise including biomechanical forces involved as it pertains to this complaint.  RICE: Rest, ice, compression, and elevation therapy, Cryotherapy/brachy therapy, and or OTC linaments as indicated with instructions.   Cortisone Injection. See procedure note.  He is doing the knee exercises. He is weaning from gabapentin and tramadol with cymbalta by his PCP and is happy about this.   12/4/2019  Mariposa Henley, APRN

## 2019-12-09 DIAGNOSIS — M17.0 ARTHRITIS OF BOTH KNEES: ICD-10-CM

## 2019-12-09 RX ORDER — ETODOLAC 500 MG/1
TABLET, FILM COATED ORAL
Qty: 180 TABLET | Refills: 3 | Status: SHIPPED | OUTPATIENT
Start: 2019-12-09 | End: 2020-06-08 | Stop reason: SDUPTHER

## 2019-12-09 RX ORDER — SILDENAFIL 100 MG/1
TABLET, FILM COATED ORAL
Qty: 6 TABLET | Refills: 3 | Status: SHIPPED | OUTPATIENT
Start: 2019-12-09 | End: 2020-04-06

## 2020-01-27 ENCOUNTER — OFFICE VISIT (OUTPATIENT)
Dept: FAMILY MEDICINE CLINIC | Facility: CLINIC | Age: 45
End: 2020-01-27

## 2020-01-27 VITALS
TEMPERATURE: 98 F | WEIGHT: 257.6 LBS | RESPIRATION RATE: 18 BRPM | HEART RATE: 98 BPM | OXYGEN SATURATION: 99 % | HEIGHT: 73 IN | SYSTOLIC BLOOD PRESSURE: 140 MMHG | BODY MASS INDEX: 34.14 KG/M2 | DIASTOLIC BLOOD PRESSURE: 92 MMHG

## 2020-01-27 DIAGNOSIS — J02.9 SORE THROAT: ICD-10-CM

## 2020-01-27 DIAGNOSIS — R05.9 COUGH: Primary | ICD-10-CM

## 2020-01-27 DIAGNOSIS — J30.9 ALLERGIC RHINITIS, UNSPECIFIED SEASONALITY, UNSPECIFIED TRIGGER: ICD-10-CM

## 2020-01-27 LAB
EXPIRATION DATE: NORMAL
FLUAV AG NPH QL: NEGATIVE
FLUBV AG NPH QL: NEGATIVE
INTERNAL CONTROL: NORMAL
Lab: NORMAL

## 2020-01-27 PROCEDURE — 87804 INFLUENZA ASSAY W/OPTIC: CPT | Performed by: NURSE PRACTITIONER

## 2020-01-27 PROCEDURE — 99213 OFFICE O/P EST LOW 20 MIN: CPT | Performed by: NURSE PRACTITIONER

## 2020-01-27 NOTE — PROGRESS NOTES
"Subjective   Arelis Munson is a 45 y.o. male.     Chief Complaint   Patient presents with   • URI   • Sore Throat     URI    This is a new problem. The current episode started in the past 7 days (started 2 days ago). The problem has been waxing and waning. There has been no fever. Associated symptoms include congestion, coughing, a plugged ear sensation, rhinorrhea and a sore throat. Pertinent negatives include no headaches or wheezing. He has tried antihistamine and decongestant for the symptoms. The treatment provided mild relief.        The following portions of the patient's history were reviewed and updated as appropriate: allergies, current medications, past family history, past medical history, past social history, past surgical history and problem list.      Current Outpatient Medications:   •  Ascorbic Acid (VITAMIN C PO), Take  by mouth., Disp: , Rfl:   •  aspirin 81 MG EC tablet, Take 81 mg by mouth Daily., Disp: , Rfl:   •  B Complex Vitamins (VITAMIN B COMPLEX PO), Take  by mouth Daily. HOLD PRIOR TO SURGERY, Disp: , Rfl:   •  B-D 3CC LUER-PUMA SYR 22GX1\" 22G X 1\" 3 ML misc, , Disp: , Rfl:   •  budesonide (PULMICORT FLEXHALER) 180 MCG/ACT inhaler, Use 1 or 2 inhalations each AM. Rinse and spit after use., Disp: 2 inhaler, Rfl: 2  •  Cholecalciferol (VITAMIN D3) 5000 UNITS capsule capsule, Take 5,000 Units by mouth Daily. HOLD PRIOR TO SURGERY, Disp: , Rfl:   •  cromolyn (OPTICROM) 4 % ophthalmic solution, Administer 1 drop to both eyes 4 (Four) Times a Day., Disp: 10 mL, Rfl: 12  •  desonide (DESOWEN) 0.05 % cream, Apply  topically to the appropriate area as directed 3 (Three) Times a Day., Disp: 60 g, Rfl: 5  •  Dextromethorphan-guaiFENesin (MUCINEX DM PO), Take  by mouth., Disp: , Rfl:   •  diclofenac (VOLTAREN) 1 % gel gel, APPLY 4 GRAMS TO AFFECTED AREA(S) FOUR TIMES A DAY AS NEEDED FOR INFLAMMATION, Disp: 4 tube, Rfl: 3  •  docusate sodium (COLACE) 250 MG capsule, Take 250 mg by mouth Daily., " Disp: , Rfl:   •  DULoxetine (CYMBALTA) 60 MG capsule, Take 1 capsule by mouth 2 (Two) Times a Day., Disp: 180 capsule, Rfl: 3  •  esomeprazole (nexIUM) 40 MG capsule, Take 1 capsule by mouth Every Night., Disp: 90 capsule, Rfl: 3  •  etodolac (LODINE) 500 MG tablet, TAKE ONE TABLET BY MOUTH TWICE A DAY, Disp: 180 tablet, Rfl: 3  •  ezetimibe-simvastatin (VYTORIN) 10-40 MG per tablet, Take 1 tablet by mouth Every Night., Disp: 90 tablet, Rfl: 3  •  FIBER PO, Take  by mouth., Disp: , Rfl:   •  FLONASE SENSIMIST 27.5 MCG/SPRAY nasal spray, USE ONE SPRAY IN EACH NOSTRIL ONCE DAILY, Disp: 9.1 mL, Rfl: 12  •  gabapentin (NEURONTIN) 600 MG tablet, Take 1 tablet by mouth 3 (Three) Times a Day., Disp: 270 tablet, Rfl: 1  •  ketoconazole (NIZORAL) 2 % cream, Apply  topically to the appropriate area as directed Daily., Disp: 60 g, Rfl: 5  •  ketoconazole (NIZORAL) 2 % shampoo, Apply  topically to the appropriate area as directed 2 (Two) Times a Week., Disp: , Rfl:   •  melatonin 1 MG tablet, Take 3 mg by mouth Every Night. HOLD PRIOR TO SURGERY, Disp: , Rfl:   •  montelukast (SINGULAIR) 10 MG tablet, Take 1 tablet by mouth Every Night., Disp: 90 tablet, Rfl: 3  •  Multiple Vitamin (MULTI VITAMIN DAILY PO), Take  by mouth daily., Disp: , Rfl:   •  olopatadine (PATANASE) 0.6 % solution nasal solution, 2 sprays by Each Nare route 2 (Two) Times a Day., Disp: 91.5 g, Rfl: 3  •  PROVENTIL  (90 BASE) MCG/ACT inhaler, Inhale 2 puffs every 6 (six) hours as needed., Disp: , Rfl:   •  RESTASIS 0.05 % ophthalmic emulsion, , Disp: , Rfl:   •  rizatriptan MLT (MAXALT-MLT) 10 MG disintegrating tablet, DISSOLVE ONE TABLET BY MOUTH AS NEEDED FOR MIGRAINE; MAY REPEAT ONE TABLET IN 2 HOURS IF NEEDED., Disp: 27 tablet, Rfl: 2  •  SALINE NASAL SPRAY NA, into each nostril., Disp: , Rfl:   •  selenium sulfide (SELSUN) 2.5 % shampoo, Apply  topically Daily As Needed for dandruff., Disp: 354 mL, Rfl: 3  •  sildenafil (VIAGRA) 100 MG tablet,  "TAKE ONE TABLET BY MOUTH AS NEEDED FOR ERECTILE DYSFUNCTION, Disp: 6 tablet, Rfl: 3  •  Testosterone Cypionate (DEPOTESTOTERONE CYPIONATE) 200 MG/ML injection, Inject  into the appropriate muscle as directed by prescriber Every 14 (Fourteen) Days., Disp: , Rfl:   •  zolpidem CR (AMBIEN CR) 12.5 MG CR tablet, Take 1 tablet by mouth at bedtime when working 1st shift., Disp: 90 tablet, Rfl: 0  •  Potassium 99 MG tablet, Take  by mouth., Disp: , Rfl:   •  Unable to find, 1 each 1 (One) Time. Apple cidaer vinegar 500mg, q AM, Disp: , Rfl:   •  Unable to find, 1 each 1 (One) Time. [employer] allergy relief, q AM, Disp: , Rfl:      Review of Systems   Constitutional: Negative for chills and fever.   HENT: Positive for congestion, postnasal drip, rhinorrhea and sore throat. Negative for sinus pressure.    Respiratory: Positive for cough. Negative for shortness of breath and wheezing.    Cardiovascular: Negative.    Neurological: Negative.        Objective    Vitals:    01/27/20 1531   BP: 140/92   BP [place] Left arm   Patient Position: Sitting   Cuff Size: Large Adult   Pulse: 98   Resp: 18   Temp: 98 °F (36.7 °C)   TempSrc: Oral   SpO2: 99%   Weight: 117 kg (257 lb 9.6 oz)   Height: 185.4 cm (73\")     Physical Exam   Constitutional: He is oriented to person, place, and time. He appears well-developed and well-nourished. He appears ill.   HENT:   Right Ear: A middle ear effusion is present.   Left Ear: A middle ear effusion is present.   Nose: Mucosal edema, rhinorrhea and congestion present.   Mouth/Throat: Uvula is midline. No posterior oropharyngeal erythema.   Cardiovascular: Normal rate, regular rhythm and normal heart sounds.   Pulmonary/Chest: Breath sounds normal.   Neurological: He is alert and oriented to person, place, and time.   Skin: Skin is warm and dry.   Psychiatric:   No acute distress   Vitals reviewed.        Assessment/Plan   Arelis was seen today for uri and sore throat.    Diagnoses and all orders for " this visit:    Cough  -     POC Influenza A / B    Sore throat  -     POC Influenza A / B    Allergic rhinitis, unspecified seasonality, unspecified trigger      Mr. Munson is negative for the flu.    You have been diagnosed with allergic rhinitis. Symptomatic treatment is best.  You may take Mucinex D for relieving congestion and cough.  If you have high blood pressure, do not take Mucinex D, instead opting for plain Mucinex and Coricidin HBP. Oral antihistamine, such as Allegra, Zyrtec or Claritin may help reduce ear pressure and relieve some nasal symptoms.  A saline nasal spray may be used to keep nose clear from discharge.  Be sure that you are increasing your intake of clear to decaffeinated fluids and get plenty of rest.  If your symptoms worsen or persist follow up as needed.

## 2020-01-27 NOTE — PATIENT INSTRUCTIONS
You have been diagnosed with allergic rhinitis. Symptomatic treatment is best.  You may take Mucinex D for relieving congestion and cough.  If you have high blood pressure, do not take Mucinex D, instead opting for plain Mucinex and Coricidin HBP. Oral antihistamine, such as Xyzal,  Allegra, Zyrtec or Claritin may help reduce ear pressure and relieve some nasal symptoms.  A saline nasal spray may be used to keep nose clear from discharge.  Be sure that you are increasing your intake of clear to decaffeinated fluids and get plenty of rest.  If your symptoms worsen or persist follow up as needed.

## 2020-02-13 DIAGNOSIS — G89.29 BILATERAL CHRONIC KNEE PAIN: ICD-10-CM

## 2020-02-13 DIAGNOSIS — M25.562 BILATERAL CHRONIC KNEE PAIN: ICD-10-CM

## 2020-02-13 DIAGNOSIS — M25.561 BILATERAL CHRONIC KNEE PAIN: ICD-10-CM

## 2020-02-13 RX ORDER — DULOXETIN HYDROCHLORIDE 30 MG/1
CAPSULE, DELAYED RELEASE ORAL
Qty: 30 CAPSULE | Refills: 6 | OUTPATIENT
Start: 2020-02-13

## 2020-03-18 ENCOUNTER — CLINICAL SUPPORT (OUTPATIENT)
Dept: ORTHOPEDIC SURGERY | Facility: CLINIC | Age: 45
End: 2020-03-18

## 2020-03-18 VITALS — BODY MASS INDEX: 32.87 KG/M2 | TEMPERATURE: 97.4 F | WEIGHT: 248 LBS | HEIGHT: 73 IN

## 2020-03-18 DIAGNOSIS — M17.0 ARTHRITIS OF BOTH KNEES: Primary | ICD-10-CM

## 2020-03-18 PROCEDURE — 99213 OFFICE O/P EST LOW 20 MIN: CPT | Performed by: NURSE PRACTITIONER

## 2020-03-18 PROCEDURE — 20610 DRAIN/INJ JOINT/BURSA W/O US: CPT | Performed by: NURSE PRACTITIONER

## 2020-03-18 RX ORDER — METHYLPREDNISOLONE ACETATE 80 MG/ML
80 INJECTION, SUSPENSION INTRA-ARTICULAR; INTRALESIONAL; INTRAMUSCULAR; SOFT TISSUE
Status: COMPLETED | OUTPATIENT
Start: 2020-03-18 | End: 2020-03-18

## 2020-03-18 RX ORDER — TETRACYCLINE HCL 500 MG
CAPSULE ORAL
COMMUNITY
End: 2021-06-16

## 2020-03-18 RX ADMIN — METHYLPREDNISOLONE ACETATE 80 MG: 80 INJECTION, SUSPENSION INTRA-ARTICULAR; INTRALESIONAL; INTRAMUSCULAR; SOFT TISSUE at 07:51

## 2020-03-18 RX ADMIN — METHYLPREDNISOLONE ACETATE 80 MG: 80 INJECTION, SUSPENSION INTRA-ARTICULAR; INTRALESIONAL; INTRAMUSCULAR; SOFT TISSUE at 07:50

## 2020-03-18 NOTE — PROGRESS NOTES
"  Patient: Arelis Munson  YOB: 1975    Chief Complaints:  bilateral knee pain    Subjective:    History of Present Illness: Here today for knee pain. The pain is a generalized joint tenderness.  It has been progressive in nature but remains intermittent.  Worsened by prolonged standing or walking and squatting activities. Has had improvement in the past with ice/heat, rest, and injections.     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     Allergies: No Known Allergies    Medications:   Home Medications:  Current Outpatient Medications on File Prior to Visit   Medication Sig   • Apple Cider Vinegar 500 MG tablet Take  by mouth.   • Ascorbic Acid (VITAMIN C PO) Take  by mouth.   • aspirin 81 MG EC tablet Take 81 mg by mouth Daily.   • B Complex Vitamins (VITAMIN B COMPLEX PO) Take  by mouth Daily. HOLD PRIOR TO SURGERY   • B-D 3CC LUER-PUMA SYR 22GX1\" 22G X 1\" 3 ML misc    • budesonide (PULMICORT FLEXHALER) 180 MCG/ACT inhaler Use 1 or 2 inhalations each AM. Rinse and spit after use.   • Cholecalciferol (VITAMIN D3) 5000 UNITS capsule capsule Take 5,000 Units by mouth Daily. HOLD PRIOR TO SURGERY   • cromolyn (OPTICROM) 4 % ophthalmic solution Administer 1 drop to both eyes 4 (Four) Times a Day.   • desonide (DESOWEN) 0.05 % cream Apply  topically to the appropriate area as directed 3 (Three) Times a Day.   • Dextromethorphan-guaiFENesin (MUCINEX DM PO) Take  by mouth.   • diclofenac (VOLTAREN) 1 % gel gel APPLY 4 GRAMS TO AFFECTED AREA(S) FOUR TIMES A DAY AS NEEDED FOR INFLAMMATION   • docusate sodium (COLACE) 250 MG capsule Take 250 mg by mouth Daily.   • DULoxetine (CYMBALTA) 60 MG capsule Take 1 capsule by mouth 2 (Two) Times a Day.   • esomeprazole (nexIUM) 40 MG capsule Take 1 capsule by mouth Every Night.   • etodolac (LODINE) 500 MG tablet TAKE ONE TABLET BY MOUTH TWICE A DAY   • ezetimibe-simvastatin (VYTORIN) 10-40 MG per tablet Take 1 tablet by mouth Every Night.   • FIBER PO Take  by mouth. " Patient presents to the clinic for a hospital follow up. Patient was seen at [facility] [facility]    2. Have you seen or consulted any other health care providers outside of the [facility] since your last visit? Include any pap smears or colon screening.  no   • FLONASE SENSIMIST 27.5 MCG/SPRAY nasal spray USE ONE SPRAY IN EACH NOSTRIL ONCE DAILY   • gabapentin (NEURONTIN) 600 MG tablet Take 1 tablet by mouth 3 (Three) Times a Day.   • ketoconazole (NIZORAL) 2 % cream Apply  topically to the appropriate area as directed Daily.   • ketoconazole (NIZORAL) 2 % shampoo Apply  topically to the appropriate area as directed 2 (Two) Times a Week.   • melatonin 1 MG tablet Take 3 mg by mouth Every Night. HOLD PRIOR TO SURGERY   • montelukast (SINGULAIR) 10 MG tablet Take 1 tablet by mouth Every Night.   • Multiple Vitamin (MULTI VITAMIN DAILY PO) Take  by mouth daily.   • olopatadine (PATANASE) 0.6 % solution nasal solution 2 sprays by Each Nare route 2 (Two) Times a Day.   • Potassium 99 MG tablet Take  by mouth.   • PROVENTIL  (90 BASE) MCG/ACT inhaler Inhale 2 puffs every 6 (six) hours as needed.   • RESTASIS 0.05 % ophthalmic emulsion    • rizatriptan MLT (MAXALT-MLT) 10 MG disintegrating tablet DISSOLVE ONE TABLET BY MOUTH AS NEEDED FOR MIGRAINE; MAY REPEAT ONE TABLET IN 2 HOURS IF NEEDED.   • SALINE NASAL SPRAY NA into each nostril.   • selenium sulfide (SELSUN) 2.5 % shampoo Apply  topically Daily As Needed for dandruff.   • sildenafil (VIAGRA) 100 MG tablet TAKE ONE TABLET BY MOUTH AS NEEDED FOR ERECTILE DYSFUNCTION   • Testosterone Cypionate (DEPOTESTOTERONE CYPIONATE) 200 MG/ML injection Inject  into the appropriate muscle as directed by prescriber Every 14 (Fourteen) Days.   • Unable to find 1 each 1 (One) Time. Apple cidaer vinegar 500mg, q AM   • Unable to find 1 each 1 (One) Time. [employer] allergy relief, q AM   • zolpidem CR (AMBIEN CR) 12.5 MG CR tablet Take 1 tablet by mouth at bedtime when working 1st shift.     No current facility-administered medications on file prior to visit.      Current Medications:  Scheduled Meds:  Continuous Infusions:  No current facility-administered medications for this visit.   PRN Meds:.    I have reviewed the patient's  medical history in detail and updated the computerized patient record.  Review and summarization of old records include:    Past Medical History:   Diagnosis Date   • Arthritis     OSTEO   • Asthma    • ED (erectile dysfunction)    • Elevated cholesterol    • Environmental allergies    • Fracture, fibula     RIGHT AND ANKLE   • Gastroesophageal reflux disease without esophagitis 2016   • Migraine with aura and without status migrainosus, not intractable 2016   • Pain and swelling of toe of right foot    • Seasonal allergies    • Sleep apnea     CPAP   • Testosterone deficiency 2016        Past Surgical History:   Procedure Laterality Date   • ANKLE ARTHROSCOPY Right 2018    Procedure: RT ANKLE ARTHROSCOPY SUBCHONDROPLASTY TALUS ;  Surgeon: Lebron Spivey Jr., MD;  [place] [facility] [place] [facility] [place]:    • ANKLE LIGAMENT RECONSTRUCTION Right 2018    Procedure: HARDWARE REMOVAL POSS LATERAL LIGAMENT RECONSTRUCTION ;  Surgeon: Lebron Spivey Jr., MD;  [place] [facility] [place] [facility] [place]:    • CARPAL TUNNEL RELEASE Right    • CUBITAL TUNNEL RELEASE Right     NERVE RELEASE   • EAR BIOPSY Right     MYRINGOTOMY W/BONES REPLACED INNER EAR   • KNEE SURGERY Right     MENISCUS REPAIR   • NOSE SURGERY     • ORIF FIBULA FRACTURE Right 2017    ANKLE INCLUDED        Social History     Occupational History   • Not on file   Tobacco Use   • Smoking status: Former Smoker     Packs/day: 2.00     Years: 31.00     Pack years: 62.00     Types: Cigarettes     Last attempt to quit: 2004     Years since quittin.2   • Smokeless tobacco: Never Used   • Tobacco comment: Quit    Substance and Sexual Activity   • Alcohol use: Yes     Comment: ONCE OR TWICE MONTHLY   • Drug use: No   • Sexual activity: Defer      Social History     Social History Narrative   • Not on file        Family History   Problem Relation Age of Onset   • Lung cancer Mother    • Lung cancer Father    • Malig Hyperthermia Neg Hx        ROS:  "14 point review of systems was performed and was negative except for documented findings in HPI and today's encounter.     Allergies: No Known Allergies  Constitutional:  Denies fever, shaking or chills   Eyes:  Denies change in visual acuity   HENT:  Denies nasal congestion or sore throat   Respiratory:  Denies cough or shortness of breath   Cardiovascular:  Denies chest pain or severe LE edema   GI:  Denies abdominal pain, nausea, vomiting, bloody stools or diarrhea   Musculoskeletal:  Numbness, tingling, or loss of motor function only as noted above in history of present illness.  : Denies painful urination or hematuria  Integument:  Denies rash, lesion or ulceration   Neurologic:  Denies headache or focal weakness  Endocrine:  Denies lymphadenopathy  Psych:  Denies confusion or change in mental status   Hem:  Denies active bleeding    Physical Exam: 45 y.o. male  Wt Readings from Last 3 Encounters:   03/18/20 112 kg (248 lb)   01/27/20 117 kg (257 lb 9.6 oz)   12/04/19 110 kg (243 lb 9.6 oz)     Ht Readings from Last 1 Encounters:   03/18/20 185.4 cm (73\")     Body mass index is 32.72 kg/m².  Vitals:    03/18/20 1032   Temp: 97.4 °F (36.3 °C)     Vital signs reviewed.   Constitutional: Awake alert and oriented x3, well developed, no acute distress, non-toxic appearance.  EYES: symmetric, sclera clear  ENT:  Normocephalic, Atraumatic.   Respiratory:  No respiratory distress, No wheezing  CV: pulse regular, no palpitations or pallor.  GI:  Abdomen soft, non-tender.   Vascular:  Intact distal pulses, No cyanosis, no signs or symptoms of DVT.  Neurologic: Sensation grossly intact to the involved extremity, No focal deficits noted.   Neck: No tenderness, Supple.  Integument: warm, dry, no ulcerations.   Psychiatric:  Oriented, no pathological affect.  Musculoskeletal:    Affected knee(s):  Painful gait with a subtle limp, positive for synovitis, swelling, joint effusion with crepitation.  Lachman " negative  Posterior drawer negative  Paulo's negative  Patellofemoral grind +  Sensation grossly intact to light touch throughout the lower extremity  Skin is intact  Distal pulses are palpable  No signs or symptoms of DVT        Diagnostic Data:     Imaging was done previously in the office, images were personally viewed and discussed with the patient:    Indication: pain related symptoms,  Views: 3V AP, LAT & 40 degree PA bilateral knee(s)   Findings: severe end-stage arthritis (bone on bone, subchondral sclerosis/cysts, osteophytes)  Comparison views: viewed last xray done in the office.     Procedure:  Large Joint Arthrocentesis: R knee  Date/Time: 3/18/2020 7:50 AM  Consent given by: patient  Site marked: site marked  Timeout: Immediately prior to procedure a time out was called to verify the correct patient, procedure, equipment, support staff and site/side marked as required   Supporting Documentation  Indications: pain   Procedure Details  [place] knee - R knee  Preparation: Patient was prepped and draped in the usual sterile fashion  Needle gauge: 21.  Approach: anterolateral  Medications administered: 4 mL lidocaine (cardiac); 80 mg methylPREDNISolone acetate 80 MG/ML  Patient tolerance: patient tolerated the procedure well with no immediate complications    Large Joint Arthrocentesis: L knee  Date/Time: 3/18/2020 7:51 AM  Consent given by: patient  Site marked: site marked  Timeout: Immediately prior to procedure a time out was called to verify the correct patient, procedure, equipment, support staff and site/side marked as required   Supporting Documentation  Indications: pain   Procedure Details  [place] knee - L knee  Preparation: Patient was prepped and draped in the usual sterile fashion  Needle gauge: 21.  Approach: anterolateral  Medications administered: 4 mL lidocaine (cardiac); 80 mg methylPREDNISolone acetate 80 MG/ML  Patient tolerance: patient tolerated the procedure well with no  immediate complications          Assessment:     ICD-10-CM ICD-9-CM   1. Arthritis of both knees M17.0 716.96           Plan: Is to proceed with injection  Follow up as indicated.  Ice, elevate, and rest as needed.  Additional interventions include:  15 min spent face to face with patient 11 min spent counseling about:  Biomechanics of pertinent body area discussed.  Risks, benefits, alternatives, comparisons, and complications of accepted medicines, injections, recommendations, surgical procedures, and therapies explained and education provided in laymen's terms. Natural history and expected course of this patient's diagnosis discussed along with evaluation of therapies. Questions answered. When appropriate I also discussed proper use of cane, walker, trekking poles.   BMI:  The concept of BMI body mass index and its importance and implications discussed.  BMI suggested to be < 40 or as low as possible. Lifestyle measures for weight loss and how this affects orthopedic condition.  EXERCISES:  Advice on benefits of, and types of regular/moderate exercise including biomechanical forces involved as it pertains to this complaint.  RICE: Rest, ice, compression, and elevation therapy, Cryotherapy/brachy therapy, and or OTC linaments as indicated with instructions.   Cortisone Injection. See procedure note.  He is doing well with regular injections and wants to continue.   3/18/2020  Mariposa Henley, APRN

## 2020-03-25 ENCOUNTER — TELEMEDICINE (OUTPATIENT)
Dept: FAMILY MEDICINE CLINIC | Facility: CLINIC | Age: 45
End: 2020-03-25

## 2020-03-25 ENCOUNTER — TELEPHONE (OUTPATIENT)
Dept: FAMILY MEDICINE CLINIC | Facility: CLINIC | Age: 45
End: 2020-03-25

## 2020-03-25 DIAGNOSIS — J01.40 ACUTE NON-RECURRENT PANSINUSITIS: Primary | ICD-10-CM

## 2020-03-25 PROCEDURE — 99213 OFFICE O/P EST LOW 20 MIN: CPT | Performed by: NURSE PRACTITIONER

## 2020-03-25 RX ORDER — BROMPHENIRAMINE MALEATE, PSEUDOEPHEDRINE HYDROCHLORIDE, AND DEXTROMETHORPHAN HYDROBROMIDE 2; 30; 10 MG/5ML; MG/5ML; MG/5ML
10 SYRUP ORAL 4 TIMES DAILY PRN
Qty: 473 ML | Refills: 1 | Status: SHIPPED | OUTPATIENT
Start: 2020-03-25 | End: 2020-08-31 | Stop reason: SDUPTHER

## 2020-03-25 RX ORDER — AMOXICILLIN AND CLAVULANATE POTASSIUM 875; 125 MG/1; MG/1
1 TABLET, FILM COATED ORAL EVERY 12 HOURS SCHEDULED
Qty: 14 TABLET | Refills: 0 | Status: SHIPPED | OUTPATIENT
Start: 2020-03-25 | End: 2020-04-01

## 2020-03-25 NOTE — PROGRESS NOTES
"Subjective   Arelis Munson is a 45 y.o. male.     Mr. Munson called today with complaints of sinus pain, sore throat and cough that has been going on for the past few days. His cough is worse when he lies down and get up in the morning. He reports it is productive and the sputum is yellow. He denies fever or chills and he does have some body aches.      I have reviewed the patient's medical history in detail and updated the computerized patient record.    The following portions of the patient's history were reviewed and updated as appropriate: allergies, current medications, past family history, past medical history, past social history, past surgical history and problem list.      Current Outpatient Medications:   •  amoxicillin-clavulanate (Augmentin) 875-125 MG per tablet, Take 1 tablet by mouth Every 12 (Twelve) Hours for 7 days., Disp: 14 tablet, Rfl: 0  •  Apple Cider Vinegar 500 MG tablet, Take  by mouth., Disp: , Rfl:   •  Ascorbic Acid (VITAMIN C PO), Take  by mouth., Disp: , Rfl:   •  aspirin 81 MG EC tablet, Take 81 mg by mouth Daily., Disp: , Rfl:   •  B Complex Vitamins (VITAMIN B COMPLEX PO), Take  by mouth Daily. HOLD PRIOR TO SURGERY, Disp: , Rfl:   •  B-D 3CC LUER-PUMA SYR 22GX1\" 22G X 1\" 3 ML misc, , Disp: , Rfl:   •  brompheniramine-pseudoephedrine-DM 30-2-10 MG/5ML syrup, Take 10 mL by mouth 4 (Four) Times a Day As Needed for Congestion, Cough or Allergies., Disp: 473 mL, Rfl: 1  •  budesonide (PULMICORT FLEXHALER) 180 MCG/ACT inhaler, Use 1 or 2 inhalations each AM. Rinse and spit after use., Disp: 2 inhaler, Rfl: 2  •  Cholecalciferol (VITAMIN D3) 5000 UNITS capsule capsule, Take 5,000 Units by mouth Daily. HOLD PRIOR TO SURGERY, Disp: , Rfl:   •  cromolyn (OPTICROM) 4 % ophthalmic solution, Administer 1 drop to both eyes 4 (Four) Times a Day., Disp: 10 mL, Rfl: 12  •  desonide (DESOWEN) 0.05 % cream, Apply  topically to the appropriate area as directed 3 (Three) Times a Day., Disp: 60 g, " Rfl: 5  •  Dextromethorphan-guaiFENesin (MUCINEX DM PO), Take  by mouth., Disp: , Rfl:   •  diclofenac (VOLTAREN) 1 % gel gel, APPLY 4 GRAMS TO AFFECTED AREA(S) FOUR TIMES A DAY AS NEEDED FOR INFLAMMATION, Disp: 4 tube, Rfl: 3  •  docusate sodium (COLACE) 250 MG capsule, Take 250 mg by mouth Daily., Disp: , Rfl:   •  DULoxetine (CYMBALTA) 60 MG capsule, Take 1 capsule by mouth 2 (Two) Times a Day., Disp: 180 capsule, Rfl: 3  •  esomeprazole (nexIUM) 40 MG capsule, Take 1 capsule by mouth Every Night., Disp: 90 capsule, Rfl: 3  •  etodolac (LODINE) 500 MG tablet, TAKE ONE TABLET BY MOUTH TWICE A DAY, Disp: 180 tablet, Rfl: 3  •  ezetimibe-simvastatin (VYTORIN) 10-40 MG per tablet, Take 1 tablet by mouth Every Night., Disp: 90 tablet, Rfl: 3  •  FIBER PO, Take  by mouth., Disp: , Rfl:   •  FLONASE SENSIMIST 27.5 MCG/SPRAY nasal spray, USE ONE SPRAY IN EACH NOSTRIL ONCE DAILY, Disp: 9.1 mL, Rfl: 12  •  gabapentin (NEURONTIN) 600 MG tablet, Take 1 tablet by mouth 3 (Three) Times a Day., Disp: 270 tablet, Rfl: 1  •  ketoconazole (NIZORAL) 2 % cream, Apply  topically to the appropriate area as directed Daily., Disp: 60 g, Rfl: 5  •  ketoconazole (NIZORAL) 2 % shampoo, Apply  topically to the appropriate area as directed 2 (Two) Times a Week., Disp: , Rfl:   •  melatonin 1 MG tablet, Take 3 mg by mouth Every Night. HOLD PRIOR TO SURGERY, Disp: , Rfl:   •  montelukast (SINGULAIR) 10 MG tablet, Take 1 tablet by mouth Every Night., Disp: 90 tablet, Rfl: 3  •  Multiple Vitamin (MULTI VITAMIN DAILY PO), Take  by mouth daily., Disp: , Rfl:   •  olopatadine (PATANASE) 0.6 % solution nasal solution, 2 sprays by Each Nare route 2 (Two) Times a Day., Disp: 91.5 g, Rfl: 3  •  Potassium 99 MG tablet, Take  by mouth., Disp: , Rfl:   •  PROVENTIL  (90 BASE) MCG/ACT inhaler, Inhale 2 puffs every 6 (six) hours as needed., Disp: , Rfl:   •  RESTASIS 0.05 % ophthalmic emulsion, , Disp: , Rfl:   •  rizatriptan MLT (MAXALT-MLT) 10 MG  disintegrating tablet, DISSOLVE ONE TABLET BY MOUTH AS NEEDED FOR MIGRAINE; MAY REPEAT ONE TABLET IN 2 HOURS IF NEEDED., Disp: 27 tablet, Rfl: 2  •  SALINE NASAL SPRAY NA, into each nostril., Disp: , Rfl:   •  selenium sulfide (SELSUN) 2.5 % shampoo, Apply  topically Daily As Needed for dandruff., Disp: 354 mL, Rfl: 3  •  sildenafil (VIAGRA) 100 MG tablet, TAKE ONE TABLET BY MOUTH AS NEEDED FOR ERECTILE DYSFUNCTION, Disp: 6 tablet, Rfl: 3  •  Testosterone Cypionate (DEPOTESTOTERONE CYPIONATE) 200 MG/ML injection, Inject  into the appropriate muscle as directed by prescriber Every 14 (Fourteen) Days., Disp: , Rfl:   •  Unable to find, 1 each 1 (One) Time. Apple cidaer vinegar 500mg, q AM, Disp: , Rfl:   •  Unable to find, 1 each 1 (One) Time. [employer] allergy relief, q AM, Disp: , Rfl:   •  zolpidem CR (AMBIEN CR) 12.5 MG CR tablet, Take 1 tablet by mouth at bedtime when working 1st shift., Disp: 90 tablet, Rfl: 0     Review of Systems   Constitutional: Negative for chills and fever.   HENT: Positive for congestion, ear pain, postnasal drip, sinus pressure and sore throat.    Respiratory: Positive for cough (worse at night, porductive,yellow) and wheezing.    Cardiovascular: Negative.    Musculoskeletal: Positive for myalgias.   Neurological: Positive for headache.       Objective   Physical Exam  Physical exam was deferred this was a video encounter.     Assessment/Plan   Diagnoses and all orders for this visit:    Acute non-recurrent pansinusitis    Other orders  -     amoxicillin-clavulanate (Augmentin) 875-125 MG per tablet; Take 1 tablet by mouth Every 12 (Twelve) Hours for 7 days.  -     brompheniramine-pseudoephedrine-DM 30-2-10 MG/5ML syrup; Take 10 mL by mouth 4 (Four) Times a Day As Needed for Congestion, Cough or Allergies.      Mr. Munson presents today per video visit with complaints of cough, sinus pain/pressure.   He has been diagnosed with an acute sinus infection.   He is to start Augmentin 875-125 mg  twice a day x 7 days. He is to also start Bromphed DM as instructed four times a day as needed. He has been instructed to stop taking Xyzal while taking the Bromphed.   He is to follow up as needed for now.

## 2020-03-25 NOTE — TELEPHONE ENCOUNTER
Pt needs note for missing work the 4-19-20 and 4-20-20 sent to him through my chart.  Ok's by Nessa MICHAELS - suraj FREIRE will assist with note through MY Chart.  Thanks,

## 2020-03-30 RX ORDER — AMOXICILLIN AND CLAVULANATE POTASSIUM 875; 125 MG/1; MG/1
TABLET, FILM COATED ORAL
Qty: 14 TABLET | Refills: 0 | OUTPATIENT
Start: 2020-03-30

## 2020-04-06 RX ORDER — SILDENAFIL 100 MG/1
TABLET, FILM COATED ORAL
Qty: 6 TABLET | Refills: 2 | Status: SHIPPED | OUTPATIENT
Start: 2020-04-06 | End: 2020-07-02

## 2020-04-20 RX ORDER — BROMPHENIRAMINE MALEATE, PSEUDOEPHEDRINE HYDROCHLORIDE, AND DEXTROMETHORPHAN HYDROBROMIDE 2; 30; 10 MG/5ML; MG/5ML; MG/5ML
SYRUP ORAL
Refills: 0 | OUTPATIENT
Start: 2020-04-20

## 2020-04-21 DIAGNOSIS — Z99.89 OSA ON CPAP: ICD-10-CM

## 2020-04-21 DIAGNOSIS — G47.33 OSA ON CPAP: ICD-10-CM

## 2020-04-21 DIAGNOSIS — F51.04 PSYCHOPHYSIOLOGICAL INSOMNIA: Primary | ICD-10-CM

## 2020-04-21 RX ORDER — ZOLPIDEM TARTRATE 10 MG/1
TABLET ORAL
Qty: 90 TABLET | Refills: 0 | Status: SHIPPED | OUTPATIENT
Start: 2020-04-21 | End: 2020-04-27

## 2020-04-27 DIAGNOSIS — Z99.89 OSA ON CPAP: ICD-10-CM

## 2020-04-27 DIAGNOSIS — F51.04 PSYCHOPHYSIOLOGICAL INSOMNIA: Primary | ICD-10-CM

## 2020-04-27 DIAGNOSIS — G47.33 OSA ON CPAP: ICD-10-CM

## 2020-04-27 RX ORDER — ZOLPIDEM TARTRATE 12.5 MG/1
12.5 TABLET, FILM COATED, EXTENDED RELEASE ORAL NIGHTLY PRN
Qty: 90 TABLET | Refills: 1 | Status: SHIPPED | OUTPATIENT
Start: 2020-04-27 | End: 2020-06-23 | Stop reason: SDUPTHER

## 2020-05-29 DIAGNOSIS — E34.9 TESTOSTERONE DEFICIENCY: ICD-10-CM

## 2020-05-29 DIAGNOSIS — E78.00 ELEVATED CHOLESTEROL: Primary | ICD-10-CM

## 2020-05-29 DIAGNOSIS — Z79.899 ENCOUNTER FOR LONG-TERM (CURRENT) USE OF HIGH-RISK MEDICATION: ICD-10-CM

## 2020-06-08 DIAGNOSIS — M17.0 ARTHRITIS OF BOTH KNEES: ICD-10-CM

## 2020-06-08 RX ORDER — ETODOLAC 500 MG/1
500 TABLET, FILM COATED ORAL 2 TIMES DAILY
Qty: 180 TABLET | Refills: 3 | Status: SHIPPED | OUTPATIENT
Start: 2020-06-08 | End: 2021-06-14

## 2020-06-08 RX ORDER — ALBUTEROL SULFATE 90 MCG
2 HFA AEROSOL WITH ADAPTER (GRAM) INHALATION EVERY 6 HOURS PRN
Qty: 1 INHALER | Refills: 3 | Status: SHIPPED | OUTPATIENT
Start: 2020-06-08 | End: 2020-07-22

## 2020-06-08 NOTE — TELEPHONE ENCOUNTER
"----- Message from Arelis Munson sent at 6/8/2020 12:14 PM EDT -----  Regarding: Prescription Question  Contact: [phone]  Good day to you Dr. Eddy it will be nice to see you and or Kalyani during my visit on June 29th at 8:30 AM for my shots in both knees.  Please find attached my most recent medication list.  Please send a new script to [facility], [place]  [address] for the ones highlighted in yellow.  I appreciate your help, have a great week, and see you all soon.    Thank you,  Arelis \"Ankit\" Arpit  "

## 2020-06-09 RX ORDER — ESOMEPRAZOLE MAGNESIUM 40 MG/1
CAPSULE, DELAYED RELEASE ORAL
Qty: 30 CAPSULE | Refills: 2 | Status: SHIPPED | OUTPATIENT
Start: 2020-06-09 | End: 2020-09-09

## 2020-06-10 ENCOUNTER — OFFICE VISIT (OUTPATIENT)
Dept: FAMILY MEDICINE CLINIC | Facility: CLINIC | Age: 45
End: 2020-06-10

## 2020-06-10 VITALS
HEART RATE: 70 BPM | HEIGHT: 73 IN | OXYGEN SATURATION: 96 % | TEMPERATURE: 97.1 F | DIASTOLIC BLOOD PRESSURE: 86 MMHG | WEIGHT: 245 LBS | BODY MASS INDEX: 32.47 KG/M2 | SYSTOLIC BLOOD PRESSURE: 132 MMHG

## 2020-06-10 DIAGNOSIS — Z00.00 ANNUAL PHYSICAL EXAM: Primary | ICD-10-CM

## 2020-06-10 PROCEDURE — 99396 PREV VISIT EST AGE 40-64: CPT | Performed by: NURSE PRACTITIONER

## 2020-06-10 RX ORDER — CHLORHEXIDINE GLUCONATE 0.12 MG/ML
RINSE ORAL
COMMUNITY
Start: 2020-06-01 | End: 2022-06-21

## 2020-06-10 NOTE — PROGRESS NOTES
"Subjective   Arelis DEVONTE Munson is a 45 y.o. male.     Chief Complaint   Patient presents with   • Annual Exam     Mr. Munson presents today for an annual physical exam with lab review. He states he is doing well and has no concerns today. He has completely weaned off of both the Tramadol and Gabapentin. He reports he is getting injections for the pain in his knees and is doing well with that.        I have reviewed the patient's medical history in detail and updated the computerized patient record.    The following portions of the patient's history were reviewed and updated as appropriate: allergies, current medications, past family history, past medical history, past social history, past surgical history and problem list.      Current Outpatient Medications:   •  Apple Cider Vinegar 500 MG tablet, Take  by mouth., Disp: , Rfl:   •  Ascorbic Acid (VITAMIN C PO), Take  by mouth., Disp: , Rfl:   •  aspirin 81 MG EC tablet, Take 81 mg by mouth Daily., Disp: , Rfl:   •  B Complex Vitamins (VITAMIN B COMPLEX PO), Take  by mouth Daily. HOLD PRIOR TO SURGERY, Disp: , Rfl:   •  B-D 3CC LUER-PUMA SYR 22GX1\" 22G X 1\" 3 ML misc, , Disp: , Rfl:   •  brompheniramine-pseudoephedrine-DM 30-2-10 MG/5ML syrup, Take 10 mL by mouth 4 (Four) Times a Day As Needed for Congestion, Cough or Allergies., Disp: 473 mL, Rfl: 1  •  budesonide (Pulmicort Flexhaler) 180 MCG/ACT inhaler, Use 1 or 2 inhalations each AM. Rinse and spit after use., Disp: 2 inhaler, Rfl: 2  •  chlorhexidine (PERIDEX) 0.12 % solution, , Disp: , Rfl:   •  Cholecalciferol (VITAMIN D3) 5000 UNITS capsule capsule, Take 5,000 Units by mouth Daily. HOLD PRIOR TO SURGERY, Disp: , Rfl:   •  cromolyn (OPTICROM) 4 % ophthalmic solution, Administer 1 drop to both eyes 4 (Four) Times a Day., Disp: 10 mL, Rfl: 12  •  desonide (DESOWEN) 0.05 % cream, Apply  topically to the appropriate area as directed 3 (Three) Times a Day., Disp: 60 g, Rfl: 5  •  Dextromethorphan-guaiFENesin " (MUCINEX DM PO), Take  by mouth., Disp: , Rfl:   •  diclofenac (VOLTAREN) 1 % gel gel, APPLY 4 GRAMS TO AFFECTED AREA(S) FOUR TIMES A DAY AS NEEDED FOR INFLAMMATION, Disp: 4 tube, Rfl: 3  •  docusate sodium (COLACE) 250 MG capsule, Take 250 mg by mouth Daily., Disp: , Rfl:   •  esomeprazole (nexIUM) 40 MG capsule, TAKE ONE CAPSULE BY MOUTH ONCE NIGHTLY, Disp: 30 capsule, Rfl: 2  •  etodolac (LODINE) 500 MG tablet, Take 1 tablet by mouth 2 (Two) Times a Day., Disp: 180 tablet, Rfl: 3  •  ezetimibe-simvastatin (VYTORIN) 10-40 MG per tablet, Take 1 tablet by mouth Every Night., Disp: 90 tablet, Rfl: 3  •  FIBER PO, Take  by mouth., Disp: , Rfl:   •  FLONASE SENSIMIST 27.5 MCG/SPRAY nasal spray, USE ONE SPRAY IN EACH NOSTRIL ONCE DAILY, Disp: 9.1 mL, Rfl: 12  •  ketoconazole (NIZORAL) 2 % cream, Apply  topically to the appropriate area as directed Daily., Disp: 60 g, Rfl: 5  •  ketoconazole (NIZORAL) 2 % shampoo, Apply  topically to the appropriate area as directed 2 (Two) Times a Week., Disp: , Rfl:   •  melatonin 1 MG tablet, Take 3 mg by mouth Every Night. HOLD PRIOR TO SURGERY, Disp: , Rfl:   •  montelukast (SINGULAIR) 10 MG tablet, Take 1 tablet by mouth Every Night., Disp: 90 tablet, Rfl: 3  •  Multiple Vitamin (MULTI VITAMIN DAILY PO), Take  by mouth daily., Disp: , Rfl:   •  olopatadine (PATANASE) 0.6 % solution nasal solution, 2 sprays by Each Nare route 2 (Two) Times a Day., Disp: 91.5 g, Rfl: 3  •  Potassium 99 MG tablet, Take  by mouth., Disp: , Rfl:   •  PROVENTIL  (90 Base) MCG/ACT inhaler, Inhale 2 puffs Every 6 (Six) Hours As Needed for Wheezing or Shortness of Air., Disp: 1 inhaler, Rfl: 3  •  RESTASIS 0.05 % ophthalmic emulsion, , Disp: , Rfl:   •  rizatriptan MLT (MAXALT-MLT) 10 MG disintegrating tablet, DISSOLVE ONE TABLET BY MOUTH AS NEEDED FOR MIGRAINE; MAY REPEAT ONE TABLET IN 2 HOURS IF NEEDED., Disp: 27 tablet, Rfl: 2  •  SALINE NASAL SPRAY NA, into each nostril., Disp: , Rfl:   •   selenium sulfide (SELSUN) 2.5 % shampoo, Apply  topically Daily As Needed for dandruff., Disp: 354 mL, Rfl: 3  •  sildenafil (VIAGRA) 100 MG tablet, TAKE ONE TABLET BY MOUTH AS NEEDED FOR ERECTILE DISFUNCTION, Disp: 6 tablet, Rfl: 2  •  Testosterone Cypionate (DEPOTESTOTERONE CYPIONATE) 200 MG/ML injection, Inject  into the appropriate muscle as directed by prescriber Every 14 (Fourteen) Days., Disp: , Rfl:   •  Unable to find, 1 each 1 (One) Time. [employer] allergy relief, q AM, Disp: , Rfl:   •  zolpidem CR (Ambien CR) 12.5 MG CR tablet, Take 1 tablet by mouth At Night As Needed for Sleep., Disp: 90 tablet, Rfl: 1     Review of Systems   Constitutional: Negative.    Respiratory: Negative.    Cardiovascular: Negative.    Musculoskeletal: Positive for arthralgias (knee pain).   Neurological: Negative.    Psychiatric/Behavioral: Negative.    All other systems reviewed and are negative.      Objective    Vitals:    06/10/20 1116   BP: 132/86   Pulse: 70   Temp: 97.1 °F (36.2 °C)   SpO2: 96%     Body mass index is 32.32 kg/m².    Physical Exam   Constitutional: He is oriented to person, place, and time. He appears well-developed and well-nourished.   HENT:   Right Ear: Tympanic membrane normal.   Left Ear: Tympanic membrane normal.   Neck: Normal range of motion. Neck supple. No JVD present. No thyromegaly present.   Cardiovascular: Normal rate, regular rhythm and normal heart sounds.   Pulmonary/Chest: Effort normal and breath sounds normal.   Musculoskeletal: Normal range of motion. He exhibits no edema.   Lymphadenopathy:     He has no cervical adenopathy.   Neurological: He is alert and oriented to person, place, and time.   Skin: Skin is warm and dry.   Psychiatric:   No acute distress   Vitals reviewed.        Assessment/Plan   Arelis was seen today for annual exam.    Diagnoses and all orders for this visit:    Annual physical exam      Mr. Jaime's physical assessment is unremarkable today.   He will need to  return later in the week or next week to have his labs drawn.   He is to continue all medications as prescribed.    High BMI Plan  General weight loss/lifestyle modification strategies discussed (elicit support from others; identify saboteurs; non-food rewards, etc).     He is to follow up as needed and in 1 year for an annual physical exam.

## 2020-06-17 ENCOUNTER — OFFICE VISIT (OUTPATIENT)
Dept: SLEEP MEDICINE | Facility: HOSPITAL | Age: 45
End: 2020-06-17

## 2020-06-17 VITALS
HEART RATE: 80 BPM | HEIGHT: 73 IN | OXYGEN SATURATION: 96 % | DIASTOLIC BLOOD PRESSURE: 88 MMHG | WEIGHT: 242.4 LBS | SYSTOLIC BLOOD PRESSURE: 141 MMHG | BODY MASS INDEX: 32.13 KG/M2

## 2020-06-17 DIAGNOSIS — G47.33 OSA ON CPAP: Primary | ICD-10-CM

## 2020-06-17 DIAGNOSIS — Z99.89 OSA ON CPAP: Primary | ICD-10-CM

## 2020-06-17 DIAGNOSIS — G47.26 CIRCADIAN RHYTHM SLEEP DISORDER, SHIFT WORK TYPE: ICD-10-CM

## 2020-06-17 DIAGNOSIS — F51.04 PSYCHOPHYSIOLOGICAL INSOMNIA: ICD-10-CM

## 2020-06-17 PROCEDURE — G0463 HOSPITAL OUTPT CLINIC VISIT: HCPCS

## 2020-06-17 PROCEDURE — 99213 OFFICE O/P EST LOW 20 MIN: CPT | Performed by: INTERNAL MEDICINE

## 2020-06-17 NOTE — PROGRESS NOTES
"[facility] Up [facility] Note     Chief Complaint:  NITA     Primary Care Physician: Nessa Gaston APRN    Interval History:   I last saw the patient in 2019.  He reports that he is stable.  The patient continues to work rotating shifts between first, second, and third weekly.  The patient's Faucett Sleepiness Scale is normal at 3.    Review of Systems:    A complete review of systems was done and all were negative with the exception of nasal congestion and postnasal drip, occasional cough and wheezing with shortness of breath, ADAM and abdominal bloating, some ear pain, and anxiety and depression    Social History:    Social History     Socioeconomic History   • Marital status:      Spouse name: Not on file   • Number of children: Not on file   • Years of education: Not on file   • Highest education level: Not on file   Tobacco Use   • Smoking status: Former Smoker     Packs/day: 2.00     Years: 31.00     Pack years: 62.00     Types: Cigarettes     Last attempt to quit:      Years since quittin.4   • Smokeless tobacco: Never Used   • Tobacco comment: Quit    Substance and Sexual Activity   • Alcohol use: Yes     Comment: ONCE OR TWICE MONTHLY   • Drug use: No   • Sexual activity: Defer       Allergies:  Patient has no known allergies.     Medication Review: His list was reviewed.  The patient only takes Ambien CR 12.5 mg.  If his sleep time is short-lived, he will only take one half tab.    Vital Signs:    Vitals:    20 0804   BP: 141/88   Pulse: 80   SpO2: 96%   Weight: 110 kg (242 lb 6.4 oz)   Height: 185.4 cm (73\")     Body mass index is 31.98 kg/m².    Physical Exam:    Constitutional:  Well developed 45 y.o. male that appears in no apparent distress.  Awake & oriented times 3.  Normal mood with normal recent and remote memory and normal judgement.  Eyes:  Conjunctivae normal.  Oropharynx: Previously, moist mucous membranes without exudate and a large tongue and " normal uvula and class III Mallampati airway, patient is wearing a facemask.     Results Review:  DME is [employer] and he uses a fullface mask.  Downloads between 3/19 and 6/16/2020 compliance 99%.  Average usage is 6 hours and 59 minutes.  Average AHI is mildly abnormal at 6.7 but all subsets are normal.  The patient does have a large leak of 1 hour and 16 minutes but he also has a beard.  Average AutoCPAP pressure is 11.3 and his auto CPAP is 9-15.       Impression:   Obstructive sleep apnea adequately treated with auto CPAP with good compliance and usage and no complaints of hypersomnolence.  Circadian rhythm sleep disorder, shiftwork type  Psychophysiologic insomnia adequately treated with Ambien CR 12.5 mg    Plan:  Good sleep hygiene measures should be maintained.  Weight loss would be beneficial in this patient who is obese by BMI.  The patient is benefiting from the treatment being provided.     The patient will continue auto CPAP and also continue Ambien CR as he is doing    The patient will call for any problems and will follow up in 6 months.      Ronni Kong MD  Sleep Medicine  06/17/20  08:25

## 2020-06-23 DIAGNOSIS — G47.33 OSA ON CPAP: ICD-10-CM

## 2020-06-23 DIAGNOSIS — Z99.89 OSA ON CPAP: ICD-10-CM

## 2020-06-23 DIAGNOSIS — F51.04 PSYCHOPHYSIOLOGICAL INSOMNIA: ICD-10-CM

## 2020-06-23 RX ORDER — ZOLPIDEM TARTRATE 12.5 MG/1
12.5 TABLET, FILM COATED, EXTENDED RELEASE ORAL NIGHTLY PRN
Qty: 90 TABLET | Refills: 1 | Status: SHIPPED | OUTPATIENT
Start: 2020-06-23 | End: 2021-01-11 | Stop reason: SDUPTHER

## 2020-06-24 RX ORDER — EZETIMIBE AND SIMVASTATIN 10; 40 MG/1; MG/1
TABLET ORAL
Qty: 30 TABLET | Refills: 11 | Status: SHIPPED | OUTPATIENT
Start: 2020-06-24 | End: 2021-06-17

## 2020-06-29 ENCOUNTER — OFFICE VISIT (OUTPATIENT)
Dept: ORTHOPEDIC SURGERY | Facility: CLINIC | Age: 45
End: 2020-06-29

## 2020-06-29 VITALS — TEMPERATURE: 97.7 F | HEIGHT: 73 IN | BODY MASS INDEX: 32.47 KG/M2 | WEIGHT: 245 LBS

## 2020-06-29 DIAGNOSIS — M17.0 PRIMARY OSTEOARTHRITIS OF BOTH KNEES: Primary | ICD-10-CM

## 2020-06-29 PROCEDURE — 20610 DRAIN/INJ JOINT/BURSA W/O US: CPT | Performed by: NURSE PRACTITIONER

## 2020-06-29 RX ORDER — METHYLPREDNISOLONE ACETATE 80 MG/ML
80 INJECTION, SUSPENSION INTRA-ARTICULAR; INTRALESIONAL; INTRAMUSCULAR; SOFT TISSUE
Status: COMPLETED | OUTPATIENT
Start: 2020-06-29 | End: 2020-06-29

## 2020-06-29 RX ADMIN — METHYLPREDNISOLONE ACETATE 80 MG: 80 INJECTION, SUSPENSION INTRA-ARTICULAR; INTRALESIONAL; INTRAMUSCULAR; SOFT TISSUE at 14:03

## 2020-06-29 NOTE — PROGRESS NOTES
"Patient Name: Arelis Munson   YOB: 1975  Referring Primary Care Physician: Nessa Gaston, APRN  BMI: Body mass index is 32.32 kg/m².    Chief Complaint:    Chief Complaint   Patient presents with   • Left Knee - Follow-up, Pain   • Right Knee - Follow-up, Pain        HPI: Pt  of [facility] here for injections to both knees. No cough or fever. Injections work well for him.    Arelis Munson is a 45 y.o. male who presents today for evaluation of   Chief Complaint   Patient presents with   • Left Knee - Follow-up, Pain   • Right Knee - Follow-up, Pain       This problem is new to this examiner.     Subjective   Medications:   Home Medications:  Current Outpatient Medications on File Prior to Visit   Medication Sig   • Apple Cider Vinegar 500 MG tablet Take  by mouth.   • Ascorbic Acid (VITAMIN C PO) Take  by mouth.   • aspirin 81 MG EC tablet Take 81 mg by mouth Daily.   • B Complex Vitamins (VITAMIN B COMPLEX PO) Take  by mouth Daily. HOLD PRIOR TO SURGERY   • B-D 3CC LUER-PUMA SYR 22GX1\" 22G X 1\" 3 ML misc    • brompheniramine-pseudoephedrine-DM 30-2-10 MG/5ML syrup Take 10 mL by mouth 4 (Four) Times a Day As Needed for Congestion, Cough or Allergies.   • budesonide (Pulmicort Flexhaler) 180 MCG/ACT inhaler Use 1 or 2 inhalations each AM. Rinse and spit after use.   • chlorhexidine (PERIDEX) 0.12 % solution    • Cholecalciferol (VITAMIN D3) 5000 UNITS capsule capsule Take 5,000 Units by mouth Daily. HOLD PRIOR TO SURGERY   • cromolyn (OPTICROM) 4 % ophthalmic solution Administer 1 drop to both eyes 4 (Four) Times a Day.   • desonide (DESOWEN) 0.05 % cream Apply  topically to the appropriate area as directed 3 (Three) Times a Day.   • Dextromethorphan-guaiFENesin (MUCINEX DM PO) Take  by mouth.   • diclofenac (VOLTAREN) 1 % gel gel APPLY 4 GRAMS TO AFFECTED AREA(S) FOUR TIMES A DAY AS NEEDED FOR INFLAMMATION   • docusate sodium (COLACE) 250 MG capsule Take 250 mg by mouth Daily.   • esomeprazole (nexIUM) " 40 MG capsule TAKE ONE CAPSULE BY MOUTH ONCE NIGHTLY   • etodolac (LODINE) 500 MG tablet Take 1 tablet by mouth 2 (Two) Times a Day.   • ezetimibe-simvastatin (VYTORIN) 10-40 MG per tablet TAKE ONE TABLET BY MOUTH ONCE NIGHTLY   • FIBER PO Take  by mouth.   • FLONASE SENSIMIST 27.5 MCG/SPRAY nasal spray USE ONE SPRAY IN EACH NOSTRIL ONCE DAILY   • ketoconazole (NIZORAL) 2 % cream Apply  topically to the appropriate area as directed Daily.   • ketoconazole (NIZORAL) 2 % shampoo Apply  topically to the appropriate area as directed 2 (Two) Times a Week.   • melatonin 1 MG tablet Take 3 mg by mouth Every Night. HOLD PRIOR TO SURGERY   • montelukast (SINGULAIR) 10 MG tablet Take 1 tablet by mouth Every Night.   • Multiple Vitamin (MULTI VITAMIN DAILY PO) Take  by mouth daily.   • olopatadine (PATANASE) 0.6 % solution nasal solution 2 sprays by Each Nare route 2 (Two) Times a Day.   • Potassium 99 MG tablet Take  by mouth.   • PROVENTIL  (90 Base) MCG/ACT inhaler Inhale 2 puffs Every 6 (Six) Hours As Needed for Wheezing or Shortness of Air.   • RESTASIS 0.05 % ophthalmic emulsion    • rizatriptan MLT (MAXALT-MLT) 10 MG disintegrating tablet DISSOLVE ONE TABLET BY MOUTH AS NEEDED FOR MIGRAINE; MAY REPEAT ONE TABLET IN 2 HOURS IF NEEDED.   • SALINE NASAL SPRAY NA into each nostril.   • selenium sulfide (SELSUN) 2.5 % shampoo Apply  topically Daily As Needed for dandruff.   • sildenafil (VIAGRA) 100 MG tablet TAKE ONE TABLET BY MOUTH AS NEEDED FOR ERECTILE DISFUNCTION   • Testosterone Cypionate (DEPOTESTOTERONE CYPIONATE) 200 MG/ML injection Inject  into the appropriate muscle as directed by prescriber Every 14 (Fourteen) Days.   • Unable to find 1 each 1 (One) Time. [employer] allergy relief, q AM   • zolpidem CR (Ambien CR) 12.5 MG CR tablet Take 1 tablet by mouth At Night As Needed for Sleep.     No current facility-administered medications on file prior to visit.      Current Medications:  Scheduled Meds:  Continuous  Infusions:  No current facility-administered medications for this visit.   PRN Meds:.    I have reviewed the patient's medical history in detail and updated the computerized patient record.  Review and summarization of old records includes:    Past Medical History:   Diagnosis Date   • Arthritis     OSTEO   • Asthma    • ED (erectile dysfunction)    • Elevated cholesterol    • Environmental allergies    • Fracture, fibula     RIGHT AND ANKLE   • Gastroesophageal reflux disease without esophagitis 2016   • Migraine with aura and without status migrainosus, not intractable 2016   • Pain and swelling of toe of right foot    • Seasonal allergies    • Sleep apnea     CPAP   • Testosterone deficiency 2016        Past Surgical History:   Procedure Laterality Date   • ANKLE ARTHROSCOPY Right 2018    Procedure: RT ANKLE ARTHROSCOPY SUBCHONDROPLASTY TALUS ;  Surgeon: Lebron Spivey Jr., MD;  [place] [facility] OR [facility] [place];  Service:    • ANKLE LIGAMENT RECONSTRUCTION Right 2018    Procedure: HARDWARE REMOVAL POSS LATERAL LIGAMENT RECONSTRUCTION ;  Surgeon: Lebron Spivey Jr., MD;  [place] [facility] OR [facility] [place];  Service:    • CARPAL TUNNEL RELEASE Right    • CUBITAL TUNNEL RELEASE Right     NERVE RELEASE   • EAR BIOPSY Right     MYRINGOTOMY W/BONES REPLACED INNER EAR   • KNEE SURGERY Right     MENISCUS REPAIR   • NOSE SURGERY     • ORIF FIBULA FRACTURE Right 2017    ANKLE INCLUDED        Social History     Occupational History   • Not on file   Tobacco Use   • Smoking status: Former Smoker     Packs/day: 2.00     Years: 31.00     Pack years: 62.00     Types: Cigarettes     Last attempt to quit: 2004     Years since quittin.5   • Smokeless tobacco: Never Used   • Tobacco comment: Quit    Substance and Sexual Activity   • Alcohol use: Yes     Comment: ONCE OR TWICE MONTHLY   • Drug use: No   • Sexual activity: Defer      Social History     Social History Narrative   • Not on file        Family History  "  Problem Relation Age of Onset   • Lung cancer Mother    • Lung cancer Father    • Malig Hyperthermia Neg Hx        ROS: 14 point review of systems was performed and all other systems were reviewed and are negative except for documented findings in HPI and today's encounter.     Allergies: No Known Allergies  Constitutional:  Denies fever, shaking or chills   Eyes:  Denies change in visual acuity   HENT:  Denies nasal congestion or sore throat   Respiratory:  Denies cough or shortness of breath   Cardiovascular:  Denies chest pain or severe LE edema   GI:  Denies abdominal pain, nausea, vomiting, bloody stools or diarrhea   Musculoskeletal:  Numbness, tingling, pain, or loss of motor function only as noted above in history of present illness.  : Denies painful urination or hematuria  Integument:  Denies rash, lesion or ulceration   Neurologic:  Denies headache or focal weakness  Endocrine:  Denies lymphadenopathy  Psych:  Denies confusion or change in mental status   Hem:  Denies active bleeding    OBJECTIVE:  Physical Exam: 45 y.o. male  Wt Readings from Last 3 Encounters:   06/29/20 111 kg (245 lb)   06/17/20 110 kg (242 lb 6.4 oz)   06/10/20 111 kg (245 lb)     Ht Readings from Last 1 Encounters:   06/29/20 185.4 cm (73\")     Body mass index is 32.32 kg/m².  Vitals:    06/29/20 1406   Temp: 97.7 °F (36.5 °C)     Vital signs reviewed.     General Appearance:    Alert, cooperative, in no acute distress                  Eyes: conjunctiva clear  ENT: external ears and nose atraumatic  CV: no peripheral edema  Resp: normal respiratory effort  Skin: no rashes or wounds; normal turgor  Psych: mood and affect appropriate  Lymph: no nodes appreciated  Neuro: gross sensation intact  Vascular:  Palpable peripheral pulse in noted extremity  Musculoskeletal Extremities: both knees with medial joint line tenderness and effusion, skin warm dry and intact, hips nttp, calves nttp. Hips nttp    Radiology:   Not done here - " reviewed    Assessment:     ICD-10-CM ICD-9-CM   1. Primary osteoarthritis of both knees M17.0 715.16        Large Joint Arthrocentesis: R knee  Date/Time: 6/29/2020 2:03 PM  Consent given by: patient  Site marked: site marked  Timeout: Immediately prior to procedure a time out was called to verify the correct patient, procedure, equipment, support staff and site/side marked as required   Supporting Documentation  Indications: pain   Procedure Details  [place] knee - R knee  Needle gauge: 21.  Approach: anterolateral  Medications administered: 80 mg methylPREDNISolone acetate 80 MG/ML; 4 mL lidocaine (cardiac)  Patient tolerance: patient tolerated the procedure well with no immediate complications    Large Joint Arthrocentesis: L knee  Date/Time: 6/29/2020 2:03 PM  Consent given by: patient  Site marked: site marked  Timeout: Immediately prior to procedure a time out was called to verify the correct patient, procedure, equipment, support staff and site/side marked as required   Supporting Documentation  Indications: pain   Procedure Details  [place] knee - L knee  Needle gauge: 21.  Approach: anterolateral  Medications administered: 4 mL lidocaine (cardiac); 80 mg methylPREDNISolone acetate 80 MG/ML  Patient tolerance: patient tolerated the procedure well with no immediate complications             Plan: Biomechanics of pertinent body area discussed.  Risks, benefits, alternatives, comparisons, and complications of accepted medicines, injections, recommendations, surgical procedures, and therapies explained and education provided in laymen's terms. Natural history and expected course of this patient's diagnosis discussed along with evaluation of therapies. Questions answered. When appropriate I also discussed proper use of cane, walker, trekking poles.   BMI:  The concept of BMI body mass index and its importance and implications discussed.  BMI suggested to be < 40 or as low as possible. Lifestyle measures for  weight loss and how this affects orthopedic condition.  MEDICATIONS:  Prescription, OTC and Monitoring of Medications per orders to address ortho complaints; Evaluation and discussion of safety, precautions, side effects, and warnings given especially of long term NSAID or steroid therapy.    RICE: Rest, ice, compression, and elevation therapy, Cryotherapy/brachy therapy, and or OTC linaments as indicated with instructions.   Cortisone Injection. See procedure note.      6/29/2020    Much of this encounter note is an electronic transcription/translation of spoken language to printed text. The electronic translation of spoken language may permit erroneous, or at times, nonsensical words or phrases to be inadvertently transcribed; Although I have reviewed the note for such errors, some may still exist

## 2020-06-29 NOTE — PATIENT INSTRUCTIONS
Knee Injection  A knee injection is a procedure to get medicine into your knee joint to relieve the pain, swelling, and stiffness of arthritis. Your health care provider uses a needle to inject medicine, which may also help to lubricate and cushion your knee joint. You may need more than one injection.  Tell a health care provider about:  · Any allergies you have.  · All medicines you are taking, including vitamins, herbs, eye drops, creams, and over-the-counter medicines.  · Any problems you or family members have had with anesthetic medicines.  · Any blood disorders you have.  · Any surgeries you have had.  · Any medical conditions you have.  · Whether you are pregnant or may be pregnant.  What are the risks?  Generally, this is a safe procedure. However, problems may occur, including:  · Infection.  · Bleeding.  · Symptoms that get worse.  · Damage to the area around your knee.  · Allergic reaction to any of the medicines.  · Skin reactions from repeated injections.  What happens before the procedure?  · Ask your health care provider about changing or stopping your regular medicines. This is especially important if you are taking diabetes medicines or blood thinners.  · Plan to have someone take you home from the hospital or clinic.  What happens during the procedure?    · You will sit or lie down in a position for your knee to be treated.  · The skin over your kneecap will be cleaned with a germ-killing soap.  · You will be given a medicine that numbs the area (local anesthetic). You may feel some stinging.  · The medicine will be injected into your knee. The needle is carefully placed between your kneecap and your knee. The medicine is injected into the joint space.  · The needle will be removed at the end of the procedure.  · A bandage (dressing) may be placed over the injection site.  The procedure may vary among health care providers and hospitals.  What can I expect after the procedure?  · Your blood  pressure, heart rate, breathing rate, and blood oxygen level will be monitored until you leave the hospital or clinic.  · You may have to move your knee through its full range of motion. This helps to get all the medicine into your joint space.  · You will be watched to make sure that you do not have a reaction to the injected medicine.  · You may feel more pain, swelling, and warmth than you did before the injection. This reaction may last about 1-2 days.  Follow these instructions at home:  Medicines  · Take over-the-counter and prescription medicines only as told by your doctor.  · Do not drive or use heavy machinery while taking prescription pain medicine.  · Do not take medicines such as aspirin and ibuprofen unless your health care provider tells you to take them.  Injection site care  · Follow instructions from your health care provider about:  ? How to take care of your puncture site.  ? When and how you should change your dressing.  ? When you should remove your dressing.  · Check your injection area every day for signs of infection. Check for:  ? More redness, swelling, or pain after 2 days.  ? Fluid or blood.  ? Pus or a bad smell.  ? Warmth.  Managing pain, stiffness, and swelling    · If directed, put ice on the injection area:  ? Put ice in a plastic bag.  ? Place a towel between your skin and the bag.  ? Leave the ice on for 20 minutes, 2-3 times per day.  · Do not apply heat to your knee.  · Raise (elevate) the injection area above the level of your heart while you are sitting or lying down.  General instructions  · If you were given a dressing, keep it dry until your health care provider says it can be removed. Ask your health care provider when you can start showering or taking a bath.  · Avoid strenuous activities for as long as directed by your health care provider. Ask your health care provider when you can return to your normal activities.  · Keep all follow-up visits as told by your health  care provider. This is important. You may need more injections.  Contact a health care provider if you have:  · A fever.  · Warmth in your injection area.  · Fluid, blood, or pus coming from your injection site.  · Symptoms at your injection site that last longer than 2 days after your procedure.  Get help right away if:  · Your knee:  ? Turns very red.  ? Becomes very swollen.  ? Is in severe pain.  Summary  · A knee injection is a procedure to get medicine into your knee joint to relieve the pain, swelling, and stiffness of arthritis.  · A needle is carefully placed between your kneecap and your knee to inject medicine into the joint space.  · Before the procedure, ask your health care provider about changing or stopping your regular medicines, especially if you are taking diabetes medicines or blood thinners.  · Contact your health care provider if you have any problems or questions after your procedure.  This information is not intended to replace advice given to you by your health care provider. Make sure you discuss any questions you have with your health care provider.  Document Released: 03/10/2008 Document Revised: 01/07/2019 Document Reviewed: 01/07/2019  Elsevier Patient Education © 2020 [employer]

## 2020-07-02 RX ORDER — SILDENAFIL 100 MG/1
TABLET, FILM COATED ORAL
Qty: 6 TABLET | Refills: 1 | Status: SHIPPED | OUTPATIENT
Start: 2020-07-02 | End: 2020-08-31

## 2020-07-20 RX ORDER — DESONIDE 0.5 MG/G
CREAM TOPICAL
Qty: 60 EACH | Refills: 4 | Status: SHIPPED | OUTPATIENT
Start: 2020-07-20 | End: 2021-01-11

## 2020-07-20 RX ORDER — KETOCONAZOLE 20 MG/G
CREAM TOPICAL DAILY
Qty: 60 G | Refills: 4 | Status: SHIPPED | OUTPATIENT
Start: 2020-07-20 | End: 2021-01-11

## 2020-07-20 RX ORDER — OLOPATADINE HYDROCHLORIDE 665 UG/1
SPRAY NASAL
Qty: 30.5 G | Refills: 11 | Status: SHIPPED | OUTPATIENT
Start: 2020-07-20 | End: 2021-09-07

## 2020-07-20 RX ORDER — MONTELUKAST SODIUM 10 MG/1
TABLET ORAL
Qty: 30 TABLET | Refills: 11 | Status: SHIPPED | OUTPATIENT
Start: 2020-07-20 | End: 2020-07-21

## 2020-07-20 RX ORDER — CROMOLYN SODIUM 40 MG/ML
SOLUTION/ DROPS OPHTHALMIC
Qty: 10 ML | Refills: 11 | Status: SHIPPED | OUTPATIENT
Start: 2020-07-20 | End: 2021-09-07

## 2020-07-21 RX ORDER — MONTELUKAST SODIUM 10 MG/1
TABLET ORAL
Qty: 30 TABLET | Refills: 11 | Status: SHIPPED | OUTPATIENT
Start: 2020-07-21 | End: 2021-08-16

## 2020-07-22 RX ORDER — ALBUTEROL SULFATE 90 UG/1
AEROSOL, METERED RESPIRATORY (INHALATION)
Qty: 3 INHALER | Refills: 2 | Status: SHIPPED | OUTPATIENT
Start: 2020-07-22 | End: 2020-10-29 | Stop reason: SDUPTHER

## 2020-07-24 ENCOUNTER — TELEPHONE (OUTPATIENT)
Dept: ORTHOPEDIC SURGERY | Facility: CLINIC | Age: 45
End: 2020-07-24

## 2020-07-24 NOTE — TELEPHONE ENCOUNTER
"----- Message from Arelis Munson sent at 7/24/2020  9:04 AM EDT -----  Regarding: Non-Urgent Medical Question  Contact: [phone]  MY CHART MESSAGE:    Akinlillie Auguste and TGAMADO!  Can you please pass along a message to Dr. Eddy for us that our grandpa, Dr. Lebron Wright passed away last evening around 9:30 PM at [facility] ([facility]     We wanted to tell him since grandpa consulted with him lots and was his patient too.  Actually I think our whole family has seen Dr. Eddy a time or two.  You all can watch this website [email or web address] for arrangements.  They hope it's for Monday July 27th, but we won't know till later this afternoon or tomorrow.      Thank you,  Arelis \"Ankit\" Arpit    "

## 2020-07-26 ENCOUNTER — PATIENT MESSAGE (OUTPATIENT)
Dept: ORTHOPEDIC SURGERY | Facility: CLINIC | Age: 45
End: 2020-07-26

## 2020-07-27 ENCOUNTER — TELEPHONE (OUTPATIENT)
Dept: ORTHOPEDIC SURGERY | Facility: CLINIC | Age: 45
End: 2020-07-27

## 2020-07-27 NOTE — TELEPHONE ENCOUNTER
"----- Message from Arelis Munson sent at 7/26/2020  6:24 AM EDT -----  Regarding: Non-Urgent Medical Question  Contact: [phone]  Please find a copy of the official obit below.  The \"visitation\" is at the mass and due to COVID you really can't send flowers either this is so unfair.     Lebron Wright - Obituary    [email or web address]  "

## 2020-08-31 RX ORDER — BROMPHENIRAMINE MALEATE, PSEUDOEPHEDRINE HYDROCHLORIDE, AND DEXTROMETHORPHAN HYDROBROMIDE 2; 30; 10 MG/5ML; MG/5ML; MG/5ML
10 SYRUP ORAL 4 TIMES DAILY PRN
Qty: 473 ML | Refills: 1 | Status: SHIPPED | OUTPATIENT
Start: 2020-08-31 | End: 2021-06-16

## 2020-08-31 RX ORDER — SILDENAFIL 100 MG/1
TABLET, FILM COATED ORAL
Qty: 6 TABLET | Refills: 5 | Status: SHIPPED | OUTPATIENT
Start: 2020-08-31 | End: 2020-10-07

## 2020-09-09 RX ORDER — ESOMEPRAZOLE MAGNESIUM 40 MG/1
CAPSULE, DELAYED RELEASE ORAL
Qty: 30 CAPSULE | Refills: 11 | Status: SHIPPED | OUTPATIENT
Start: 2020-09-09 | End: 2021-09-08

## 2020-10-07 ENCOUNTER — CLINICAL SUPPORT (OUTPATIENT)
Dept: ORTHOPEDIC SURGERY | Facility: CLINIC | Age: 45
End: 2020-10-07

## 2020-10-07 VITALS — WEIGHT: 245 LBS | BODY MASS INDEX: 32.47 KG/M2 | HEIGHT: 73 IN | TEMPERATURE: 98 F

## 2020-10-07 DIAGNOSIS — M25.561 PAIN IN BOTH KNEES, UNSPECIFIED CHRONICITY: Primary | ICD-10-CM

## 2020-10-07 DIAGNOSIS — M25.562 PAIN IN BOTH KNEES, UNSPECIFIED CHRONICITY: Primary | ICD-10-CM

## 2020-10-07 DIAGNOSIS — M17.0 PRIMARY OSTEOARTHRITIS OF BOTH KNEES: ICD-10-CM

## 2020-10-07 PROCEDURE — 20610 DRAIN/INJ JOINT/BURSA W/O US: CPT | Performed by: NURSE PRACTITIONER

## 2020-10-07 PROCEDURE — 99213 OFFICE O/P EST LOW 20 MIN: CPT | Performed by: NURSE PRACTITIONER

## 2020-10-07 PROCEDURE — 73562 X-RAY EXAM OF KNEE 3: CPT | Performed by: NURSE PRACTITIONER

## 2020-10-07 RX ORDER — SILDENAFIL 100 MG/1
TABLET, FILM COATED ORAL
Qty: 6 TABLET | Refills: 4 | Status: SHIPPED | OUTPATIENT
Start: 2020-10-07 | End: 2021-06-01

## 2020-10-07 RX ORDER — METHYLPREDNISOLONE ACETATE 80 MG/ML
80 INJECTION, SUSPENSION INTRA-ARTICULAR; INTRALESIONAL; INTRAMUSCULAR; SOFT TISSUE
Status: COMPLETED | OUTPATIENT
Start: 2020-10-07 | End: 2020-10-07

## 2020-10-07 RX ADMIN — METHYLPREDNISOLONE ACETATE 80 MG: 80 INJECTION, SUSPENSION INTRA-ARTICULAR; INTRALESIONAL; INTRAMUSCULAR; SOFT TISSUE at 08:18

## 2020-10-07 NOTE — PROGRESS NOTES
"Patient Name: Arelis Munson   YOB: 1975  Referring Primary Care Physician: Nessa Gaston, LORENZO  BMI: Body mass index is 32.32 kg/m².    Chief Complaint:    Chief Complaint   Patient presents with   • Left Knee - Follow-up   • Right Knee - Follow-up        HPI: Pt of SPM here for injections to both knees. These work well for him. Masks were worn throughout the visit.     Arelis Munson is a 45 y.o. male who presents today for evaluation of   Chief Complaint   Patient presents with   • Left Knee - Follow-up   • Right Knee - Follow-up       This problem is new to this examiner.     Subjective   Medications:   Home Medications:  Current Outpatient Medications on File Prior to Visit   Medication Sig   • albuterol sulfate  (90 Base) MCG/ACT inhaler 2 puffs every 4 hours as needed for SOA or wheezing.   • Apple Cider Vinegar 500 MG tablet Take  by mouth.   • Ascorbic Acid (VITAMIN C PO) Take  by mouth.   • aspirin 81 MG EC tablet Take 81 mg by mouth Daily.   • B Complex Vitamins (VITAMIN B COMPLEX PO) Take  by mouth Daily. HOLD PRIOR TO SURGERY   • B-D 3CC LUER-PUMA SYR 22GX1\" 22G X 1\" 3 ML misc    • budesonide (Pulmicort Flexhaler) 180 MCG/ACT inhaler Use 1 or 2 inhalations each AM. Rinse and spit after use.   • chlorhexidine (PERIDEX) 0.12 % solution    • Cholecalciferol (VITAMIN D3) 5000 UNITS capsule capsule Take 5,000 Units by mouth Daily. HOLD PRIOR TO SURGERY   • cromolyn (OPTICROM) 4 % ophthalmic solution INSTILL ONE DROP IN EACH EYE FOUR TIMES A DAY   • desonide (DESOWEN) 0.05 % cream APPLY TO AFFECTED AREA(S) THREE TIMES A DAY   • Dextromethorphan-guaiFENesin (MUCINEX DM PO) Take  by mouth.   • diclofenac (VOLTAREN) 1 % gel gel APPLY 4 GRAMS TO AFFECTED AREA(S) FOUR TIMES A DAY AS NEEDED FOR INFLAMMATION   • docusate sodium (COLACE) 250 MG capsule Take 250 mg by mouth Daily.   • esomeprazole (nexIUM) 40 MG capsule TAKE ONE CAPSULE BY MOUTH ONCE NIGHTLY   • etodolac (LODINE) 500 MG tablet " Take 1 tablet by mouth 2 (Two) Times a Day.   • ezetimibe-simvastatin (VYTORIN) 10-40 MG per tablet TAKE ONE TABLET BY MOUTH ONCE NIGHTLY   • FIBER PO Take  by mouth.   • FLONASE SENSIMIST 27.5 MCG/SPRAY nasal spray USE ONE SPRAY IN EACH NOSTRIL ONCE DAILY   • ketoconazole (NIZORAL) 2 % cream APPLY TOPICALLY  TO THE APPROPRIATE AREA AS DIRECTED DAILY.   • ketoconazole (NIZORAL) 2 % shampoo Apply  topically to the appropriate area as directed 2 (Two) Times a Week.   • melatonin 1 MG tablet Take 3 mg by mouth Every Night. HOLD PRIOR TO SURGERY   • montelukast (SINGULAIR) 10 MG tablet TAKE ONE TABLET BY MOUTH ONCE NIGHTLY   • Multiple Vitamin (MULTI VITAMIN DAILY PO) Take  by mouth daily.   • olopatadine (PATANASE) 0.6 % solution nasal solution SPRAY TWO SPRAYS IN EACH NOSTRIL TWICE DAILY   • Potassium 99 MG tablet Take  by mouth.   • RESTASIS 0.05 % ophthalmic emulsion    • rizatriptan MLT (MAXALT-MLT) 10 MG disintegrating tablet DISSOLVE ONE TABLET BY MOUTH AS NEEDED FOR MIGRAINE; MAY REPEAT ONE TABLET IN 2 HOURS IF NEEDED.   • SALINE NASAL SPRAY NA into each nostril.   • selenium sulfide (SELSUN) 2.5 % shampoo Apply  topically Daily As Needed for dandruff.   • Testosterone Cypionate (DEPOTESTOTERONE CYPIONATE) 200 MG/ML injection Inject  into the appropriate muscle as directed by prescriber Every 14 (Fourteen) Days.   • Unable to find 1 each 1 (One) Time. [facility] allergy relief, q AM   • zolpidem CR (Ambien CR) 12.5 MG CR tablet Take 1 tablet by mouth At Night As Needed for Sleep.   • [DISCONTINUED] sildenafil (VIAGRA) 100 MG tablet TAKE 1 TABLET BY MOUTH AS NEEDED FOR ERECTILE DISFUNCTION   • brompheniramine-pseudoephedrine-DM 30-2-10 MG/5ML syrup Take 10 mL by mouth 4 (Four) Times a Day As Needed for Congestion, Cough or Allergies.     No current facility-administered medications on file prior to visit.      Current Medications:  Scheduled Meds:  Continuous Infusions:No current facility-administered medications for  this visit.     PRN Meds:.    I have reviewed the patient's medical history in detail and updated the computerized patient record.  Review and summarization of old records includes:    Past Medical History:   Diagnosis Date   • Arthritis     OSTEO   • Asthma    • ED (erectile dysfunction)    • Elevated cholesterol    • Environmental allergies    • Fracture, fibula     RIGHT AND ANKLE   • Gastroesophageal reflux disease without esophagitis 2016   • Migraine with aura and without status migrainosus, not intractable 2016   • Pain and swelling of toe of right foot    • Seasonal allergies    • Sleep apnea     CPAP   • Testosterone deficiency 2016        Past Surgical History:   Procedure Laterality Date   • ANKLE ARTHROSCOPY Right 2018    Procedure: RT ANKLE ARTHROSCOPY SUBCHONDROPLASTY TALUS ;  Surgeon: Lebron Spivey Jr., MD;  [place] [facility];  Service:    • ANKLE LIGAMENT RECONSTRUCTION Right 2018    Procedure: HARDWARE REMOVAL POSS LATERAL LIGAMENT RECONSTRUCTION ;  Surgeon: Lebron Spivey Jr., MD;  [place] [facility];  Service:    • CARPAL TUNNEL RELEASE Right    • CUBITAL TUNNEL RELEASE Right     NERVE RELEASE   • EAR BIOPSY Right     MYRINGOTOMY W/BONES REPLACED INNER EAR   • KNEE SURGERY Right     MENISCUS REPAIR   • NOSE SURGERY     • ORIF FIBULA FRACTURE Right 2017    ANKLE INCLUDED        Social History     Occupational History   • Not on file   Tobacco Use   • Smoking status: Former Smoker     Packs/day: 2.00     Years: 31.00     Pack years: 62.00     Types: Cigarettes     Quit date:      Years since quittin.7   • Smokeless tobacco: Never Used   • Tobacco comment: Quit    Substance and Sexual Activity   • Alcohol use: Yes     Comment: ONCE OR TWICE MONTHLY   • Drug use: No   • Sexual activity: Defer      Social History     Social History Narrative   • Not on file        Family History   Problem Relation Age of Onset   • Lung cancer Mother    • Lung cancer  "Father    • Mabel Hyperthermia Neg Hx        ROS: 14 point review of systems was performed and all other systems were reviewed and are negative except for documented findings in HPI and today's encounter.     Allergies: No Known Allergies  Constitutional:  Denies fever, shaking or chills   Eyes:  Denies change in visual acuity   HENT:  Denies nasal congestion or sore throat   Respiratory:  Denies cough or shortness of breath   Cardiovascular:  Denies chest pain or severe LE edema   GI:  Denies abdominal pain, nausea, vomiting, bloody stools or diarrhea   Musculoskeletal:  Numbness, tingling, pain, or loss of motor function only as noted above in history of present illness.  : Denies painful urination or hematuria  Integument:  Denies rash, lesion or ulceration   Neurologic:  Denies headache or focal weakness  Endocrine:  Denies lymphadenopathy  Psych:  Denies confusion or change in mental status   Hem:  Denies active bleeding    OBJECTIVE:  Physical Exam: 45 y.o. male  Wt Readings from Last 3 Encounters:   10/07/20 111 kg (245 lb)   06/29/20 111 kg (245 lb)   06/17/20 110 kg (242 lb 6.4 oz)     Ht Readings from Last 1 Encounters:   10/07/20 185.4 cm (73\")     Body mass index is 32.32 kg/m².  Vitals:    10/07/20 0824   Temp: 98 °F (36.7 °C)     Vital signs reviewed.     General Appearance:    Alert, cooperative, in no acute distress                  Eyes: conjunctiva clear  ENT: external ears and nose atraumatic  CV: no peripheral edema  Resp: normal respiratory effort  Skin: no rashes or wounds; normal turgor  Psych: mood and affect appropriate  Lymph: no nodes appreciated  Neuro: gross sensation intact  Vascular:  Palpable peripheral pulse in noted extremity  Musculoskeletal Extremities: skin is warm, dry and intact, calves soft and nttp, both knees with medial joint line tenderness, synovitis and effusion. Stiffness to knees with ambulation.    Radiology:   Both knees 3 views done for pain with comparison show " tricompartmental DJD to both knees and medial aspect OA     Assessment:     ICD-10-CM ICD-9-CM   1. Pain in both knees, unspecified chronicity  M25.561 719.46    M25.562    2. Primary osteoarthritis of both knees  M17.0 715.16        Large Joint Arthrocentesis: R knee  Date/Time: 10/7/2020 8:18 AM  Consent given by: patient  Site marked: site marked  Timeout: Immediately prior to procedure a time out was called to verify the correct patient, procedure, equipment, support staff and site/side marked as required   Supporting Documentation  Indications: pain   Procedure Details  [place] knee - R knee  Needle size: 25 G  Approach: anteromedial  Medications administered: 80 mg methylPREDNISolone acetate 80 MG/ML; 4 mL lidocaine (cardiac)      Large Joint Arthrocentesis: L knee  Date/Time: 10/7/2020 8:18 AM  Consent given by: patient  Site marked: site marked  Timeout: Immediately prior to procedure a time out was called to verify the correct patient, procedure, equipment, support staff and site/side marked as required   Supporting Documentation  Indications: pain   Procedure Details  [place] knee - L knee  Needle size: 25 G  Approach: anteromedial  Medications administered: 4 mL lidocaine (cardiac); 80 mg methylPREDNISolone acetate 80 MG/ML  Patient tolerance: patient tolerated the procedure well with no immediate complications             Plan: Biomechanics of pertinent body area discussed.  Risks, benefits, alternatives, comparisons, and complications of accepted medicines, injections, recommendations, surgical procedures, and therapies explained and education provided in laymen's terms. Natural history and expected course of this patient's diagnosis discussed along with evaluation of therapies. Questions answered. When appropriate I also discussed proper use of cane, walker, trekking poles.   BMI:  The concept of BMI body mass index and its importance and implications discussed.  BMI suggested to be < 40 or as low  as possible. Lifestyle measures for weight loss and how this affects orthopedic condition.  MEDICATIONS:  Prescription, OTC and Monitoring of Medications per orders to address ortho complaints; Evaluation and discussion of safety, precautions, side effects, and warnings given especially of long term NSAID or steroid therapy.    RICE: Rest, ice, compression, and elevation therapy, Cryotherapy/brachy therapy, and or OTC linaments as indicated with instructions.   Cortisone Injection. See procedure note.      10/7/2020    Much of this encounter note is an electronic transcription/translation of spoken language to printed text. The electronic translation of spoken language may permit erroneous, or at times, nonsensical words or phrases to be inadvertently transcribed; Although I have reviewed the note for such errors, some may still exist

## 2020-10-12 RX ORDER — SELENIUM SULFIDE 2.5 MG/100ML
LOTION TOPICAL DAILY PRN
Qty: 354 ML | Refills: 3 | Status: SHIPPED | OUTPATIENT
Start: 2020-10-12 | End: 2021-10-15

## 2020-10-12 NOTE — TELEPHONE ENCOUNTER
"----- Message from Arelis Munson sent at 10/12/2020 12:56 PM EDT -----  Regarding: Prescription Question  Contact: [phone]  Good day to you Nessa I hope you're doing well.  I have a question please, Dr. Aviles used to prescribe me Selemium Sul Shampoo 2.5% that comes in bottles I use in the shower to wash my head and face.  I'm almost out of it and wondered if you could please send a new script to [facility] at [address] [place]  [address], [phone]?    Also ketoconazole 2 % is listed twice on my medication list, once as a cream and once as a shampoo.  It's actually a cream and the instructions say to use it twice daily as needed on my inflamed areas.  This isn't the shampoo I don't believe, correct?    Please let me know.    Thank you,  Arelis \"Buck\" Arpit  "

## 2020-10-29 RX ORDER — ALBUTEROL SULFATE 90 UG/1
AEROSOL, METERED RESPIRATORY (INHALATION)
Qty: 2 PUFF | Refills: 3 | Status: SHIPPED | OUTPATIENT
Start: 2020-10-29 | End: 2021-02-24 | Stop reason: SDUPTHER

## 2020-12-16 ENCOUNTER — OFFICE VISIT (OUTPATIENT)
Dept: SLEEP MEDICINE | Facility: HOSPITAL | Age: 45
End: 2020-12-16

## 2020-12-16 VITALS — HEIGHT: 73 IN | WEIGHT: 247 LBS | BODY MASS INDEX: 32.74 KG/M2

## 2020-12-16 DIAGNOSIS — G47.33 OSA ON CPAP: Primary | ICD-10-CM

## 2020-12-16 DIAGNOSIS — F51.04 PSYCHOPHYSIOLOGICAL INSOMNIA: ICD-10-CM

## 2020-12-16 DIAGNOSIS — G47.26 CIRCADIAN RHYTHM SLEEP DISORDER, SHIFT WORK TYPE: ICD-10-CM

## 2020-12-16 DIAGNOSIS — Z99.89 OSA ON CPAP: Primary | ICD-10-CM

## 2020-12-16 PROCEDURE — 99213 OFFICE O/P EST LOW 20 MIN: CPT | Performed by: INTERNAL MEDICINE

## 2020-12-16 PROCEDURE — G0463 HOSPITAL OUTPT CLINIC VISIT: HCPCS

## 2020-12-16 NOTE — PROGRESS NOTES
"Follow Up [facility] Note     Chief Complaint:  NITA     Primary Care Physician: Nessa Gaston APRN    Interval History:   The patient is a 45 y.o. male who I last saw in .  The patient is describes no new issues.  He continues to rotate his work shift weekly between first second and third shifts.  M[place]field Sleepiness Scale is normal at 4.    Review of Systems:    A complete review of systems was done and all were negative with the exception of nasal congestion postnasal drip, some shortness of air, some ADAM, and some ear pain    Social History:    Social History     Socioeconomic History   • Marital status:      Spouse name: Not on file   • Number of children: Not on file   • Years of education: Not on file   • Highest education level: Not on file   Tobacco Use   • Smoking status: Former Smoker     Packs/day: 2.00     Years: 31.00     Pack years: 62.00     Types: Cigarettes     Quit date:      Years since quittin.9   • Smokeless tobacco: Never Used   • Tobacco comment: Quit    Substance and Sexual Activity   • Alcohol use: Yes     Comment: ONCE OR TWICE MONTHLY   • Drug use: No   • Sexual activity: Defer       Allergies:  Patient has no known allergies.     Medication Review: His list was reviewed.  He takes Ambien CR 12.5 mg, one half or 1 tab depending on how hours he is able to sleep    Vital Signs:    Vitals:    20 0828   Weight: 112 kg (247 lb)   Height: 185.4 cm (73\")     Body mass index is 32.59 kg/m².    Physical Exam:    Constitutional:  Well developed 45 y.o. male that appears in no apparent distress.  Awake & oriented times 3.  Normal mood with normal recent and remote memory and normal judgement.  Eyes:  Conjunctivae normal.  Oropharynx: Previously, moist mucous membranes without exudate and a large tongue and normal uvula and class III Mallampati airway, patient is wearing a facemask.     Results Review:  DME is [facility] and he uses a fullface mask.  Downloads " between 9/16 and 12/14/2020 compliance 100%.  Average usage is 6 hours and 48 minutes.  Average AHI is normal without leak.  Average AutoCPAP pressure is 12.1 and his auto CPAP is 9-15.    Impression:   Obstructive sleep apnea adequately treated with auto CPAP with good compliance and usage and no complaints of hypersomnolence.  Circadian rhythm sleep disorder, shiftwork type  Psychophysiologic insomnia adequately treated with Ambien CR 12.5 mg, one half or 1 tablet depending on available sleep hours per patient    Plan:  Good sleep hygiene measures should be maintained.  Weight loss would be beneficial in this patient who is obese by BMI.  The patient is benefiting from the treatment being provided.     The patient will continue auto CPAP and Ambien CR as prescribed    The patient will call for any problems and will follow up in 6 months.      Ronni Kong MD  Sleep Medicine  12/16/20  08:38 EST

## 2020-12-24 ENCOUNTER — PATIENT MESSAGE (OUTPATIENT)
Dept: ORTHOPEDIC SURGERY | Facility: CLINIC | Age: 45
End: 2020-12-24

## 2020-12-28 ENCOUNTER — TELEPHONE (OUTPATIENT)
Dept: ORTHOPEDIC SURGERY | Facility: CLINIC | Age: 45
End: 2020-12-28

## 2021-01-11 DIAGNOSIS — Z99.89 OSA ON CPAP: ICD-10-CM

## 2021-01-11 DIAGNOSIS — F51.04 PSYCHOPHYSIOLOGICAL INSOMNIA: ICD-10-CM

## 2021-01-11 DIAGNOSIS — G47.33 OSA ON CPAP: ICD-10-CM

## 2021-01-11 DIAGNOSIS — M17.0 ARTHRITIS OF BOTH KNEES: ICD-10-CM

## 2021-01-11 RX ORDER — DESONIDE 0.5 MG/G
CREAM TOPICAL
Qty: 60 EACH | Refills: 3 | Status: SHIPPED | OUTPATIENT
Start: 2021-01-11 | End: 2021-09-07

## 2021-01-11 RX ORDER — KETOCONAZOLE 20 MG/G
CREAM TOPICAL
Qty: 60 G | Refills: 3 | Status: SHIPPED | OUTPATIENT
Start: 2021-01-11 | End: 2021-09-07

## 2021-01-11 RX ORDER — ZOLPIDEM TARTRATE 12.5 MG/1
12.5 TABLET, FILM COATED, EXTENDED RELEASE ORAL NIGHTLY PRN
Qty: 90 TABLET | Refills: 1 | Status: SHIPPED | OUTPATIENT
Start: 2021-01-11 | End: 2021-07-19 | Stop reason: SDUPTHER

## 2021-01-28 ENCOUNTER — CLINICAL SUPPORT (OUTPATIENT)
Dept: ORTHOPEDIC SURGERY | Facility: CLINIC | Age: 46
End: 2021-01-28

## 2021-01-28 VITALS — HEIGHT: 73 IN | BODY MASS INDEX: 33.13 KG/M2 | WEIGHT: 250 LBS | TEMPERATURE: 96 F

## 2021-01-28 DIAGNOSIS — M17.0 PRIMARY OSTEOARTHRITIS OF KNEES, BILATERAL: Primary | ICD-10-CM

## 2021-01-28 DIAGNOSIS — M17.0 ARTHRITIS OF BOTH KNEES: ICD-10-CM

## 2021-01-28 PROCEDURE — 20610 DRAIN/INJ JOINT/BURSA W/O US: CPT | Performed by: NURSE PRACTITIONER

## 2021-01-28 PROCEDURE — 99213 OFFICE O/P EST LOW 20 MIN: CPT | Performed by: NURSE PRACTITIONER

## 2021-01-28 RX ORDER — METHYLPREDNISOLONE ACETATE 80 MG/ML
80 INJECTION, SUSPENSION INTRA-ARTICULAR; INTRALESIONAL; INTRAMUSCULAR; SOFT TISSUE
Status: COMPLETED | OUTPATIENT
Start: 2021-01-28 | End: 2021-01-28

## 2021-01-28 RX ADMIN — METHYLPREDNISOLONE ACETATE 80 MG: 80 INJECTION, SUSPENSION INTRA-ARTICULAR; INTRALESIONAL; INTRAMUSCULAR; SOFT TISSUE at 08:33

## 2021-01-28 RX ADMIN — METHYLPREDNISOLONE ACETATE 80 MG: 80 INJECTION, SUSPENSION INTRA-ARTICULAR; INTRALESIONAL; INTRAMUSCULAR; SOFT TISSUE at 08:34

## 2021-01-28 NOTE — PROGRESS NOTES
"Patient Name: Arelis Munson   YOB: 1975  Referring Primary Care Physician: Nessa Gaston, APRN  BMI: Body mass index is 32.98 kg/m².    Chief Complaint:    Chief Complaint   Patient presents with   • Right Knee - Follow-up   • Left Knee - Follow-up        HPI: Pt of SPM here for injections to both knees. These work well for him. Masks were worn throughout the visit.   Patient is 45 is very active and the injections last about 3 months.  Desires conservative treatment  Arelis Munson is a 45 y.o. male who presents today for evaluation of   Chief Complaint   Patient presents with   • Right Knee - Follow-up   • Left Knee - Follow-up         Subjective   Medications:   Home Medications:  Current Outpatient Medications on File Prior to Visit   Medication Sig   • albuterol sulfate  (90 Base) MCG/ACT inhaler 2 puffs every 4 hours as needed for SOA or wheezing. Generic.   • Ascorbic Acid (VITAMIN C PO) Take  by mouth.   • aspirin 81 MG EC tablet Take 81 mg by mouth Daily.   • B Complex Vitamins (VITAMIN B COMPLEX PO) Take  by mouth Daily. HOLD PRIOR TO SURGERY   • B-D 3CC LUER-PUMA SYR 22GX1\" 22G X 1\" 3 ML misc    • budesonide (Pulmicort Flexhaler) 180 MCG/ACT inhaler Use 1 or 2 inhalations each AM. Rinse and spit after use.   • chlorhexidine (PERIDEX) 0.12 % solution    • Cholecalciferol (VITAMIN D3) 5000 UNITS capsule capsule Take 5,000 Units by mouth Daily. HOLD PRIOR TO SURGERY   • cromolyn (OPTICROM) 4 % ophthalmic solution INSTILL ONE DROP IN EACH EYE FOUR TIMES A DAY   • desonide (DESOWEN) 0.05 % cream APPLY TO AFFECTED AREA(S) THREE TIMES A DAY   • Dextromethorphan-guaiFENesin (MUCINEX DM PO) Take  by mouth.   • Diclofenac Sodium (VOLTAREN) 1 % gel gel APPLY 4 GRAMS TO AFFECTED AREA(S) FOUR TIMES A DAY AS NEEDED FOR INFLAMMATION   • docusate sodium (COLACE) 250 MG capsule Take 250 mg by mouth Daily.   • esomeprazole (nexIUM) 40 MG capsule TAKE ONE CAPSULE BY MOUTH ONCE NIGHTLY   • etodolac " (LODINE) 500 MG tablet Take 1 tablet by mouth 2 (Two) Times a Day.   • ezetimibe-simvastatin (VYTORIN) 10-40 MG per tablet TAKE ONE TABLET BY MOUTH ONCE NIGHTLY   • FIBER PO Take  by mouth.   • FLONASE SENSIMIST 27.5 MCG/SPRAY nasal spray USE ONE SPRAY IN EACH NOSTRIL ONCE DAILY   • ketoconazole (NIZORAL) 2 % cream APPLY TO AFFECTED AREA(S) DAILY AS DIRECTED   • melatonin 1 MG tablet Take 3 mg by mouth Every Night. HOLD PRIOR TO SURGERY   • montelukast (SINGULAIR) 10 MG tablet TAKE ONE TABLET BY MOUTH ONCE NIGHTLY   • Multiple Vitamin (MULTI VITAMIN DAILY PO) Take  by mouth daily.   • olopatadine (PATANASE) 0.6 % solution nasal solution SPRAY TWO SPRAYS IN EACH NOSTRIL TWICE DAILY   • Potassium 99 MG tablet Take  by mouth.   • RESTASIS 0.05 % ophthalmic emulsion    • rizatriptan MLT (MAXALT-MLT) 10 MG disintegrating tablet DISSOLVE ONE TABLET BY MOUTH AS NEEDED FOR MIGRAINE; MAY REPEAT ONE TABLET IN 2 HOURS IF NEEDED.   • SALINE NASAL SPRAY NA into each nostril.   • selenium sulfide (SELSUN) 2.5 % lotion Apply  topically to the appropriate area as directed Daily As Needed for Dandruff.   • sildenafil (VIAGRA) 100 MG tablet TAKE ONE TABLET BY MOUTH AS NEEDED FOR ERECTILE DISFUNCTION   • Testosterone Cypionate (DEPOTESTOTERONE CYPIONATE) 200 MG/ML injection Inject  into the appropriate muscle as directed by prescriber Every 14 (Fourteen) Days.   • Unable to find 1 each 1 (One) Time. [employer] allergy relief, q AM   • zolpidem CR (Ambien CR) 12.5 MG CR tablet Take 1 tablet by mouth At Night As Needed for Sleep.   • Apple Cider Vinegar 500 MG tablet Take  by mouth.   • brompheniramine-pseudoephedrine-DM 30-2-10 MG/5ML syrup Take 10 mL by mouth 4 (Four) Times a Day As Needed for Congestion, Cough or Allergies.     No current facility-administered medications on file prior to visit.      Current Medications:  Scheduled Meds:  Continuous Infusions:No current facility-administered medications for this visit.     PRN  Meds:.    I have reviewed the patient's medical history in detail and updated the computerized patient record.  Review and summarization of old records includes:    Past Medical History:   Diagnosis Date   • Arthritis     OSTEO   • Asthma    • ED (erectile dysfunction)    • Elevated cholesterol    • Environmental allergies    • Fracture, fibula     RIGHT AND ANKLE   • Gastroesophageal reflux disease without esophagitis 2016   • Migraine with aura and without status migrainosus, not intractable 2016   • Pain and swelling of toe of right foot    • Seasonal allergies    • Sleep apnea     CPAP   • Testosterone deficiency 2016        Past Surgical History:   Procedure Laterality Date   • ANKLE ARTHROSCOPY Right 2018    Procedure: RT ANKLE ARTHROSCOPY SUBCHONDROPLASTY TALUS ;  Surgeon: Lebron Spivey Jr., MD;  [place] [facility] OR [place]:    • ANKLE LIGAMENT RECONSTRUCTION Right 2018    Procedure: HARDWARE REMOVAL POSS LATERAL LIGAMENT RECONSTRUCTION ;  Surgeon: Lebron Spivey Jr., MD;  [place] [facility] OR [place]:    • CARPAL TUNNEL RELEASE Right    • CUBITAL TUNNEL RELEASE Right     NERVE RELEASE   • EAR BIOPSY Right     MYRINGOTOMY W/BONES REPLACED INNER EAR   • KNEE SURGERY Right     MENISCUS REPAIR   • NOSE SURGERY     • ORIF FIBULA FRACTURE Right 2017    ANKLE INCLUDED        Social History     Occupational History   • Not on file   Tobacco Use   • Smoking status: Former Smoker     Packs/day: 2.00     Years: 31.00     Pack years: 62.00     Types: Cigarettes     Quit date:      Years since quittin.0   • Smokeless tobacco: Never Used   • Tobacco comment: Quit    Substance and Sexual Activity   • Alcohol use: Yes     Comment: ONCE OR TWICE MONTHLY   • Drug use: No   • Sexual activity: Defer      Social History     Social History Narrative   • Not on file        Family History   Problem Relation Age of Onset   • Lung cancer Mother    • Lung cancer Father    • Mabel  "Hyperthermia Neg Hx        ROS: 14 point review of systems was performed and all other systems were reviewed and are negative except for documented findings in HPI and today's encounter.     Allergies: No Known Allergies  Constitutional:  Denies fever, shaking or chills   Eyes:  Denies change in visual acuity   HENT:  Denies nasal congestion or sore throat   Respiratory:  Denies cough or shortness of breath   Cardiovascular:  Denies chest pain or severe LE edema   GI:  Denies abdominal pain, nausea, vomiting, bloody stools or diarrhea   Musculoskeletal:  Numbness, tingling, pain, or loss of motor function only as noted above in history of present illness.  : Denies painful urination or hematuria  Integument:  Denies rash, lesion or ulceration   Neurologic:  Denies headache or focal weakness  Endocrine:  Denies lymphadenopathy  Psych:  Denies confusion or change in mental status   Hem:  Denies active bleeding    OBJECTIVE:  Physical Exam: 45 y.o. male  Wt Readings from Last 3 Encounters:   01/28/21 113 kg (250 lb)   12/16/20 112 kg (247 lb)   10/07/20 111 kg (245 lb)     Ht Readings from Last 1 Encounters:   01/28/21 185.4 cm (73\")     Body mass index is 32.98 kg/m².  Vitals:    01/28/21 0828   Temp: 96 °F (35.6 °C)     Vital signs reviewed.     General Appearance:    Alert, cooperative, in no acute distress                  Eyes: conjunctiva clear  ENT: external ears and nose atraumatic  CV: no peripheral edema  Resp: normal respiratory effort  Skin: no rashes or wounds; normal turgor  Psych: mood and affect appropriate  Lymph: no nodes appreciated  Neuro: gross sensation intact  Vascular:  Palpable peripheral pulse in  skin is warm, dry and intact, calves soft and nttp, both knees with medial joint line tenderness, synovitis and effusion. Stiffness to knees with ambulation.noted extremity  Musculoskeletal Extremities:    Radiology: reviewed 10-7-20 DJD     Assessment:     ICD-10-CM ICD-9-CM   1. Primary " osteoarthritis of knees, bilateral  M17.0 715.16   2. Arthritis of both knees  M17.0 716.96        Large Joint Arthrocentesis: R knee  Date/Time: 1/28/2021 8:33 AM  Consent given by: patient  Site marked: site marked  Timeout: Immediately prior to procedure a time out was called to verify the correct patient, procedure, equipment, support staff and site/side marked as required   Supporting Documentation  Indications: pain   Procedure Details  [place] knee - R knee  Needle size: 25 G  Approach: anteromedial  Medications administered: 80 mg methylPREDNISolone acetate 80 MG/ML; 4 mL lidocaine (cardiac)      Large Joint Arthrocentesis: L knee  Date/Time: 1/28/2021 8:34 AM  Consent given by: patient  Site marked: site marked  Timeout: Immediately prior to procedure a time out was called to verify the correct patient, procedure, equipment, support staff and site/side marked as required   Supporting Documentation  Indications: pain   Procedure Details  [place] knee - L knee  Needle size: 25 G  Approach: anteromedial  Medications administered: 4 mL lidocaine (cardiac); 80 mg methylPREDNISolone acetate 80 MG/ML  Patient tolerance: patient tolerated the procedure well with no immediate complications             Plan: The diagnosis(es), natural history, pathophysiology and treatment for diagnosis(es) were discussed. Opportunity given and questions answered.  Biomechanics of pertinent body areas discussed.  When appropriate, the use of ambulatory aids discussed.  BMI:  The concept of BMI body mass index and its importance and implications discussed.    EXERCISES:  Advice on benefits of, and types of regular/moderate exercise pertaining to orthopedic diagnosis(es).  MEDICATIONS:  The risks, benefits, warnings,side effects and alternatives of medications discussed.  Inflammation/pain control; with cold, heat, elevation and/or liniments discussed as appropriate  Cortisone Injection. See procedure note.  MEDICAL RECORDS  reviewed from other provider(s) for past and current medical history pertinent to this complaint.      1/28/2021    Much of this encounter note is an electronic transcription/translation of spoken language to printed text. The electronic translation of spoken language may permit erroneous, or at times, nonsensical words or phrases to be inadvertently transcribed; Although I have reviewed the note for such errors, some may still exist    '

## 2021-02-15 ENCOUNTER — TELEPHONE (OUTPATIENT)
Dept: ORTHOPEDIC SURGERY | Facility: CLINIC | Age: 46
End: 2021-02-15

## 2021-02-15 NOTE — TELEPHONE ENCOUNTER
"----- Message from Arelis Munson sent at 2/15/2021 10:47 AM EST -----  Regarding: Test Results Question  Contact: [phone]  Please see my most recent work physical results and please let me know if you need anything additional.     Thank you,  Arelis Del Toro"Ankit\" Arpit  "

## 2021-02-24 RX ORDER — ALBUTEROL SULFATE 90 UG/1
AEROSOL, METERED RESPIRATORY (INHALATION)
Qty: 3 G | Refills: 1 | Status: SHIPPED | OUTPATIENT
Start: 2021-02-24 | End: 2021-09-07 | Stop reason: SDUPTHER

## 2021-03-01 RX ORDER — BUDESONIDE 180 UG/1
AEROSOL, POWDER RESPIRATORY (INHALATION)
Qty: 2 EACH | Refills: 3 | Status: SHIPPED | OUTPATIENT
Start: 2021-03-01

## 2021-03-15 DIAGNOSIS — M17.0 ARTHRITIS OF BOTH KNEES: ICD-10-CM

## 2021-04-14 DIAGNOSIS — M17.0 ARTHRITIS OF BOTH KNEES: ICD-10-CM

## 2021-04-29 ENCOUNTER — TELEPHONE (OUTPATIENT)
Dept: ORTHOPEDIC SURGERY | Facility: CLINIC | Age: 46
End: 2021-04-29

## 2021-04-29 NOTE — TELEPHONE ENCOUNTER
Caller: ADRY BLOOM    Relationship: SPOUSE     Best call back number: [phone]    What was the call regarding: SPOUSE RETURNED CALL TO [facility], PER APPT NOTES NEEDED TO KNOW DATE OF 2ND COVID VACCINE- 2ND VACCINE SCHEDULED FOR 05/07- PT'S CORTIZONE INJ SCHEDULED 05/03- SPOKE TO OFFICE ABOUT TIME FRAME- PT'S WIFE WOULD LIKE ADDITIONAL  GUIDANCE ON WHICH INJECTION PT SHOULD PROCEED WITH.. ATTEMPTED TO WARM TRANSFER- PLEASE CALL SPOUSE BACK TO DISCUSS-     Do you require a callback: YES

## 2021-04-29 NOTE — TELEPHONE ENCOUNTER
Spoke with pt's wife.  He is scheduled to see DAWSON on 5/3 for an injection and his 2nd covid vaccine on 5/7.  Is it OK for him to come back on 5/13 (1 day shy of a week) for his steroid injection or should I schedule him to see Lyn sometime the week on 5/17?  Please advise.

## 2021-04-29 NOTE — TELEPHONE ENCOUNTER
Spoke with pt's wife again and he was scheduled to see Lyn on 5/18, per [facility].  She is aware his appt is in [address].

## 2021-05-18 ENCOUNTER — CLINICAL SUPPORT (OUTPATIENT)
Dept: ORTHOPEDIC SURGERY | Facility: CLINIC | Age: 46
End: 2021-05-18

## 2021-05-18 VITALS — WEIGHT: 250 LBS | BODY MASS INDEX: 33.13 KG/M2 | HEIGHT: 73 IN

## 2021-05-18 DIAGNOSIS — M17.0 PRIMARY OSTEOARTHRITIS OF BOTH KNEES: Primary | ICD-10-CM

## 2021-05-18 DIAGNOSIS — R52 PAIN: ICD-10-CM

## 2021-05-18 PROCEDURE — 73562 X-RAY EXAM OF KNEE 3: CPT | Performed by: NURSE PRACTITIONER

## 2021-05-18 PROCEDURE — 20610 DRAIN/INJ JOINT/BURSA W/O US: CPT | Performed by: NURSE PRACTITIONER

## 2021-05-18 RX ORDER — LIDOCAINE HYDROCHLORIDE 20 MG/ML
4 INJECTION, SOLUTION EPIDURAL; INFILTRATION; INTRACAUDAL; PERINEURAL
Status: COMPLETED | OUTPATIENT
Start: 2021-05-18 | End: 2021-05-18

## 2021-05-18 RX ORDER — DEXTROMETHORPHAN HYDROBROMIDE AND PROMETHAZINE HYDROCHLORIDE 15; 6.25 MG/5ML; MG/5ML
SYRUP ORAL
COMMUNITY
Start: 2021-04-19 | End: 2021-06-16

## 2021-05-18 RX ORDER — METHYLPREDNISOLONE ACETATE 80 MG/ML
80 INJECTION, SUSPENSION INTRA-ARTICULAR; INTRALESIONAL; INTRAMUSCULAR; SOFT TISSUE
Status: COMPLETED | OUTPATIENT
Start: 2021-05-18 | End: 2021-05-18

## 2021-05-18 RX ORDER — SODIUM FLUORIDE 1.1 G/100G
GEL, DENTIFRICE ORAL
COMMUNITY
Start: 2021-05-16

## 2021-05-18 RX ADMIN — LIDOCAINE HYDROCHLORIDE 4 ML: 20 INJECTION, SOLUTION EPIDURAL; INFILTRATION; INTRACAUDAL; PERINEURAL at 15:03

## 2021-05-18 RX ADMIN — METHYLPREDNISOLONE ACETATE 80 MG: 80 INJECTION, SUSPENSION INTRA-ARTICULAR; INTRALESIONAL; INTRAMUSCULAR; SOFT TISSUE at 15:03

## 2021-05-18 NOTE — PROGRESS NOTES
5/18/2021    Arelis Munson is here today for worsening knee pain. Pt has undergone injection of the knee in the past with good resolution of symptoms. Pt is requesting a repeat injection.   Radiology: AP, lateral, 40 degree PA of bilateral knees obtained in the office today due to pain with prior comparison shows advanced osteoarthritis of bilateral knees    KNEE Injection Procedure Note:    Large Joint Arthrocentesis: R knee  Date/Time: 5/18/2021 3:03 PM  Consent given by: patient  Site marked: site marked  Timeout: Immediately prior to procedure a time out was called to verify the correct patient, procedure, equipment, support staff and site/side marked as required   Supporting Documentation  Indications: pain   Procedure Details  [place] knee - R knee  Preparation: Patient was prepped and draped in the usual sterile fashion  Needle gauge: 21G.  Approach: anterolateral  Medications administered: 80 mg methylPREDNISolone acetate 80 MG/ML; 4 mL lidocaine PF 2% 2 %  Patient tolerance: patient tolerated the procedure well with no immediate complications    Large Joint Arthrocentesis: L knee  Date/Time: 5/18/2021 3:03 PM  Consent given by: patient  Site marked: site marked  Timeout: Immediately prior to procedure a time out was called to verify the correct patient, procedure, equipment, support staff and site/side marked as required   Supporting Documentation  Indications: pain   Procedure Details  [place] knee - L knee  Preparation: Patient was prepped and draped in the usual sterile fashion  Needle gauge: 21G.  Approach: anterolateral  Medications administered: 80 mg methylPREDNISolone acetate 80 MG/ML; 4 mL lidocaine PF 2% 2 %  Patient tolerance: patient tolerated the procedure well with no immediate complications          At the conclusion of the injection I discussed the importance of continued quad strengthening exercises on a daily basis. I will see the patient back if the symptoms should fail to improve  or worsen.    Marylin Hernandez, APRN  5/18/2021

## 2021-06-01 DIAGNOSIS — Z00.00 ANNUAL PHYSICAL EXAM: Primary | ICD-10-CM

## 2021-06-01 RX ORDER — SILDENAFIL 100 MG/1
TABLET, FILM COATED ORAL
Qty: 6 TABLET | Refills: 3 | Status: SHIPPED | OUTPATIENT
Start: 2021-06-01 | End: 2021-10-06

## 2021-06-02 RX ORDER — SILDENAFIL 100 MG/1
100 TABLET, FILM COATED ORAL AS NEEDED
Qty: 6 TABLET | Refills: 3 | OUTPATIENT
Start: 2021-06-02

## 2021-06-14 DIAGNOSIS — M17.0 ARTHRITIS OF BOTH KNEES: ICD-10-CM

## 2021-06-14 RX ORDER — ETODOLAC 500 MG/1
TABLET, FILM COATED ORAL
Qty: 60 TABLET | Refills: 2 | Status: SHIPPED | OUTPATIENT
Start: 2021-06-14 | End: 2021-09-14 | Stop reason: SDUPTHER

## 2021-06-16 ENCOUNTER — OFFICE VISIT (OUTPATIENT)
Dept: SLEEP MEDICINE | Facility: HOSPITAL | Age: 46
End: 2021-06-16

## 2021-06-16 ENCOUNTER — OFFICE VISIT (OUTPATIENT)
Dept: FAMILY MEDICINE CLINIC | Facility: CLINIC | Age: 46
End: 2021-06-16

## 2021-06-16 VITALS
SYSTOLIC BLOOD PRESSURE: 126 MMHG | WEIGHT: 244 LBS | DIASTOLIC BLOOD PRESSURE: 84 MMHG | HEART RATE: 78 BPM | HEIGHT: 73 IN | BODY MASS INDEX: 32.34 KG/M2 | OXYGEN SATURATION: 96 %

## 2021-06-16 VITALS — OXYGEN SATURATION: 97 % | HEIGHT: 73 IN | BODY MASS INDEX: 33.13 KG/M2 | WEIGHT: 250 LBS | HEART RATE: 69 BPM

## 2021-06-16 DIAGNOSIS — G47.26 CIRCADIAN RHYTHM SLEEP DISORDER, SHIFT WORK TYPE: ICD-10-CM

## 2021-06-16 DIAGNOSIS — G47.33 OSA ON CPAP: Primary | ICD-10-CM

## 2021-06-16 DIAGNOSIS — F51.04 PSYCHOPHYSIOLOGICAL INSOMNIA: ICD-10-CM

## 2021-06-16 DIAGNOSIS — Z00.00 ANNUAL PHYSICAL EXAM: Primary | ICD-10-CM

## 2021-06-16 DIAGNOSIS — Z99.89 OSA ON CPAP: Primary | ICD-10-CM

## 2021-06-16 PROCEDURE — 99396 PREV VISIT EST AGE 40-64: CPT | Performed by: NURSE PRACTITIONER

## 2021-06-16 PROCEDURE — 99214 OFFICE O/P EST MOD 30 MIN: CPT | Performed by: INTERNAL MEDICINE

## 2021-06-16 NOTE — PROGRESS NOTES
"Follow Up [facility] Note     Chief Complaint:  NITA     Primary Care Physician: Nessa Gaston APRN    Interval History:   The patient is a 46 y.o. male  who I last saw 2020 and that note was reviewed.  The patient reports he is unchanged.  The patient goes to bed at 10 PM and awakens at 5 AM.  He does not use the restroom during the nighttime.    Self-administered Drew Sleepiness Scale test results: 3  0-5 Lower normal daytime sleepiness  6-10 Higher normal daytime sleepiness  11-12 Mild, 13-15 Moderate, & 16-24 Severe excessive daytime sleepiness    Review of Systems:    A complete review of systems was done and all were negative with the exception of nasal congestion postnasal drip and some ADAM    Social History:    Social History     Socioeconomic History   • Marital status:      Spouse name: Not on file   • Number of children: Not on file   • Years of education: Not on file   • Highest education level: Not on file   Tobacco Use   • Smoking status: Former Smoker     Packs/day: 2.00     Years: 31.00     Pack years: 62.00     Types: Cigarettes     Quit date:      Years since quittin.4   • Smokeless tobacco: Never Used   • Tobacco comment: Quit    Substance and Sexual Activity   • Alcohol use: Yes     Comment: ONCE OR TWICE MONTHLY   • Drug use: No   • Sexual activity: Defer       Allergies:  Patient has no known allergies.     Medication Review: His list was reviewed.  Ambien CR 12.5 mg nightly    Vital Signs:    Vitals:    21 0700   Pulse: 69   SpO2: 97%   Weight: 113 kg (250 lb)   Height: 185.4 cm (73\")     Body mass index is 32.98 kg/m².    Physical Exam:    Constitutional:  Well developed 46 y.o. male that appears in no apparent distress.  Awake & oriented times 3.  Normal mood with normal recent and remote memory and normal judgement.  Eyes:  Conjunctivae normal.  Oropharynx: Previously, moist mucous membranes without exudate and a large tongue and normal uvula " and class III Mallampati airway, patient is wearing a facemask.     Downloaded PAP Data Reviewed For Compliance:  DME is [facility] and he uses a fullface mask.  Downloads between 3/18 and 6/15/2021 compliance 100%.  Average usage is 6 hours and 59 minutes.  Average AHI is normal without a significant leak.  Average Auto CPAP pressure is 6 and his auto CPAP is 9-15.    I have reviewed the above results and compared them with the patient's last downloads and reviewed with the patient.    Impression:   Obstructive sleep apnea adequately treated with auto CPAP. The patient appears to be at goal with good compliance and usage. The patient has no complaints of hypersomnolence.  Circadian rhythm sleep disorder, shiftwork type, early morning awakening  Psychophysiologic insomnia adequately treated with Ambien CR 12.5 mg, one half or 1 tablet depending on available sleep hours per patient    Plan:  Good sleep hygiene measures should be maintained.  Weight loss would be beneficial in this patient who is obese by BMI.      After evaluating the patient and assessing results available, the patient is benefiting from the treatment being provided.     The patient will continue auto CPAP.  After clinical evaluation and review of downloads, I recommend no changes to the patient's pressures.  A new prescription will be sent to the patient's DME as needed.    The patient did ask me about his recent labs.  ALT mildly elevated at 53.  This is fluctuated back and forth being slightly abnormal versus normal.  Etodolac can increase ALT or AST.  Consider dropping to 1 tablet daily?  Additionally, ezetimibe-simvastatin may also contribute.  Consider decreasing to 1/2 tablet daily based on lipid profile?    The patient will continue Ambien CR 12.5 mg as he is doing.  Previously, I reviewed Mateo.  Prescription will be provided as needed.    I answered all of the patient's questions.  The patient will call for any problems and will follow up in 6  months.      Ronni Kong MD  Sleep Medicine  06/16/21  09:01 EDT

## 2021-06-16 NOTE — PROGRESS NOTES
"Chief Complaint  Annual Exam (FU)    Subjective          Arelis Munson presents to [facility]  Mr. Munson presents today for an annual physical exam with lab review. Denies chest pain, shortness of breath, abdominal pain or headaches.     I have reviewed the patient's medical history in detail and updated the computerized patient record.    Current Outpatient Medications:   •  albuterol sulfate  (90 Base) MCG/ACT inhaler, 2 puffs every 4 hours as needed for SOA or wheezing. Generic., Disp: 3 g, Rfl: 1  •  Ascorbic Acid (VITAMIN C PO), Take  by mouth., Disp: , Rfl:   •  aspirin 81 MG EC tablet, Take 81 mg by mouth Daily., Disp: , Rfl:   •  B Complex Vitamins (VITAMIN B COMPLEX PO), Take  by mouth Daily. HOLD PRIOR TO SURGERY, Disp: , Rfl:   •  B-D 3CC LUER-PUMA SYR 22GX1\" 22G X 1\" 3 ML misc, , Disp: , Rfl:   •  budesonide (Pulmicort Flexhaler) 180 MCG/ACT inhaler, Use 1 or 2 inhalations each AM. Rinse and spit after use., Disp: 2 each, Rfl: 3  •  chlorhexidine (PERIDEX) 0.12 % solution, , Disp: , Rfl:   •  Cholecalciferol (VITAMIN D3) 5000 UNITS capsule capsule, Take 5,000 Units by mouth Daily. HOLD PRIOR TO SURGERY, Disp: , Rfl:   •  cromolyn (OPTICROM) 4 % ophthalmic solution, INSTILL ONE DROP IN EACH EYE FOUR TIMES A DAY, Disp: 10 mL, Rfl: 11  •  Denta 5000 Plus 1.1 % cream, , Disp: , Rfl:   •  desonide (DESOWEN) 0.05 % cream, APPLY TO AFFECTED AREA(S) THREE TIMES A DAY, Disp: 60 each, Rfl: 3  •  Diclofenac Sodium (VOLTAREN) 1 % gel gel, APPLY 4 GRAMS TO AFFECTED AREA(S) FOUR TIMES A DAY AS NEEDED FOR INFLAMMATION, Disp: 100 g, Rfl: 3  •  diphenhydrAMINE HCl (BENADRYL ALLERGY PO), Take 1 tablet by mouth Daily., Disp: , Rfl:   •  docusate sodium (COLACE) 250 MG capsule, Take 250 mg by mouth Daily., Disp: , Rfl:   •  esomeprazole (nexIUM) 40 MG capsule, TAKE ONE CAPSULE BY MOUTH ONCE NIGHTLY, Disp: 30 capsule, Rfl: 11  •  etodolac (LODINE) 500 MG tablet, TAKE ONE TABLET BY " MOUTH TWICE A DAY, Disp: 60 tablet, Rfl: 2  •  FIBER PO, Take  by mouth., Disp: , Rfl:   •  FLONASE SENSIMIST 27.5 MCG/SPRAY nasal spray, USE ONE SPRAY IN EACH NOSTRIL ONCE DAILY, Disp: 9.1 mL, Rfl: 12  •  ketoconazole (NIZORAL) 2 % cream, APPLY TO AFFECTED AREA(S) DAILY AS DIRECTED, Disp: 60 g, Rfl: 3  •  melatonin 1 MG tablet, Take 3 mg by mouth Every Night. HOLD PRIOR TO SURGERY, Disp: , Rfl:   •  montelukast (SINGULAIR) 10 MG tablet, TAKE ONE TABLET BY MOUTH ONCE NIGHTLY, Disp: 30 tablet, Rfl: 11  •  Multiple Vitamin (MULTI VITAMIN DAILY PO), Take  by mouth daily., Disp: , Rfl:   •  olopatadine (PATANASE) 0.6 % solution nasal solution, SPRAY TWO SPRAYS IN EACH NOSTRIL TWICE DAILY, Disp: 30.5 g, Rfl: 11  •  Potassium 99 MG tablet, Take  by mouth., Disp: , Rfl:   •  RESTASIS 0.05 % ophthalmic emulsion, , Disp: , Rfl:   •  rizatriptan MLT (MAXALT-MLT) 10 MG disintegrating tablet, DISSOLVE ONE TABLET BY MOUTH AS NEEDED FOR MIGRAINE; MAY REPEAT ONE TABLET IN 2 HOURS IF NEEDED., Disp: 27 tablet, Rfl: 2  •  SALINE NASAL SPRAY NA, into each nostril., Disp: , Rfl:   •  selenium sulfide (SELSUN) 2.5 % lotion, Apply  topically to the appropriate area as directed Daily As Needed for Dandruff., Disp: 354 mL, Rfl: 3  •  sildenafil (VIAGRA) 100 MG tablet, TAKE 1 TABLET BY MOUTH AS NEEDED FOR ERECTILE DYSFUNCTION, Disp: 6 tablet, Rfl: 3  •  Testosterone Cypionate (DEPOTESTOTERONE CYPIONATE) 200 MG/ML injection, Inject  into the appropriate muscle as directed by prescriber Every 14 (Fourteen) Days., Disp: , Rfl:   •  Unable to find, 1 each 1 (One) Time. [employer] allergy relief, q AM, Disp: , Rfl:   •  zolpidem CR (Ambien CR) 12.5 MG CR tablet, Take 1 tablet by mouth At Night As Needed for Sleep., Disp: 90 tablet, Rfl: 1  •  ezetimibe-simvastatin (VYTORIN) 10-40 MG per tablet, TAKE ONE TABLET BY MOUTH ONCE NIGHTLY, Disp: 30 tablet, Rfl: 11     Objective   Vital Signs:   /84 (BP [place] Left arm, Patient Position: Sitting,  "Cuff Size: Adult)   Pulse 78   Ht 185.4 cm (73\")   Wt 111 kg (244 lb)   SpO2 96%   BMI 32.19 kg/m²       Physical Exam  Vitals reviewed.   Constitutional:       Appearance: Normal appearance. He is obese.   HENT:      Right Ear: Tympanic membrane normal.      Left Ear: Tympanic membrane normal.   Cardiovascular:      Rate and Rhythm: Normal rate and regular rhythm.      Pulses: Normal pulses.      Heart sounds: Normal heart sounds.   Pulmonary:      Effort: Pulmonary effort is normal.      Breath sounds: Normal breath sounds.   Skin:     General: Skin is warm and dry.   Neurological:      Mental Status: He is alert and oriented to person, place, and time.   Psychiatric:         Mood and Affect: Mood normal.         Behavior: Behavior normal.         Thought Content: Thought content normal.         Judgment: Judgment normal.        Result Review :     Common labs    Common Labsle 6/7/21 6/7/21 6/7/21    1539 1539 1539   Glucose  78    BUN  12    Creatinine  1.08    eGFR Non  Am  81    eGFR African Am  93    Sodium  140    Potassium  4.7    Chloride  100    Calcium  9.5    Total Protein  6.4    Albumin  4.9    Total Bilirubin  0.3    Alkaline Phosphatase  52    AST (SGOT)  29    ALT (SGPT)  53 (A)    WBC 7.8     Hemoglobin 16.0     Hematocrit 46.5     Platelets 340     Total Cholesterol   121   Triglycerides   95   HDL Cholesterol   48   LDL Cholesterol    55   (A) Abnormal value       Comments are available for some flowsheets but are not being displayed.                     Assessment and Plan    Diagnoses and all orders for this visit:    1. Annual physical exam (Primary)    Mr. Munson appears to be doing well today.  His physical exam is unremarkable today.   I have reviewed his labs with him today. All of his labs are at goal. His AST is slightly elevated. I will continue to monitor his lever enzymes.   He is to use diet and exercise to manage his weight, lipid levels and blood pressure.     Follow " Up   Return in about 1 year (around 6/16/2022) for Annual physical, Fasting labs 1 week prior to next f/u.  Patient was given instructions and counseling regarding his condition or for health maintenance advice. Please see specific information pulled into the AVS if appropriate.

## 2021-06-17 RX ORDER — EZETIMIBE AND SIMVASTATIN 10; 40 MG/1; MG/1
TABLET ORAL
Qty: 30 TABLET | Refills: 11 | Status: SHIPPED | OUTPATIENT
Start: 2021-06-17 | End: 2022-06-15

## 2021-07-19 DIAGNOSIS — G47.33 OSA ON CPAP: ICD-10-CM

## 2021-07-19 DIAGNOSIS — F51.04 PSYCHOPHYSIOLOGICAL INSOMNIA: ICD-10-CM

## 2021-07-19 DIAGNOSIS — Z99.89 OSA ON CPAP: ICD-10-CM

## 2021-07-19 RX ORDER — ZOLPIDEM TARTRATE 12.5 MG/1
12.5 TABLET, FILM COATED, EXTENDED RELEASE ORAL NIGHTLY PRN
Qty: 90 TABLET | Refills: 1 | Status: SHIPPED | OUTPATIENT
Start: 2021-07-19 | End: 2022-01-17 | Stop reason: SDUPTHER

## 2021-08-16 RX ORDER — MONTELUKAST SODIUM 10 MG/1
TABLET ORAL
Qty: 30 TABLET | Refills: 11 | Status: SHIPPED | OUTPATIENT
Start: 2021-08-16 | End: 2022-08-17

## 2021-08-19 ENCOUNTER — OFFICE VISIT (OUTPATIENT)
Dept: ORTHOPEDIC SURGERY | Facility: CLINIC | Age: 46
End: 2021-08-19

## 2021-08-19 VITALS — WEIGHT: 250 LBS | TEMPERATURE: 98.2 F | HEIGHT: 73 IN | BODY MASS INDEX: 33.13 KG/M2

## 2021-08-19 DIAGNOSIS — M17.0 PRIMARY OSTEOARTHRITIS OF BOTH KNEES: Primary | ICD-10-CM

## 2021-08-19 PROCEDURE — 20610 DRAIN/INJ JOINT/BURSA W/O US: CPT | Performed by: NURSE PRACTITIONER

## 2021-08-19 RX ORDER — METHYLPREDNISOLONE ACETATE 80 MG/ML
80 INJECTION, SUSPENSION INTRA-ARTICULAR; INTRALESIONAL; INTRAMUSCULAR; SOFT TISSUE
Status: COMPLETED | OUTPATIENT
Start: 2021-08-19 | End: 2021-08-19

## 2021-08-19 RX ADMIN — METHYLPREDNISOLONE ACETATE 80 MG: 80 INJECTION, SUSPENSION INTRA-ARTICULAR; INTRALESIONAL; INTRAMUSCULAR; SOFT TISSUE at 15:33

## 2021-08-19 NOTE — PROGRESS NOTES
"Patient Name: Arelis Munson   YOB: 1975  Referring Primary Care Physician: Nessa Gaston, APRN  BMI: Body mass index is 32.98 kg/m².    Chief Complaint:    Chief Complaint   Patient presents with   • Left Knee - Follow-up   • Right Knee - Follow-up        HPI:  Pt of [facility] here for injections to both knees. These work well for him. Masks were worn throughout the visit.   Patient is 45 is very active and the injections last about 3 months.  Desires conservative treatment    Arelis Munson is a 46 y.o. male who presents today for evaluation of   Chief Complaint   Patient presents with   • Left Knee - Follow-up   • Right Knee - Follow-up         Subjective   Medications:   Home Medications:  Current Outpatient Medications on File Prior to Visit   Medication Sig   • albuterol sulfate  (90 Base) MCG/ACT inhaler 2 puffs every 4 hours as needed for SOA or wheezing. Generic.   • Ascorbic Acid (VITAMIN C PO) Take  by mouth.   • aspirin 81 MG EC tablet Take 81 mg by mouth Daily.   • B Complex Vitamins (VITAMIN B COMPLEX PO) Take  by mouth Daily. HOLD PRIOR TO SURGERY   • B-D 3CC LUER-PUMA SYR 22GX1\" 22G X 1\" 3 ML misc    • budesonide (Pulmicort Flexhaler) 180 MCG/ACT inhaler Use 1 or 2 inhalations each AM. Rinse and spit after use.   • chlorhexidine (PERIDEX) 0.12 % solution    • Cholecalciferol (VITAMIN D3) 5000 UNITS capsule capsule Take 5,000 Units by mouth Daily. HOLD PRIOR TO SURGERY   • cromolyn (OPTICROM) 4 % ophthalmic solution INSTILL ONE DROP IN EACH EYE FOUR TIMES A DAY   • Denta 5000 Plus 1.1 % cream    • desonide (DESOWEN) 0.05 % cream APPLY TO AFFECTED AREA(S) THREE TIMES A DAY   • Diclofenac Sodium (VOLTAREN) 1 % gel gel APPLY 4 GRAMS TO AFFECTED AREA(S) FOUR TIMES A DAY AS NEEDED FOR INFLAMMATION   • diphenhydrAMINE HCl (BENADRYL ALLERGY PO) Take 1 tablet by mouth Daily.   • docusate sodium (COLACE) 250 MG capsule Take 250 mg by mouth Daily.   • esomeprazole (nexIUM) 40 MG capsule TAKE " ONE CAPSULE BY MOUTH ONCE NIGHTLY   • etodolac (LODINE) 500 MG tablet TAKE ONE TABLET BY MOUTH TWICE A DAY   • ezetimibe-simvastatin (VYTORIN) 10-40 MG per tablet TAKE ONE TABLET BY MOUTH ONCE NIGHTLY   • FIBER PO Take  by mouth.   • FLONASE SENSIMIST 27.5 MCG/SPRAY nasal spray USE ONE SPRAY IN EACH NOSTRIL ONCE DAILY   • ketoconazole (NIZORAL) 2 % cream APPLY TO AFFECTED AREA(S) DAILY AS DIRECTED   • melatonin 1 MG tablet Take 3 mg by mouth Every Night. HOLD PRIOR TO SURGERY   • montelukast (SINGULAIR) 10 MG tablet TAKE ONE TABLET BY MOUTH ONCE NIGHTLY   • Multiple Vitamin (MULTI VITAMIN DAILY PO) Take  by mouth daily.   • olopatadine (PATANASE) 0.6 % solution nasal solution SPRAY TWO SPRAYS IN EACH NOSTRIL TWICE DAILY   • Potassium 99 MG tablet Take  by mouth.   • RESTASIS 0.05 % ophthalmic emulsion    • rizatriptan MLT (MAXALT-MLT) 10 MG disintegrating tablet DISSOLVE ONE TABLET BY MOUTH AS NEEDED FOR MIGRAINE; MAY REPEAT ONE TABLET IN 2 HOURS IF NEEDED.   • SALINE NASAL SPRAY NA into each nostril.   • selenium sulfide (SELSUN) 2.5 % lotion Apply  topically to the appropriate area as directed Daily As Needed for Dandruff.   • sildenafil (VIAGRA) 100 MG tablet TAKE 1 TABLET BY MOUTH AS NEEDED FOR ERECTILE DYSFUNCTION   • Testosterone Cypionate (DEPOTESTOTERONE CYPIONATE) 200 MG/ML injection Inject  into the appropriate muscle as directed by prescriber Every 14 (Fourteen) Days.   • Unable to find 1 each 1 (One) Time. [place] allergy relief, q AM   • zolpidem CR (Ambien CR) 12.5 MG CR tablet Take 1 tablet by mouth At Night As Needed for Sleep.     No current facility-administered medications on file prior to visit.     Current Medications:  Scheduled Meds:  Continuous Infusions:No current facility-administered medications for this visit.    PRN Meds:.    I have reviewed the patient's medical history in detail and updated the computerized patient record.  Review and summarization of old records includes:    Past  Medical History:   Diagnosis Date   • Arthritis     OSTEO   • Asthma    • ED (erectile dysfunction)    • Elevated cholesterol    • Environmental allergies    • Fracture, fibula     RIGHT AND ANKLE   • Gastroesophageal reflux disease without esophagitis 2016   • Migraine with aura and without status migrainosus, not intractable 2016   • Pain and swelling of toe of right foot    • Seasonal allergies    • Sleep apnea     CPAP   • Testosterone deficiency 2016        Past Surgical History:   Procedure Laterality Date   • ANKLE ARTHROSCOPY Right 2018    Procedure: RT ANKLE ARTHROSCOPY SUBCHONDROPLASTY TALUS ;  Surgeon: Lebron Spivey Jr., MD;  [place] [facility];  Service:    • ANKLE LIGAMENT RECONSTRUCTION Right 2018    Procedure: HARDWARE REMOVAL POSS LATERAL LIGAMENT RECONSTRUCTION ;  Surgeon: Lebron Spivey Jr., MD;  [place] [facility];  Service:    • CARPAL TUNNEL RELEASE Right    • CUBITAL TUNNEL RELEASE Right     NERVE RELEASE   • EAR BIOPSY Right     MYRINGOTOMY W/BONES REPLACED INNER EAR   • KNEE SURGERY Right     MENISCUS REPAIR   • NOSE SURGERY     • ORIF FIBULA FRACTURE Right 2017    ANKLE INCLUDED        Social History     Occupational History   • Not on file   Tobacco Use   • Smoking status: Former Smoker     Packs/day: 2.00     Years: 31.00     Pack years: 62.00     Types: Cigarettes     Quit date:      Years since quittin.6   • Smokeless tobacco: Never Used   • Tobacco comment: Quit    Substance and Sexual Activity   • Alcohol use: Yes     Comment: ONCE OR TWICE MONTHLY   • Drug use: No   • Sexual activity: Defer      Social History     Social History Narrative   • Not on file        Family History   Problem Relation Age of Onset   • Lung cancer Mother    • Lung cancer Father    • Malig Hyperthermia Neg Hx        ROS: 14 point review of systems was performed and all other systems were reviewed and are negative except for documented findings in HPI and  "today's encounter.     Allergies: No Known Allergies  Constitutional:  Denies fever, shaking or chills   Eyes:  Denies change in visual acuity   HENT:  Denies nasal congestion or sore throat   Respiratory:  Denies cough or shortness of breath   Cardiovascular:  Denies chest pain or severe LE edema   GI:  Denies abdominal pain, nausea, vomiting, bloody stools or diarrhea   Musculoskeletal:  Numbness, tingling, pain, or loss of motor function only as noted above in history of present illness.  : Denies painful urination or hematuria  Integument:  Denies rash, lesion or ulceration   Neurologic:  Denies headache or focal weakness  Endocrine:  Denies lymphadenopathy  Psych:  Denies confusion or change in mental status   Hem:  Denies active bleeding    OBJECTIVE:  Physical Exam: 46 y.o. male  Wt Readings from Last 3 Encounters:   08/19/21 113 kg (250 lb)   06/16/21 111 kg (244 lb)   06/16/21 113 kg (250 lb)     Ht Readings from Last 1 Encounters:   08/19/21 185.4 cm (73\")     Body mass index is 32.98 kg/m².  Vitals:    08/19/21 1533   Temp: 98.2 °F (36.8 °C)     Vital signs reviewed.     General Appearance:    Alert, cooperative, in no acute distress                  Eyes: conjunctiva clear  ENT: external ears and nose atraumatic  CV: no peripheral edema  Resp: normal respiratory effort  Skin: no rashes or wounds; normal turgor  Psych: mood and affect appropriate  Lymph: no nodes appreciated  Neuro: gross sensation intact  Vascular:  Palpable peripheral pulse in noted extremity  Musculoskeletal Extremities: skin is warm, dry and intact, calves soft and nttp, both knees with medial joint line tenderness, synovitis and effusion. Stiffness to knees with ambulation.    Radiology:   5-18-21 DJD tricompartmental        Assessment:     ICD-10-CM ICD-9-CM   1. Primary osteoarthritis of both knees  M17.0 715.16        MDM/Plan:   The diagnosis(es), natural history, pathophysiology and treatment for diagnosis(es) were discussed. " Opportunity given and questions answered.  Biomechanics of pertinent body areas discussed.  When appropriate, the use of ambulatory aids discussed.    Large Joint Arthrocentesis: R knee  Date/Time: 8/19/2021 3:33 PM  Consent given by: patient  Site marked: site marked  Timeout: Immediately prior to procedure a time out was called to verify the correct patient, procedure, equipment, support staff and site/side marked as required   Supporting Documentation  Indications: pain   Procedure Details  [place] knee - R knee  Preparation: Patient was prepped and draped in the usual sterile fashion  Needle size: 22 G  Approach: anteromedial  Medications administered: 80 mg methylPREDNISolone acetate 80 MG/ML; 4 mL lidocaine (cardiac)      Large Joint Arthrocentesis: L knee  Date/Time: 8/19/2021 3:33 PM  Consent given by: patient  Site marked: site marked  Timeout: Immediately prior to procedure a time out was called to verify the correct patient, procedure, equipment, support staff and site/side marked as required   Supporting Documentation  Indications: pain   Procedure Details  [place] knee - L knee  Preparation: Patient was prepped and draped in the usual sterile fashion  Needle size: 22 G  Approach: anteromedial  Medications administered: 80 mg methylPREDNISolone acetate 80 MG/ML; 4 mL lidocaine (cardiac)  Patient tolerance: patient tolerated the procedure well with no immediate complications            The diagnosis(es), natural history, pathophysiology and treatment for diagnosis(es) were discussed. Opportunity given and questions answered.  Biomechanics of pertinent body areas discussed.  When appropriate, the use of ambulatory aids discussed.  BMI:  The concept of BMI body mass index and its importance and implications discussed.    EXERCISES:  Advice on benefits of, and types of regular/moderate exercise pertaining to orthopedic diagnosis(es).  MEDICATIONS:  The risks, benefits, warnings,side effects and  alternatives of medications discussed.  Inflammation/pain control; with cold, heat, elevation and/or liniments discussed as appropriate  Cortisone Injection. See procedure note.  HOME EXERCISE/PT program encouraged  MEDICAL RECORDS reviewed from other provider(s) for past and current medical history pertinent to this complaint.      8/19/2021    Much of this encounter note is an electronic transcription/translation of spoken language to printed text. The electronic translation of spoken language may permit erroneous, or at times, nonsensical words or phrases to be inadvertently transcribed; Although I have reviewed the note for such errors, some may still exist

## 2021-09-07 DIAGNOSIS — M17.0 ARTHRITIS OF BOTH KNEES: ICD-10-CM

## 2021-09-07 RX ORDER — ALBUTEROL SULFATE 90 UG/1
AEROSOL, METERED RESPIRATORY (INHALATION)
Qty: 3 G | Refills: 1 | Status: SHIPPED | OUTPATIENT
Start: 2021-09-07 | End: 2021-12-07 | Stop reason: SDUPTHER

## 2021-09-07 RX ORDER — KETOCONAZOLE 20 MG/G
CREAM TOPICAL
Qty: 60 G | Refills: 3 | Status: SHIPPED | OUTPATIENT
Start: 2021-09-07

## 2021-09-07 RX ORDER — CROMOLYN SODIUM 40 MG/ML
SOLUTION/ DROPS OPHTHALMIC
Qty: 10 ML | Refills: 6 | Status: SHIPPED | OUTPATIENT
Start: 2021-09-07

## 2021-09-07 RX ORDER — OLOPATADINE HYDROCHLORIDE 665 UG/1
SPRAY NASAL
Qty: 30.5 G | Refills: 6 | Status: SHIPPED | OUTPATIENT
Start: 2021-09-07

## 2021-09-07 RX ORDER — DESONIDE 0.5 MG/G
CREAM TOPICAL
Qty: 60 EACH | Refills: 6 | Status: SHIPPED | OUTPATIENT
Start: 2021-09-07

## 2021-09-08 RX ORDER — ESOMEPRAZOLE MAGNESIUM 40 MG/1
CAPSULE, DELAYED RELEASE ORAL
Qty: 30 CAPSULE | Refills: 11 | Status: SHIPPED | OUTPATIENT
Start: 2021-09-08 | End: 2022-09-08

## 2021-09-14 DIAGNOSIS — M17.0 ARTHRITIS OF BOTH KNEES: ICD-10-CM

## 2021-09-14 RX ORDER — ETODOLAC 500 MG/1
500 TABLET, FILM COATED ORAL 2 TIMES DAILY
Qty: 60 TABLET | Refills: 2 | Status: SHIPPED | OUTPATIENT
Start: 2021-09-14 | End: 2021-12-15 | Stop reason: SDUPTHER

## 2021-09-18 ENCOUNTER — PATIENT MESSAGE (OUTPATIENT)
Dept: ORTHOPEDIC SURGERY | Facility: CLINIC | Age: 46
End: 2021-09-18

## 2021-09-20 RX ORDER — DICLOFENAC SODIUM 20 MG/G
1 SOLUTION TOPICAL 2 TIMES DAILY
Qty: 112 G | Refills: 2 | Status: SHIPPED | OUTPATIENT
Start: 2021-09-20 | End: 2022-08-30

## 2021-10-06 RX ORDER — SILDENAFIL 100 MG/1
TABLET, FILM COATED ORAL
Qty: 6 TABLET | Refills: 3 | Status: SHIPPED | OUTPATIENT
Start: 2021-10-06 | End: 2022-02-07

## 2021-10-14 ENCOUNTER — OFFICE VISIT (OUTPATIENT)
Dept: FAMILY MEDICINE CLINIC | Facility: CLINIC | Age: 46
End: 2021-10-14

## 2021-10-14 VITALS
OXYGEN SATURATION: 95 % | HEART RATE: 80 BPM | BODY MASS INDEX: 33 KG/M2 | HEIGHT: 73 IN | DIASTOLIC BLOOD PRESSURE: 96 MMHG | WEIGHT: 249 LBS | SYSTOLIC BLOOD PRESSURE: 124 MMHG | TEMPERATURE: 97.8 F

## 2021-10-14 DIAGNOSIS — Z20.822 SUSPECTED COVID-19 VIRUS INFECTION: Primary | ICD-10-CM

## 2021-10-14 DIAGNOSIS — J00 ACUTE NASOPHARYNGITIS: ICD-10-CM

## 2021-10-14 PROCEDURE — 99213 OFFICE O/P EST LOW 20 MIN: CPT | Performed by: NURSE PRACTITIONER

## 2021-10-14 NOTE — PROGRESS NOTES
"Chief Complaint  Cough, Sore Throat, and Nasal Congestion    Subjective          Arelis Munson presents to [facility]  URI   This is a new problem. The current episode started yesterday. The problem has been waxing and waning. There has been no fever. Associated symptoms include congestion, coughing (dry cough), headaches and a sore throat. Pertinent negatives include no plugged ear sensation, rhinorrhea, sneezing or wheezing. Associated symptoms comments: Post nasal drainage, no chills, body aches, no loss of taste or smell, history of COVID in April. He has tried antihistamine, decongestant and acetaminophen (Dimatapp, ) for the symptoms. The treatment provided mild relief.     I have reviewed the patient's medical history in detail and updated the computerized patient record.    Current Outpatient Medications:   •  albuterol sulfate  (90 Base) MCG/ACT inhaler, 2 puffs every 4 hours as needed for SOA or wheezing. Generic., Disp: 3 g, Rfl: 1  •  Ascorbic Acid (VITAMIN C PO), Take  by mouth., Disp: , Rfl:   •  aspirin 81 MG EC tablet, Take 81 mg by mouth Daily., Disp: , Rfl:   •  B Complex Vitamins (VITAMIN B COMPLEX PO), Take  by mouth Daily. HOLD PRIOR TO SURGERY, Disp: , Rfl:   •  B-D 3CC LUER-PUMA SYR 22GX1\" 22G X 1\" 3 ML misc, , Disp: , Rfl:   •  budesonide (Pulmicort Flexhaler) 180 MCG/ACT inhaler, Use 1 or 2 inhalations each AM. Rinse and spit after use., Disp: 2 each, Rfl: 3  •  chlorhexidine (PERIDEX) 0.12 % solution, , Disp: , Rfl:   •  Cholecalciferol (VITAMIN D3) 5000 UNITS capsule capsule, Take 5,000 Units by mouth Daily. HOLD PRIOR TO SURGERY, Disp: , Rfl:   •  cromolyn (OPTICROM) 4 % ophthalmic solution, PLACE ONE DROP IN EACH EYE FOUR TIMES A DAY, Disp: 10 mL, Rfl: 6  •  Denta 5000 Plus 1.1 % cream, , Disp: , Rfl:   •  desonide (DESOWEN) 0.05 % cream, APPLY TO AFFECTED AREA(S) THREE TIMES A DAY, Disp: 60 each, Rfl: 6  •  Diclofenac Sodium (Pen[place]aid) 2 % " solution, Apply 1 application topically 2 (Two) Times a Day., Disp: 112 g, Rfl: 2  •  diphenhydrAMINE HCl (BENADRYL ALLERGY PO), Take 1 tablet by mouth Daily., Disp: , Rfl:   •  docusate sodium (COLACE) 250 MG capsule, Take 250 mg by mouth Daily., Disp: , Rfl:   •  esomeprazole (nexIUM) 40 MG capsule, TAKE ONE CAPSULE BY MOUTH ONCE NIGHTLY, Disp: 30 capsule, Rfl: 11  •  etodolac (LODINE) 500 MG tablet, Take 1 tablet by mouth 2 (Two) Times a Day., Disp: 60 tablet, Rfl: 2  •  ezetimibe-simvastatin (VYTORIN) 10-40 MG per tablet, TAKE ONE TABLET BY MOUTH ONCE NIGHTLY, Disp: 30 tablet, Rfl: 11  •  FIBER PO, Take  by mouth., Disp: , Rfl:   •  FLONASE SENSIMIST 27.5 MCG/SPRAY nasal spray, USE ONE SPRAY IN EACH NOSTRIL ONCE DAILY, Disp: 9.1 mL, Rfl: 12  •  ketoconazole (NIZORAL) 2 % cream, APPLY TO AFFECTED AREA(S) DAILY AS DIRECTED, Disp: 60 g, Rfl: 3  •  melatonin 1 MG tablet, Take 3 mg by mouth Every Night. HOLD PRIOR TO SURGERY, Disp: , Rfl:   •  montelukast (SINGULAIR) 10 MG tablet, TAKE ONE TABLET BY MOUTH ONCE NIGHTLY, Disp: 30 tablet, Rfl: 11  •  Multiple Vitamin (MULTI VITAMIN DAILY PO), Take  by mouth daily., Disp: , Rfl:   •  olopatadine (PATANASE) 0.6 % solution nasal solution, SPRAY TWO SPRAYS IN EACH NOSTRIL TWICE DAILY, Disp: 30.5 g, Rfl: 6  •  Potassium 99 MG tablet, Take  by mouth., Disp: , Rfl:   •  RESTASIS 0.05 % ophthalmic emulsion, , Disp: , Rfl:   •  rizatriptan MLT (MAXALT-MLT) 10 MG disintegrating tablet, DISSOLVE ONE TABLET BY MOUTH AS NEEDED FOR MIGRAINE; MAY REPEAT ONE TABLET IN 2 HOURS IF NEEDED., Disp: 27 tablet, Rfl: 2  •  SALINE NASAL SPRAY NA, into each nostril., Disp: , Rfl:   •  sildenafil (VIAGRA) 100 MG tablet, TAKE 1 TABLET BY MOUTH AS NEEDED FOR ERECTILE DYSFUNCTION, Disp: 6 tablet, Rfl: 3  •  Testosterone Cypionate (DEPOTESTOTERONE CYPIONATE) 200 MG/ML injection, Inject  into the appropriate muscle as directed by prescriber Every 14 (Fourteen) Days., Disp: , Rfl:   •  Unable to find,  "1 each 1 (One) Time. [place] allergy relief, q AM, Disp: , Rfl:   •  zolpidem CR (Ambien CR) 12.5 MG CR tablet, Take 1 tablet by mouth At Night As Needed for Sleep., Disp: 90 tablet, Rfl: 1  •  selenium sulfide (SELSUN) 2.5 % lotion, APPLY TO AFFECTED AREA(S) AS DIRECTED ONCE AS NEEDED FOR DANDRUFF, Disp: 120 mL, Rfl: 5  Objective   Vital Signs:   /96 (BP [place] Right arm, Patient Position: Sitting, Cuff Size: Adult)   Pulse 80   Temp 97.8 °F (36.6 °C) (Oral)   Ht 185.4 cm (73\")   Wt 113 kg (249 lb)   SpO2 95%   BMI 32.85 kg/m²     Physical Exam  Vitals reviewed.   Constitutional:       Appearance: Normal appearance.   HENT:      Right Ear: Tympanic membrane normal.      Left Ear: Tympanic membrane normal.      Nose: Congestion present.      Mouth/Throat:      Mouth: Mucous membranes are moist.      Pharynx: Oropharynx is clear.   Cardiovascular:      Rate and Rhythm: Normal rate and regular rhythm.      Pulses: Normal pulses.      Heart sounds: Normal heart sounds.   Pulmonary:      Effort: Pulmonary effort is normal.      Breath sounds: Normal breath sounds.   Skin:     General: Skin is warm and dry.   Neurological:      Mental Status: He is alert and oriented to person, place, and time.   Psychiatric:      Comments: No acute distress        Result Review :                 Assessment and Plan    Diagnoses and all orders for this visit:    1. Suspected COVID-19 virus infection (Primary)  -     COVID-19,[facility] ROUTINE, NP/OP SWAB IN TRANSPORT MEDIA OR ESWAB 72 HR TAT - Swab, Oropharynx    2. Acute nasopharyngitis    Other orders  -     SARS-CoV-2, ERIKA 2 DAY TAT - ,    You have been diagnosed with an upper respiratory infection which is likely viral.  Viruses are not killed by antibiotics and therefore an antibiotic is not prescribed for you today.  Viral illness can last between 3 in 14 days, and symptoms may persist the entire course of the illness.  Symptomatic treatment is best.  You may take " Mucinex D for relieving congestion and cough.  If you have high blood pressure, do not take Mucinex D, instead opting for plain Mucinex and Coricidin HBP.  Take Ibuprofen or Tylenol as needed for pain or fever.  Oral antihistamine, such as Zyrtec or Claritin may help reduce ear pressure and relieve some nasal symptoms.  A saline nasal spray may be used to keep nose clear from discharge.  Be sure that you are increasing your intake of clear to decaffeinated fluids and get plenty of rest.  If your symptoms worsen or persist follow up as needed.      Follow Up   Return if symptoms worsen or fail to improve, for Next scheduled follow up.  Patient was given instructions and counseling regarding his condition or for health maintenance advice. Please see specific information pulled into the AVS if appropriate.

## 2021-10-15 LAB
LABCORP SARS-COV-2, NAA 2 DAY TAT: NORMAL
SARS-COV-2 RNA RESP QL NAA+PROBE: NOT DETECTED

## 2021-10-15 RX ORDER — SELENIUM SULFIDE 2.5 MG/100ML
LOTION TOPICAL
Qty: 120 ML | Refills: 5 | Status: SHIPPED | OUTPATIENT
Start: 2021-10-15 | End: 2022-05-09

## 2021-10-18 ENCOUNTER — TELEPHONE (OUTPATIENT)
Dept: FAMILY MEDICINE CLINIC | Facility: CLINIC | Age: 46
End: 2021-10-18

## 2021-10-18 NOTE — TELEPHONE ENCOUNTER
Caller: Arelis Munson    Relationship: Self    Best call back number: [phone]    What form or medical record are you requesting: THE PATIENT NEEDS HIS DOCTOR NOTE TO STATE THAT HE WILL RETURN TO WORK ON 10/19/2021 INSTEAD OF 10/18/2021. PLEASE ADVISE.    Who is requesting this form or medical record from you: [employer]    How would you like to receive the form or medical records (pick-up, mail, fax): UPLOADED TO [facility]    Timeframe paperwork needed: ASAP

## 2021-11-02 ENCOUNTER — OFFICE VISIT (OUTPATIENT)
Dept: ORTHOPEDIC SURGERY | Facility: CLINIC | Age: 46
End: 2021-11-02

## 2021-11-02 VITALS — TEMPERATURE: 98.1 F | WEIGHT: 250 LBS | HEIGHT: 73 IN | BODY MASS INDEX: 33.13 KG/M2

## 2021-11-02 DIAGNOSIS — M51.36 DDD (DEGENERATIVE DISC DISEASE), LUMBAR: ICD-10-CM

## 2021-11-02 DIAGNOSIS — M70.61 TROCHANTERIC BURSITIS OF RIGHT HIP: Primary | ICD-10-CM

## 2021-11-02 DIAGNOSIS — M25.551 RIGHT HIP PAIN: ICD-10-CM

## 2021-11-02 DIAGNOSIS — M53.3 SI (SACROILIAC) JOINT DYSFUNCTION: ICD-10-CM

## 2021-11-02 PROCEDURE — 99213 OFFICE O/P EST LOW 20 MIN: CPT | Performed by: NURSE PRACTITIONER

## 2021-11-02 PROCEDURE — 73502 X-RAY EXAM HIP UNI 2-3 VIEWS: CPT | Performed by: NURSE PRACTITIONER

## 2021-11-02 PROCEDURE — 20610 DRAIN/INJ JOINT/BURSA W/O US: CPT | Performed by: NURSE PRACTITIONER

## 2021-11-02 PROCEDURE — 20552 NJX 1/MLT TRIGGER POINT 1/2: CPT | Performed by: NURSE PRACTITIONER

## 2021-11-02 RX ORDER — METHYLPREDNISOLONE ACETATE 80 MG/ML
80 INJECTION, SUSPENSION INTRA-ARTICULAR; INTRALESIONAL; INTRAMUSCULAR; SOFT TISSUE
Status: COMPLETED | OUTPATIENT
Start: 2021-11-02 | End: 2021-11-02

## 2021-11-02 RX ADMIN — METHYLPREDNISOLONE ACETATE 80 MG: 80 INJECTION, SUSPENSION INTRA-ARTICULAR; INTRALESIONAL; INTRAMUSCULAR; SOFT TISSUE at 15:51

## 2021-11-02 NOTE — PROGRESS NOTES
"Patient Name: Arelis Munson   YOB: 1975  Referring Primary Care Physician: Nessa Gaston, APRN  BMI: Body mass index is 32.98 kg/m².    Chief Complaint:    Chief Complaint   Patient presents with   • Right Hip - Follow-up        HPI:     Arelis Munson is a 46 y.o. male who presents today for evaluation of   Chief Complaint   Patient presents with   • Right Hip - Follow-up   .  Patient presents for evaluation of right back and hip pain.  He reports that last Thursday morning he woke up with pain in his right lower back and his right lateral hip.  The pain is worse with sitting up straight, laying flat, and activity.  Sometimes when he leans forward it alleviates a little bit.  It shoots down the lateral aspect of his leg and he sometimes has some numbness and tingling in the right leg.  He denies any bowel or bladder incontinence.  He is able to ambulate and bear weight but it is painful.  He denies any injury or trauma.  He does work as a  but does not report doing any activities out of the normal for him.  He has been taking Tylenol using ice and using Voltaren gel with some relief.  Heat does not seem to help.  He denies any pain in the groin.      Subjective   Medications:   Home Medications:  Current Outpatient Medications on File Prior to Visit   Medication Sig   • albuterol sulfate  (90 Base) MCG/ACT inhaler 2 puffs every 4 hours as needed for SOA or wheezing. Generic.   • Ascorbic Acid (VITAMIN C PO) Take  by mouth.   • aspirin 81 MG EC tablet Take 81 mg by mouth Daily.   • B Complex Vitamins (VITAMIN B COMPLEX PO) Take  by mouth Daily. HOLD PRIOR TO SURGERY   • B-D 3CC LUER-PUMA SYR 22GX1\" 22G X 1\" 3 ML misc    • budesonide (Pulmicort Flexhaler) 180 MCG/ACT inhaler Use 1 or 2 inhalations each AM. Rinse and spit after use.   • chlorhexidine (PERIDEX) 0.12 % solution    • Cholecalciferol (VITAMIN D3) 5000 UNITS capsule capsule Take 5,000 Units by mouth Daily. HOLD PRIOR TO " SURGERY   • cromolyn (OPTICROM) 4 % ophthalmic solution PLACE ONE DROP IN EACH EYE FOUR TIMES A DAY   • Denta 5000 Plus 1.1 % cream    • desonide (DESOWEN) 0.05 % cream APPLY TO AFFECTED AREA(S) THREE TIMES A DAY   • Diclofenac Sodium (Pennsaid) 2 % solution Apply 1 application topically 2 (Two) Times a Day.   • diphenhydrAMINE HCl (BENADRYL ALLERGY PO) Take 1 tablet by mouth Daily.   • docusate sodium (COLACE) 250 MG capsule Take 250 mg by mouth Daily.   • esomeprazole (nexIUM) 40 MG capsule TAKE ONE CAPSULE BY MOUTH ONCE NIGHTLY   • etodolac (LODINE) 500 MG tablet Take 1 tablet by mouth 2 (Two) Times a Day.   • ezetimibe-simvastatin (VYTORIN) 10-40 MG per tablet TAKE ONE TABLET BY MOUTH ONCE NIGHTLY   • FIBER PO Take  by mouth.   • FLONASE SENSIMIST 27.5 MCG/SPRAY nasal spray USE ONE SPRAY IN EACH NOSTRIL ONCE DAILY   • ketoconazole (NIZORAL) 2 % cream APPLY TO AFFECTED AREA(S) DAILY AS DIRECTED   • melatonin 1 MG tablet Take 3 mg by mouth Every Night. HOLD PRIOR TO SURGERY   • montelukast (SINGULAIR) 10 MG tablet TAKE ONE TABLET BY MOUTH ONCE NIGHTLY   • Multiple Vitamin (MULTI VITAMIN DAILY PO) Take  by mouth daily.   • olopatadine (PATANASE) 0.6 % solution nasal solution SPRAY TWO SPRAYS IN EACH NOSTRIL TWICE DAILY   • Potassium 99 MG tablet Take  by mouth.   • RESTASIS 0.05 % ophthalmic emulsion    • rizatriptan MLT (MAXALT-MLT) 10 MG disintegrating tablet DISSOLVE ONE TABLET BY MOUTH AS NEEDED FOR MIGRAINE; MAY REPEAT ONE TABLET IN 2 HOURS IF NEEDED.   • SALINE NASAL SPRAY NA into each nostril.   • selenium sulfide (SELSUN) 2.5 % lotion APPLY TO AFFECTED AREA(S) AS DIRECTED ONCE AS NEEDED FOR DANDRUFF   • sildenafil (VIAGRA) 100 MG tablet TAKE 1 TABLET BY MOUTH AS NEEDED FOR ERECTILE DYSFUNCTION   • Testosterone Cypionate (DEPOTESTOTERONE CYPIONATE) 200 MG/ML injection Inject  into the appropriate muscle as directed by prescriber Every 14 (Fourteen) Days.   • Unable to find 1 each 1 (One) Time. [employer] allergy  relief, q AM   • zolpidem CR (Ambien CR) 12.5 MG CR tablet Take 1 tablet by mouth At Night As Needed for Sleep.     No current facility-administered medications on file prior to visit.     Current Medications:  Scheduled Meds:  Continuous Infusions:No current facility-administered medications for this visit.    PRN Meds:.    I have reviewed the patient's medical history in detail and updated the computerized patient record.  Review and summarization of old records includes:    Past Medical History:   Diagnosis Date   • Arthritis     OSTEO   • Asthma    • COVID-19 2021   • ED (erectile dysfunction)    • Elevated cholesterol    • Environmental allergies    • Fracture, fibula     RIGHT AND ANKLE   • Gastroesophageal reflux disease without esophagitis 2016   • Migraine with aura and without status migrainosus, not intractable 2016   • Pain and swelling of toe of right foot    • Seasonal allergies    • Sleep apnea     CPAP   • Testosterone deficiency 2016        Past Surgical History:   Procedure Laterality Date   • ANKLE ARTHROSCOPY Right 2018    Procedure: RT ANKLE ARTHROSCOPY SUBCHONDROPLASTY TALUS ;  Surgeon: Lebron Spivey Jr., MD;  [place] [facility] [place] [place]:    • ANKLE LIGAMENT RECONSTRUCTION Right 2018    Procedure: HARDWARE REMOVAL POSS LATERAL LIGAMENT RECONSTRUCTION ;  Surgeon: Lebron Spivey Jr., MD;  [place] [facility] [place] [place]:    • CARPAL TUNNEL RELEASE Right    • CUBITAL TUNNEL RELEASE Right     NERVE RELEASE   • EAR BIOPSY Right     MYRINGOTOMY W/BONES REPLACED INNER EAR   • KNEE SURGERY Right     MENISCUS REPAIR   • NOSE SURGERY     • ORIF FIBULA FRACTURE Right 2017    ANKLE INCLUDED        Social History     Occupational History   • Not on file   Tobacco Use   • Smoking status: Former Smoker     Packs/day: 2.00     Years: 31.00     Pack years: 62.00     Types: Cigarettes     Quit date:      Years since quittin.8   • Smokeless tobacco: Never Used   •  "Tobacco comment: Quit 2004   Substance and Sexual Activity   • Alcohol use: Yes     Comment: ONCE OR TWICE MONTHLY   • Drug use: No   • Sexual activity: Defer      Social History     Social History Narrative   • Not on file        Family History   Problem Relation Age of Onset   • Lung cancer Mother    • Lung cancer Father    • Malig Hyperthermia Neg Hx        ROS: 14 point review of systems was performed and all other systems were reviewed and are negative except for documented findings in HPI and today's encounter.     Allergies: No Known Allergies  Constitutional:  Denies fever, shaking or chills   Eyes:  Denies change in visual acuity   HENT:  Denies nasal congestion or sore throat   Respiratory:  Denies cough or shortness of breath   Cardiovascular:  Denies chest pain or severe LE edema   GI:  Denies abdominal pain, nausea, vomiting, bloody stools or diarrhea   Musculoskeletal:  Numbness, tingling, pain, or loss of motor function only as noted above in history of present illness.  : Denies painful urination or hematuria  Integument:  Denies rash, lesion or ulceration   Neurologic:  Denies headache or focal weakness  Endocrine:  Denies lymphadenopathy  Psych:  Denies confusion or change in mental status   Hem:  Denies active bleeding    OBJECTIVE:  Physical Exam: 46 y.o. male  Wt Readings from Last 3 Encounters:   11/02/21 113 kg (250 lb)   10/14/21 113 kg (249 lb)   08/19/21 113 kg (250 lb)     Ht Readings from Last 1 Encounters:   11/02/21 185.4 cm (73\")     Body mass index is 32.98 kg/m².  Vitals:    11/02/21 1534   Temp: 98.1 °F (36.7 °C)     Vital signs reviewed.     General Appearance:    Alert, cooperative, in no acute distress                  Eyes: conjunctiva clear  ENT: external ears and nose atraumatic  CV: no peripheral edema  Resp: normal respiratory effort  Skin: no rashes or wounds; normal turgor  Psych: mood and affect appropriate  Lymph: no nodes appreciated  Neuro: gross sensation " intact  Vascular:  Palpable peripheral pulse in noted extremity  Musculoskeletal Extremities: Right hip: Stinchfield is moderately positive causing pain in the lateral aspect of the hip and the lower back; it does not elicit any pain in the groin.  He has no pain with internal or external rotation of the hip.  FADIR is negative.  KATELYN is positive and causes pain in the sacroiliac joint.  He has significant tenderness over the right greater trochanter as well as in the sacroiliac joint.  There is really no tenderness to the midline lumbar spine.  Motor strength and sensation is grossly intact in bilateral lower extremities.  He ambulates with a slight limp without assistive device    Radiology:   AP pelvis, lateral right hip obtained in the office today due to pain without prior comparison shows very minimal arthritic change in the right hip with preserved joint spaces.  There is some degeneration of the right sacroiliac joint as well as in the visualized portion of the lumbar spine    Assessment:     ICD-10-CM ICD-9-CM   1. Trochanteric bursitis of right hip  M70.61 726.5   2. Right hip pain  M25.551 719.45   3. SI (sacroiliac) joint dysfunction  M53.3 724.6   4. DDD (degenerative disc disease), lumbar  M51.36 722.52           MDM/Plan:   The diagnosis(es), natural history, pathophysiology and treatment for diagnosis(es) were discussed. Opportunity given and questions answered.  Biomechanics of pertinent body areas discussed.  When appropriate, the use of ambulatory aids discussed.  EXERCISES:  Advice on benefits of, and types of regular/moderate exercise pertaining to orthopedic diagnosis(es).  MEDICATIONS:  The risks, benefits, warnings,side effects and alternatives of medications discussed.  Inflammation/pain control; with cold, heat, elevation and/or liniments discussed as appropriate  Cortisone Injection. See procedure note.   Discussed with patient that his pain really is not coming out of his hip-his x-rays  of his hip are reassuring and his hip is nontender on exam.  He has significant pain in the right greater trochanter as well as the right sacroiliac joint.  Discussed that these are both probably related to his lower back.  He is getting ready to go on a trip within the next couple days and wants as much relief as he can before then.  He wants to proceed with a cortisone injection in the right sacroiliac joint as well as the right greater trochanter today.  We discussed physical therapy but he wants to hold off for now as he has a trip coming up.  No relief in 3 to 4 weeks we will obtain an MRI of the lumbar spine.  He can continue taking anti-inflammatories and using ice, Voltaren gel, etc. for pain relief.      11/2/2021    Much of this encounter note is an electronic transcription/translation of spoken language to printed text. The electronic translation of spoken language may permit erroneous, or at times, nonsensical words or phrases to be inadvertently transcribed; Although I have reviewed the note for such errors, some may still exist    Large Joint Arthrocentesis: R greater trochanteric bursa  Date/Time: 11/2/2021 3:51 PM  Consent given by: patient  Site marked: site marked  Timeout: Immediately prior to procedure a time out was called to verify the correct patient, procedure, equipment, support staff and site/side marked as required   Supporting Documentation  Indications: pain   Procedure Details  [place] hip - R greater trochanteric bursa  Needle gauge: 21G.  Approach: lateral  Medications administered: 4 mL lidocaine (cardiac); 80 mg methylPREDNISolone acetate 80 MG/ML  Patient tolerance: patient tolerated the procedure well with no immediate complications    Large Joint Arthrocentesis  Date/Time: 11/2/2021 3:51 PM  Consent given by: patient  Site marked: site marked  Timeout: Immediately prior to procedure a time out was called to verify the correct patient, procedure, equipment, support staff and  site/side marked as required   Supporting Documentation  Indications: pain   Procedure Details  [place] hip - Hip joint: R SI joint.  Preparation: Patient was prepped and draped in the usual sterile fashion  Needle gauge: 21G.  Approach: lateral  Medications administered: 4 mL lidocaine (cardiac); 80 mg methylPREDNISolone acetate 80 MG/ML  Patient tolerance: patient tolerated the procedure well with no immediate complications

## 2021-11-17 ENCOUNTER — CLINICAL SUPPORT (OUTPATIENT)
Dept: ORTHOPEDIC SURGERY | Facility: CLINIC | Age: 46
End: 2021-11-17

## 2021-11-17 VITALS — HEIGHT: 73 IN | WEIGHT: 250 LBS | BODY MASS INDEX: 33.13 KG/M2

## 2021-11-17 DIAGNOSIS — M17.0 PRIMARY OSTEOARTHRITIS OF BOTH KNEES: Primary | ICD-10-CM

## 2021-11-17 PROCEDURE — 20610 DRAIN/INJ JOINT/BURSA W/O US: CPT | Performed by: NURSE PRACTITIONER

## 2021-11-17 RX ORDER — METHYLPREDNISOLONE ACETATE 80 MG/ML
80 INJECTION, SUSPENSION INTRA-ARTICULAR; INTRALESIONAL; INTRAMUSCULAR; SOFT TISSUE
Status: COMPLETED | OUTPATIENT
Start: 2021-11-17 | End: 2021-11-17

## 2021-11-17 RX ADMIN — METHYLPREDNISOLONE ACETATE 80 MG: 80 INJECTION, SUSPENSION INTRA-ARTICULAR; INTRALESIONAL; INTRAMUSCULAR; SOFT TISSUE at 15:27

## 2021-11-17 NOTE — PROGRESS NOTES
"Patient Name: Arelis Munson   YOB: 1975  Referring Primary Care Physician: Nessa Gaston, LORENZO  BMI: Body mass index is 32.98 kg/m².    Chief Complaint:    Chief Complaint   Patient presents with   • Left Knee - Follow-up, Pain   • Right Knee - Follow-up, Pain        HPI: Pt of [facility] here for injections to both knees. These work well for him. Masks were worn throughout the visit.   Patient is 46 is very active and the injections last about 3 months.  Desires conservative treatment    Arelis Munson is a 46 y.o. male who presents today for evaluation of   Chief Complaint   Patient presents with   • Left Knee - Follow-up, Pain   • Right Knee - Follow-up, Pain         Subjective   Medications:   Home Medications:  Current Outpatient Medications on File Prior to Visit   Medication Sig   • albuterol sulfate  (90 Base) MCG/ACT inhaler 2 puffs every 4 hours as needed for SOA or wheezing. Generic.   • Ascorbic Acid (VITAMIN C PO) Take  by mouth.   • aspirin 81 MG EC tablet Take 81 mg by mouth Daily.   • B Complex Vitamins (VITAMIN B COMPLEX PO) Take  by mouth Daily. HOLD PRIOR TO SURGERY   • B-D 3CC LUER-PUMA SYR 22GX1\" 22G X 1\" 3 ML misc    • budesonide (Pulmicort Flexhaler) 180 MCG/ACT inhaler Use 1 or 2 inhalations each AM. Rinse and spit after use.   • chlorhexidine (PERIDEX) 0.12 % solution    • Cholecalciferol (VITAMIN D3) 5000 UNITS capsule capsule Take 5,000 Units by mouth Daily. HOLD PRIOR TO SURGERY   • cromolyn (OPTICROM) 4 % ophthalmic solution PLACE ONE DROP IN EACH EYE FOUR TIMES A DAY   • Denta 5000 Plus 1.1 % cream    • desonide (DESOWEN) 0.05 % cream APPLY TO AFFECTED AREA(S) THREE TIMES A DAY   • Diclofenac Sodium (Pennsaid) 2 % solution Apply 1 application topically 2 (Two) Times a Day.   • diphenhydrAMINE HCl (BENADRYL ALLERGY PO) Take 1 tablet by mouth Daily.   • docusate sodium (COLACE) 250 MG capsule Take 250 mg by mouth Daily.   • esomeprazole (nexIUM) 40 MG capsule TAKE ONE " CAPSULE BY MOUTH ONCE NIGHTLY   • etodolac (LODINE) 500 MG tablet Take 1 tablet by mouth 2 (Two) Times a Day.   • ezetimibe-simvastatin (VYTORIN) 10-40 MG per tablet TAKE ONE TABLET BY MOUTH ONCE NIGHTLY   • FIBER PO Take  by mouth.   • FLONASE SENSIMIST 27.5 MCG/SPRAY nasal spray USE ONE SPRAY IN EACH NOSTRIL ONCE DAILY   • ketoconazole (NIZORAL) 2 % cream APPLY TO AFFECTED AREA(S) DAILY AS DIRECTED   • melatonin 1 MG tablet Take 3 mg by mouth Every Night. HOLD PRIOR TO SURGERY   • montelukast (SINGULAIR) 10 MG tablet TAKE ONE TABLET BY MOUTH ONCE NIGHTLY   • Multiple Vitamin (MULTI VITAMIN DAILY PO) Take  by mouth daily.   • olopatadine (PATANASE) 0.6 % solution nasal solution SPRAY TWO SPRAYS IN EACH NOSTRIL TWICE DAILY   • Potassium 99 MG tablet Take  by mouth.   • RESTASIS 0.05 % ophthalmic emulsion    • rizatriptan MLT (MAXALT-MLT) 10 MG disintegrating tablet DISSOLVE ONE TABLET BY MOUTH AS NEEDED FOR MIGRAINE; MAY REPEAT ONE TABLET IN 2 HOURS IF NEEDED.   • SALINE NASAL SPRAY NA into each nostril.   • selenium sulfide (SELSUN) 2.5 % lotion APPLY TO AFFECTED AREA(S) AS DIRECTED ONCE AS NEEDED FOR DANDRUFF   • sildenafil (VIAGRA) 100 MG tablet TAKE 1 TABLET BY MOUTH AS NEEDED FOR ERECTILE DYSFUNCTION   • Testosterone Cypionate (DEPOTESTOTERONE CYPIONATE) 200 MG/ML injection Inject  into the appropriate muscle as directed by prescriber Every 14 (Fourteen) Days.   • Unable to find 1 each 1 (One) Time. [employer] allergy relief, q AM   • zolpidem CR (Ambien CR) 12.5 MG CR tablet Take 1 tablet by mouth At Night As Needed for Sleep.     No current facility-administered medications on file prior to visit.     Current Medications:  Scheduled Meds:  Continuous Infusions:No current facility-administered medications for this visit.    PRN Meds:.    I have reviewed the patient's medical history in detail and updated the computerized patient record.  Review and summarization of old records includes:    Past Medical History:    Diagnosis Date   • Arthritis     OSTEO   • Asthma    • COVID-19 2021   • ED (erectile dysfunction)    • Elevated cholesterol    • Environmental allergies    • Fracture, fibula     RIGHT AND ANKLE   • Gastroesophageal reflux disease without esophagitis 2016   • Migraine with aura and without status migrainosus, not intractable 2016   • Pain and swelling of toe of right foot    • Seasonal allergies    • Sleep apnea     CPAP   • Testosterone deficiency 2016        Past Surgical History:   Procedure Laterality Date   • ANKLE ARTHROSCOPY Right 2018    Procedure: RT ANKLE ARTHROSCOPY SUBCHONDROPLASTY TALUS ;  Surgeon: Lebron Spivey Jr., MD;  [place] [facility] [place] [facility] [place];  Service:    • ANKLE LIGAMENT RECONSTRUCTION Right 2018    Procedure: HARDWARE REMOVAL POSS LATERAL LIGAMENT RECONSTRUCTION ;  Surgeon: Lebron Spivey Jr., MD;  [place] [facility] [place] [facility] [place];  Service:    • CARPAL TUNNEL RELEASE Right    • CUBITAL TUNNEL RELEASE Right     NERVE RELEASE   • EAR BIOPSY Right     MYRINGOTOMY W/BONES REPLACED INNER EAR   • KNEE SURGERY Right     MENISCUS REPAIR   • NOSE SURGERY     • ORIF FIBULA FRACTURE Right 2017    ANKLE INCLUDED        Social History     Occupational History   • Not on file   Tobacco Use   • Smoking status: Former Smoker     Packs/day: 2.00     Years: 31.00     Pack years: 62.00     Types: Cigarettes     Quit date:      Years since quittin.8   • Smokeless tobacco: Never Used   • Tobacco comment: Quit    Substance and Sexual Activity   • Alcohol use: Yes     Comment: ONCE OR TWICE MONTHLY   • Drug use: No   • Sexual activity: Defer      Social History     Social History Narrative   • Not on file        Family History   Problem Relation Age of Onset   • Lung cancer Mother    • Lung cancer Father    • Malig Hyperthermia Neg Hx        ROS: 14 point review of systems was performed and all other systems were reviewed and are negative except for documented findings in HPI and  "today's encounter.     Allergies: No Known Allergies  Constitutional:  Denies fever, shaking or chills   Eyes:  Denies change in visual acuity   HENT:  Denies nasal congestion or sore throat   Respiratory:  Denies cough or shortness of breath   Cardiovascular:  Denies chest pain or severe LE edema   GI:  Denies abdominal pain, nausea, vomiting, bloody stools or diarrhea   Musculoskeletal:  Numbness, tingling, pain, or loss of motor function only as noted above in history of present illness.  : Denies painful urination or hematuria  Integument:  Denies rash, lesion or ulceration   Neurologic:  Denies headache or focal weakness  Endocrine:  Denies lymphadenopathy  Psych:  Denies confusion or change in mental status   Hem:  Denies active bleeding    OBJECTIVE:  Physical Exam: 46 y.o. male  Wt Readings from Last 3 Encounters:   11/17/21 113 kg (250 lb)   11/02/21 113 kg (250 lb)   10/14/21 113 kg (249 lb)     Ht Readings from Last 1 Encounters:   11/17/21 185.4 cm (73\")     Body mass index is 32.98 kg/m².  There were no vitals filed for this visit.  Vital signs reviewed.     General Appearance:    Alert, cooperative, in no acute distress                  Eyes: conjunctiva clear  ENT: external ears and nose atraumatic  CV: no peripheral edema  Resp: normal respiratory effort  Skin: no rashes or wounds; normal turgor  Psych: mood and affect appropriate  Lymph: no nodes appreciated  Neuro: gross sensation intact  Vascular:  Palpable peripheral pulse in noted extremity  Musculoskeletal Extremities: skin is warm, dry and intact, calves soft and nttp, both knees with medial joint line tenderness, synovitis and effusion. Stiffness to knees with ambulation.    Radiology:   Done 5/18/21         Assessment:     ICD-10-CM ICD-9-CM   1. Primary osteoarthritis of both knees  M17.0 715.16        MDM/Plan:   The diagnosis(es), natural history, pathophysiology and treatment for diagnosis(es) were discussed. Opportunity given and " questions answered.  Biomechanics of pertinent body areas discussed.  When appropriate, the use of ambulatory aids discussed.    Large Joint Arthrocentesis: R knee  Date/Time: 11/17/2021 3:27 PM  Consent given by: patient  Site marked: site marked  Timeout: Immediately prior to procedure a time out was called to verify the correct patient, procedure, equipment, support staff and site/side marked as required   Supporting Documentation  Indications: pain   Procedure Details  [place] knee - R knee  Preparation: Patient was prepped and draped in the usual sterile fashion  Needle gauge: 21G.  Approach: anteromedial  Medications administered: 80 mg methylPREDNISolone acetate 80 MG/ML; 4 mL lidocaine (cardiac)      Large Joint Arthrocentesis: L knee  Date/Time: 11/17/2021 3:27 PM  Consent given by: patient  Site marked: site marked  Timeout: Immediately prior to procedure a time out was called to verify the correct patient, procedure, equipment, support staff and site/side marked as required   Supporting Documentation  Indications: pain   Procedure Details  [place] knee - L knee  Preparation: Patient was prepped and draped in the usual sterile fashion  Needle gauge: 21G.  Approach: anteromedial  Medications administered: 80 mg methylPREDNISolone acetate 80 MG/ML; 4 mL lidocaine (cardiac)  Patient tolerance: patient tolerated the procedure well with no immediate complications          The diagnosis(es), natural history, pathophysiology and treatment for diagnosis(es) were discussed. Opportunity given and questions answered.  Biomechanics of pertinent body areas discussed.  When appropriate, the use of ambulatory aids discussed.  BMI:  The concept of BMI body mass index and its importance and implications discussed.    EXERCISES:  Advice on benefits of, and types of regular/moderate exercise pertaining to orthopedic diagnosis(es).  MEDICATIONS:  The risks, benefits, warnings,side effects and alternatives of medications  discussed.  Inflammation/pain control; with cold, heat, elevation and/or liniments discussed as appropriate  Cortisone Injection. See procedure note.  HOME EXERCISE/PT program encouraged  MEDICAL RECORDS reviewed from other provider(s) for past and current medical history pertinent to this complaint.      11/17/2021    Dictated utilizing Dragon dictation

## 2021-12-07 RX ORDER — ALBUTEROL SULFATE 90 UG/1
AEROSOL, METERED RESPIRATORY (INHALATION)
Qty: 3 G | Refills: 1 | Status: SHIPPED | OUTPATIENT
Start: 2021-12-07

## 2021-12-15 ENCOUNTER — OFFICE VISIT (OUTPATIENT)
Dept: SLEEP MEDICINE | Facility: HOSPITAL | Age: 46
End: 2021-12-15

## 2021-12-15 VITALS — HEART RATE: 73 BPM | WEIGHT: 243 LBS | HEIGHT: 73 IN | BODY MASS INDEX: 32.2 KG/M2 | OXYGEN SATURATION: 96 %

## 2021-12-15 DIAGNOSIS — M17.0 ARTHRITIS OF BOTH KNEES: ICD-10-CM

## 2021-12-15 DIAGNOSIS — G47.26 CIRCADIAN RHYTHM SLEEP DISORDER, SHIFT WORK TYPE: ICD-10-CM

## 2021-12-15 DIAGNOSIS — G47.33 OSA ON CPAP: Primary | ICD-10-CM

## 2021-12-15 DIAGNOSIS — Z99.89 OSA ON CPAP: Primary | ICD-10-CM

## 2021-12-15 DIAGNOSIS — F51.04 PSYCHOPHYSIOLOGICAL INSOMNIA: ICD-10-CM

## 2021-12-15 PROCEDURE — 99213 OFFICE O/P EST LOW 20 MIN: CPT | Performed by: INTERNAL MEDICINE

## 2021-12-15 PROCEDURE — G0463 HOSPITAL OUTPT CLINIC VISIT: HCPCS

## 2021-12-15 RX ORDER — ETODOLAC 500 MG/1
500 TABLET, FILM COATED ORAL 2 TIMES DAILY
Qty: 60 TABLET | Refills: 2 | Status: SHIPPED | OUTPATIENT
Start: 2021-12-15 | End: 2022-04-28 | Stop reason: SDUPTHER

## 2021-12-15 NOTE — PROGRESS NOTES
"[facility] Note     Chief Complaint:  NITA     Primary Care Physician: Nessa Gaston APRN    Interval History:   The patient is a 46 y.o. male  who I last saw 2021 and that note was reviewed.  The patient reports he is stable without new complaints.  He goes to bed at 10 PM and awakens at 5 AM.  He wakes up due to air leaking.    Self-administered Crum Sleepiness Scale test results: 2, previously 3  0-5 Lower normal daytime sleepiness  6-10 Higher normal daytime sleepiness  11-12 Mild, 13-15 Moderate, & 16-24 Severe excessive daytime sleepiness    Review of Systems:    A complete review of systems was done and all were negative with the exception of some nasal congestion postnasal drip, ADAM, and some shortness of breath with exertion    Social History:    Social History     Socioeconomic History   • Marital status:    Tobacco Use   • Smoking status: Former Smoker     Packs/day: 2.00     Years: 31.00     Pack years: 62.00     Types: Cigarettes     Quit date:      Years since quittin.9   • Smokeless tobacco: Never Used   • Tobacco comment: Quit    Substance and Sexual Activity   • Alcohol use: Yes     Comment: ONCE OR TWICE MONTHLY   • Drug use: No   • Sexual activity: Defer       Allergies:  Patient has no known allergies.     Medication Review: His list was reviewed.      Vital Signs:    Vitals:    12/15/21 1002   Pulse: 73   SpO2: 96%   Weight: 110 kg (243 lb)   Height: 185.4 cm (73\")     Body mass index is 32.06 kg/m².    Physical Exam:    Constitutional:  Well developed 46 y.o. male that appears in no apparent distress.  Awake & oriented times 3.  Normal mood with normal recent and remote memory and normal judgement.  Eyes:  Conjunctivae normal.  Oropharynx: Previously, moist mucous membranes without exudate and a large tongue and normal uvula and class III Mallampati airway, patient is wearing a facemask.     Downloaded PAP Data Reviewed For Compliance:  DME is " Naps and he uses a fullface mask.  Downloads between 9/16 and 12/14/2021 compliance 100%.  Average usage is 7 hours and 4 minutes.  Average AHI is normal without leak.  Average auto CPAP pressure is 13 and his auto CPAP is 9-15.    I have reviewed the above results and compared them with the patient's last downloads and reviewed with the patient.    Impression:   Obstructive sleep apnea adequately treated with auto CPAP. The patient appears to be at goal with good compliance and usage. The patient has no complaints of hypersomnolence.  Circadian rhythm sleep disorder, shiftwork type, early morning awakening  Psychophysiologic insomnia adequately treated with Ambien CR 12.5 mg 1/2 to 1 tablet at bedtime    Plan:  Good sleep hygiene measures should be maintained.  Weight loss would be beneficial in this patient who is obese by BMI.      After evaluating the patient and assessing results available, the patient is benefiting from the treatment being provided.     The patient will continue auto CPAP.  After clinical evaluation and review of downloads, I recommend no changes to the patient's pressures.  A new prescription will be sent to the patient's DME as needed.    Additionally, Anjum recall discussed.  The patient has registered his device.  He does use an ozone  and he was instructed to stop using it.    Mateo reviewed and no irregularities noted.  The patient will call me when he needs his Ambien CR represcribed.    I answered all of the patient's questions.  The patient will call for any problems and will follow up in 6 months.      Ronni Kong MD  Sleep Medicine  12/15/21  10:26 EST

## 2022-01-03 ENCOUNTER — TELEPHONE (OUTPATIENT)
Dept: ORTHOPEDIC SURGERY | Facility: CLINIC | Age: 47
End: 2022-01-03

## 2022-01-03 NOTE — TELEPHONE ENCOUNTER
Caller: ADRY    Relationship to patient: SPOUSE    Best call back number: [phone]    Chief complaint: BILATERAL KNEE PAIN    Type of visit: INJECTION    Requested date: 3 MONTHS OUT FROM LAST INJECTION. PATIENT WOULD LIKE AN APPT AROUND 3:30 PM.    If rescheduling, when is the original appointment: N/A    Additional notes: PATIENT'S SPOUSE, ADRY WAS CALLING TO SCHEDULE PATIENT'S NEXT INJECTION FOR BOTH KNEES. ADRY STATED THE PATIENT WOULD LIKE AN APPT AROUND 3:30 PM. THANK YOU!

## 2022-01-17 DIAGNOSIS — Z99.89 OSA ON CPAP: ICD-10-CM

## 2022-01-17 DIAGNOSIS — G47.33 OSA ON CPAP: ICD-10-CM

## 2022-01-17 DIAGNOSIS — F51.04 PSYCHOPHYSIOLOGICAL INSOMNIA: ICD-10-CM

## 2022-01-17 RX ORDER — ZOLPIDEM TARTRATE 12.5 MG/1
12.5 TABLET, FILM COATED, EXTENDED RELEASE ORAL NIGHTLY PRN
Qty: 90 TABLET | Refills: 1 | Status: SHIPPED | OUTPATIENT
Start: 2022-01-17 | End: 2022-07-18 | Stop reason: SDUPTHER

## 2022-02-07 RX ORDER — SILDENAFIL 100 MG/1
TABLET, FILM COATED ORAL
Qty: 5 TABLET | Refills: 5 | Status: SHIPPED | OUTPATIENT
Start: 2022-02-07 | End: 2022-03-14

## 2022-02-23 ENCOUNTER — CLINICAL SUPPORT (OUTPATIENT)
Dept: ORTHOPEDIC SURGERY | Facility: CLINIC | Age: 47
End: 2022-02-23

## 2022-02-23 VITALS — BODY MASS INDEX: 32.47 KG/M2 | TEMPERATURE: 96.8 F | WEIGHT: 245 LBS | HEIGHT: 73 IN

## 2022-02-23 DIAGNOSIS — M25.561 PAIN IN BOTH KNEES, UNSPECIFIED CHRONICITY: Primary | ICD-10-CM

## 2022-02-23 DIAGNOSIS — M25.562 PAIN IN BOTH KNEES, UNSPECIFIED CHRONICITY: Primary | ICD-10-CM

## 2022-02-23 PROCEDURE — 20610 DRAIN/INJ JOINT/BURSA W/O US: CPT | Performed by: NURSE PRACTITIONER

## 2022-02-23 PROCEDURE — 73562 X-RAY EXAM OF KNEE 3: CPT | Performed by: NURSE PRACTITIONER

## 2022-02-23 RX ORDER — METHYLPREDNISOLONE ACETATE 80 MG/ML
80 INJECTION, SUSPENSION INTRA-ARTICULAR; INTRALESIONAL; INTRAMUSCULAR; SOFT TISSUE
Status: COMPLETED | OUTPATIENT
Start: 2022-02-23 | End: 2022-02-23

## 2022-02-23 RX ORDER — LIDOCAINE HYDROCHLORIDE 20 MG/ML
4 INJECTION, SOLUTION EPIDURAL; INFILTRATION; INTRACAUDAL; PERINEURAL
Status: COMPLETED | OUTPATIENT
Start: 2022-02-23 | End: 2022-02-23

## 2022-02-23 RX ADMIN — METHYLPREDNISOLONE ACETATE 80 MG: 80 INJECTION, SUSPENSION INTRA-ARTICULAR; INTRALESIONAL; INTRAMUSCULAR; SOFT TISSUE at 15:46

## 2022-02-23 RX ADMIN — LIDOCAINE HYDROCHLORIDE 4 ML: 20 INJECTION, SOLUTION EPIDURAL; INFILTRATION; INTRACAUDAL; PERINEURAL at 15:46

## 2022-02-23 NOTE — PROGRESS NOTES
"Patient Name: Arelis Munson   YOB: 1975  Referring Primary Care Physician: Nessa Gaston, APRN  BMI: There is no height or weight on file to calculate BMI.    Chief complaint - both knees painful      HPI: Pt of SPM here for injections to both knees. These work well for him. Masks were worn throughout the visit.   Patient is 47 is very active and the injections last about 3 months.  Desires conservative treatment    Arelis Munson is a 47 y.o. male who presents today for evaluation of No chief complaint on file.        Subjective   Medications:   Home Medications:  Current Outpatient Medications on File Prior to Visit   Medication Sig   • albuterol sulfate  (90 Base) MCG/ACT inhaler 2 puffs every 4 hours as needed for SOA or wheezing. Generic.   • Ascorbic Acid (VITAMIN C PO) Take  by mouth.   • aspirin 81 MG EC tablet Take 81 mg by mouth Daily.   • B Complex Vitamins (VITAMIN B COMPLEX PO) Take  by mouth Daily. HOLD PRIOR TO SURGERY   • B-D 3CC LUER-PUMA SYR 22GX1\" 22G X 1\" 3 ML misc    • budesonide (Pulmicort Flexhaler) 180 MCG/ACT inhaler Use 1 or 2 inhalations each AM. Rinse and spit after use.   • chlorhexidine (PERIDEX) 0.12 % solution    • Cholecalciferol (VITAMIN D3) 5000 UNITS capsule capsule Take 5,000 Units by mouth Daily. HOLD PRIOR TO SURGERY   • cromolyn (OPTICROM) 4 % ophthalmic solution PLACE ONE DROP IN EACH EYE FOUR TIMES A DAY   • Denta 5000 Plus 1.1 % cream    • desonide (DESOWEN) 0.05 % cream APPLY TO AFFECTED AREA(S) THREE TIMES A DAY   • Diclofenac Sodium (Pennsaid) 2 % solution Apply 1 application topically 2 (Two) Times a Day.   • Diclofenac Sodium (VOLTAREN) 1 % gel gel Apply 4 g topically to the appropriate area as directed 4 (Four) Times a Day.   • diphenhydrAMINE HCl (BENADRYL ALLERGY PO) Take 1 tablet by mouth Daily.   • docusate sodium (COLACE) 250 MG capsule Take 250 mg by mouth Daily.   • esomeprazole (nexIUM) 40 MG capsule TAKE ONE CAPSULE BY MOUTH ONCE " NIGHTLY   • etodolac (LODINE) 500 MG tablet Take 1 tablet by mouth 2 (Two) Times a Day.   • ezetimibe-simvastatin (VYTORIN) 10-40 MG per tablet TAKE ONE TABLET BY MOUTH ONCE NIGHTLY   • FIBER PO Take  by mouth.   • FLONASE SENSIMIST 27.5 MCG/SPRAY nasal spray USE ONE SPRAY IN EACH NOSTRIL ONCE DAILY   • ketoconazole (NIZORAL) 2 % cream APPLY TO AFFECTED AREA(S) DAILY AS DIRECTED   • melatonin 1 MG tablet Take 3 mg by mouth Every Night. HOLD PRIOR TO SURGERY   • montelukast (SINGULAIR) 10 MG tablet TAKE ONE TABLET BY MOUTH ONCE NIGHTLY   • Multiple Vitamin (MULTI VITAMIN DAILY PO) Take  by mouth daily.   • olopatadine (PATANASE) 0.6 % solution nasal solution SPRAY TWO SPRAYS IN EACH NOSTRIL TWICE DAILY   • Potassium 99 MG tablet Take  by mouth.   • RESTASIS 0.05 % ophthalmic emulsion    • rizatriptan MLT (MAXALT-MLT) 10 MG disintegrating tablet DISSOLVE ONE TABLET BY MOUTH AS NEEDED FOR MIGRAINE; MAY REPEAT ONE TABLET IN 2 HOURS IF NEEDED.   • SALINE NASAL SPRAY NA into each nostril.   • selenium sulfide (SELSUN) 2.5 % lotion APPLY TO AFFECTED AREA(S) AS DIRECTED ONCE AS NEEDED FOR DANDRUFF   • sildenafil (VIAGRA) 100 MG tablet TAKE 1 TABLET BY MOUTH AS NEEDED FOR ERECTILE DYSFUNCTION   • Testosterone Cypionate (DEPOTESTOTERONE CYPIONATE) 200 MG/ML injection Inject  into the appropriate muscle as directed by prescriber Every 14 (Fourteen) Days.   • Unable to find 1 each 1 (One) Time. Clarkoger allergy relief, q AM   • zolpidem CR (Ambien CR) 12.5 MG CR tablet Take 1 tablet by mouth At Night As Needed for Sleep.     No current facility-administered medications on file prior to visit.     Current Medications:  Scheduled Meds:  Continuous Infusions:No current facility-administered medications for this visit.    PRN Meds:.    I have reviewed the patient's medical history in detail and updated the computerized patient record.  Review and summarization of old records includes:    Past Medical History:   Diagnosis Date   •  Arthritis     OSTEO   • Asthma    • COVID-19 2021   • ED (erectile dysfunction)    • Elevated cholesterol    • Environmental allergies    • Fracture, fibula     RIGHT AND ANKLE   • Gastroesophageal reflux disease without esophagitis 2016   • Migraine with aura and without status migrainosus, not intractable 2016   • Pain and swelling of toe of right foot    • Seasonal allergies    • Sleep apnea     CPAP   • Testosterone deficiency 2016        Past Surgical History:   Procedure Laterality Date   • ANKLE ARTHROSCOPY Right 2018    Procedure: RT ANKLE ARTHROSCOPY SUBCHONDROPLASTY TALUS ;  Surgeon: Lebron Spivey Jr., MD;  [place] [facility] OR [place]:    • ANKLE LIGAMENT RECONSTRUCTION Right 2018    Procedure: HARDWARE REMOVAL POSS LATERAL LIGAMENT RECONSTRUCTION ;  Surgeon: Lebron Spivey Jr., MD;  [place] [facility] OR [place]:    • CARPAL TUNNEL RELEASE Right    • CUBITAL TUNNEL RELEASE Right     NERVE RELEASE   • EAR BIOPSY Right     MYRINGOTOMY W/BONES REPLACED INNER EAR   • KNEE SURGERY Right     MENISCUS REPAIR   • NOSE SURGERY     • ORIF FIBULA FRACTURE Right 2017    ANKLE INCLUDED        Social History     Occupational History   • Not on file   Tobacco Use   • Smoking status: Former Smoker     Packs/day: 2.00     Years: 31.00     Pack years: 62.00     Types: Cigarettes     Quit date:      Years since quittin.1   • Smokeless tobacco: Never Used   • Tobacco comment: Quit    Substance and Sexual Activity   • Alcohol use: Yes     Comment: ONCE OR TWICE MONTHLY   • Drug use: No   • Sexual activity: Defer      Social History     Social History Narrative   • Not on file        Family History   Problem Relation Age of Onset   • Lung cancer Mother    • Lung cancer Father    • Malig Hyperthermia Neg Hx        ROS: 14 point review of systems was performed and all other systems were reviewed and are negative except for documented findings in HPI and today's encounter.  "    Allergies: No Known Allergies  Constitutional:  Denies fever, shaking or chills   Eyes:  Denies change in visual acuity   HENT:  Denies nasal congestion or sore throat   Respiratory:  Denies cough or shortness of breath   Cardiovascular:  Denies chest pain or severe LE edema   GI:  Denies abdominal pain, nausea, vomiting, bloody stools or diarrhea   Musculoskeletal:  Numbness, tingling, pain, or loss of motor function only as noted above in history of present illness.  : Denies painful urination or hematuria  Integument:  Denies rash, lesion or ulceration   Neurologic:  Denies headache or focal weakness  Endocrine:  Denies lymphadenopathy  Psych:  Denies confusion or change in mental status   Hem:  Denies active bleeding    OBJECTIVE:  Physical Exam: 47 y.o. male  Wt Readings from Last 3 Encounters:   12/15/21 110 kg (243 lb)   11/17/21 113 kg (250 lb)   11/02/21 113 kg (250 lb)     Ht Readings from Last 1 Encounters:   12/15/21 185.4 cm (73\")     There is no height or weight on file to calculate BMI.  There were no vitals filed for this visit.  Vital signs reviewed.     General Appearance:    Alert, cooperative, in no acute distress                  Eyes: conjunctiva clear  ENT: external ears and nose atraumatic  CV: no peripheral edema  Resp: normal respiratory effort  Skin: no rashes or wounds; normal turgor  Psych: mood and affect appropriate  Lymph: no nodes appreciated  Neuro: gross sensation intact  Vascular:  Palpable peripheral pulse in noted extremity  Musculoskeletal Extremities: skin is warm, dry and intact, calves soft and nttp, both knees with medial joint line tenderness, synovitis and effusion. Stiffness to knees with ambulation.    Radiology:   Both knees 3 views done for pain with comparison views done tricompartmental djd        Assessment:     ICD-10-CM ICD-9-CM   1. Pain in both knees, unspecified chronicity  M25.561 719.46    M25.562         MDM/Plan:   The diagnosis(es), natural history, " pathophysiology and treatment for diagnosis(es) were discussed. Opportunity given and questions answered.  Biomechanics of pertinent body areas discussed.  When appropriate, the use of ambulatory aids discussed.    Large Joint Arthrocentesis: R knee  Date/Time: 2/23/2022 3:46 PM  Consent given by: patient  Site marked: site marked  Timeout: Immediately prior to procedure a time out was called to verify the correct patient, procedure, equipment, support staff and site/side marked as required   Supporting Documentation  Indications: pain   Procedure Details  [place] knee - R knee  Preparation: Patient was prepped and draped in the usual sterile fashion  Needle gauge: 21G.  Approach: anteromedial  Medications administered: 80 mg methylPREDNISolone acetate 80 MG/ML; 4 mL lidocaine PF 2% 2 %  Patient tolerance: patient tolerated the procedure well with no immediate complications    Large Joint Arthrocentesis: L knee  Date/Time: 2/23/2022 3:46 PM  Consent given by: patient  Site marked: site marked  Timeout: Immediately prior to procedure a time out was called to verify the correct patient, procedure, equipment, support staff and site/side marked as required   Supporting Documentation  Indications: pain   Procedure Details  [place] knee - L knee  Preparation: Patient was prepped and draped in the usual sterile fashion  Needle gauge: 21G.  Approach: anteromedial  Medications administered: 80 mg methylPREDNISolone acetate 80 MG/ML; 4 mL lidocaine (cardiac)  Patient tolerance: patient tolerated the procedure well with no immediate complications          The diagnosis(es), natural history, pathophysiology and treatment for diagnosis(es) were discussed. Opportunity given and questions answered.  Biomechanics of pertinent body areas discussed.  When appropriate, the use of ambulatory aids discussed.  BMI:  The concept of BMI body mass index and its importance and implications discussed.    EXERCISES:  Advice on benefits of,  and types of regular/moderate exercise pertaining to orthopedic diagnosis(es).  MEDICATIONS:  The risks, benefits, warnings,side effects and alternatives of medications discussed.  Inflammation/pain control; with cold, heat, elevation and/or liniments discussed as appropriate  Cortisone Injection. See procedure note.  MEDICAL RECORDS reviewed from other provider(s) for past and current medical history pertinent to this complaint.      2/23/2022    Dictated utilizing Dragon dictation

## 2022-02-28 ENCOUNTER — TELEPHONE (OUTPATIENT)
Dept: ORTHOPEDIC SURGERY | Facility: CLINIC | Age: 47
End: 2022-02-28

## 2022-02-28 NOTE — TELEPHONE ENCOUNTER
Caller: ADRY BLOOM ( VERBAL)    Relationship to patient: SELF    Best call back number: [phone]    Chief complaint: BILATERAL KNEES    Type of visit: INJECTIONS    Requested date: 3 MONTHS OUT    If rescheduling, when is the original appointment: N/A     Additional notes:WIFE CALLED TO SCHEDULE APPT- WANTS TO GO AHEAD AND HAVE IT SCHEDULED FOR 3 MONTHS OUT FROM 2.23.22- PATIENT WOULD NEED A 330 APPT IF AVAILABLE SO HE DOESN'T MISS WORK. PLEASE CALL HER TO SCHEDULE. THANK YOU!!

## 2022-03-14 RX ORDER — SILDENAFIL 100 MG/1
TABLET, FILM COATED ORAL
Qty: 5 TABLET | Refills: 5 | Status: SHIPPED | OUTPATIENT
Start: 2022-03-14 | End: 2022-04-13

## 2022-03-22 ENCOUNTER — TELEPHONE (OUTPATIENT)
Dept: ORTHOPEDIC SURGERY | Facility: CLINIC | Age: 47
End: 2022-03-22

## 2022-03-22 NOTE — TELEPHONE ENCOUNTER
Caller: PATIENT     Relationship to patient: SELF     Best call back number: [phone]    Chief complaint RIGHT SIDE HIP PAIN     Type of visit: CORTISONE INJECTION     Requested date: ASAP     IfAdditional notes: WANTS TO SCHEDULE CORTSIONE INJECTION FOR RIGHT HIP.

## 2022-04-13 RX ORDER — SILDENAFIL 100 MG/1
TABLET, FILM COATED ORAL
Qty: 5 TABLET | Refills: 5 | Status: SHIPPED | OUTPATIENT
Start: 2022-04-13 | End: 2022-05-23

## 2022-04-18 ENCOUNTER — CLINICAL SUPPORT (OUTPATIENT)
Dept: ORTHOPEDIC SURGERY | Facility: CLINIC | Age: 47
End: 2022-04-18

## 2022-04-18 VITALS — HEIGHT: 73 IN | BODY MASS INDEX: 33.13 KG/M2 | WEIGHT: 250 LBS | TEMPERATURE: 98.7 F

## 2022-04-18 DIAGNOSIS — M70.61 GREATER TROCHANTERIC BURSITIS OF RIGHT HIP: ICD-10-CM

## 2022-04-18 DIAGNOSIS — M51.36 DDD (DEGENERATIVE DISC DISEASE), LUMBAR: ICD-10-CM

## 2022-04-18 DIAGNOSIS — M54.50 LUMBAR SPINE PAIN: Primary | ICD-10-CM

## 2022-04-18 DIAGNOSIS — G57.11 MERALGIA PARESTHETICA OF RIGHT SIDE: ICD-10-CM

## 2022-04-18 PROBLEM — E66.9 OBESITY (BMI 30.0-34.9): Status: ACTIVE | Noted: 2022-04-18

## 2022-04-18 PROBLEM — E66.811 OBESITY (BMI 30.0-34.9): Status: ACTIVE | Noted: 2022-04-18

## 2022-04-18 PROCEDURE — 20610 DRAIN/INJ JOINT/BURSA W/O US: CPT | Performed by: NURSE PRACTITIONER

## 2022-04-18 PROCEDURE — 99213 OFFICE O/P EST LOW 20 MIN: CPT | Performed by: NURSE PRACTITIONER

## 2022-04-18 PROCEDURE — 72100 X-RAY EXAM L-S SPINE 2/3 VWS: CPT | Performed by: NURSE PRACTITIONER

## 2022-04-18 RX ORDER — METHYLPREDNISOLONE 4 MG/1
TABLET ORAL
Qty: 21 TABLET | Refills: 0 | Status: SHIPPED | OUTPATIENT
Start: 2022-04-18 | End: 2022-06-21

## 2022-04-18 RX ADMIN — METHYLPREDNISOLONE ACETATE 80 MG: 80 INJECTION, SUSPENSION INTRA-ARTICULAR; INTRALESIONAL; INTRAMUSCULAR; SOFT TISSUE at 14:01

## 2022-04-18 NOTE — PROGRESS NOTES
"Patient Name: Arelis Munson   YOB: 1975  Referring Primary Care Physician: Nessa Gaston, APRN  BMI: Body mass index is 32.98 kg/m².    Chief Complaint:    Chief Complaint   Patient presents with   • Right Hip - Follow-up, Pain        HPI: Here for right hip bursa pain and low back problems patient gets knees injected routinely and works a laborious job and today is having pain in the right hip bursa has had it injected in the past.  Denies injury or trauma  Arelis Munson is a 47 y.o. male who presents today for evaluation of   Chief Complaint   Patient presents with   • Right Hip - Follow-up, Pain         Subjective   Medications:   Home Medications:  Current Outpatient Medications on File Prior to Visit   Medication Sig   • albuterol sulfate  (90 Base) MCG/ACT inhaler 2 puffs every 4 hours as needed for SOA or wheezing. Generic.   • Ascorbic Acid (VITAMIN C PO) Take  by mouth.   • aspirin 81 MG EC tablet Take 81 mg by mouth Daily.   • B Complex Vitamins (VITAMIN B COMPLEX PO) Take  by mouth Daily. HOLD PRIOR TO SURGERY   • B-D 3CC LUER-PUMA SYR 22GX1\" 22G X 1\" 3 ML misc    • budesonide (Pulmicort Flexhaler) 180 MCG/ACT inhaler Use 1 or 2 inhalations each AM. Rinse and spit after use.   • chlorhexidine (PERIDEX) 0.12 % solution    • Cholecalciferol (VITAMIN D3) 5000 UNITS capsule capsule Take 5,000 Units by mouth Daily. HOLD PRIOR TO SURGERY   • cromolyn (OPTICROM) 4 % ophthalmic solution PLACE ONE DROP IN EACH EYE FOUR TIMES A DAY   • Denta 5000 Plus 1.1 % cream    • desonide (DESOWEN) 0.05 % cream APPLY TO AFFECTED AREA(S) THREE TIMES A DAY   • Diclofenac Sodium (Pennsaid) 2 % solution Apply 1 application topically 2 (Two) Times a Day.   • Diclofenac Sodium (VOLTAREN) 1 % gel gel Apply 4 g topically to the appropriate area as directed 4 (Four) Times a Day.   • diphenhydrAMINE HCl (BENADRYL ALLERGY PO) Take 1 tablet by mouth Daily.   • docusate sodium (COLACE) 250 MG capsule Take 250 mg " by mouth Daily.   • esomeprazole (nexIUM) 40 MG capsule TAKE ONE CAPSULE BY MOUTH ONCE NIGHTLY   • etodolac (LODINE) 500 MG tablet Take 1 tablet by mouth 2 (Two) Times a Day.   • ezetimibe-simvastatin (VYTORIN) 10-40 MG per tablet TAKE ONE TABLET BY MOUTH ONCE NIGHTLY   • FIBER PO Take  by mouth.   • FLONASE SENSIMIST 27.5 MCG/SPRAY nasal spray USE ONE SPRAY IN EACH NOSTRIL ONCE DAILY   • ketoconazole (NIZORAL) 2 % cream APPLY TO AFFECTED AREA(S) DAILY AS DIRECTED   • melatonin 1 MG tablet Take 3 mg by mouth Every Night. HOLD PRIOR TO SURGERY   • montelukast (SINGULAIR) 10 MG tablet TAKE ONE TABLET BY MOUTH ONCE NIGHTLY   • Multiple Vitamin (MULTI VITAMIN DAILY PO) Take  by mouth daily.   • olopatadine (PATANASE) 0.6 % solution nasal solution SPRAY TWO SPRAYS IN EACH NOSTRIL TWICE DAILY   • Potassium 99 MG tablet Take  by mouth.   • RESTASIS 0.05 % ophthalmic emulsion    • rizatriptan MLT (MAXALT-MLT) 10 MG disintegrating tablet DISSOLVE ONE TABLET BY MOUTH AS NEEDED FOR MIGRAINE; MAY REPEAT ONE TABLET IN 2 HOURS IF NEEDED.   • SALINE NASAL SPRAY NA into each nostril.   • selenium sulfide (SELSUN) 2.5 % lotion APPLY TO AFFECTED AREA(S) AS DIRECTED ONCE AS NEEDED FOR DANDRUFF   • sildenafil (VIAGRA) 100 MG tablet TAKE ONE TABLET BY MOUTH  AS NEEDED FOR ERECTILE DYSFUNCTION   • Testosterone Cypionate (DEPOTESTOTERONE CYPIONATE) 200 MG/ML injection Inject  into the appropriate muscle as directed by prescriber Every 14 (Fourteen) Days.   • Unable to find 1 each 1 (One) Time. [employer] allergy relief, q AM   • zolpidem CR (Ambien CR) 12.5 MG CR tablet Take 1 tablet by mouth At Night As Needed for Sleep.     No current facility-administered medications on file prior to visit.     Current Medications:  Scheduled Meds:  Continuous Infusions:No current facility-administered medications for this visit.    PRN Meds:.    I have reviewed the patient's medical history in detail and updated the computerized patient record.  Review  and summarization of old records includes:    Past Medical History:   Diagnosis Date   • Arthritis     OSTEO   • Asthma    • COVID-19 2021   • ED (erectile dysfunction)    • Elevated cholesterol    • Environmental allergies    • Fracture, fibula     RIGHT AND ANKLE   • Gastroesophageal reflux disease without esophagitis 2016   • Migraine with aura and without status migrainosus, not intractable 2016   • Pain and swelling of toe of right foot    • Seasonal allergies    • Sleep apnea     CPAP   • Testosterone deficiency 2016        Past Surgical History:   Procedure Laterality Date   • ANKLE ARTHROSCOPY Right 2018    Procedure: RT ANKLE ARTHROSCOPY SUBCHONDROPLASTY TALUS ;  Surgeon: Lebron Spivey Jr., MD;  [place] [facility] [place] [facility] [place];  Service:    • ANKLE LIGAMENT RECONSTRUCTION Right 2018    Procedure: HARDWARE REMOVAL POSS LATERAL LIGAMENT RECONSTRUCTION ;  Surgeon: Lebron Spivey Jr., MD;  [place] [facility] [place];  Service:    • CARPAL TUNNEL RELEASE Right    • CUBITAL TUNNEL RELEASE Right     NERVE RELEASE   • EAR BIOPSY Right     MYRINGOTOMY W/BONES REPLACED INNER EAR   • KNEE SURGERY Right     MENISCUS REPAIR   • NOSE SURGERY     • ORIF FIBULA FRACTURE Right 2017    ANKLE INCLUDED        Social History     Occupational History   • Not on file   Tobacco Use   • Smoking status: Former Smoker     Packs/day: 2.00     Years: 31.00     Pack years: 62.00     Types: Cigarettes     Quit date:      Years since quittin.3   • Smokeless tobacco: Never Used   • Tobacco comment: Quit    Substance and Sexual Activity   • Alcohol use: Yes     Comment: ONCE OR TWICE MONTHLY   • Drug use: No   • Sexual activity: Defer      Social History     Social History Narrative   • Not on file        Family History   Problem Relation Age of Onset   • Lung cancer Mother    • Lung cancer Father    • Malig Hyperthermia Neg Hx        ROS: 14 point review of systems was performed and all other systems were  "reviewed and are negative except for documented findings in HPI and today's encounter.     Allergies: No Known Allergies  Constitutional:  Denies fever, shaking or chills   Eyes:  Denies change in visual acuity   HENT:  Denies nasal congestion or sore throat   Respiratory:  Denies cough or shortness of breath   Cardiovascular:  Denies chest pain or severe LE edema   GI:  Denies abdominal pain, nausea, vomiting, bloody stools or diarrhea   Musculoskeletal:  Numbness, tingling, pain, or loss of motor function only as noted above in history of present illness.  : Denies painful urination or hematuria  Integument:  Denies rash, lesion or ulceration   Neurologic:  Denies headache or focal weakness  Endocrine:  Denies lymphadenopathy  Psych:  Denies confusion or change in mental status   Hem:  Denies active bleeding    OBJECTIVE:  Physical Exam: 47 y.o. male  Wt Readings from Last 3 Encounters:   04/18/22 113 kg (250 lb)   02/23/22 111 kg (245 lb)   12/15/21 110 kg (243 lb)     Ht Readings from Last 1 Encounters:   04/18/22 185.4 cm (73\")     Body mass index is 32.98 kg/m².  Vitals:    04/18/22 1527   Temp: 98.7 °F (37.1 °C)     Vital signs reviewed.     General Appearance:    Alert, cooperative, in no acute distress                  Eyes: conjunctiva clear  ENT: external ears and nose atraumatic  CV: no peripheral edema  Resp: normal respiratory effort  Skin: no rashes or wounds; normal turgor  Psych: mood and affect appropriate  Lymph: no nodes appreciated  Neuro: gross sensation intact  Vascular:  Palpable peripheral pulse intact   Musculoskeletal Extremities: skin is warm, dry and intact, calves soft and nttp with ttp right hip bursa with effusion.  Low back pain has diffuse pain and treatment Stinchfield is negative internal rotation of the hip is not elicit pain he has some anterior femoral cutaneous pain    Radiology: lumbar spine 2 views done for pain with no comparison films show degenerative changes  Reviewed " from 11-2-21 of hip interchanges        Assessment:     ICD-10-CM ICD-9-CM   1. Lumbar spine pain  M54.50 724.2   2. Greater trochanteric bursitis of right hip  M70.61 726.5   3. Meralgia paresthetica of right side  G57.11 355.1   4. DDD (degenerative disc disease), lumbar  M51.36 722.52        MDM/Plan:   The diagnosis(es), natural history, pathophysiology and treatment for diagnosis(es) were discussed. Opportunity given and questions answered.  Biomechanics of pertinent body areas discussed.  When appropriate, the use of ambulatory aids discussed.    The diagnosis(es), natural history, pathophysiology and treatment for diagnosis(es) were discussed. Opportunity given and questions answered.  Biomechanics of pertinent body areas discussed.  When appropriate, the use of ambulatory aids discussed.  BMI:  The concept of BMI body mass index and its importance and implications discussed.    EXERCISES:  Advice on benefits of, and types of regular/moderate exercise pertaining to orthopedic diagnosis(es).  MEDICATIONS:  The risks, benefits, warnings,side effects and alternatives of medications discussed.  Inflammation/pain control; with cold, heat, elevation and/or liniments discussed as appropriate  Cortisone Injection. See procedure note.  MEDICAL RECORDS reviewed from other provider(s) for past and current medical history pertinent to this complaint.      Large Joint Arthrocentesis: R greater trochanteric bursa  Date/Time: 4/18/2022 2:01 PM  Consent given by: patient  Site marked: site marked  Timeout: Immediately prior to procedure a time out was called to verify the correct patient, procedure, equipment, support staff and site/side marked as required   Supporting Documentation  Indications: pain   Procedure Details  [place] hip - R greater trochanteric bursa  Preparation: Patient was prepped and draped in the usual sterile fashion  Needle gauge: 21 G.  Approach: lateral  Medications administered: 80 mg  methylPREDNISolone acetate 80 MG/ML; 4 mL lidocaine (cardiac)  Patient tolerance: patient tolerated the procedure well with no immediate complications      Cortisone injection into the bursa get MRI of the lumbar spine to greater assess pathology    4/21/2022    Dictated utilizing Dragon dictation

## 2022-04-21 RX ORDER — METHYLPREDNISOLONE ACETATE 80 MG/ML
80 INJECTION, SUSPENSION INTRA-ARTICULAR; INTRALESIONAL; INTRAMUSCULAR; SOFT TISSUE
Status: COMPLETED | OUTPATIENT
Start: 2022-04-18 | End: 2022-04-18

## 2022-04-28 DIAGNOSIS — M17.0 ARTHRITIS OF BOTH KNEES: ICD-10-CM

## 2022-04-28 RX ORDER — ETODOLAC 500 MG/1
500 TABLET, FILM COATED ORAL 2 TIMES DAILY
Qty: 60 TABLET | Refills: 0 | Status: SHIPPED | OUTPATIENT
Start: 2022-04-28 | End: 2022-06-22 | Stop reason: SDUPTHER

## 2022-05-03 ENCOUNTER — HOSPITAL ENCOUNTER (OUTPATIENT)
Dept: MRI IMAGING | Facility: HOSPITAL | Age: 47
Discharge: HOME | End: 2022-05-03
Admitting: NURSE PRACTITIONER

## 2022-05-03 DIAGNOSIS — M51.36 DDD (DEGENERATIVE DISC DISEASE), LUMBAR: ICD-10-CM

## 2022-05-03 PROCEDURE — 72148 MRI LUMBAR SPINE W/O DYE: CPT

## 2022-05-04 ENCOUNTER — TELEPHONE (OUTPATIENT)
Dept: ORTHOPEDIC SURGERY | Facility: CLINIC | Age: 47
End: 2022-05-04

## 2022-05-04 DIAGNOSIS — M51.26 DISPLACEMENT OF LUMBAR INTERVERTEBRAL DISC: Primary | ICD-10-CM

## 2022-05-04 DIAGNOSIS — M47.26 OSTEOARTHRITIS OF SPINE WITH RADICULOPATHY, LUMBAR REGION: ICD-10-CM

## 2022-05-04 DIAGNOSIS — M51.36 DDD (DEGENERATIVE DISC DISEASE), LUMBAR: ICD-10-CM

## 2022-05-04 NOTE — TELEPHONE ENCOUNTER
Pt informed of results and options.  He declined PT at this point as he does not have a lot of extra time.  I have placed a referral for neurosurgery and he is aware someone will call him to set up an appt.

## 2022-05-04 NOTE — TELEPHONE ENCOUNTER
----- Message from LORENZO Veag sent at 5/4/2022  9:12 AM EDT -----  Multi level disc bulge - would start physical therapy and follo wup with neurosurgery to see about operative and non operative options. [facility]  ----- Message -----  From: Interface, Rad Results [place] In  Sent: 5/4/2022   8:14 AM EDT  To: LORENZO Vega

## 2022-05-09 RX ORDER — SELENIUM SULFIDE 2.5 MG/100ML
LOTION TOPICAL
Qty: 120 ML | Refills: 11 | Status: SHIPPED | OUTPATIENT
Start: 2022-05-09

## 2022-05-23 RX ORDER — SILDENAFIL 100 MG/1
TABLET, FILM COATED ORAL
Qty: 5 TABLET | Refills: 5 | Status: SHIPPED | OUTPATIENT
Start: 2022-05-23 | End: 2022-07-25

## 2022-05-27 DIAGNOSIS — M17.0 ARTHRITIS OF BOTH KNEES: ICD-10-CM

## 2022-05-27 RX ORDER — ETODOLAC 500 MG/1
TABLET, FILM COATED ORAL
Qty: 60 TABLET | Refills: 0 | OUTPATIENT
Start: 2022-05-27

## 2022-05-27 NOTE — TELEPHONE ENCOUNTER
He was told in April he will need an annual checkup from PCP with labs to stay on meds, he can request refills from them if appropraite.

## 2022-06-01 ENCOUNTER — CLINICAL SUPPORT (OUTPATIENT)
Dept: ORTHOPEDIC SURGERY | Facility: CLINIC | Age: 47
End: 2022-06-01

## 2022-06-01 VITALS — HEIGHT: 73 IN | BODY MASS INDEX: 30.88 KG/M2 | WEIGHT: 233 LBS | TEMPERATURE: 97.3 F

## 2022-06-01 DIAGNOSIS — M17.0 PRIMARY OSTEOARTHRITIS OF BOTH KNEES: Primary | ICD-10-CM

## 2022-06-01 PROCEDURE — 20610 DRAIN/INJ JOINT/BURSA W/O US: CPT | Performed by: NURSE PRACTITIONER

## 2022-06-01 RX ORDER — METHYLPREDNISOLONE ACETATE 80 MG/ML
80 INJECTION, SUSPENSION INTRA-ARTICULAR; INTRALESIONAL; INTRAMUSCULAR; SOFT TISSUE
Status: COMPLETED | OUTPATIENT
Start: 2022-06-01 | End: 2022-06-01

## 2022-06-01 RX ORDER — LIDOCAINE HYDROCHLORIDE 20 MG/ML
4 INJECTION, SOLUTION EPIDURAL; INFILTRATION; INTRACAUDAL; PERINEURAL
Status: COMPLETED | OUTPATIENT
Start: 2022-06-01 | End: 2022-06-01

## 2022-06-01 RX ADMIN — LIDOCAINE HYDROCHLORIDE 4 ML: 20 INJECTION, SOLUTION EPIDURAL; INFILTRATION; INTRACAUDAL; PERINEURAL at 15:52

## 2022-06-01 RX ADMIN — METHYLPREDNISOLONE ACETATE 80 MG: 80 INJECTION, SUSPENSION INTRA-ARTICULAR; INTRALESIONAL; INTRAMUSCULAR; SOFT TISSUE at 15:52

## 2022-06-01 NOTE — PROGRESS NOTES
"Patient: Arelis Munson  YOB: 1975  Date of Service: 6/1/2022    Chief Complaints:   Chief Complaint   Patient presents with   • Left Knee - Follow-up, Pain   • Right Knee - Follow-up, Pain       Subjective: Here for both knees hurting and desires conservative treatment    History of Present Illness: Pt is seen in the office today with complaints of   Chief Complaint   Patient presents with   • Left Knee - Follow-up, Pain   • Right Knee - Follow-up, Pain   .  KNEE: TIMING:  The pain is described as ACUTE.  LOCATION: medial joint line tenderness. AGGRAVATING FACTORS:  Is worsened by prolonged standing, sitting, walking and squatting activities.  ASSOCIATED SYMPTOMS:  swelling, tenderness, and aching.    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     Allergies: No Known Allergies    Medications:   Home Medications:  Current Outpatient Medications on File Prior to Visit   Medication Sig   • albuterol sulfate  (90 Base) MCG/ACT inhaler 2 puffs every 4 hours as needed for SOA or wheezing. Generic.   • Ascorbic Acid (VITAMIN C PO) Take  by mouth.   • aspirin 81 MG EC tablet Take 81 mg by mouth Daily.   • B Complex Vitamins (VITAMIN B COMPLEX PO) Take  by mouth Daily. HOLD PRIOR TO SURGERY   • B-D 3CC LUER-PUMA SYR 22GX1\" 22G X 1\" 3 ML misc    • budesonide (Pulmicort Flexhaler) 180 MCG/ACT inhaler Use 1 or 2 inhalations each AM. Rinse and spit after use.   • chlorhexidine (PERIDEX) 0.12 % solution    • Cholecalciferol (VITAMIN D3) 5000 UNITS capsule capsule Take 5,000 Units by mouth Daily. HOLD PRIOR TO SURGERY   • cromolyn (OPTICROM) 4 % ophthalmic solution PLACE ONE DROP IN EACH EYE FOUR TIMES A DAY   • Denta 5000 Plus 1.1 % cream    • desonide (DESOWEN) 0.05 % cream APPLY TO AFFECTED AREA(S) THREE TIMES A DAY   • Diclofenac Sodium (Pennsaid) 2 % solution Apply 1 application topically 2 (Two) Times a Day.   • Diclofenac Sodium (VOLTAREN) 1 % gel gel Apply 4 g topically to the appropriate area as " directed 4 (Four) Times a Day.   • diphenhydrAMINE HCl (BENADRYL ALLERGY PO) Take 1 tablet by mouth Daily.   • docusate sodium (COLACE) 250 MG capsule Take 250 mg by mouth Daily.   • esomeprazole (nexIUM) 40 MG capsule TAKE ONE CAPSULE BY MOUTH ONCE NIGHTLY   • etodolac (LODINE) 500 MG tablet Take 1 tablet by mouth 2 (Two) Times a Day.   • ezetimibe-simvastatin (VYTORIN) 10-40 MG per tablet TAKE ONE TABLET BY MOUTH ONCE NIGHTLY   • FIBER PO Take  by mouth.   • FLONASE SENSIMIST 27.5 MCG/SPRAY nasal spray USE ONE SPRAY IN EACH NOSTRIL ONCE DAILY   • ketoconazole (NIZORAL) 2 % cream APPLY TO AFFECTED AREA(S) DAILY AS DIRECTED   • melatonin 1 MG tablet Take 3 mg by mouth Every Night. HOLD PRIOR TO SURGERY   • methylPREDNISolone (MEDROL) 4 MG dose pack Take as directed on package instructions.   • montelukast (SINGULAIR) 10 MG tablet TAKE ONE TABLET BY MOUTH ONCE NIGHTLY   • Multiple Vitamin (MULTI VITAMIN DAILY PO) Take  by mouth daily.   • olopatadine (PATANASE) 0.6 % solution nasal solution SPRAY TWO SPRAYS IN EACH NOSTRIL TWICE DAILY   • Potassium 99 MG tablet Take  by mouth.   • RESTASIS 0.05 % ophthalmic emulsion    • rizatriptan MLT (MAXALT-MLT) 10 MG disintegrating tablet DISSOLVE ONE TABLET BY MOUTH AS NEEDED FOR MIGRAINE; MAY REPEAT ONE TABLET IN 2 HOURS IF NEEDED.   • SALINE NASAL SPRAY NA into each nostril.   • selenium sulfide (SELSUN) 2.5 % lotion APPLY TO AFFECTED AREA(S) AS DIRECTED DAILY AS NEEDED FOR DANDRUFF   • sildenafil (VIAGRA) 100 MG tablet TAKE ONE TABLET BY MOUTH AS NEEDED FOR ERECTILE DYSFUNCTION   • Testosterone Cypionate (DEPOTESTOTERONE CYPIONATE) 200 MG/ML injection Inject  into the appropriate muscle as directed by prescriber Every 14 (Fourteen) Days.   • Unable to find 1 each 1 (One) Time. [employer] allergy relief, q AM   • zolpidem CR (Ambien CR) 12.5 MG CR tablet Take 1 tablet by mouth At Night As Needed for Sleep.     No current facility-administered medications on file prior to visit.      Current Medications:  Scheduled Meds:  Continuous Infusions:No current facility-administered medications for this visit.    PRN Meds:.    I have reviewed the patient's medical history in detail and updated the computerized patient record.  Review and summarization of old records include:    Past Medical History:   Diagnosis Date   • Arthritis     OSTEO   • Asthma    • COVID-19 2021   • ED (erectile dysfunction)    • Elevated cholesterol    • Environmental allergies    • Fracture, fibula     RIGHT AND ANKLE   • Gastroesophageal reflux disease without esophagitis 2016   • Migraine with aura and without status migrainosus, not intractable 2016   • Pain and swelling of toe of right foot    • Seasonal allergies    • Sleep apnea     CPAP   • Testosterone deficiency 2016        Past Surgical History:   Procedure Laterality Date   • ANKLE ARTHROSCOPY Right 2018    Procedure: RT ANKLE ARTHROSCOPY SUBCHONDROPLASTY TALUS ;  Surgeon: Lebron Spivey Jr., MD;  [place] [facility];  Service:    • ANKLE LIGAMENT RECONSTRUCTION Right 2018    Procedure: HARDWARE REMOVAL POSS LATERAL LIGAMENT RECONSTRUCTION ;  Surgeon: Lebron Spivey Jr., MD;  [place] [facility] [place];  Service:    • CARPAL TUNNEL RELEASE Right    • CUBITAL TUNNEL RELEASE Right     NERVE RELEASE   • EAR BIOPSY Right     MYRINGOTOMY W/BONES REPLACED INNER EAR   • KNEE SURGERY Right     MENISCUS REPAIR   • NOSE SURGERY     • ORIF FIBULA FRACTURE Right 2017    ANKLE INCLUDED        Social History     Occupational History   • Not on file   Tobacco Use   • Smoking status: Former Smoker     Packs/day: 2.00     Years: 31.00     Pack years: 62.00     Types: Cigarettes     Quit date:      Years since quittin.4   • Smokeless tobacco: Never Used   • Tobacco comment: Quit    Substance and Sexual Activity   • Alcohol use: Yes     Comment: ONCE OR TWICE MONTHLY   • Drug use: No   • Sexual activity: Defer      Social History      Social History Narrative   • Not on file        Family History   Problem Relation Age of Onset   • Lung cancer Mother    • Lung cancer Father    • Malig Hyperthermia Neg Hx        ROS: 14 point review of systems was performed and was negative except for documented findings in HPI and today's encounter.     Allergies: No Known Allergies  Constitutional:  Denies fever, shaking or chills   Eyes:  Denies change in visual acuity   HENT:  Denies nasal congestion or sore throat   Respiratory:  Denies cough or shortness of breath   Cardiovascular:  Denies chest pain or severe LE edema   GI:  Denies abdominal pain, nausea, vomiting, bloody stools or diarrhea   Musculoskeletal:  Numbness, tingling, or loss of motor function only as noted above in history of present illness.  : Denies painful urination or hematuria  Integument:  Denies rash, lesion or ulceration   Neurologic:  Denies headache or focal weakness  Endocrine:  Denies lymphadenopathy  Psych:  Denies confusion or change in mental status   Hem:  Denies active bleeding      Physical Exam: 47 y.o. male  Wt Readings from Last 3 Encounters:   06/01/22 106 kg (233 lb)   04/18/22 113 kg (250 lb)   02/23/22 111 kg (245 lb)       Body mass index is 30.74 kg/m².  Facility age limit for growth percentiles is 20 years.  Vitals:    06/01/22 1552   Temp: 97.3 °F (36.3 °C)     Vital signs reviewed.   General Appearance:    Alert, cooperative, in no acute distress                  Eyes: conjunctiva clear  ENT: external ears and nose atraumatic  CV: no peripheral edema  Resp: normal respiratory effort  Skin: no rashes or wounds; normal turgor  Psych: mood and affect appropriate  Lymph: no nodes appreciated  Neuro: gross sensation intact  Vascular:  Palpable peripheral pulse in noted extremity  Musculoskeletal Extremities: KNEE Exam: medial joint line tenderness with crepitation, synovitis, swelling, and joint effusion bilateral knee.      Diagnostic Data:  Imaging done  previously in the office and discussed with the patient:    Indication: pain related symptoms,  Views: 3V AP, LAT & 40 degree PA bilateral knee(s)   Findings: advanced arthritis  Comparison views: viewed last xray done in the office.      Procedure:  Large Joint Arthrocentesis: R knee  Date/Time: 6/1/2022 3:52 PM  Consent given by: patient  Site marked: site marked  Timeout: Immediately prior to procedure a time out was called to verify the correct patient, procedure, equipment, support staff and site/side marked as required   Supporting Documentation  Indications: pain, joint swelling and diagnostic evaluation   Procedure Details  [place] knee - R knee  Preparation: Patient was prepped and draped in the usual sterile fashion  Needle gauge: 21G.  Medications administered: 80 mg methylPREDNISolone acetate 80 MG/ML; 4 mL lidocaine PF 2% 2 %  Patient tolerance: patient tolerated the procedure well with no immediate complications    Large Joint Arthrocentesis: L knee  Date/Time: 6/1/2022 3:52 PM  Consent given by: patient  Site marked: site marked  Timeout: Immediately prior to procedure a time out was called to verify the correct patient, procedure, equipment, support staff and site/side marked as required   Supporting Documentation  Indications: pain   Procedure Details  [place] knee - L knee  Preparation: Patient was prepped and draped in the usual sterile fashion  Needle gauge: 21G.  Medications administered: 80 mg methylPREDNISolone acetate 80 MG/ML; 4 mL lidocaine PF 2% 2 %  Patient tolerance: patient tolerated the procedure well with no immediate complications          Assessment:     ICD-10-CM ICD-9-CM   1. Primary osteoarthritis of both knees  M17.0 715.16       Plan:   Follow up as indicated.  Ice, elevate, and rest as needed.  The diagnosis(es), natural history, pathophysiology and treatment for diagnosis(es) were discussed. Opportunity given and questions answered.  Biomechanics of pertinent body areas  discussed.  When appropriate, the use of ambulatory aids discussed.  BMI:  The concept of BMI body mass index and its importance and implications discussed.    EXERCISES:  Advice on benefits of, and types of regular/moderate exercise pertaining to orthopedic diagnosis(es).  MEDICATIONS:  The risks, benefits, warnings,side effects and alternatives of medications discussed.  Inflammation/pain control; with cold, heat, elevation and/or liniments discussed as appropriate  Cortisone Injection. See procedure note.  HOME EXERCISE/PT program encouraged  MEDICAL RECORDS reviewed from other provider(s) for past and current medical history pertinent to this complaint.    6/1/2022

## 2022-06-02 ENCOUNTER — OFFICE VISIT (OUTPATIENT)
Dept: NEUROSURGERY | Facility: CLINIC | Age: 47
End: 2022-06-02

## 2022-06-02 VITALS
HEART RATE: 100 BPM | DIASTOLIC BLOOD PRESSURE: 92 MMHG | BODY MASS INDEX: 30.88 KG/M2 | TEMPERATURE: 98.2 F | SYSTOLIC BLOOD PRESSURE: 130 MMHG | OXYGEN SATURATION: 94 % | WEIGHT: 233 LBS | HEIGHT: 73 IN

## 2022-06-02 DIAGNOSIS — G89.29 BILATERAL CHRONIC KNEE PAIN: ICD-10-CM

## 2022-06-02 DIAGNOSIS — M25.562 BILATERAL CHRONIC KNEE PAIN: ICD-10-CM

## 2022-06-02 DIAGNOSIS — M51.16 LUMBAR DISC HERNIATION WITH RADICULOPATHY: Primary | ICD-10-CM

## 2022-06-02 DIAGNOSIS — M25.561 BILATERAL CHRONIC KNEE PAIN: ICD-10-CM

## 2022-06-02 PROCEDURE — 99204 OFFICE O/P NEW MOD 45 MIN: CPT | Performed by: NEUROLOGICAL SURGERY

## 2022-06-02 RX ORDER — TRAMADOL HYDROCHLORIDE 50 MG/1
50 TABLET ORAL EVERY 6 HOURS PRN
Qty: 40 TABLET | Refills: 0 | Status: SHIPPED | OUTPATIENT
Start: 2022-06-02 | End: 2022-06-17

## 2022-06-03 ENCOUNTER — TELEPHONE (OUTPATIENT)
Dept: ORTHOPEDIC SURGERY | Facility: CLINIC | Age: 47
End: 2022-06-03

## 2022-06-03 ENCOUNTER — TELEPHONE (OUTPATIENT)
Dept: NEUROSURGERY | Facility: CLINIC | Age: 47
End: 2022-06-03

## 2022-06-03 NOTE — TELEPHONE ENCOUNTER
"    Caller: CHUY, [facility]    Best call back number: [phone]    What is the medical concern/diagnosis:    Lumbar disc herniation with radiculopathy    What specialty or service is being requested: PAIN MANAGEMENT    What is the provider, practice or medical service name:   [facility]; PAIN MANAGEMENT, [address], [address]  DR[address] GUSMAN AND SUGGS    OR    [facility] AT [facility]     Any additional details: CHUY FROM [facility] CALLED BECAUSE THE PT HAS CALL THEIR OFFICE TO \"SEE IF HE IS A CANDIDATE FOR PAIN MANAGEMENT\";  BUT THERE WAS NOT REFERRAL SENT.  [facility] STATES A REFERRAL HAS TO BE PLACED FOR PT'S RECORD TO BE REVIEWED.      [facility] ALSO WANTED TO MAKE SURE WHICH PAIN MANAGEMENT OFFICE YOU MENT TO REFER PT TO.    [facility]; PAIN MANAGEMENT  OR  [facility]     THERE WAS SOME CONFUSION WITH THIS IN THE PAST.      PLEASE REVIEW AND PLACE REFERRAL.      "

## 2022-06-08 DIAGNOSIS — E78.00 ELEVATED CHOLESTEROL: Primary | ICD-10-CM

## 2022-06-15 RX ORDER — EZETIMIBE AND SIMVASTATIN 10; 40 MG/1; MG/1
TABLET ORAL
Qty: 30 TABLET | Refills: 11 | Status: SHIPPED | OUTPATIENT
Start: 2022-06-15

## 2022-06-16 DIAGNOSIS — M51.16 LUMBAR DISC HERNIATION WITH RADICULOPATHY: ICD-10-CM

## 2022-06-17 RX ORDER — TRAMADOL HYDROCHLORIDE 50 MG/1
TABLET ORAL
Qty: 15 TABLET | Refills: 0 | Status: SHIPPED | OUTPATIENT
Start: 2022-06-17 | End: 2022-08-01 | Stop reason: SDUPTHER

## 2022-06-17 NOTE — TELEPHONE ENCOUNTER
I will give him enough medication to get through the weekend, but since we have not done surgery on him, I would like for you to discuss with Dr. Doran on Monday.  Also, he needs a full substance agreement signed if we are going to continue prescribing.

## 2022-06-17 NOTE — TELEPHONE ENCOUNTER
Rx Refill Note  Requested Prescriptions     Pending Prescriptions Disp Refills   • traMADol (ULTRAM) 50 MG tablet [Pharmacy Med Name: traMADol HCL 50MG TABLET] 40 tablet      Sig: TAKE ONE TABLET BY MOUTH EVERY 6 HOURS AS NEEDED FOR MODERATE PAIN      Last office visit with prescribing clinician: 6/2/2022      Next office visit with prescribing clinician: 7/12/2022            Enid Morrison MA  06/17/22, 07:56 EDT      Patient is having L-EDDA on 06-21-22. Patient will return to office on 07-12-22 to discuss results/surgery

## 2022-06-19 LAB
ALBUMIN SERPL-MCNC: 4.6 G/DL (ref 4–5)
ALBUMIN/GLOB SERPL: 2.4 {RATIO} (ref 1.2–2.2)
ALP SERPL-CCNC: 49 IU/L (ref 44–121)
ALT SERPL-CCNC: 43 IU/L (ref 0–44)
AST SERPL-CCNC: 23 IU/L (ref 0–40)
BILIRUB SERPL-MCNC: 0.6 MG/DL (ref 0–1.2)
BUN SERPL-MCNC: 14 MG/DL (ref 6–24)
BUN/CREAT SERPL: 14 (ref 9–20)
CALCIUM SERPL-MCNC: 9.5 MG/DL (ref 8.7–10.2)
CHLORIDE SERPL-SCNC: 101 MMOL/L (ref 96–106)
CHOLEST SERPL-MCNC: 148 MG/DL (ref 100–199)
CHOLEST/HDLC SERPL: 3.1 RATIO (ref 0–5)
CO2 SERPL-SCNC: 27 MMOL/L (ref 20–29)
CREAT SERPL-MCNC: 1.01 MG/DL (ref 0.76–1.27)
EGFRCR SERPLBLD CKD-EPI 2021: 91 ML/MIN/1.73
GLOBULIN SER CALC-MCNC: 1.9 G/DL (ref 1.5–4.5)
GLUCOSE SERPL-MCNC: 92 MG/DL (ref 65–99)
HDLC SERPL-MCNC: 47 MG/DL
LDLC SERPL CALC-MCNC: 77 MG/DL (ref 0–99)
POTASSIUM SERPL-SCNC: 4.8 MMOL/L (ref 3.5–5.2)
PROT SERPL-MCNC: 6.5 G/DL (ref 6–8.5)
SODIUM SERPL-SCNC: 142 MMOL/L (ref 134–144)
TRIGL SERPL-MCNC: 136 MG/DL (ref 0–149)
VLDLC SERPL CALC-MCNC: 24 MG/DL (ref 5–40)

## 2022-06-21 ENCOUNTER — HOSPITAL ENCOUNTER (OUTPATIENT)
Dept: GENERAL RADIOLOGY | Facility: HOSPITAL | Age: 47
Discharge: HOME | End: 2022-06-21

## 2022-06-21 ENCOUNTER — ANESTHESIA (OUTPATIENT)
Dept: PAIN MEDICINE | Facility: HOSPITAL | Age: 47
End: 2022-06-21

## 2022-06-21 ENCOUNTER — HOSPITAL ENCOUNTER (OUTPATIENT)
Dept: PAIN MEDICINE | Facility: HOSPITAL | Age: 47
Discharge: HOME | End: 2022-06-21

## 2022-06-21 ENCOUNTER — ANESTHESIA EVENT (OUTPATIENT)
Dept: PAIN MEDICINE | Facility: HOSPITAL | Age: 47
End: 2022-06-21

## 2022-06-21 VITALS
WEIGHT: 245 LBS | DIASTOLIC BLOOD PRESSURE: 89 MMHG | BODY MASS INDEX: 32.47 KG/M2 | OXYGEN SATURATION: 96 % | HEIGHT: 73 IN | TEMPERATURE: 98.4 F | SYSTOLIC BLOOD PRESSURE: 134 MMHG | RESPIRATION RATE: 16 BRPM | HEART RATE: 64 BPM

## 2022-06-21 DIAGNOSIS — M51.16 LUMBAR DISC HERNIATION WITH RADICULOPATHY: ICD-10-CM

## 2022-06-21 DIAGNOSIS — R52 PAIN: ICD-10-CM

## 2022-06-21 PROCEDURE — 0 IOPAMIDOL 41 % SOLUTION: Performed by: ANESTHESIOLOGY

## 2022-06-21 PROCEDURE — 25010000002 METHYLPREDNISOLONE PER 80 MG: Performed by: ANESTHESIOLOGY

## 2022-06-21 PROCEDURE — 77003 FLUOROGUIDE FOR SPINE INJECT: CPT

## 2022-06-21 RX ORDER — FENTANYL CITRATE 50 UG/ML
50 INJECTION, SOLUTION INTRAMUSCULAR; INTRAVENOUS AS NEEDED
Status: DISCONTINUED | OUTPATIENT
Start: 2022-06-21 | End: 2022-06-22 | Stop reason: HOSPADM

## 2022-06-21 RX ORDER — MIDAZOLAM HYDROCHLORIDE 1 MG/ML
1 INJECTION INTRAMUSCULAR; INTRAVENOUS AS NEEDED
Status: DISCONTINUED | OUTPATIENT
Start: 2022-06-21 | End: 2022-06-22 | Stop reason: HOSPADM

## 2022-06-21 RX ORDER — SODIUM CHLORIDE 0.9 % (FLUSH) 0.9 %
3-10 SYRINGE (ML) INJECTION AS NEEDED
Status: DISCONTINUED | OUTPATIENT
Start: 2022-06-21 | End: 2022-06-22 | Stop reason: HOSPADM

## 2022-06-21 RX ORDER — SODIUM CHLORIDE 0.9 % (FLUSH) 0.9 %
3 SYRINGE (ML) INJECTION EVERY 12 HOURS SCHEDULED
Status: DISCONTINUED | OUTPATIENT
Start: 2022-06-21 | End: 2022-06-22 | Stop reason: HOSPADM

## 2022-06-21 RX ORDER — METHYLPREDNISOLONE ACETATE 80 MG/ML
80 INJECTION, SUSPENSION INTRA-ARTICULAR; INTRALESIONAL; INTRAMUSCULAR; SOFT TISSUE ONCE
Status: COMPLETED | OUTPATIENT
Start: 2022-06-21 | End: 2022-06-21

## 2022-06-21 RX ORDER — LIDOCAINE HYDROCHLORIDE 10 MG/ML
1 INJECTION, SOLUTION INFILTRATION; PERINEURAL ONCE
Status: DISCONTINUED | OUTPATIENT
Start: 2022-06-21 | End: 2022-06-22 | Stop reason: HOSPADM

## 2022-06-21 RX ADMIN — IOPAMIDOL 10 ML: 408 INJECTION, SOLUTION INTRATHECAL at 09:46

## 2022-06-21 RX ADMIN — METHYLPREDNISOLONE ACETATE 80 MG: 80 INJECTION, SUSPENSION INTRA-ARTICULAR; INTRALESIONAL; INTRAMUSCULAR; SOFT TISSUE at 09:47

## 2022-06-21 NOTE — DISCHARGE INSTRUCTIONS
Lumbar Epidural Steroid Injection Instructions  Plan includes:  1.  Lumbar epidural steroid injections, up to 3, spaced 4 weeks apart.  If pain control is acceptable after 1 or 2 injections, it was discussed with the patient that they may return for the subsequent injections if and when their pain returns.  The risks were discussed with the patient including failure of relief, worsening pain, Headache (post dural puncture headache), bleeding (epidural hematoma) and infection (epidural abscess or skin infection).  2.  Physical therapy exercises at home as prescribed by physical therapy or from the pain clinic handout (given to the patient).  Continuation of these exercises every day, or multiple times per week, even when the patient has good pain relief, was stressed to the patient as a preventative measure to decrease the frequency and severity of future pain episodes.  3.  Continue pain medicines as already prescribed.  If patient not currently taking any, it is recommended to begin Acetaminophen 1000 mg po q 8 hours.  If other medicines containing Acetaminophen are currently prescribed, maintain daily dose at 3000 mg.    4.  If they can tolerate NSAIDS, it is recommended to take Ibuprofen 600 mg po q 6 hours for 7 days during pain exacerbations.  Alternatively, they may substitute an NSAID of their choice (e.g. Aleve).  This may be taken at the same time as Acetaminophen.  5.  Heat and ice to the affected area as tolerated for pain control.  It was discussed that heating pads can cause burns.  6.  Daily low impact exercise such as walking or water exercise was recommended to maintain overall health and aid in weight control.   7.  Follow up as needed for subsequent injections.  8.  Patient was counseled to abstain from tobacco products.

## 2022-06-21 NOTE — ANESTHESIA PROCEDURE NOTES
Lumbar epidural steroid injection    Pre-sedation assessment completed: 6/21/2022 9:37 AM    Patient reassessed immediately prior to procedure    Patient location during procedure: pain clinic  Start Time: 6/21/2022 9:40 AM  Stop Time: 6/21/2022 9:48 AM  Indication:procedure for pain  Performed By  Anesthesiologist: Manohar Rincon MD  Preanesthetic Checklist  Completed: patient identified, IV checked, site marked, risks and benefits discussed, surgical consent, monitors and equipment checked, pre-op evaluation and timeout performed  Additional Notes  Post-Op Diagnosis Codes:     * Intervertebral disc disorders with radiculopathy, lumbar region (M51.16)    The patient was observed in recovery with no evidence of neurological deficits or other problems. All questions were answered. The patient was discharged with appropriate instructions.  Prep:  Pt Position:prone  Sterile Tech:cap, gloves, mask and sterile barrier  Prep:chlorhexidine gluconate and isopropyl alcohol  Monitoring:blood pressure monitoring, continuous pulse oximetry and EKG  Procedure:Sedation: no     Approach: Right of midline.  Guidance: fluoroscopy  [place]lumbar  Level:3-4  Needle Type:Tuohy  Needle Gauge:20 G  Aspiration:negative  Medications:  Preservative Free Saline:3mL  Isovue:1mL  Comments:Appropriate epidural spread of contrast. Depomedrol:80mg  Post Assessment:  Post-procedure: Dressing per nursing.  Pt Tolerance:patient tolerated the procedure well with no apparent complications  Complications:no

## 2022-06-21 NOTE — H&P
"CHIEF COMPLAINT:  Low back pain        HISTORY OF PRESENT ILLNESS:  This patient is complaining of low back pain which radiates typically down his right anterior thigh and groin.  It ranges between a 7 and 8 out of 10 on the pain scale.  The pain is described as intermittent, aching, deep, pressure, sharp, sore, spasming, squeezing, stabbing, tight in nature. The pain is exacerbated by activity, lifting, lying down, sitting, standing, straining, twisting, and walking, and relieved by massage, relaxation, pain medication, ice, lying down, and rest.  The patient has tried conservative therapy including: Ice, rest, heat, over-the-counter medication, prescription medication, steroids, and physical therapy.  The pain is significantly decreasing the patient's Activities of Daily Living.      Diagnostic imaging: MRI of the lumbar spine from 5/3/2022 without contrast shows a broad-based disc bulge to the right at L3-L4 with contact of the exiting right L3 nerve.  There is moderate to severe narrowing of the right neural foramen at this level.  There is also bulging at L4-5 and L5-S1.  The full dictation by the radiologist is available in the chart.    This patient was referred to our clinic by Dr. Jovon Doran for lumbar epidural steroid injections.         PAST MEDICAL HISTORY:  Current Outpatient Medications on File Prior to Encounter   Medication Sig Dispense Refill   • Ascorbic Acid (VITAMIN C PO) Take  by mouth.     • aspirin 81 MG EC tablet Take 81 mg by mouth Daily.     • B Complex Vitamins (VITAMIN B COMPLEX PO) Take  by mouth Daily. HOLD PRIOR TO SURGERY     • B-D 3CC LUER-PUMA SYR 22GX1\" 22G X 1\" 3 ML misc      • budesonide (Pulmicort Flexhaler) 180 MCG/ACT inhaler Use 1 or 2 inhalations each AM. Rinse and spit after use. 2 each 3   • Cholecalciferol (VITAMIN D3) 5000 UNITS capsule capsule Take 5,000 Units by mouth Daily. HOLD PRIOR TO SURGERY     • cromolyn (OPTICROM) 4 % ophthalmic solution PLACE ONE DROP IN " EACH EYE FOUR TIMES A DAY 10 mL 6   • Diclofenac Sodium (Pennsaid) 2 % solution Apply 1 application topically 2 (Two) Times a Day. 112 g 2   • Diclofenac Sodium (VOLTAREN) 1 % gel gel Apply 4 g topically to the appropriate area as directed 4 (Four) Times a Day. 100 g 3   • diphenhydrAMINE HCl (BENADRYL ALLERGY PO) Take 1 tablet by mouth Daily.     • docusate sodium (COLACE) 250 MG capsule Take 250 mg by mouth Daily.     • esomeprazole (nexIUM) 40 MG capsule TAKE ONE CAPSULE BY MOUTH ONCE NIGHTLY 30 capsule 11   • etodolac (LODINE) 500 MG tablet Take 1 tablet by mouth 2 (Two) Times a Day. 60 tablet 0   • ezetimibe-simvastatin (VYTORIN) 10-40 MG per tablet TAKE ONE TABLET BY MOUTH ONCE NIGHTLY 30 tablet 11   • FIBER PO Take  by mouth.     • FLONASE SENSIMIST 27.5 MCG/SPRAY nasal spray USE ONE SPRAY IN EACH NOSTRIL ONCE DAILY 9.1 mL 12   • melatonin 1 MG tablet Take 3 mg by mouth Every Night. HOLD PRIOR TO SURGERY     • montelukast (SINGULAIR) 10 MG tablet TAKE ONE TABLET BY MOUTH ONCE NIGHTLY 30 tablet 11   • Multiple Vitamin (MULTI VITAMIN DAILY PO) Take  by mouth daily.     • olopatadine (PATANASE) 0.6 % solution nasal solution SPRAY TWO SPRAYS IN EACH NOSTRIL TWICE DAILY 30.5 g 6   • Potassium 99 MG tablet Take  by mouth.     • RESTASIS 0.05 % ophthalmic emulsion      • rizatriptan MLT (MAXALT-MLT) 10 MG disintegrating tablet DISSOLVE ONE TABLET BY MOUTH AS NEEDED FOR MIGRAINE; MAY REPEAT ONE TABLET IN 2 HOURS IF NEEDED. 27 tablet 2   • SALINE NASAL SPRAY NA into each nostril.     • selenium sulfide (SELSUN) 2.5 % lotion APPLY TO AFFECTED AREA(S) AS DIRECTED DAILY AS NEEDED FOR DANDRUFF 120 mL 11   • Testosterone Cypionate (DEPOTESTOTERONE CYPIONATE) 200 MG/ML injection Inject  into the appropriate muscle as directed by prescriber Every 14 (Fourteen) Days.     • traMADol (ULTRAM) 50 MG tablet TAKE ONE TABLET BY MOUTH EVERY 6 HOURS AS NEEDED FOR MODERATE PAIN 15 tablet 0   • zolpidem CR (Ambien CR) 12.5 MG CR tablet  "Take 1 tablet by mouth At Night As Needed for Sleep. 90 tablet 1   • albuterol sulfate  (90 Base) MCG/ACT inhaler 2 puffs every 4 hours as needed for SOA or wheezing. Generic. 3 g 1   • Denta 5000 Plus 1.1 % cream      • desonide (DESOWEN) 0.05 % cream APPLY TO AFFECTED AREA(S) THREE TIMES A DAY 60 each 6   • ketoconazole (NIZORAL) 2 % cream APPLY TO AFFECTED AREA(S) DAILY AS DIRECTED 60 g 3   • sildenafil (VIAGRA) 100 MG tablet TAKE ONE TABLET BY MOUTH AS NEEDED FOR ERECTILE DYSFUNCTION 5 tablet 5   • Unable to find 1 each 1 (One) Time. [employer] allergy relief, q AM     • [DISCONTINUED] chlorhexidine (PERIDEX) 0.12 % solution      • [DISCONTINUED] methylPREDNISolone (MEDROL) 4 MG dose pack Take as directed on package instructions. 21 tablet 0     No current facility-administered medications on file prior to encounter.       Past Medical History:   Diagnosis Date   • Arthritis     OSTEO   • Asthma    • COVID-19 04/2021   • ED (erectile dysfunction)    • Elevated cholesterol    • Environmental allergies    • Fracture, fibula     RIGHT AND ANKLE   • Gastroesophageal reflux disease without esophagitis 07/11/2016   • Low back pain    • Migraine with aura and without status migrainosus, not intractable 07/11/2016   • Pain and swelling of toe of right foot    • Peripheral neuropathy    • Seasonal allergies    • Sleep apnea     CPAP   • Testosterone deficiency 07/11/2016       SOCIAL HISTORY:  Counseled to avoid tobacco     REVIEW OF SYSTEMS:  No pertinent hematologic, infectious, or constitutional symptoms.  Other review of systems non-contributory     PHYSICAL EXAM:  /94 (BP [place] Left arm, Patient Position: Lying)   Pulse 65   Temp 36.9 °C (98.4 °F) (Infrared)   Resp 16   Ht 185.4 cm (73\")   Wt 111 kg (245 lb)   SpO2 97%   BMI 32.32 kg/m²   Constitutional: Well-developed well-nourished no acute distress  General: Alert and oriented  Neuromuscular: Straight leg raise is positive on the right.  He " has decreased range of motion of his lumbar spine significant difficulty moving.  He has pain throughout range of motion of his lumbar spine.       DIAGNOSIS:  Post-Op Diagnosis Codes:  Post-Op Diagnosis Codes:     * Intervertebral disc disorders with radiculopathy, lumbar region [M51.16]    PLAN:  -  Lumbar epidural steroid injections at the planned level of L3-L4 spaced approximately 2-4 weeks apart.  If pain control is acceptable after this injection, it was discussed with the patient that they may return for the subsequent injections if and when their pain returns.    - Risks were discussed with the patient, including but not limited to: failure of relief, worsening pain, tissue, nerve, blood vessel or spinal cord damage, post-dural-puncture headache, bleeding, epidural hematoma, infection, and epidural abscess. Benefits and alternatives were also discussed. No promises were made. Risks/benefits/alternatives of procedural medications were also discussed, including but not limited to hyperglycemia, fluid retention, decreased immune system, osteoporosis, and anaphylactoid reactions. The patient voiced understanding and agreed to proceed.   -  Physical therapy exercises at home should be considered, as tolerated, as prescribed by physical therapy or from the pain clinic handout (given to the patient) to allow for a home exercise program.  Continuation of these exercises every day, or multiple times per week, even when the patient has good pain relief, was stressed to the patient as a preventative measure to decrease the frequency and severity of future pain episodes.  -  It is recommended that the patient use the least amount of opioid medication possible.     -  Heat and ice to the affected area as tolerated for pain control.  It was discussed that heating pads can cause burns.  -  Daily low impact exercise such as walking or water exercise was recommended to maintain overall health and aid in weight control.   -   Patient was counseled to abstain from tobacco products.  -  Follow up as needed for subsequent injections.  -My understanding is that this gentleman is a possible surgical candidate.  He states that he is requesting a trial of conservative therapy including epidurals and physical therapy before committing to surgery.    Manohar Rincon M.D.  [place], [facility]  Board Certified in Pain Medicine by the American Board of Anesthesiology  Board Certified in Anesthesiology by the American Board of Anesthesiology     Much of this encounter note is an electronic transcription/translation of spoken language to printed text. The electronic translation of spoken language may permit erroneous, or at times, nonsensical words or phrases to be inadvertently transcribed. Although I have reviewed the note for such errors, some may still exist.

## 2022-06-22 ENCOUNTER — OFFICE VISIT (OUTPATIENT)
Dept: SLEEP MEDICINE | Facility: HOSPITAL | Age: 47
End: 2022-06-22

## 2022-06-22 ENCOUNTER — OFFICE VISIT (OUTPATIENT)
Dept: FAMILY MEDICINE CLINIC | Facility: CLINIC | Age: 47
End: 2022-06-22

## 2022-06-22 VITALS
HEART RATE: 83 BPM | DIASTOLIC BLOOD PRESSURE: 74 MMHG | BODY MASS INDEX: 31.14 KG/M2 | SYSTOLIC BLOOD PRESSURE: 128 MMHG | HEIGHT: 73 IN | WEIGHT: 235 LBS | OXYGEN SATURATION: 95 %

## 2022-06-22 VITALS — WEIGHT: 233 LBS | HEIGHT: 73 IN | BODY MASS INDEX: 30.88 KG/M2

## 2022-06-22 DIAGNOSIS — G47.26 CIRCADIAN RHYTHM SLEEP DISORDER, SHIFT WORK TYPE: ICD-10-CM

## 2022-06-22 DIAGNOSIS — Z99.89 OSA ON CPAP: Primary | ICD-10-CM

## 2022-06-22 DIAGNOSIS — F51.04 PSYCHOPHYSIOLOGICAL INSOMNIA: ICD-10-CM

## 2022-06-22 DIAGNOSIS — Z00.00 ANNUAL PHYSICAL EXAM: Primary | ICD-10-CM

## 2022-06-22 DIAGNOSIS — M17.0 ARTHRITIS OF BOTH KNEES: ICD-10-CM

## 2022-06-22 DIAGNOSIS — G47.33 OSA ON CPAP: Primary | ICD-10-CM

## 2022-06-22 PROCEDURE — G0463 HOSPITAL OUTPT CLINIC VISIT: HCPCS

## 2022-06-22 PROCEDURE — 99213 OFFICE O/P EST LOW 20 MIN: CPT | Performed by: INTERNAL MEDICINE

## 2022-06-22 PROCEDURE — 99396 PREV VISIT EST AGE 40-64: CPT | Performed by: NURSE PRACTITIONER

## 2022-06-22 RX ORDER — ETODOLAC 500 MG/1
500 TABLET, FILM COATED ORAL 2 TIMES DAILY
Qty: 60 TABLET | Refills: 5 | Status: SHIPPED | OUTPATIENT
Start: 2022-06-22 | End: 2022-12-19

## 2022-06-22 NOTE — PROGRESS NOTES
"Chief Complaint  Annual Exam (& review labs)    Subjective        Arelis Munson presents to [facility]  Mr. Munson presents today for an annual physical exam with lab review.     I have reviewed the patient's medical history in detail and updated the computerized patient record.    Current Outpatient Medications:   •  albuterol sulfate  (90 Base) MCG/ACT inhaler, 2 puffs every 4 hours as needed for SOA or wheezing. Generic., Disp: 3 g, Rfl: 1  •  Ascorbic Acid (VITAMIN C PO), Take  by mouth., Disp: , Rfl:   •  aspirin 81 MG EC tablet, Take 81 mg by mouth Daily., Disp: , Rfl:   •  B Complex Vitamins (VITAMIN B COMPLEX PO), Take  by mouth Daily. HOLD PRIOR TO SURGERY, Disp: , Rfl:   •  B-D 3CC LUER-PUMA SYR 22GX1\" 22G X 1\" 3 ML misc, , Disp: , Rfl:   •  budesonide (Pulmicort Flexhaler) 180 MCG/ACT inhaler, Use 1 or 2 inhalations each AM. Rinse and spit after use., Disp: 2 each, Rfl: 3  •  Cholecalciferol (VITAMIN D3) 5000 UNITS capsule capsule, Take 5,000 Units by mouth Daily. HOLD PRIOR TO SURGERY, Disp: , Rfl:   •  cromolyn (OPTICROM) 4 % ophthalmic solution, PLACE ONE DROP IN EACH EYE FOUR TIMES A DAY, Disp: 10 mL, Rfl: 6  •  Denta 5000 Plus 1.1 % cream, , Disp: , Rfl:   •  desonide (DESOWEN) 0.05 % cream, APPLY TO AFFECTED AREA(S) THREE TIMES A DAY, Disp: 60 each, Rfl: 6  •  Diclofenac Sodium (Pennsaid) 2 % solution, Apply 1 application topically 2 (Two) Times a Day., Disp: 112 g, Rfl: 2  •  Diclofenac Sodium (VOLTAREN) 1 % gel gel, Apply 4 g topically to the appropriate area as directed 4 (Four) Times a Day., Disp: 100 g, Rfl: 3  •  diphenhydrAMINE HCl (BENADRYL ALLERGY PO), Take 1 tablet by mouth Daily., Disp: , Rfl:   •  docusate sodium (COLACE) 250 MG capsule, Take 250 mg by mouth Daily., Disp: , Rfl:   •  esomeprazole (nexIUM) 40 MG capsule, TAKE ONE CAPSULE BY MOUTH ONCE NIGHTLY, Disp: 30 capsule, Rfl: 11  •  etodolac (LODINE) 500 MG tablet, Take 1 tablet by mouth 2 " (Two) Times a Day., Disp: 60 tablet, Rfl: 0  •  ezetimibe-simvastatin (VYTORIN) 10-40 MG per tablet, TAKE ONE TABLET BY MOUTH ONCE NIGHTLY, Disp: 30 tablet, Rfl: 11  •  FIBER PO, Take  by mouth., Disp: , Rfl:   •  FLONASE SENSIMIST 27.5 MCG/SPRAY nasal spray, USE ONE SPRAY IN EACH NOSTRIL ONCE DAILY, Disp: 9.1 mL, Rfl: 12  •  ketoconazole (NIZORAL) 2 % cream, APPLY TO AFFECTED AREA(S) DAILY AS DIRECTED, Disp: 60 g, Rfl: 3  •  melatonin 1 MG tablet, Take 3 mg by mouth Every Night. HOLD PRIOR TO SURGERY, Disp: , Rfl:   •  montelukast (SINGULAIR) 10 MG tablet, TAKE ONE TABLET BY MOUTH ONCE NIGHTLY, Disp: 30 tablet, Rfl: 11  •  Multiple Vitamin (MULTI VITAMIN DAILY PO), Take  by mouth daily., Disp: , Rfl:   •  olopatadine (PATANASE) 0.6 % solution nasal solution, SPRAY TWO SPRAYS IN EACH NOSTRIL TWICE DAILY, Disp: 30.5 g, Rfl: 6  •  Potassium 99 MG tablet, Take  by mouth., Disp: , Rfl:   •  RESTASIS 0.05 % ophthalmic emulsion, , Disp: , Rfl:   •  rizatriptan MLT (MAXALT-MLT) 10 MG disintegrating tablet, DISSOLVE ONE TABLET BY MOUTH AS NEEDED FOR MIGRAINE; MAY REPEAT ONE TABLET IN 2 HOURS IF NEEDED., Disp: 27 tablet, Rfl: 2  •  SALINE NASAL SPRAY NA, into each nostril., Disp: , Rfl:   •  selenium sulfide (SELSUN) 2.5 % lotion, APPLY TO AFFECTED AREA(S) AS DIRECTED DAILY AS NEEDED FOR DANDRUFF, Disp: 120 mL, Rfl: 11  •  sildenafil (VIAGRA) 100 MG tablet, TAKE ONE TABLET BY MOUTH AS NEEDED FOR ERECTILE DYSFUNCTION, Disp: 5 tablet, Rfl: 5  •  Testosterone Cypionate (DEPOTESTOTERONE CYPIONATE) 200 MG/ML injection, Inject  into the appropriate muscle as directed by prescriber Every 14 (Fourteen) Days., Disp: , Rfl:   •  traMADol (ULTRAM) 50 MG tablet, TAKE ONE TABLET BY MOUTH EVERY 6 HOURS AS NEEDED FOR MODERATE PAIN, Disp: 15 tablet, Rfl: 0  •  Unable to find, 1 each 1 (One) Time. [employer] allergy relief, q AM, Disp: , Rfl:   •  zolpidem CR (Ambien CR) 12.5 MG CR tablet, Take 1 tablet by mouth At Night As Needed for Sleep.,  "Disp: 90 tablet, Rfl: 1  No current facility-administered medications for this visit.     Review of Systems   Constitutional: Negative.    HENT: Negative.    Eyes: Negative.    Respiratory: Negative.    Cardiovascular: Negative.    Gastrointestinal: Negative.    Genitourinary: Negative.    Musculoskeletal: Positive for back pain.   Skin: Negative.    Neurological: Negative.    Psychiatric/Behavioral: Negative.      Objective   Vital Signs:  /74 (BP [place] Right arm, Patient Position: Sitting, Cuff Size: Adult)   Pulse 83   Ht 185.4 cm (73\")   Wt 107 kg (235 lb)   SpO2 95%   BMI 31.00 kg/m²   Estimated body mass index is 31 kg/m² as calculated from the following:    Height as of this encounter: 185.4 cm (73\").    Weight as of this encounter: 107 kg (235 lb).          Physical Exam  Vitals reviewed.   Constitutional:       Appearance: Normal appearance.   HENT:      Right Ear: Tympanic membrane normal.      Left Ear: Tympanic membrane normal.   Cardiovascular:      Rate and Rhythm: Normal rate and regular rhythm.      Pulses: Normal pulses.      Heart sounds: Normal heart sounds.   Pulmonary:      Effort: Pulmonary effort is normal.      Breath sounds: Normal breath sounds.   Abdominal:      General: Bowel sounds are normal.      Palpations: Abdomen is soft.   Skin:     General: Skin is warm and dry.   Neurological:      Mental Status: He is alert and oriented to person, place, and time.   Psychiatric:         Mood and Affect: Mood normal.         Behavior: Behavior normal.         Thought Content: Thought content normal.         Judgment: Judgment normal.        Result Review :    CMP    CMP 6/18/22   Glucose 92   BUN 14   Creatinine 1.01   Sodium 142   Potassium 4.8   Chloride 101   Calcium 9.5   Total Protein 6.5   Albumin 4.6   Globulin 1.9   Total Bilirubin 0.6   Alkaline Phosphatase 49   AST (SGOT) 23   ALT (SGPT) 43           Lipid Panel    Lipid Panel 6/18/22   Total Cholesterol 148 "   Triglycerides 136   HDL Cholesterol 47   VLDL Cholesterol 24   LDL Cholesterol  77                     Assessment and Plan   Diagnoses and all orders for this visit:    1. Annual physical exam (Primary)    Mr. Munson appears to be doing well today.  His physical exam is within normal parameters for him today.  I have reviewed his labs with him today. His labs are at goal.  He is up to date with all healthy exams, but he does need a colonoscopy. He said he needs to check with his wife before he schedules one and will most likely go to Dr. ADELINA Live to have it ordered.  We discussed age appropriate healthy behaviors.          Follow Up   Return in about 1 year (around 6/22/2023), or if symptoms worsen or fail to improve, for Annual physical, Fasting labs 1 week prior to next f/u.  Patient was given instructions and counseling regarding his condition or for health maintenance advice. Please see specific information pulled into the AVS if appropriate.

## 2022-06-22 NOTE — PROGRESS NOTES
"Follow Up Sleep Disorders Center Note     Chief Complaint:  NITA     Primary Care Physician: Nessa Gaston APRN    Interval History:   The patient is a 47 y.o. male  who I last saw 12/15/2021 and that note was reviewed.  Patient reports he is stable without new complaints.  His work shift has been stable on for shift.  He goes to bed at 10 PM and awakens at 5 AM.  He takes Ambien nightly.    Review of Systems:    A complete review of systems was done and all were negative with the exception of some nasal congestion postnasal drip, shortness of breath with exertion, some ear pain and ADAM    Social History:    Social History     Socioeconomic History   • Marital status:    Tobacco Use   • Smoking status: Former Smoker     Packs/day: 2.00     Years: 31.00     Pack years: 62.00     Types: Cigarettes     Quit date:      Years since quittin.4   • Smokeless tobacco: Never Used   • Tobacco comment: Quit    Substance and Sexual Activity   • Alcohol use: Yes     Comment: ONCE OR TWICE MONTHLY   • Drug use: No   • Sexual activity: Defer       Allergies:  Patient has no known allergies.     Medication Review: His list was reviewed.      Vital Signs:    Vitals:    22 0900   Weight: 106 kg (233 lb)   Height: 185.4 cm (72.99\")     Body mass index is 30.75 kg/m².    Self-administered [place] Sleepiness Scale test results: 2  0-5 Lower normal daytime sleepiness  6-10 Higher normal daytime sleepiness  11-12 Mild, 13-15 Moderate, & 16-24 Severe excessive daytime sleepiness    Physical Exam:    Constitutional:  Well developed 47 y.o. male that appears in no apparent distress.  Awake & oriented times 3.  Normal mood with normal recent and remote memory and normal judgement.  Eyes:  Conjunctivae normal.  Oropharynx: Previously, moist mucous membranes without exudate and a large tongue and normal uvula and class III Mallampati airway, patient is wearing a facemask.     Downloaded PAP Data Reviewed For " Compliance:  DME is [facility] and he uses a fullface mask.  No up-to-date downloads available.  The patient's wife grandmother passed away earlier this morning.      Impression:   Obstructive sleep apnea adequately treated with auto CPAP. The patient appears to be at goal with good compliance and usage. The patient has no complaints of hypersomnolence.  Circadian rhythm sleep disorder, shiftwork type, early morning awakening  Psychophysiologic insomnia adequately treated with Ambien CR 12.5 mg 1/2 to 1 tablet at bedtime    Plan:  Good sleep hygiene measures should be maintained.  Weight loss would be beneficial in this patient who is obese by BMI.      After evaluating the patient and assessing results available, the patient is benefiting from the treatment being provided.     The patient will continue auto CPAP.  After clinical evaluation I recommend no changes to the patient's pressures.  A new prescription will be sent to the patient's DME.    The patient will continue Ambien 10 mg at bedtime, one half or 1 tab.  Mateo reviewed and there are no irregularities.    I answered all of the patient's questions.  The patient will call for any problems and will follow up in 6 months.      Ronni Kong MD  Sleep Medicine  06/22/22  09:32 EDT

## 2022-07-01 NOTE — PROGRESS NOTES
"Subjective   History of Present Illness: Arelis Munson is a 47 y.o. male is here today for follow-up after having a L-EDDA on 06-21-22 that was ordered for right sided low back pain that radiates into his right hip and down his leg with intermittent numbness and tingling.    Today, Mr. Munson reports that he has received 75 percent relief from the L-EDDA.  He also thinks the physical therapy has helped quite a bit.  He reports back pain that radiates into the right hip and leg. He denies numbness and tingling. He states that he is having more stiffness in his back than pain.  He is very encouraged with the results he has thus far that he is can to be able to avoid surgery.  He no longer has right hip and thigh pain.    While in the room and during my examination of the patient I wore a mask and eye protection.  I washed my hands before and after this patient encounter.  The patient was also wearing a mask.    Back Pain  The problem has been gradually improving since onset. The pain is present in the lumbar spine. The pain radiates to the right thigh and right foot. The symptoms are aggravated by position. Pertinent negatives include no numbness, tingling or weakness. Treatments tried: L-EDDA x1. The treatment provided moderate relief.       The following portions of the patient's history were reviewed and updated as appropriate: allergies, current medications, past family history, past medical history, past social history, past surgical history and problem list.    Review of Systems   Musculoskeletal: Positive for back pain.   Neurological: Negative for tingling, weakness and numbness.       Objective     Vitals:    07/12/22 1531   BP: 132/96   Pulse: 81   Temp: 96.8 °F (36 °C)   SpO2: 95%   Weight: 107 kg (235 lb)   Height: 185.4 cm (73\")     Body mass index is 31 kg/m².      Physical Exam  Neurologic Exam    Physical Exam:    CONSTITUTIONAL:  appears well developed, well-nourished and in no acute " distress.    NECK: the neck is supple and symmetric. The trachea is midline with no masses.      PULMONARY: Respiratory effort is normal with no increased work of breathing or signs of respiratory distress.    CARDIOVASCULAR: Pedal pulses are +2/4 bilaterally. Examination of the extremities shows no edema or varicosities.    MUSCULOSKELETAL: Gait normal    SKIN: The skin is warm, dry and intact.      NEUROLOGIC:   Cranial Nerves 2-12 intact  Normal motor strength noted. Muscle bulk and tone are normal.  Sensory exam is normal to fine touch to confrontational testing bilaterally  Reflexes on the right side demonstrates 2/4 Knee Jerk Reflex, 2/4 Ankle Jerk Reflex and no ankle clonus on the right.   Reflexes on the left side demonstrates 2/4 Knee Jerk Reflex, 2/4 Ankle Jerk Reflex and no ankle clonus on the left.  Superficial/Primitive Reflexes: primitive reflexes were absent.  Hill's, Babinski, and Clonus signs all negative.  No coordination deficit observed.  Radicular testing showed a negative Jose Manuel (KATELYN) test but he does get pain in the right knee with Jose Manuel's test and negative straight leg raise  although when I first saw him he was having a little bit of issues with right calf on the right which is resolved through the physical therapy  Cortical function is intact and without deficits. Speech is normal.    PSYCHIATRIC: oriented to person, place and time. Patient's mood and affect are normal.    Assessment & Plan   Independent Review of Radiographic Studies:      I personally reviewed the images from the following studies.    MRI of the lumbar spine done on May 3, 2020 to [facility] reveals a transitional lumbosacral anatomy with a partially lumbarized S1.  At L3-4, there is a broad-based disc bulge slightly to the right causing significant right neuroforaminal narrowing and there is also a far right posterolateral disc protrusion is present extending into and lateral to the right neural foramen and  contacting and deflects the exiting right L3 nerve within and lateral to the right  neural foramen.  At L4-5, there is a slight disc bulge to the right and only mild right foraminal narrowing.  At L5-S1 there are mild degenerative and arthritic changes with only mild bilateral foraminal narrowing.    Medical Decision Making:      There is no sign of any residual radiculopathy along the L3 distribution at this point.  He still has some back stiffness and soreness to the right side of the back but that may continue to respond to additional therapy exercise and the epidural steroid injection protocol.  He is going to get another epidural steroid injection in about a week and I will see him back in about 6 weeks to make sure that he continues to maintain the progress of improvement.  At this point I see no role for neurosurgical intervention.    Return in about 6 weeks (around 8/23/2022) for routine follow up.    Diagnoses and all orders for this visit:    1. Lumbar disc herniation with radiculopathy (Primary)             Jovon Doran MD FACS FAANS  Neurological Surgery

## 2022-07-12 ENCOUNTER — OFFICE VISIT (OUTPATIENT)
Dept: NEUROSURGERY | Facility: CLINIC | Age: 47
End: 2022-07-12

## 2022-07-12 VITALS
HEART RATE: 81 BPM | SYSTOLIC BLOOD PRESSURE: 132 MMHG | WEIGHT: 235 LBS | TEMPERATURE: 96.8 F | BODY MASS INDEX: 31.14 KG/M2 | OXYGEN SATURATION: 95 % | HEIGHT: 73 IN | DIASTOLIC BLOOD PRESSURE: 96 MMHG

## 2022-07-12 DIAGNOSIS — M51.16 LUMBAR DISC HERNIATION WITH RADICULOPATHY: Primary | ICD-10-CM

## 2022-07-12 PROCEDURE — 99213 OFFICE O/P EST LOW 20 MIN: CPT | Performed by: NEUROLOGICAL SURGERY

## 2022-07-18 DIAGNOSIS — G47.33 OSA ON CPAP: ICD-10-CM

## 2022-07-18 DIAGNOSIS — F51.04 PSYCHOPHYSIOLOGICAL INSOMNIA: ICD-10-CM

## 2022-07-18 DIAGNOSIS — Z99.89 OSA ON CPAP: ICD-10-CM

## 2022-07-18 RX ORDER — ZOLPIDEM TARTRATE 12.5 MG/1
12.5 TABLET, FILM COATED, EXTENDED RELEASE ORAL NIGHTLY PRN
Qty: 90 TABLET | Refills: 1 | Status: SHIPPED | OUTPATIENT
Start: 2022-07-18 | End: 2023-02-20 | Stop reason: SDUPTHER

## 2022-07-22 ENCOUNTER — ANESTHESIA (OUTPATIENT)
Dept: PAIN MEDICINE | Facility: HOSPITAL | Age: 47
End: 2022-07-22

## 2022-07-22 ENCOUNTER — ANESTHESIA EVENT (OUTPATIENT)
Dept: PAIN MEDICINE | Facility: HOSPITAL | Age: 47
End: 2022-07-22

## 2022-07-22 ENCOUNTER — HOSPITAL ENCOUNTER (OUTPATIENT)
Dept: GENERAL RADIOLOGY | Facility: HOSPITAL | Age: 47
Discharge: HOME | End: 2022-07-22

## 2022-07-22 ENCOUNTER — HOSPITAL ENCOUNTER (OUTPATIENT)
Dept: PAIN MEDICINE | Facility: HOSPITAL | Age: 47
Discharge: HOME | End: 2022-07-22

## 2022-07-22 VITALS
HEART RATE: 86 BPM | OXYGEN SATURATION: 95 % | SYSTOLIC BLOOD PRESSURE: 148 MMHG | DIASTOLIC BLOOD PRESSURE: 95 MMHG | RESPIRATION RATE: 16 BRPM | TEMPERATURE: 100.2 F

## 2022-07-22 DIAGNOSIS — M51.16 LUMBAR DISC HERNIATION WITH RADICULOPATHY: Primary | ICD-10-CM

## 2022-07-22 DIAGNOSIS — R52 PAIN: ICD-10-CM

## 2022-07-22 PROCEDURE — 77003 FLUOROGUIDE FOR SPINE INJECT: CPT

## 2022-07-22 PROCEDURE — 25010000002 METHYLPREDNISOLONE PER 80 MG: Performed by: ANESTHESIOLOGY

## 2022-07-22 RX ORDER — LIDOCAINE HYDROCHLORIDE 10 MG/ML
1 INJECTION, SOLUTION INFILTRATION; PERINEURAL ONCE AS NEEDED
Status: DISCONTINUED | OUTPATIENT
Start: 2022-07-22 | End: 2022-07-23 | Stop reason: HOSPADM

## 2022-07-22 RX ORDER — METHYLPREDNISOLONE ACETATE 80 MG/ML
80 INJECTION, SUSPENSION INTRA-ARTICULAR; INTRALESIONAL; INTRAMUSCULAR; SOFT TISSUE ONCE
Status: COMPLETED | OUTPATIENT
Start: 2022-07-22 | End: 2022-07-22

## 2022-07-22 RX ORDER — FENTANYL CITRATE 50 UG/ML
50 INJECTION, SOLUTION INTRAMUSCULAR; INTRAVENOUS AS NEEDED
Status: DISCONTINUED | OUTPATIENT
Start: 2022-07-22 | End: 2022-07-23 | Stop reason: HOSPADM

## 2022-07-22 RX ORDER — SODIUM CHLORIDE 0.9 % (FLUSH) 0.9 %
1-10 SYRINGE (ML) INJECTION AS NEEDED
Status: DISCONTINUED | OUTPATIENT
Start: 2022-07-22 | End: 2022-07-23 | Stop reason: HOSPADM

## 2022-07-22 RX ORDER — MIDAZOLAM HYDROCHLORIDE 1 MG/ML
1 INJECTION INTRAMUSCULAR; INTRAVENOUS AS NEEDED
Status: DISCONTINUED | OUTPATIENT
Start: 2022-07-22 | End: 2022-07-23 | Stop reason: HOSPADM

## 2022-07-22 RX ADMIN — METHYLPREDNISOLONE ACETATE 80 MG: 80 INJECTION, SUSPENSION INTRA-ARTICULAR; INTRALESIONAL; INTRAMUSCULAR; SOFT TISSUE at 14:12

## 2022-07-22 NOTE — ANESTHESIA PROCEDURE NOTES
PAIN Epidural block      Patient reassessed immediately prior to procedure    Patient location during procedure: pain clinic  Start Time: 7/22/2022 2:06 PM  Stop Time: 7/22/2022 2:13 PM  Indication:procedure for pain  Performed By  Anesthesiologist: Medhat Boothe MD  Preanesthetic Checklist  Completed: patient identified, IV checked, risks and benefits discussed, surgical consent, monitors and equipment checked, pre-op evaluation and timeout performed  Additional Notes  Diagnosis:  Post-Op Diagnosis Codes:     * Intervertebral disc disorder with radiculopathy of lumbar region (M51.16)      Prep:  Pt Position:prone  Sterile Tech:gloves, mask and sterile barrier  Prep:chlorhexidine gluconate and isopropyl alcohol  Monitoring:blood pressure monitoring, continuous pulse oximetry and EKG  Procedure:Sedation: no     Approach:right paramedian  Guidance: fluoroscopy  [place]lumbar  Level:3-4  Needle Type:Tuohy  Needle Gauge:20  Aspiration:negative  Test Dose:negative  Medications:  Preservative Free Saline:3mL  Depomedrol:80 mg  Post Assessment:  Dressing:occlusive dressing applied  Pt Tolerance:patient tolerated the procedure well with no apparent complications  Complications:no

## 2022-07-22 NOTE — ADDENDUM NOTE
Addendum  created 07/22/22 1424 by Medhat Boothe MD    Order Reconciliation Section accessed, Order list changed

## 2022-07-22 NOTE — H&P
"INTERVAL HISTORY:    The patient returns for another Lumbar epidural steroid injection today.  They have received 70% improvement since their last injection with a pain level of 4/10 at its worst recently.    Conservative measures tried in the interim     Current Outpatient Medications on File Prior to Encounter   Medication Sig Dispense Refill   • albuterol sulfate  (90 Base) MCG/ACT inhaler 2 puffs every 4 hours as needed for SOA or wheezing. Generic. 3 g 1   • Ascorbic Acid (VITAMIN C PO) Take  by mouth.     • aspirin 81 MG EC tablet Take 81 mg by mouth Daily.     • B Complex Vitamins (VITAMIN B COMPLEX PO) Take  by mouth Daily. HOLD PRIOR TO SURGERY     • B-D 3CC LUER-PUMA SYR 22GX1\" 22G X 1\" 3 ML misc      • budesonide (Pulmicort Flexhaler) 180 MCG/ACT inhaler Use 1 or 2 inhalations each AM. Rinse and spit after use. 2 each 3   • Cholecalciferol (VITAMIN D3) 5000 UNITS capsule capsule Take 5,000 Units by mouth Daily. HOLD PRIOR TO SURGERY     • cromolyn (OPTICROM) 4 % ophthalmic solution PLACE ONE DROP IN EACH EYE FOUR TIMES A DAY 10 mL 6   • Denta 5000 Plus 1.1 % cream      • desonide (DESOWEN) 0.05 % cream APPLY TO AFFECTED AREA(S) THREE TIMES A DAY 60 each 6   • Diclofenac Sodium (Pennsaid) 2 % solution Apply 1 application topically 2 (Two) Times a Day. 112 g 2   • Diclofenac Sodium (VOLTAREN) 1 % gel gel Apply 4 g topically to the appropriate area as directed 4 (Four) Times a Day. 100 g 3   • diphenhydrAMINE HCl (BENADRYL ALLERGY PO) Take 1 tablet by mouth Daily.     • docusate sodium (COLACE) 250 MG capsule Take 250 mg by mouth Daily.     • esomeprazole (nexIUM) 40 MG capsule TAKE ONE CAPSULE BY MOUTH ONCE NIGHTLY 30 capsule 11   • etodolac (LODINE) 500 MG tablet Take 1 tablet by mouth 2 (Two) Times a Day. 60 tablet 5   • ezetimibe-simvastatin (VYTORIN) 10-40 MG per tablet TAKE ONE TABLET BY MOUTH ONCE NIGHTLY 30 tablet 11   • FIBER PO Take  by mouth.     • FLONASE SENSIMIST 27.5 MCG/SPRAY nasal " spray USE ONE SPRAY IN EACH NOSTRIL ONCE DAILY 9.1 mL 12   • ketoconazole (NIZORAL) 2 % cream APPLY TO AFFECTED AREA(S) DAILY AS DIRECTED 60 g 3   • melatonin 1 MG tablet Take 3 mg by mouth Every Night. HOLD PRIOR TO SURGERY     • montelukast (SINGULAIR) 10 MG tablet TAKE ONE TABLET BY MOUTH ONCE NIGHTLY 30 tablet 11   • Multiple Vitamin (MULTI VITAMIN DAILY PO) Take  by mouth daily.     • olopatadine (PATANASE) 0.6 % solution nasal solution SPRAY TWO SPRAYS IN EACH NOSTRIL TWICE DAILY 30.5 g 6   • Potassium 99 MG tablet Take  by mouth.     • RESTASIS 0.05 % ophthalmic emulsion      • rizatriptan MLT (MAXALT-MLT) 10 MG disintegrating tablet DISSOLVE ONE TABLET BY MOUTH AS NEEDED FOR MIGRAINE; MAY REPEAT ONE TABLET IN 2 HOURS IF NEEDED. 27 tablet 2   • SALINE NASAL SPRAY NA into each nostril.     • selenium sulfide (SELSUN) 2.5 % lotion APPLY TO AFFECTED AREA(S) AS DIRECTED DAILY AS NEEDED FOR DANDRUFF 120 mL 11   • sildenafil (VIAGRA) 100 MG tablet TAKE ONE TABLET BY MOUTH AS NEEDED FOR ERECTILE DYSFUNCTION 5 tablet 5   • Testosterone Cypionate (DEPOTESTOTERONE CYPIONATE) 200 MG/ML injection Inject  into the appropriate muscle as directed by prescriber Every 14 (Fourteen) Days.     • traMADol (ULTRAM) 50 MG tablet TAKE ONE TABLET BY MOUTH EVERY 6 HOURS AS NEEDED FOR MODERATE PAIN 15 tablet 0   • Unable to find 1 each 1 (One) Time. [employer] allergy relief, q AM     • zolpidem CR (Ambien CR) 12.5 MG CR tablet Take 1 tablet by mouth At Night As Needed for Sleep. 90 tablet 1     No current facility-administered medications on file prior to encounter.       Past Medical History:   Diagnosis Date   • Arthritis     OSTEO   • Asthma    • COVID-19 04/2021   • ED (erectile dysfunction)    • Elevated cholesterol    • Environmental allergies    • Erectile dysfunction    • Fracture, fibula     RIGHT AND ANKLE   • Gastroesophageal reflux disease without esophagitis 07/11/2016   • Low back pain    • Migraine with aura and without  status migrainosus, not intractable 07/11/2016   • Pain and swelling of toe of right foot    • Peripheral neuropathy    • Seasonal allergies    • Sleep apnea     CPAP   • Testosterone deficiency 07/11/2016       No hematologic infectious or constitutional symptoms  Negative screen for NITA      Exam:  /93 (BP [place] Left arm, Patient Position: Lying)   Pulse 85   Temp 37.9 °C (100.2 °F) (Infrared)   Resp 16   SpO2 94%   Airway Mallampatti 2  Alert and oriented      Diagnosis:  Post-Op Diagnosis Codes:     * Intervertebral disc disorder with radiculopathy of lumbar region [M51.16]    Plan:  Lumbar epidural steroid injection under fluoroscopic guidance    I have encouraged them to continue:  1.  Physical therapy exercises at home as prescribed by physical therapy or from the pain clinic handout.  Continuation of these exercises every day, or multiple times per week, even when the patient has good pain relief, was stressed to the patient as a preventative measure to decrease the frequency and severity of future pain episodes.  2.  Continue pain medicines as already prescribed.  If patient not currently taking any, it is recommended to begin Acetaminophen 1000 mg po q 8 hours.  If other medicines containing Acetaminophen are currently prescribed, maintain daily dose at 3000mg.    3.  If they can tolerate NSAIDS, it is recommended to take Ibuprofen 600 mg po q 6 hours for 7 days during pain exacerbations.   Alternatively, they may substitute an NSAID of their choice (e.g. Aleve)  4.  Heat and ice to the affected area as tolerated for pain control.   5.  Low impact exercise such as walking or water exercise was recommended to maintain overall health and aid in weight control.   6.  Follow up as needed for subsequent injections.

## 2022-07-25 RX ORDER — SILDENAFIL 100 MG/1
TABLET, FILM COATED ORAL
Qty: 5 TABLET | Refills: 5 | Status: SHIPPED | OUTPATIENT
Start: 2022-07-25 | End: 2022-08-29

## 2022-08-01 DIAGNOSIS — M51.16 LUMBAR DISC HERNIATION WITH RADICULOPATHY: ICD-10-CM

## 2022-08-01 RX ORDER — TRAMADOL HYDROCHLORIDE 50 MG/1
50 TABLET ORAL EVERY 6 HOURS PRN
Qty: 40 TABLET | Refills: 0 | Status: SHIPPED | OUTPATIENT
Start: 2022-08-01 | End: 2022-09-14

## 2022-08-17 RX ORDER — MONTELUKAST SODIUM 10 MG/1
TABLET ORAL
Qty: 30 TABLET | Refills: 11 | Status: SHIPPED | OUTPATIENT
Start: 2022-08-17

## 2022-08-22 ENCOUNTER — HOSPITAL ENCOUNTER (OUTPATIENT)
Dept: GENERAL RADIOLOGY | Facility: HOSPITAL | Age: 47
Discharge: HOME | End: 2022-08-22

## 2022-08-22 ENCOUNTER — HOSPITAL ENCOUNTER (OUTPATIENT)
Dept: PAIN MEDICINE | Facility: HOSPITAL | Age: 47
Discharge: HOME | End: 2022-08-22

## 2022-08-22 ENCOUNTER — ANESTHESIA (OUTPATIENT)
Dept: PAIN MEDICINE | Facility: HOSPITAL | Age: 47
End: 2022-08-22

## 2022-08-22 ENCOUNTER — ANESTHESIA EVENT (OUTPATIENT)
Dept: PAIN MEDICINE | Facility: HOSPITAL | Age: 47
End: 2022-08-22

## 2022-08-22 VITALS
TEMPERATURE: 97.3 F | DIASTOLIC BLOOD PRESSURE: 96 MMHG | SYSTOLIC BLOOD PRESSURE: 138 MMHG | RESPIRATION RATE: 16 BRPM | HEART RATE: 69 BPM | OXYGEN SATURATION: 94 %

## 2022-08-22 DIAGNOSIS — M51.16 LUMBAR DISC HERNIATION WITH RADICULOPATHY: ICD-10-CM

## 2022-08-22 DIAGNOSIS — R52 PAIN: ICD-10-CM

## 2022-08-22 PROCEDURE — 77003 FLUOROGUIDE FOR SPINE INJECT: CPT

## 2022-08-22 PROCEDURE — 25010000002 METHYLPREDNISOLONE PER 80 MG: Performed by: ANESTHESIOLOGY

## 2022-08-22 RX ORDER — FENTANYL CITRATE 50 UG/ML
50 INJECTION, SOLUTION INTRAMUSCULAR; INTRAVENOUS AS NEEDED
Status: DISCONTINUED | OUTPATIENT
Start: 2022-08-22 | End: 2022-08-23 | Stop reason: HOSPADM

## 2022-08-22 RX ORDER — LIDOCAINE HYDROCHLORIDE 10 MG/ML
1 INJECTION, SOLUTION INFILTRATION; PERINEURAL ONCE AS NEEDED
Status: DISCONTINUED | OUTPATIENT
Start: 2022-08-22 | End: 2022-08-23 | Stop reason: HOSPADM

## 2022-08-22 RX ORDER — METHYLPREDNISOLONE ACETATE 80 MG/ML
80 INJECTION, SUSPENSION INTRA-ARTICULAR; INTRALESIONAL; INTRAMUSCULAR; SOFT TISSUE ONCE
Status: COMPLETED | OUTPATIENT
Start: 2022-08-22 | End: 2022-08-22

## 2022-08-22 RX ORDER — SODIUM CHLORIDE 0.9 % (FLUSH) 0.9 %
1-10 SYRINGE (ML) INJECTION AS NEEDED
Status: DISCONTINUED | OUTPATIENT
Start: 2022-08-22 | End: 2022-08-23 | Stop reason: HOSPADM

## 2022-08-22 RX ORDER — MIDAZOLAM HYDROCHLORIDE 1 MG/ML
1 INJECTION INTRAMUSCULAR; INTRAVENOUS AS NEEDED
Status: DISCONTINUED | OUTPATIENT
Start: 2022-08-22 | End: 2022-08-23 | Stop reason: HOSPADM

## 2022-08-22 RX ADMIN — METHYLPREDNISOLONE ACETATE 80 MG: 80 INJECTION, SUSPENSION INTRA-ARTICULAR; INTRALESIONAL; INTRAMUSCULAR; SOFT TISSUE at 08:49

## 2022-08-22 NOTE — ADDENDUM NOTE
Addendum  created 08/22/22 0909 by Medhat Boothe MD    Diagnosis association updated, Order Reconciliation Section accessed, Order list changed, Pharmacy for encounter modified

## 2022-08-22 NOTE — H&P
"INTERVAL HISTORY:    The patient returns for another Lumbar epidural steroid injection today.  They have received at least 50% improvement since their last injection with a pain level of 4/10 at its worst recently.    Conservative measures tried in the interim rest    Current Outpatient Medications on File Prior to Encounter   Medication Sig Dispense Refill   • aspirin 81 MG EC tablet Take 81 mg by mouth Daily.     • albuterol sulfate  (90 Base) MCG/ACT inhaler 2 puffs every 4 hours as needed for SOA or wheezing. Generic. 3 g 1   • Ascorbic Acid (VITAMIN C PO) Take  by mouth.     • B Complex Vitamins (VITAMIN B COMPLEX PO) Take  by mouth Daily. HOLD PRIOR TO SURGERY     • B-D 3CC LUER-PUMA SYR 22GX1\" 22G X 1\" 3 ML misc      • budesonide (Pulmicort Flexhaler) 180 MCG/ACT inhaler Use 1 or 2 inhalations each AM. Rinse and spit after use. 2 each 3   • Cholecalciferol (VITAMIN D3) 5000 UNITS capsule capsule Take 5,000 Units by mouth Daily. HOLD PRIOR TO SURGERY     • cromolyn (OPTICROM) 4 % ophthalmic solution PLACE ONE DROP IN EACH EYE FOUR TIMES A DAY 10 mL 6   • Denta 5000 Plus 1.1 % cream      • desonide (DESOWEN) 0.05 % cream APPLY TO AFFECTED AREA(S) THREE TIMES A DAY 60 each 6   • Diclofenac Sodium (Pennsaid) 2 % solution Apply 1 application topically 2 (Two) Times a Day. 112 g 2   • Diclofenac Sodium (VOLTAREN) 1 % gel gel Apply 4 g topically to the appropriate area as directed 4 (Four) Times a Day. 100 g 3   • diphenhydrAMINE HCl (BENADRYL ALLERGY PO) Take 1 tablet by mouth Daily.     • docusate sodium (COLACE) 250 MG capsule Take 250 mg by mouth Daily.     • esomeprazole (nexIUM) 40 MG capsule TAKE ONE CAPSULE BY MOUTH ONCE NIGHTLY 30 capsule 11   • etodolac (LODINE) 500 MG tablet Take 1 tablet by mouth 2 (Two) Times a Day. 60 tablet 5   • ezetimibe-simvastatin (VYTORIN) 10-40 MG per tablet TAKE ONE TABLET BY MOUTH ONCE NIGHTLY 30 tablet 11   • FIBER PO Take  by mouth.     • FLONASE SENSIMIST 27.5 " MCG/SPRAY nasal spray USE ONE SPRAY IN EACH NOSTRIL ONCE DAILY 9.1 mL 12   • ketoconazole (NIZORAL) 2 % cream APPLY TO AFFECTED AREA(S) DAILY AS DIRECTED 60 g 3   • melatonin 1 MG tablet Take 3 mg by mouth Every Night. HOLD PRIOR TO SURGERY     • montelukast (SINGULAIR) 10 MG tablet TAKE ONE TABLET BY MOUTH ONCE NIGHTLY 30 tablet 11   • Multiple Vitamin (MULTI VITAMIN DAILY PO) Take  by mouth daily.     • olopatadine (PATANASE) 0.6 % solution nasal solution SPRAY TWO SPRAYS IN EACH NOSTRIL TWICE DAILY 30.5 g 6   • Potassium 99 MG tablet Take  by mouth.     • RESTASIS 0.05 % ophthalmic emulsion      • rizatriptan MLT (MAXALT-MLT) 10 MG disintegrating tablet DISSOLVE ONE TABLET BY MOUTH AS NEEDED FOR MIGRAINE; MAY REPEAT ONE TABLET IN 2 HOURS IF NEEDED. 27 tablet 2   • SALINE NASAL SPRAY NA into each nostril.     • selenium sulfide (SELSUN) 2.5 % lotion APPLY TO AFFECTED AREA(S) AS DIRECTED DAILY AS NEEDED FOR DANDRUFF 120 mL 11   • sildenafil (VIAGRA) 100 MG tablet TAKE 1 TABLET BY MOUTH AS NEEDED FOR ERECTILE DYSFUNCTION 5 tablet 5   • Testosterone Cypionate (DEPOTESTOTERONE CYPIONATE) 200 MG/ML injection Inject  into the appropriate muscle as directed by prescriber Every 14 (Fourteen) Days.     • traMADol (ULTRAM) 50 MG tablet Take 1 tablet by mouth Every 6 (Six) Hours As Needed for Moderate Pain . 40 tablet 0   • Unable to find 1 each 1 (One) Time. [employer] allergy relief, q AM     • zolpidem CR (Ambien CR) 12.5 MG CR tablet Take 1 tablet by mouth At Night As Needed for Sleep. 90 tablet 1     No current facility-administered medications on file prior to encounter.       Past Medical History:   Diagnosis Date   • Arthritis     OSTEO   • Asthma    • COVID-19 04/2021   • ED (erectile dysfunction)    • Elevated cholesterol    • Environmental allergies    • Erectile dysfunction    • Fracture, fibula     RIGHT AND ANKLE   • Gastroesophageal reflux disease without esophagitis 07/11/2016   • Low back pain    • Migraine with  aura and without status migrainosus, not intractable 07/11/2016   • Pain and swelling of toe of right foot    • Peripheral neuropathy    • Seasonal allergies    • Sleep apnea     CPAP   • Testosterone deficiency 07/11/2016       No hematologic infectious or constitutional symptoms  Negative screen for NITA      Exam:  BP (!) 148/102 (BP [place] Left arm, Patient Position: Sitting)   Pulse 73   Temp 36.3 °C (97.3 °F) (Oral)   Resp 16   SpO2 96%   Airway Mallampatti 2  Alert and oriented      Diagnosis:  Post-Op Diagnosis Codes:     * Intervertebral disc disorder with radiculopathy of lumbar region [M51.16]    Plan:  Lumbar epidural steroid injection under fluoroscopic guidance    I have encouraged them to continue:  1.  Physical therapy exercises at home as prescribed by physical therapy or from the pain clinic handout.  Continuation of these exercises every day, or multiple times per week, even when the patient has good pain relief, was stressed to the patient as a preventative measure to decrease the frequency and severity of future pain episodes.  2.  Continue pain medicines as already prescribed.  If patient not currently taking any, it is recommended to begin Acetaminophen 1000 mg po q 8 hours.  If other medicines containing Acetaminophen are currently prescribed, maintain daily dose at 3000mg.    3.  If they can tolerate NSAIDS, it is recommended to take Ibuprofen 600 mg po q 6 hours for 7 days during pain exacerbations.   Alternatively, they may substitute an NSAID of their choice (e.g. Aleve)  4.  Heat and ice to the affected area as tolerated for pain control.   5.  Low impact exercise such as walking or water exercise was recommended to maintain overall health and aid in weight control.   6.  Follow up as needed for subsequent injections.

## 2022-08-22 NOTE — ANESTHESIA PROCEDURE NOTES
PAIN Epidural block      Patient reassessed immediately prior to procedure    Patient location during procedure: pain clinic  Start Time: 8/22/2022 8:43 AM  Stop Time: 8/22/2022 8:50 AM  Indication:procedure for pain  Performed By  Anesthesiologist: Medhat Boothe MD  Preanesthetic Checklist  Completed: patient identified, IV checked, risks and benefits discussed, surgical consent, monitors and equipment checked, pre-op evaluation and timeout performed  Additional Notes  Diagnosis:  Post-Op Diagnosis Codes:     * Intervertebral disc disorder with radiculopathy of lumbar region (M51.16)      Prep:  Pt Position:prone  Sterile Tech:gloves, mask and sterile barrier  Prep:chlorhexidine gluconate and isopropyl alcohol  Monitoring:blood pressure monitoring, continuous pulse oximetry and EKG  Procedure:Sedation: no     Approach:right paramedian  Guidance: fluoroscopy  [place]lumbar  Level:3-4  Needle Type:Tuohy  Needle Gauge:20  Aspiration:negative  Test Dose:negative  Medications:  Preservative Free Saline:3mL  Depomedrol:80 mg  Post Assessment:  Dressing:occlusive dressing applied  Pt Tolerance:patient tolerated the procedure well with no apparent complications  Complications:no

## 2022-08-29 RX ORDER — SILDENAFIL 100 MG/1
TABLET, FILM COATED ORAL
Qty: 5 TABLET | Refills: 5 | Status: SHIPPED | OUTPATIENT
Start: 2022-08-29 | End: 2022-11-21

## 2022-08-29 NOTE — PROGRESS NOTES
"Subjective   History of Present Illness: Arelis Munson is a 47 y.o. male is here today for follow-up after having a L-EDDA on 07-22-22 and 08-22-22. Mr. Munson was last seen on 07-12-22 with complaints of back pain that radiates into the right hip and leg.    Today, Mr. Munson reports he is still experiencing back pain that radiates into the right hip and leg although better than when I first saw him it is still persisting. He reports intermittent tingling.  He has completed 3 epidural steroid injections although the second 2 injections were not as effective as of the first.  He is still working through the pain with his normal activities with good days and bad days.  His last injection was only about a week ago.    While in the room and during my examination of the patient I wore a mask and eye protection.  I washed my hands before and after this patient encounter.  The patient was also wearing a mask.    Back Pain  The quality of the pain is described as burning, shooting and aching. The pain radiates to the right thigh, right foot and right knee. The pain is moderate. The symptoms are aggravated by position. Associated symptoms include tingling. Treatments tried: L-EDDA x3.       The following portions of the patient's history were reviewed and updated as appropriate: allergies, current medications, past family history, past medical history, past social history, past surgical history and problem list.    Review of Systems   Constitutional: Positive for activity change.   Musculoskeletal: Positive for back pain.   Neurological: Positive for tingling.       Objective     Vitals:    08/30/22 1536   BP: 136/82   Pulse: 82   Temp: 97.7 °F (36.5 °C)   SpO2: 95%   Weight: 107 kg (235 lb)   Height: 185.4 cm (73\")     Body mass index is 31 kg/m².      Physical Exam  Neurologic Exam    Physical Exam:    CONSTITUTIONAL:  appears well developed, well-nourished and in no acute distress.    NECK: the neck is supple and " symmetric. The trachea is midline with no masses.      PULMONARY: Respiratory effort is normal with no increased work of breathing or signs of respiratory distress.    CARDIOVASCULAR: Pedal pulses are +2/4 bilaterally. Examination of the extremities shows no edema or varicosities.    MUSCULOSKELETAL: Gait normal    SKIN: The skin is warm, dry and intact.      NEUROLOGIC:   Normal motor strength noted. Muscle bulk and tone are normal.  Sensory exam is normal to fine touch  Reflexes normal at both knees and ankles symmetrically  Straight leg raising test does aggravate his right knee more than any radicular pain.  Cortical function is intact and without deficits. Speech is normal.    PSYCHIATRIC: oriented to person, place and time. Patient's mood and affect are normal.      Assessment & Plan   Independent Review of Radiographic Studies:      I personally reviewed the images from the following studies    MRI of the lumbar spine done on May 3, 2020 to [facility] reveals a transitional lumbosacral anatomy with a partially lumbarized S1.  At L3-4, there is a broad-based disc bulge slightly to the right causing significant right neuroforaminal narrowing and there is also a far right posterolateral disc protrusion is present extending into and lateral to the right neural foramen and contacting and deflects the exiting right L3 nerve within and lateral to the right  neural foramen.  At L4-5, there is a slight disc bulge to the right and only mild right foraminal narrowing.  At L5-S1 there are mild degenerative and arthritic changes with only mild bilateral foraminal narrowing.    Medical Decision Making:      Patient has persistent radicular type pain into the right leg although intermittent it is persistent.  He has not lost any neurologic function to suggest that surgery is mandatory.  So this is a situation where he has had physical therapy and 3 epidural steroid injections with persistent radicular pain in the L4  distribution with a right far lateral disc herniation at L3-4.  He is therefore a candidate to consider a right L3-4 far lateral discectomy via a minimally invasive tubular retractor system.  He states that he is still functional and really does not want to consider a back surgery.  He asked whether he can continue taking Ultram and gabapentin which he has taken in the past for his other orthopedic problems.  I explained that one of the issues of treating this medically when the nerve roots compressed that if the compression is not relieved he may develop neurologic deficit over time.  He would like to avoid surgery if at all possible and is can to continue physical therapy exercises and wants to use medication to try to get by for now.  I cautioned him that if he develops increasing pain in the leg or certainly any sign of neurologic deficit he needs to reconsider the option of right L3-4 far lateral discectomy.  Because he wants to maintain this medically I will refer him to a pain management specialist to monitor and distribute the medication long-term.    Return if symptoms worsen or fail to improve and he elects to proceed with surgery.    Diagnoses and all orders for this visit:    1. Lumbar disc herniation with radiculopathy (Primary)  -     traMADol (ULTRAM) 50 MG tablet; Take 1 tablet by mouth Every 6 (Six) Hours As Needed for Moderate Pain.  Dispense: 40 tablet; Refill: 0  -     gabapentin (NEURONTIN) 300 MG capsule; Take 1 capsule by mouth 3 (Three) Times a Day.  Dispense: 90 capsule; Refill: 3  -     Ambulatory Referral to Pain Management             Jovon Doran MD [facility]  Neurological Surgery

## 2022-08-30 ENCOUNTER — OFFICE VISIT (OUTPATIENT)
Dept: NEUROSURGERY | Facility: CLINIC | Age: 47
End: 2022-08-30

## 2022-08-30 VITALS
SYSTOLIC BLOOD PRESSURE: 136 MMHG | OXYGEN SATURATION: 95 % | TEMPERATURE: 97.7 F | WEIGHT: 235 LBS | DIASTOLIC BLOOD PRESSURE: 82 MMHG | HEART RATE: 82 BPM | BODY MASS INDEX: 31.14 KG/M2 | HEIGHT: 73 IN

## 2022-08-30 DIAGNOSIS — M51.16 LUMBAR DISC HERNIATION WITH RADICULOPATHY: Primary | ICD-10-CM

## 2022-08-30 PROCEDURE — 99213 OFFICE O/P EST LOW 20 MIN: CPT | Performed by: NEUROLOGICAL SURGERY

## 2022-08-30 RX ORDER — TRAMADOL HYDROCHLORIDE 50 MG/1
50 TABLET ORAL EVERY 6 HOURS PRN
Qty: 40 TABLET | Refills: 0 | Status: SHIPPED | OUTPATIENT
Start: 2022-08-30 | End: 2022-09-14

## 2022-08-30 RX ORDER — GABAPENTIN 300 MG/1
300 CAPSULE ORAL 3 TIMES DAILY
Qty: 90 CAPSULE | Refills: 3 | Status: SHIPPED | OUTPATIENT
Start: 2022-08-30 | End: 2022-09-14

## 2022-09-08 ENCOUNTER — CLINICAL SUPPORT (OUTPATIENT)
Dept: ORTHOPEDIC SURGERY | Facility: CLINIC | Age: 47
End: 2022-09-08

## 2022-09-08 VITALS — HEIGHT: 73 IN | BODY MASS INDEX: 32.47 KG/M2 | TEMPERATURE: 97.5 F | WEIGHT: 245 LBS

## 2022-09-08 DIAGNOSIS — M25.561 PAIN IN BOTH KNEES, UNSPECIFIED CHRONICITY: Primary | ICD-10-CM

## 2022-09-08 DIAGNOSIS — M17.0 PRIMARY OSTEOARTHRITIS OF BOTH KNEES: ICD-10-CM

## 2022-09-08 DIAGNOSIS — M25.562 PAIN IN BOTH KNEES, UNSPECIFIED CHRONICITY: Primary | ICD-10-CM

## 2022-09-08 PROCEDURE — 73562 X-RAY EXAM OF KNEE 3: CPT | Performed by: NURSE PRACTITIONER

## 2022-09-08 PROCEDURE — 20610 DRAIN/INJ JOINT/BURSA W/O US: CPT | Performed by: NURSE PRACTITIONER

## 2022-09-08 RX ORDER — ESOMEPRAZOLE MAGNESIUM 40 MG/1
CAPSULE, DELAYED RELEASE ORAL
Qty: 30 CAPSULE | Refills: 11 | Status: SHIPPED | OUTPATIENT
Start: 2022-09-08

## 2022-09-08 RX ADMIN — METHYLPREDNISOLONE ACETATE 80 MG: 80 INJECTION, SUSPENSION INTRA-ARTICULAR; INTRALESIONAL; INTRAMUSCULAR; SOFT TISSUE at 16:38

## 2022-09-08 NOTE — PROGRESS NOTES
"Patient: Arelis Munson  YOB: 1975  Date of Service: 9/12/2022    Chief Complaints:   Chief Complaint   Patient presents with   • Left Knee - Pain, Follow-up   • Right Knee - Pain, Follow-up       Subjective:     History of Present Illness: Pt is seen in the office today with complaints of BOTH KNEES HURTING  Chief Complaint   Patient presents with   • Left Knee - Pain, Follow-up   • Right Knee - Pain, Follow-up   .  KNEE: TIMING:  The pain is described as ACUTE.  LOCATION: medial joint line tenderness. AGGRAVATING FACTORS:  Is worsened by prolonged standing, sitting, walking and squatting activities.  ASSOCIATED SYMPTOMS:  swelling, tenderness, and aching.    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     Allergies: No Known Allergies    Medications:   Home Medications:  Current Outpatient Medications on File Prior to Visit   Medication Sig   • albuterol sulfate  (90 Base) MCG/ACT inhaler 2 puffs every 4 hours as needed for SOA or wheezing. Generic.   • Ascorbic Acid (VITAMIN C PO) Take  by mouth.   • aspirin 81 MG EC tablet Take 81 mg by mouth Daily.   • B Complex Vitamins (VITAMIN B COMPLEX PO) Take  by mouth Daily. HOLD PRIOR TO SURGERY   • B-D 3CC LUER-PUMA SYR 22GX1\" 22G X 1\" 3 ML misc    • budesonide (Pulmicort Flexhaler) 180 MCG/ACT inhaler Use 1 or 2 inhalations each AM. Rinse and spit after use.   • Cholecalciferol (VITAMIN D3) 5000 UNITS capsule capsule Take 5,000 Units by mouth Daily. HOLD PRIOR TO SURGERY   • cromolyn (OPTICROM) 4 % ophthalmic solution PLACE ONE DROP IN EACH EYE FOUR TIMES A DAY   • Denta 5000 Plus 1.1 % cream    • desonide (DESOWEN) 0.05 % cream APPLY TO AFFECTED AREA(S) THREE TIMES A DAY   • Diclofenac Sodium (VOLTAREN) 1 % gel gel Apply 4 g topically to the appropriate area as directed 4 (Four) Times a Day.   • diphenhydrAMINE HCl (BENADRYL ALLERGY PO) Take 1 tablet by mouth Daily.   • docusate sodium (COLACE) 250 MG capsule Take 250 mg by mouth Daily.   • " etodolac (LODINE) 500 MG tablet Take 1 tablet by mouth 2 (Two) Times a Day.   • ezetimibe-simvastatin (VYTORIN) 10-40 MG per tablet TAKE ONE TABLET BY MOUTH ONCE NIGHTLY   • FIBER PO Take  by mouth.   • FLONASE SENSIMIST 27.5 MCG/SPRAY nasal spray USE ONE SPRAY IN EACH NOSTRIL ONCE DAILY   • gabapentin (NEURONTIN) 300 MG capsule Take 1 capsule by mouth 3 (Three) Times a Day.   • ketoconazole (NIZORAL) 2 % cream APPLY TO AFFECTED AREA(S) DAILY AS DIRECTED   • melatonin 1 MG tablet Take 3 mg by mouth Every Night. HOLD PRIOR TO SURGERY   • montelukast (SINGULAIR) 10 MG tablet TAKE ONE TABLET BY MOUTH ONCE NIGHTLY   • Multiple Vitamin (MULTI VITAMIN DAILY PO) Take  by mouth daily.   • olopatadine (PATANASE) 0.6 % solution nasal solution SPRAY TWO SPRAYS IN EACH NOSTRIL TWICE DAILY   • Potassium 99 MG tablet Take  by mouth.   • RESTASIS 0.05 % ophthalmic emulsion    • rizatriptan MLT (MAXALT-MLT) 10 MG disintegrating tablet DISSOLVE ONE TABLET BY MOUTH AS NEEDED FOR MIGRAINE; MAY REPEAT ONE TABLET IN 2 HOURS IF NEEDED.   • SALINE NASAL SPRAY NA into each nostril.   • selenium sulfide (SELSUN) 2.5 % lotion APPLY TO AFFECTED AREA(S) AS DIRECTED DAILY AS NEEDED FOR DANDRUFF   • sildenafil (VIAGRA) 100 MG tablet TAKE 1 TABLET BY MOUTH AS NEEDED FOR ERECTILE DYSFUNCTION   • Testosterone Cypionate (DEPOTESTOTERONE CYPIONATE) 200 MG/ML injection Inject  into the appropriate muscle as directed by prescriber Every 14 (Fourteen) Days.   • traMADol (ULTRAM) 50 MG tablet Take 1 tablet by mouth Every 6 (Six) Hours As Needed for Moderate Pain .   • traMADol (ULTRAM) 50 MG tablet Take 1 tablet by mouth Every 6 (Six) Hours As Needed for Moderate Pain.   • Unable to find 1 each 1 (One) Time. Jina allergy relief, q AM   • zolpidem CR (Ambien CR) 12.5 MG CR tablet Take 1 tablet by mouth At Night As Needed for Sleep.   • esomeprazole (nexIUM) 40 MG capsule TAKE ONE CAPSULE BY MOUTH ONCE NIGHTLY     No current facility-administered  medications on file prior to visit.     Current Medications:  Scheduled Meds:  Continuous Infusions:No current facility-administered medications for this visit.    PRN Meds:.    I have reviewed the patient's medical history in detail and updated the computerized patient record.  Review and summarization of old records include:    Past Medical History:   Diagnosis Date   • Arthritis     OSTEO   • Asthma    • COVID-19 2021   • ED (erectile dysfunction)    • Elevated cholesterol    • Environmental allergies    • Erectile dysfunction    • Fracture, fibula     RIGHT AND ANKLE   • Gastroesophageal reflux disease without esophagitis 2016   • Low back pain    • Migraine with aura and without status migrainosus, not intractable 2016   • Pain and swelling of toe of right foot    • Peripheral neuropathy    • Seasonal allergies    • Sleep apnea     CPAP   • Testosterone deficiency 2016        Past Surgical History:   Procedure Laterality Date   • ANKLE ARTHROSCOPY Right 2018    Procedure: RT ANKLE ARTHROSCOPY SUBCHONDROPLASTY TALUS ;  Surgeon: Lebron Spivey Jr., MD;  [place] [facility] OR [facility] [place];  Service:    • ANKLE LIGAMENT RECONSTRUCTION Right 2018    Procedure: HARDWARE REMOVAL POSS LATERAL LIGAMENT RECONSTRUCTION ;  Surgeon: Lebron Spivey Jr., MD;  [place] [facility] OR [facility] [place];  Service:    • CARPAL TUNNEL RELEASE Right    • CUBITAL TUNNEL RELEASE Right     NERVE RELEASE   • EAR BIOPSY Right     MYRINGOTOMY W/BONES REPLACED INNER EAR   • EPIDURAL BLOCK     • KNEE SURGERY Right     MENISCUS REPAIR   • NOSE SURGERY     • ORIF FIBULA FRACTURE Right 2017    ANKLE INCLUDED   • SINUS SURGERY      Dr. Vilchis   • TONSILLECTOMY      As a kid        Social History     Occupational History   • Not on file   Tobacco Use   • Smoking status: Former Smoker     Packs/day: 2.00     Years: 31.00     Pack years: 62.00     Types: Cigarettes, Cigarettes     Quit date:      Years since quittin.7   •  Smokeless tobacco: Never Used   • Tobacco comment: I have been smoke free for 19 years as of 19.   Substance and Sexual Activity   • Alcohol use: Yes     Alcohol/week: 2.0 standard drinks     Types: 2 Shots of liquor per week     Comment: Socially   • Drug use: No   • Sexual activity: Yes     Partners: Female     Birth control/protection: None      Social History     Social History Narrative   • Not on file        Family History   Problem Relation Age of Onset   • Lung cancer Mother    • Cancer Mother          of lung, breast, and brain cancer.   • Lung cancer Father    • Cancer Father          of lung cancer.   • Malig Hyperthermia Neg Hx        ROS: 14 point review of systems was performed and was negative except for documented findings in HPI and today's encounter.     Allergies: No Known Allergies  Constitutional:  Denies fever, shaking or chills   Eyes:  Denies change in visual acuity   HENT:  Denies nasal congestion or sore throat   Respiratory:  Denies cough or shortness of breath   Cardiovascular:  Denies chest pain or severe LE edema   GI:  Denies abdominal pain, nausea, vomiting, bloody stools or diarrhea   Musculoskeletal:  Numbness, tingling, or loss of motor function only as noted above in history of present illness.  : Denies painful urination or hematuria  Integument:  Denies rash, lesion or ulceration   Neurologic:  Denies headache or focal weakness  Endocrine:  Denies lymphadenopathy  Psych:  Denies confusion or change in mental status   Hem:  Denies active bleeding      Physical Exam: 47 y.o. male  Wt Readings from Last 3 Encounters:   22 111 kg (245 lb)   22 107 kg (235 lb)   22 107 kg (235 lb)       Body mass index is 32.32 kg/m².  Facility age limit for growth percentiles is 20 years.  Vitals:    22 1609   Temp: 97.5 °F (36.4 °C)     Vital signs reviewed.   General Appearance:    Alert, cooperative, in no acute distress                  Eyes: conjunctiva  clear  ENT: external ears and nose atraumatic  CV: no peripheral edema  Resp: normal respiratory effort  Skin: no rashes or wounds; normal turgor  Psych: mood and affect appropriate  Lymph: no nodes appreciated  Neuro: gross sensation intact  Vascular:  Palpable peripheral pulse in noted extremity  Musculoskeletal Extremities: KNEE Exam: medial and lateral joint line tenderness with crepitation, synovitis, swelling, and joint effusion bilateral knee.      Diagnostic Data:  Imaging done previously in the office and discussed with the patient:    Indication: pain related symptoms,  Views: 3V AP, LAT & 40 degree PA bilateral knee(s)   Findings: advanced arthritis  Comparison views: viewed last xray done in the office.      Procedure:  Large Joint Arthrocentesis: R knee  Date/Time: 9/8/2022 4:38 PM  Consent given by: patient  Site marked: site marked  Timeout: Immediately prior to procedure a time out was called to verify the correct patient, procedure, equipment, support staff and site/side marked as required   Supporting Documentation  Indications: pain, joint swelling and diagnostic evaluation   Procedure Details  [place] knee - R knee  Preparation: Patient was prepped and draped in the usual sterile fashion  Needle gauge: 21G.  Medications administered: 80 mg methylPREDNISolone acetate 80 MG/ML; 4 mL lidocaine (cardiac)  Patient tolerance: patient tolerated the procedure well with no immediate complications    Large Joint Arthrocentesis: L knee  Date/Time: 9/8/2022 4:38 PM  Consent given by: patient  Site marked: site marked  Timeout: Immediately prior to procedure a time out was called to verify the correct patient, procedure, equipment, support staff and site/side marked as required   Supporting Documentation  Indications: pain   Procedure Details  [place] knee - L knee  Preparation: Patient was prepped and draped in the usual sterile fashion  Needle gauge: 21G.  Medications administered: 80 mg  methylPREDNISolone acetate 80 MG/ML; 4 mL lidocaine (cardiac)  Patient tolerance: patient tolerated the procedure well with no immediate complications          Assessment:     ICD-10-CM ICD-9-CM   1. Pain in both knees, unspecified chronicity  M25.561 719.46    M25.562        Plan:   Follow up as indicated.  Ice, elevate, and rest as needed.  The diagnosis(es), natural history, pathophysiology and treatment for diagnosis(es) were discussed. Opportunity given and questions answered.  Biomechanics of pertinent body areas discussed.  When appropriate, the use of ambulatory aids discussed.  BMI:  The concept of BMI body mass index and its importance and implications discussed.    EXERCISES:  Advice on benefits of, and types of regular/moderate exercise pertaining to orthopedic diagnosis(es).  MEDICATIONS:  The risks, benefits, warnings,side effects and alternatives of medications discussed.  Inflammation/pain control; with cold, heat, elevation and/or liniments discussed as appropriate  Cortisone Injection. See procedure note.  HOME EXERCISE/PT program encouraged  MEDICAL RECORDS reviewed from other provider(s) for past and current medical history pertinent to this complaint.    9/12/2022

## 2022-09-12 RX ORDER — METHYLPREDNISOLONE ACETATE 80 MG/ML
80 INJECTION, SUSPENSION INTRA-ARTICULAR; INTRALESIONAL; INTRAMUSCULAR; SOFT TISSUE
Status: COMPLETED | OUTPATIENT
Start: 2022-09-08 | End: 2022-09-08

## 2022-09-14 ENCOUNTER — OFFICE VISIT (OUTPATIENT)
Dept: PAIN MEDICINE | Facility: CLINIC | Age: 47
End: 2022-09-14

## 2022-09-14 VITALS
HEART RATE: 81 BPM | BODY MASS INDEX: 32.87 KG/M2 | RESPIRATION RATE: 20 BRPM | DIASTOLIC BLOOD PRESSURE: 95 MMHG | TEMPERATURE: 97.7 F | WEIGHT: 248 LBS | SYSTOLIC BLOOD PRESSURE: 160 MMHG | HEIGHT: 73 IN | OXYGEN SATURATION: 94 %

## 2022-09-14 DIAGNOSIS — M54.41 CHRONIC RIGHT-SIDED LOW BACK PAIN WITH RIGHT-SIDED SCIATICA: ICD-10-CM

## 2022-09-14 DIAGNOSIS — M51.16 LUMBAR DISC HERNIATION WITH RADICULOPATHY: Primary | ICD-10-CM

## 2022-09-14 DIAGNOSIS — G89.29 CHRONIC RIGHT-SIDED LOW BACK PAIN WITH RIGHT-SIDED SCIATICA: ICD-10-CM

## 2022-09-14 DIAGNOSIS — G89.29 OTHER CHRONIC PAIN: ICD-10-CM

## 2022-09-14 PROCEDURE — 99215 OFFICE O/P EST HI 40 MIN: CPT | Performed by: NURSE PRACTITIONER

## 2022-09-14 PROCEDURE — 80305 DRUG TEST PRSMV DIR OPT OBS: CPT | Performed by: NURSE PRACTITIONER

## 2022-09-14 RX ORDER — TRAMADOL HYDROCHLORIDE 50 MG/1
50-100 TABLET ORAL DAILY PRN
Qty: 60 TABLET | Refills: 0 | Status: SHIPPED | OUTPATIENT
Start: 2022-09-14 | End: 2022-10-12 | Stop reason: SDUPTHER

## 2022-09-14 RX ORDER — GABAPENTIN 600 MG/1
600 TABLET ORAL 3 TIMES DAILY
Qty: 90 TABLET | Refills: 0 | Status: SHIPPED | OUTPATIENT
Start: 2022-09-14 | End: 2022-10-12 | Stop reason: SDUPTHER
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
## 2022-09-15 LAB
POC AMPHETAMINES: NEGATIVE
POC BARBITURATES: NEGATIVE
POC BENZODIAZEPHINES: NEGATIVE
POC COCAINE: NEGATIVE
POC METHADONE: NEGATIVE
POC METHAMPHETAMINE SCREEN URINE: NEGATIVE
POC OPIATES: NEGATIVE
POC OXYCODONE: NEGATIVE
POC PHENCYCLIDINE: NEGATIVE
POC PROPOXYPHENE: NEGATIVE
POC THC: NEGATIVE
POC TRICYCLIC ANTIDEPRESSANTS: NEGATIVE

## 2022-10-12 ENCOUNTER — OFFICE VISIT (OUTPATIENT)
Dept: PAIN MEDICINE | Facility: CLINIC | Age: 47
End: 2022-10-12

## 2022-10-12 VITALS
TEMPERATURE: 97.5 F | HEIGHT: 73 IN | RESPIRATION RATE: 18 BRPM | DIASTOLIC BLOOD PRESSURE: 80 MMHG | HEART RATE: 76 BPM | WEIGHT: 246.4 LBS | OXYGEN SATURATION: 96 % | BODY MASS INDEX: 32.66 KG/M2 | SYSTOLIC BLOOD PRESSURE: 131 MMHG

## 2022-10-12 DIAGNOSIS — Z79.899 ENCOUNTER FOR LONG-TERM (CURRENT) USE OF HIGH-RISK MEDICATION: ICD-10-CM

## 2022-10-12 DIAGNOSIS — G89.29 CHRONIC RIGHT-SIDED LOW BACK PAIN WITH RIGHT-SIDED SCIATICA: ICD-10-CM

## 2022-10-12 DIAGNOSIS — M54.41 CHRONIC RIGHT-SIDED LOW BACK PAIN WITH RIGHT-SIDED SCIATICA: ICD-10-CM

## 2022-10-12 DIAGNOSIS — M51.16 LUMBAR DISC HERNIATION WITH RADICULOPATHY: Primary | ICD-10-CM

## 2022-10-12 DIAGNOSIS — G89.29 OTHER CHRONIC PAIN: ICD-10-CM

## 2022-10-12 PROCEDURE — 99214 OFFICE O/P EST MOD 30 MIN: CPT | Performed by: NURSE PRACTITIONER

## 2022-10-12 RX ORDER — TRAMADOL HYDROCHLORIDE 50 MG/1
50 TABLET ORAL 3 TIMES DAILY PRN
Qty: 75 TABLET | Refills: 0 | Status: SHIPPED | OUTPATIENT
Start: 2022-10-12 | End: 2022-11-14 | Stop reason: SDUPTHER

## 2022-10-12 RX ORDER — GABAPENTIN 600 MG/1
600 TABLET ORAL 3 TIMES DAILY
Qty: 90 TABLET | Refills: 0 | Status: SHIPPED | OUTPATIENT
Start: 2022-10-12 | End: 2022-12-14 | Stop reason: SDUPTHER

## 2022-10-12 NOTE — PROGRESS NOTES
"CHIEF COMPLAINT  1 month back pain follow up   Patient in office reports his pain has slightly improved  over the last month \"I think it all depends on my daily activity\".     Subjective   Arelis Munson is a 47 y.o. male  who presents for follow-up.  He has a history of back and leg pain. Patient was evaluated by myself on 9/14/2022 (note reviewed).  He was previously diagnosed with a disc herniation and describes his pain as continuous aching with intermittent sharp [place] pain radiating down his right anterior thigh stopping past the knee.  He was previously evaluated by Dr. Doran who recommended a minimally invasive right L3-L4 lateral discectomy.  Patient is needing to hold off on surgery until he is in a better place financially and closer to assisted.  He currently works full-time as a  in a chemical plant.  We increased his gabapentin to 600 mg 3 times daily and started tramadol 50 to 100 mg daily as needed.  He presents today for follow-up.    Today his pain is 6/10VAS in severity.  He continues with Gabapentin 600 mg 3/day and Tramadol 2/day. This is helpful to his pain, but only lasts ~3 hours or so. He has been more careful with not lifting heavy items at work which has helped his pain.  He does feel that some days he could use a third dose of tramadol.  His only complaint with his medication regimen currently is dry mouth. Discussed utilizing Biotine for this. He denies any other side effects including somnolence or constipation.     Procedure list:  8/22/2022-LESI at L3-4--75-80% relief x a few weeks  7/22/2022-LESI at L3-4--No relief  6/21/2022-LESI at L3-4--95% relief for 1 month     Additional steroid containing procedures in last 12 months:  9/8/2022-bilateral knee injections with orthopedics (160 mg of Depo-Medrol total)  6/1/2022-bilateral knee injections with orthopedics (160 mg of Depo-Medrol total)  4/18/2022-right GT bursa injection (80mg Depo-Medrol)  2/23/2022-bilateral " knee injections (160 mg of Depo-Medrol total)  11/17/2021-bilateral knee injections (160 mg of Depo-Medrol total)    Back Pain  This is a chronic problem. The problem occurs intermittently. Progression since onset: slightly improved since last office visit. The pain is present in the lumbar spine. The pain radiates to the right thigh. Pertinent negatives include no abdominal pain, chest pain, dysuria, fever, headaches, numbness or weakness.      PEG Assessment   What number best describes your pain on average in the past week?8  What number best describes how, during the past week, pain has interfered with your enjoyment of life?8  What number best describes how, during the past week, pain has interfered with your general activity?  8    The following portions of the patient's history were reviewed and updated as appropriate: allergies, current medications, past family history, past medical history, past social history, past surgical history and problem list.    Review of Systems   Constitutional: Negative for activity change, fatigue and fever.   HENT: Negative for congestion.    Eyes: Negative for visual disturbance.   Respiratory: Negative for cough and chest tightness.    Cardiovascular: Negative for chest pain.   Gastrointestinal: Negative for abdominal pain, constipation and diarrhea.   Genitourinary: Negative for difficulty urinating and dysuria.   Musculoskeletal: Positive for back pain.   Neurological: Negative for dizziness, weakness, light-headedness, numbness and headaches.   Psychiatric/Behavioral: Positive for agitation (occasionally). Negative for sleep disturbance and suicidal ideas. The patient is nervous/anxious.      --  The aforementioned information the Chief Complaint section and above subjective data including any HPI data, and also the Review of Systems data, has been personally reviewed and affirmed.  --    Vitals:    10/12/22 1430   BP: 131/80   BP [place] Left arm   Patient Position:  "Sitting   Cuff Size: Large Adult   Pulse: 76   Resp: 18   Temp: 97.5 °F (36.4 °C)   TempSrc: Temporal   SpO2: 96%   Weight: 112 kg (246 lb 6.4 oz)   Height: 185.4 cm (73\")   PainSc:   6   PainLoc: Comment: back     Objective   Physical Exam  Vitals and nursing note reviewed.   Constitutional:       Appearance: Normal appearance. He is well-developed.   Eyes:      General: Lids are normal.   Cardiovascular:      Rate and Rhythm: Normal rate.   Pulmonary:      Effort: Pulmonary effort is normal.   Musculoskeletal:      Lumbar back: Decreased range of motion.   Neurological:      Mental Status: He is alert and oriented to person, place, and time.   Psychiatric:         Attention and Perception: Attention normal.         Mood and Affect: Mood normal.         Speech: Speech normal.         Behavior: Behavior normal.         Judgment: Judgment normal.       Assessment & Plan   Diagnoses and all orders for this visit:    1. Lumbar disc herniation with radiculopathy (Primary)    2. Other chronic pain    3. Chronic right-sided low back pain with right-sided sciatica    4. Encounter for long-term (current) use of high-risk medication      --- ORT--Low Risk, SOAPP--Negative  --- The urine drug screen confirmation from 9/14/2022 has been reviewed and the result is appropriate based on patient history and MARY BETH report  --- CSA signed in office on 9/14/2022  --- Increase Tramadol to 50 mg 2-3/day Sig #75. Patient understands that this is a 30 day supply.   --- Continue Gabapentin 600 mg TID.   --- Follow-up 1 month or sooner if needed. If Tramadol dose is stable at that time plan to follow up every 3 months there after.      MARY BETH REPORT  As part of the patient's treatment plan, I am prescribing controlled substances. The patient has been made aware of appropriate use of such medications, including potential risk of somnolence, limited ability to drive and/or work safely, and the potential for dependence or overdose. It has " also been made clear that these medications are for use by this patient only, without concomitant use of alcohol or other substances unless prescribed.     Patient has completed prescribing agreement detailing terms of continued prescribing of controlled substances, including monitoring MARY BETH reports, urine drug screening, and pill counts if necessary. The patient is aware that inappropriate use will results in cessation of prescribing such medications.    As the clinician, I personally reviewed the MARY BETH from 10/12/2022 while the patient was in the office today.    History and physical exam exhibit continued safe and appropriate use of controlled substances.    Dictated utilizing Dragon dictation.     This document is intended for medical expert use only. Reading of this document by patients and/or patient's family without participating medical staff guidance may result in misinterpretation and unintended morbidity.   Any interpretation of such data is the responsibility of the patient and/or family member responsible for the patient in concert with their primary or specialist providers, not to be left for sources of online searches such as [employer], [employer] or similar queries. Relying on these approaches to knowledge may result in misinterpretation, misguided goals of care and even death should patients or family members try recommendations outside of the realm of professional medical care in a supervised way.    Patient remained masked during entire encounter. No cough present. I donned a mask and eye protection throughout entire visit. Prior to donning mask and eye protection, hand hygiene was performed, as well as when it was doffed.  I was closer than 6 feet, but not for an extended period of time. No obvious exposure to any bodily fluids.

## 2022-11-14 ENCOUNTER — OFFICE VISIT (OUTPATIENT)
Dept: PAIN MEDICINE | Facility: CLINIC | Age: 47
End: 2022-11-14

## 2022-11-14 VITALS
TEMPERATURE: 96.5 F | BODY MASS INDEX: 32.97 KG/M2 | HEIGHT: 73 IN | SYSTOLIC BLOOD PRESSURE: 153 MMHG | HEART RATE: 75 BPM | DIASTOLIC BLOOD PRESSURE: 83 MMHG | OXYGEN SATURATION: 97 % | WEIGHT: 248.8 LBS

## 2022-11-14 DIAGNOSIS — M51.16 LUMBAR DISC HERNIATION WITH RADICULOPATHY: ICD-10-CM

## 2022-11-14 DIAGNOSIS — G89.29 CHRONIC RIGHT-SIDED LOW BACK PAIN WITH RIGHT-SIDED SCIATICA: ICD-10-CM

## 2022-11-14 DIAGNOSIS — Z79.899 ENCOUNTER FOR LONG-TERM (CURRENT) USE OF HIGH-RISK MEDICATION: ICD-10-CM

## 2022-11-14 DIAGNOSIS — G89.29 OTHER CHRONIC PAIN: Primary | ICD-10-CM

## 2022-11-14 DIAGNOSIS — M54.41 CHRONIC RIGHT-SIDED LOW BACK PAIN WITH RIGHT-SIDED SCIATICA: ICD-10-CM

## 2022-11-14 PROCEDURE — 99214 OFFICE O/P EST MOD 30 MIN: CPT | Performed by: NURSE PRACTITIONER

## 2022-11-14 RX ORDER — TRAMADOL HYDROCHLORIDE 50 MG/1
50 TABLET ORAL 3 TIMES DAILY PRN
Qty: 90 TABLET | Refills: 0 | Status: SHIPPED | OUTPATIENT
Start: 2022-11-14 | End: 2022-12-14 | Stop reason: SDUPTHER

## 2022-11-14 NOTE — PROGRESS NOTES
CHIEF COMPLAINT  F/U back pain- patient states that his pain fluctuates.     Subjective   Arelis Munson is a 47 y.o. male  who presents for follow-up.  He has a history of back pain.  Today his pain is 7/10VAS in severity. He continues with Gabapentin 600 mg 3/day and Tramadol 2-3/day. He states that the 3 tramadol/day is more helpful to his pain and allows him to continue working. ADLs by self. He continues to report mild drowsiness as well as issues with his libido. He states the drowsiness is not interfering with his daily routine.     He was previously evaluated by Dr. Doran who recommended a minimally invasive right L3-L4 lateral discectomy.  Patient is needing to hold off on surgery until he is in a better place financially and closer to shelter.    He continues to work full time as a .     Procedure list (performed at [facility]):  8/22/2022-LESI at L3-4--75-80% relief x a few weeks  7/22/2022-LESI at L3-4--No relief  6/21/2022-LESI at L3-4--95% relief for 1 month     Additional steroid containing procedures in last 12 months:  9/8/2022-bilateral knee injections with orthopedics (160 mg of Depo-Medrol total)  6/1/2022-bilateral knee injections with orthopedics (160 mg of Depo-Medrol total)  4/18/2022-right GT bursa injection (80mg Depo-Medrol)  2/23/2022-bilateral knee injections (160 mg of Depo-Medrol total)  11/17/2021-bilateral knee injections (160 mg of Depo-Medrol total)    Back Pain  This is a chronic problem. The problem occurs intermittently. The problem has been waxing and waning since onset. The pain is present in the lumbar spine. The quality of the pain is described as aching, burning and stabbing. The pain radiates to the right thigh (stopping at the knee). The pain is at a severity of 7/10. Exacerbated by: nothing in particular, small movements with increase his pain. Associated symptoms include headaches (migraines) and weakness. Pertinent negatives include no abdominal pain, chest  "pain, dysuria, fever or numbness. He has tried NSAIDs and heat (PT, massage chair, Gabapentin, and Tramadol) for the symptoms.      PEG Assessment   What number best describes your pain on average in the past week?6  What number best describes how, during the past week, pain has interfered with your enjoyment of life?7  What number best describes how, during the past week, pain has interfered with your general activity?  4    The following portions of the patient's history were reviewed and updated as appropriate: allergies, current medications, past family history, past medical history, past social history, past surgical history and problem list.    Review of Systems   Constitutional: Positive for fatigue. Negative for activity change, chills and fever.   HENT: Negative for congestion.    Eyes: Negative for visual disturbance.   Respiratory: Negative for chest tightness and shortness of breath.    Cardiovascular: Negative for chest pain.   Gastrointestinal: Negative for abdominal pain, constipation and diarrhea.   Genitourinary: Negative for difficulty urinating and dysuria.   Musculoskeletal: Positive for back pain.   Neurological: Positive for weakness and headaches (migraines). Negative for dizziness, light-headedness and numbness.   Psychiatric/Behavioral: Positive for agitation and sleep disturbance. The patient is nervous/anxious.      --  The aforementioned information the Chief Complaint section and above subjective data including any HPI data, and also the Review of Systems data, has been personally reviewed and affirmed.  --    Vitals:    11/14/22 1446   BP: 153/83   Pulse: 75   Temp: 96.5 °F (35.8 °C)   SpO2: 97%   Weight: 113 kg (248 lb 12.8 oz)   Height: 185.4 cm (73\")   PainSc:   7   PainLoc: Back  Comment: and right knee     Objective   Physical Exam  Vitals and nursing note reviewed.   Constitutional:       Appearance: Normal appearance. He is well-developed.   Eyes:      General: Lids are normal. "   Cardiovascular:      Rate and Rhythm: Normal rate.   Pulmonary:      Effort: Pulmonary effort is normal.   Musculoskeletal:      Lumbar back: Tenderness present. Decreased range of motion. Positive right straight leg raise test. Negative left straight leg raise test.   Neurological:      Mental Status: He is alert and oriented to person, place, and time.   Psychiatric:         Attention and Perception: Attention normal.         Mood and Affect: Mood normal.         Speech: Speech normal.         Behavior: Behavior normal.         Judgment: Judgment normal.         Assessment & Plan   Diagnoses and all orders for this visit:    1. Other chronic pain (Primary)    2. Chronic right-sided low back pain with right-sided sciatica    3. Lumbar disc herniation with radiculopathy    4. Encounter for long-term (current) use of high-risk medication    Other orders  -     traMADol (ULTRAM) 50 MG tablet; Take 1 tablet by mouth 3 (Three) Times a Day As Needed for Moderate Pain.  Dispense: 90 tablet; Refill: 0      --- I spent 34 minutes caring for Arelis on this date of service. This time includes time spent by me in the following activities: preparing for the visit, reviewing tests, obtaining and/or reviewing a separately obtained history, performing a medically appropriate examination and/or evaluation, counseling and educating the patient/family/caregiver, ordering medications, tests, or procedures and documenting information in the medical record     --- The urine drug screen confirmation from 9/14/2022 has been reviewed and the result is appropriate based on patient history and MARY BETH report  --- CSA signed 9/14/2022   --- ORT--Low risk  --- Continue Gabapentin 600 mg 3/day. It looks like he recently filled a prescription of Gabapentin from Dr. Doran. Patient spoke with his wife who verified that he does have Gabapentin 300 mg at home. Instructed him to utilize 2 tablets TID until he is out of this and then I will  continue Gabapentin 600 mg TID. May need to consider weaning this due to concerns with his libido.   --- Refill Tramadol 50 mg. Increase sig to #90. Patient appears stable with current regimen. No adverse effects. Regarding continuation of opioids, there is no evidence of aberrant behavior or any red flags.  The patient continues with appropriate response to opioid therapy. ADL's remain intact by self.   --- Recommend that he consider moving forward with the surgery recommended by Dr. Doran. Patient states understanding.   --- Follow-up 1 month or sooner if needed.  If no further increase in medication then will move out to 3 month follow-ups.       MARY BETH REPORT  As part of the patient's treatment plan, I am prescribing controlled substances. The patient has been made aware of appropriate use of such medications, including potential risk of somnolence, limited ability to drive and/or work safely, and the potential for dependence or overdose. It has also been made clear that these medications are for use by this patient only, without concomitant use of alcohol or other substances unless prescribed.     Patient has completed prescribing agreement detailing terms of continued prescribing of controlled substances, including monitoring MARY BETH reports, urine drug screening, and pill counts if necessary. The patient is aware that inappropriate use will results in cessation of prescribing such medications.    As the clinician, I personally reviewed the MARY BETH from 11/14/2022 while the patient was in the office today.    History and physical exam exhibit continued safe and appropriate use of controlled substances.    Dictated utilizing Dragon dictation.     Patient remained masked during entire encounter. No cough present. I donned a mask and eye protection throughout entire visit. Prior to donning mask and eye protection, hand hygiene was performed, as well as when it was doffed.  I was closer than 6 feet, but not for  an extended period of time. No obvious exposure to any bodily fluids.

## 2022-11-21 RX ORDER — SILDENAFIL 100 MG/1
TABLET, FILM COATED ORAL
Qty: 5 TABLET | Refills: 5 | Status: SHIPPED | OUTPATIENT
Start: 2022-11-21 | End: 2023-02-13

## 2022-11-22 ENCOUNTER — TELEPHONE (OUTPATIENT)
Dept: ORTHOPEDIC SURGERY | Facility: CLINIC | Age: 47
End: 2022-11-22

## 2022-11-22 NOTE — TELEPHONE ENCOUNTER
Caller: Stephanie Munson    Relationship to patient: Emergency Contact    Best call back number:     Chief complaint: BILAT KNEE     Type of visit: FUP INJECTIONS    Requested date: 12/14 AFTER 230 PM  ANY DAY EXCEPT 12/22, 12/23, AND 12/26

## 2022-12-14 ENCOUNTER — OFFICE VISIT (OUTPATIENT)
Dept: SLEEP MEDICINE | Facility: HOSPITAL | Age: 47
End: 2022-12-14

## 2022-12-14 ENCOUNTER — CLINICAL SUPPORT (OUTPATIENT)
Dept: ORTHOPEDIC SURGERY | Facility: CLINIC | Age: 47
End: 2022-12-14

## 2022-12-14 ENCOUNTER — OFFICE VISIT (OUTPATIENT)
Dept: PAIN MEDICINE | Facility: CLINIC | Age: 47
End: 2022-12-14

## 2022-12-14 VITALS
SYSTOLIC BLOOD PRESSURE: 118 MMHG | RESPIRATION RATE: 18 BRPM | OXYGEN SATURATION: 95 % | HEART RATE: 77 BPM | BODY MASS INDEX: 32.58 KG/M2 | HEIGHT: 73 IN | WEIGHT: 245.8 LBS | TEMPERATURE: 96.6 F | DIASTOLIC BLOOD PRESSURE: 81 MMHG

## 2022-12-14 VITALS — BODY MASS INDEX: 32.6 KG/M2 | HEIGHT: 73 IN | TEMPERATURE: 97.3 F | WEIGHT: 246 LBS

## 2022-12-14 VITALS — HEART RATE: 80 BPM | OXYGEN SATURATION: 98 % | BODY MASS INDEX: 32.6 KG/M2 | WEIGHT: 246 LBS | HEIGHT: 73 IN

## 2022-12-14 DIAGNOSIS — M17.0 PRIMARY OSTEOARTHRITIS OF BOTH KNEES: Primary | ICD-10-CM

## 2022-12-14 DIAGNOSIS — F51.04 PSYCHOPHYSIOLOGICAL INSOMNIA: ICD-10-CM

## 2022-12-14 DIAGNOSIS — G47.33 OSA ON CPAP: Primary | ICD-10-CM

## 2022-12-14 DIAGNOSIS — M51.16 LUMBAR DISC HERNIATION WITH RADICULOPATHY: ICD-10-CM

## 2022-12-14 DIAGNOSIS — Z99.89 OSA ON CPAP: Primary | ICD-10-CM

## 2022-12-14 DIAGNOSIS — G89.29 CHRONIC RIGHT-SIDED LOW BACK PAIN WITH RIGHT-SIDED SCIATICA: ICD-10-CM

## 2022-12-14 DIAGNOSIS — Z79.899 ENCOUNTER FOR LONG-TERM (CURRENT) USE OF HIGH-RISK MEDICATION: ICD-10-CM

## 2022-12-14 DIAGNOSIS — G89.29 OTHER CHRONIC PAIN: Primary | ICD-10-CM

## 2022-12-14 DIAGNOSIS — G47.26 CIRCADIAN RHYTHM SLEEP DISORDER, SHIFT WORK TYPE: ICD-10-CM

## 2022-12-14 DIAGNOSIS — M54.41 CHRONIC RIGHT-SIDED LOW BACK PAIN WITH RIGHT-SIDED SCIATICA: ICD-10-CM

## 2022-12-14 PROCEDURE — G0463 HOSPITAL OUTPT CLINIC VISIT: HCPCS

## 2022-12-14 PROCEDURE — 20610 DRAIN/INJ JOINT/BURSA W/O US: CPT | Performed by: NURSE PRACTITIONER

## 2022-12-14 PROCEDURE — 99213 OFFICE O/P EST LOW 20 MIN: CPT | Performed by: INTERNAL MEDICINE

## 2022-12-14 PROCEDURE — 99213 OFFICE O/P EST LOW 20 MIN: CPT | Performed by: NURSE PRACTITIONER

## 2022-12-14 RX ORDER — GABAPENTIN 600 MG/1
600 TABLET ORAL 3 TIMES DAILY
Qty: 90 TABLET | Refills: 1 | Status: SHIPPED | OUTPATIENT
Start: 2022-12-14 | End: 2023-02-15 | Stop reason: SDUPTHER

## 2022-12-14 RX ORDER — METHYLPREDNISOLONE ACETATE 80 MG/ML
80 INJECTION, SUSPENSION INTRA-ARTICULAR; INTRALESIONAL; INTRAMUSCULAR; SOFT TISSUE
Status: COMPLETED | OUTPATIENT
Start: 2022-12-14 | End: 2022-12-14

## 2022-12-14 RX ORDER — TRAMADOL HYDROCHLORIDE 50 MG/1
50 TABLET ORAL 3 TIMES DAILY PRN
Qty: 90 TABLET | Refills: 1 | Status: SHIPPED | OUTPATIENT
Start: 2022-12-14 | End: 2023-02-15 | Stop reason: SDUPTHER

## 2022-12-14 RX ADMIN — METHYLPREDNISOLONE ACETATE 80 MG: 80 INJECTION, SUSPENSION INTRA-ARTICULAR; INTRALESIONAL; INTRAMUSCULAR; SOFT TISSUE at 10:20

## 2022-12-14 RX ADMIN — METHYLPREDNISOLONE ACETATE 80 MG: 80 INJECTION, SUSPENSION INTRA-ARTICULAR; INTRALESIONAL; INTRAMUSCULAR; SOFT TISSUE at 10:19

## 2022-12-14 NOTE — PROGRESS NOTES
"Patient: Arelis Munson  YOB: 1975  Date of Service: 12/14/2022    Chief Complaints:   Chief Complaint   Patient presents with   • Right Knee - Pain, Follow-up   • Left Knee - Pain, Follow-up       Subjective: Here for both knees painful and desires conservative treatment    History of Present Illness: Pt is seen in the office today with complaints of   Chief Complaint   Patient presents with   • Right Knee - Pain, Follow-up   • Left Knee - Pain, Follow-up   .  KNEE: TIMING:  The pain is described as ACUTE.  LOCATION: medial joint line tenderness. AGGRAVATING FACTORS:  Is worsened by prolonged standing, sitting, walking and squatting activities.  ASSOCIATED SYMPTOMS:  swelling, tenderness, and aching.    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     Allergies: No Known Allergies    Medications:   Home Medications:  Current Outpatient Medications on File Prior to Visit   Medication Sig   • albuterol sulfate  (90 Base) MCG/ACT inhaler 2 puffs every 4 hours as needed for SOA or wheezing. Generic.   • Ascorbic Acid (VITAMIN C PO) Take  by mouth.   • aspirin 81 MG EC tablet Take 81 mg by mouth Daily.   • B Complex Vitamins (VITAMIN B COMPLEX PO) Take  by mouth Daily. HOLD PRIOR TO SURGERY   • B-D 3CC LUER-PUMA SYR 22GX1\" 22G X 1\" 3 ML misc    • budesonide (Pulmicort Flexhaler) 180 MCG/ACT inhaler Use 1 or 2 inhalations each AM. Rinse and spit after use.   • Cholecalciferol (VITAMIN D3) 5000 UNITS capsule capsule Take 5,000 Units by mouth Daily. HOLD PRIOR TO SURGERY   • cromolyn (OPTICROM) 4 % ophthalmic solution PLACE ONE DROP IN EACH EYE FOUR TIMES A DAY   • Denta 5000 Plus 1.1 % cream    • desonide (DESOWEN) 0.05 % cream APPLY TO AFFECTED AREA(S) THREE TIMES A DAY   • Diclofenac Sodium (VOLTAREN) 1 % gel gel Apply 4 g topically to the appropriate area as directed 4 (Four) Times a Day.   • diphenhydrAMINE HCl (BENADRYL ALLERGY PO) Take 1 tablet by mouth Daily.   • docusate sodium (COLACE) 250 MG " capsule Take 250 mg by mouth Daily.   • esomeprazole (nexIUM) 40 MG capsule TAKE ONE CAPSULE BY MOUTH ONCE NIGHTLY   • etodolac (LODINE) 500 MG tablet Take 1 tablet by mouth 2 (Two) Times a Day.   • ezetimibe-simvastatin (VYTORIN) 10-40 MG per tablet TAKE ONE TABLET BY MOUTH ONCE NIGHTLY   • FIBER PO Take  by mouth.   • FLONASE SENSIMIST 27.5 MCG/SPRAY nasal spray USE ONE SPRAY IN EACH NOSTRIL ONCE DAILY   • ketoconazole (NIZORAL) 2 % cream APPLY TO AFFECTED AREA(S) DAILY AS DIRECTED   • melatonin 1 MG tablet Take 3 mg by mouth Every Night. HOLD PRIOR TO SURGERY   • montelukast (SINGULAIR) 10 MG tablet TAKE ONE TABLET BY MOUTH ONCE NIGHTLY   • Multiple Vitamin (MULTI VITAMIN DAILY PO) Take  by mouth daily.   • olopatadine (PATANASE) 0.6 % solution nasal solution SPRAY TWO SPRAYS IN EACH NOSTRIL TWICE DAILY   • Potassium 99 MG tablet Take  by mouth.   • RESTASIS 0.05 % ophthalmic emulsion    • rizatriptan MLT (MAXALT-MLT) 10 MG disintegrating tablet DISSOLVE ONE TABLET BY MOUTH AS NEEDED FOR MIGRAINE; MAY REPEAT ONE TABLET IN 2 HOURS IF NEEDED.   • SALINE NASAL SPRAY NA into each nostril.   • selenium sulfide (SELSUN) 2.5 % lotion APPLY TO AFFECTED AREA(S) AS DIRECTED DAILY AS NEEDED FOR DANDRUFF   • sildenafil (VIAGRA) 100 MG tablet TAKE ONE TABLET BY MOUTH AS NEEDED FOR ERECTILE DYSFUNCTION   • Testosterone Cypionate (DEPOTESTOTERONE CYPIONATE) 200 MG/ML injection Inject  into the appropriate muscle as directed by prescriber Every 14 (Fourteen) Days.   • Unable to find 1 each 1 (One) Time. Jina allergy relief, q AM   • zolpidem CR (Ambien CR) 12.5 MG CR tablet Take 1 tablet by mouth At Night As Needed for Sleep.   • [DISCONTINUED] gabapentin (NEURONTIN) 600 MG tablet Take 1 tablet by mouth 3 (Three) Times a Day.   • [DISCONTINUED] traMADol (ULTRAM) 50 MG tablet Take 1 tablet by mouth 3 (Three) Times a Day As Needed for Moderate Pain.   • gabapentin (NEURONTIN) 600 MG tablet Take 1 tablet by mouth 3 (Three) Times  a Day.   • traMADol (ULTRAM) 50 MG tablet Take 1 tablet by mouth 3 (Three) Times a Day As Needed for Moderate Pain.     No current facility-administered medications on file prior to visit.     Current Medications:  Scheduled Meds:  Continuous Infusions:No current facility-administered medications for this visit.    PRN Meds:.    I have reviewed the patient's medical history in detail and updated the computerized patient record.  Review and summarization of old records include:    Past Medical History:   Diagnosis Date   • Arthritis     OSTEO   • Asthma    • COVID-19 04/2021   • ED (erectile dysfunction)    • Elevated cholesterol    • Environmental allergies    • Erectile dysfunction    • Fracture, fibula     RIGHT AND ANKLE   • Gastroesophageal reflux disease without esophagitis 07/11/2016   • Low back pain    • Migraine with aura and without status migrainosus, not intractable 07/11/2016   • Pain and swelling of toe of right foot    • Peripheral neuropathy    • Seasonal allergies    • Sleep apnea     CPAP   • Testosterone deficiency 07/11/2016        Past Surgical History:   Procedure Laterality Date   • ANKLE ARTHROSCOPY Right 01/26/2018    Procedure: RT ANKLE ARTHROSCOPY SUBCHONDROPLASTY TALUS ;  Surgeon: Lebron Spivey Jr., MD;  [place] [facility];  Service:    • ANKLE LIGAMENT RECONSTRUCTION Right 01/26/2018    Procedure: HARDWARE REMOVAL POSS LATERAL LIGAMENT RECONSTRUCTION ;  Surgeon: Lebron Spivey Jr., MD;  [place] [facility];  Service:    • CARPAL TUNNEL RELEASE Right    • CUBITAL TUNNEL RELEASE Right     NERVE RELEASE   • EAR BIOPSY Right     MYRINGOTOMY W/BONES REPLACED INNER EAR   • EPIDURAL BLOCK     • KNEE SURGERY Right     MENISCUS REPAIR   • NOSE SURGERY     • ORIF FIBULA FRACTURE Right 05/17/2017    ANKLE INCLUDED   • SINUS SURGERY      Dr. Vilchis   • TONSILLECTOMY      As a kid        Social History     Occupational History   • Not on file   Tobacco Use   • Smoking status: Former      Packs/day: 2.00     Years: 31.00     Pack years: 62.00     Types: Cigarettes     Quit date:      Years since quittin.9   • Smokeless tobacco: Never   • Tobacco comments:     I have been smoke free for 19 years as of 19.   Substance and Sexual Activity   • Alcohol use: Yes     Alcohol/week: 2.0 standard drinks     Types: 2 Shots of liquor per week     Comment: Socially   • Drug use: No   • Sexual activity: Yes     Partners: Female     Birth control/protection: None      Social History     Social History Narrative   • Not on file        Family History   Problem Relation Age of Onset   • Lung cancer Mother    • Cancer Mother          of lung, breast, and brain cancer.   • Lung cancer Father    • Cancer Father          of lung cancer.   • Malig Hyperthermia Neg Hx        ROS: 14 point review of systems was performed and was negative except for documented findings in HPI and today's encounter.     Allergies: No Known Allergies  Constitutional:  Denies fever, shaking or chills   Eyes:  Denies change in visual acuity   HENT:  Denies nasal congestion or sore throat   Respiratory:  Denies cough or shortness of breath   Cardiovascular:  Denies chest pain or severe LE edema   GI:  Denies abdominal pain, nausea, vomiting, bloody stools or diarrhea   Musculoskeletal:  Numbness, tingling, or loss of motor function only as noted above in history of present illness.  : Denies painful urination or hematuria  Integument:  Denies rash, lesion or ulceration   Neurologic:  Denies headache or focal weakness  Endocrine:  Denies lymphadenopathy  Psych:  Denies confusion or change in mental status   Hem:  Denies active bleeding      Physical Exam: 47 y.o. male  Wt Readings from Last 3 Encounters:   22 111 kg (245 lb 12.8 oz)   22 112 kg (246 lb)   22 112 kg (246 lb)       Body mass index is 32.46 kg/m².  Facility age limit for growth percentiles is 20 years.  Vitals:    22 1023   Temp: 97.3 °F  (36.3 °C)     Vital signs reviewed.   General Appearance:    Alert, cooperative, in no acute distress                  Eyes: conjunctiva clear  ENT: external ears and nose atraumatic  CV: no peripheral edema  Resp: normal respiratory effort  Skin: no rashes or wounds; normal turgor  Psych: mood and affect appropriate  Lymph: no nodes appreciated  Neuro: gross sensation intact  Vascular:  Palpable peripheral pulse in noted extremity  Musculoskeletal Extremities: KNEE Exam: medial and lateral joint line tenderness with crepitation, synovitis, swelling, and joint effusion bilateral knee.      Diagnostic Data:  Imaging done previously in the office and discussed with the patient:         Procedure:  Large Joint Arthrocentesis: R knee  Date/Time: 12/14/2022 10:19 AM  Consent given by: patient  Site marked: site marked  Timeout: Immediately prior to procedure a time out was called to verify the correct patient, procedure, equipment, support staff and site/side marked as required   Supporting Documentation  Indications: pain, joint swelling and diagnostic evaluation   Procedure Details  [place] knee - R knee  Preparation: Patient was prepped and draped in the usual sterile fashion  Needle gauge: 21G.  Medications administered: 80 mg methylPREDNISolone acetate 80 MG/ML; 2 mL lidocaine (cardiac)  Patient tolerance: patient tolerated the procedure well with no immediate complications    Large Joint Arthrocentesis: L knee  Date/Time: 12/14/2022 10:20 AM  Consent given by: patient  Site marked: site marked  Timeout: Immediately prior to procedure a time out was called to verify the correct patient, procedure, equipment, support staff and site/side marked as required   Supporting Documentation  Indications: pain   Procedure Details  [place] knee - L knee  Preparation: Patient was prepped and draped in the usual sterile fashion  Needle gauge: 21G.  Medications administered: 80 mg methylPREDNISolone acetate 80 MG/ML; 2 mL  lidocaine (cardiac)  Patient tolerance: patient tolerated the procedure well with no immediate complications          Assessment:     ICD-10-CM ICD-9-CM   1. Primary osteoarthritis of both knees  M17.0 715.16       Plan:   Follow up as indicated.  Ice, elevate, and rest as needed.  The diagnosis(es), natural history, pathophysiology and treatment for diagnosis(es) were discussed. Opportunity given and questions answered.  Biomechanics of pertinent body areas discussed.  When appropriate, the use of ambulatory aids discussed.  EXERCISES:  Advice on benefits of, and types of regular/moderate exercise pertaining to orthopedic diagnosis(es).  MEDICATIONS:  The risks, benefits, warnings,side effects and alternatives of medications discussed.  Inflammation/pain control; with cold, heat, elevation and/or liniments discussed as appropriate  Cortisone Injection. See procedure note.  HOME EXERCISE/PT program encouraged  MEDICAL RECORDS reviewed from other provider(s) for past and current medical history pertinent to this complaint.    12/14/2022

## 2022-12-14 NOTE — PROGRESS NOTES
CHIEF COMPLAINT  Follow-up for back pain.    Subjective   Arelis Munson is a 47 y.o. male  who presents for follow-up.  He has a history of back pain.  Today his pain is 6/10VAS in severity. He continues with Gabapentin 600 mg 3/day and Tramadol 3/day. This medication regimen decreases his pain by ~45% and allows him to continue working. ADLs by self. He denies any side effects including somnolence or constipation. ADLs by self.     Previously evaluated by Dr. Doran who recommended a minimally invasive right L3-L4 lateral discectomy.  Patient is waiting on surgery until he is in a better place financially and closer to nursing home. Previously discussed that if his pain worsens I recommend proceeding with surgery. He denies any worsening weakness today.     He continues to work full time as a .      Procedure list (performed at [facility]):  8/22/2022-LESI at L3-4--75-80% relief x a few weeks  7/22/2022-LESI at L3-4--No relief  6/21/2022-LESI at L3-4--95% relief for 1 month     Additional steroid containing procedures in last 12 months:  9/8/2022-bilateral knee injections with orthopedics (160 mg of Depo-Medrol total)  6/1/2022-bilateral knee injections with orthopedics (160 mg of Depo-Medrol total)  4/18/2022-right GT bursa injection (80mg Depo-Medrol)  2/23/2022-bilateral knee injections (160 mg of Depo-Medrol total)  11/17/2021-bilateral knee injections (160 mg of Depo-Medrol total)    Back Pain  This is a chronic problem. The problem occurs intermittently. The problem has been waxing and waning since onset. The pain is present in the lumbar spine. The quality of the pain is described as aching, burning and stabbing. The pain radiates to the right thigh (stopping at the knee). The pain is at a severity of 6/10. Exacerbated by: nothing in particular, small movements with increase his pain. Associated symptoms include headaches (migraines). Pertinent negatives include no abdominal pain, chest pain, dysuria,  "fever, numbness or weakness. He has tried NSAIDs and heat (PT, massage chair, Gabapentin, and Tramadol) for the symptoms.      PEG Assessment   What number best describes your pain on average in the past week?6  What number best describes how, during the past week, pain has interfered with your enjoyment of life?7  What number best describes how, during the past week, pain has interfered with your general activity?  7    The following portions of the patient's history were reviewed and updated as appropriate: allergies, current medications, past family history, past medical history, past social history, past surgical history and problem list.    Review of Systems   Constitutional: Negative for fatigue and fever.   HENT: Positive for congestion.    Eyes: Negative for visual disturbance.   Respiratory: Negative for shortness of breath.    Cardiovascular: Negative for chest pain.   Gastrointestinal: Negative for abdominal pain, constipation and diarrhea.   Genitourinary: Negative for difficulty urinating and dysuria.   Musculoskeletal: Positive for back pain.   Neurological: Positive for headaches (migraines). Negative for weakness and numbness.   Psychiatric/Behavioral: Negative for sleep disturbance and suicidal ideas. The patient is not nervous/anxious.      --  The aforementioned information the Chief Complaint section and above subjective data including any HPI data, and also the Review of Systems data, has been personally reviewed and affirmed.  --    Vitals:    12/14/22 1237   BP: 118/81   Pulse: 77   Resp: 18   Temp: 96.6 °F (35.9 °C)   SpO2: 95%   Weight: 111 kg (245 lb 12.8 oz)   Height: 185.4 cm (73\")   PainSc:   6   PainLoc: Back     Objective   Physical Exam  Vitals and nursing note reviewed.   Constitutional:       Appearance: Normal appearance. He is well-developed.   Eyes:      General: Lids are normal.   Cardiovascular:      Rate and Rhythm: Normal rate.   Pulmonary:      Effort: Pulmonary effort is " normal.   Musculoskeletal:      Lumbar back: Tenderness present. Decreased range of motion.   Neurological:      Mental Status: He is alert and oriented to person, place, and time.   Psychiatric:         Attention and Perception: Attention normal.         Mood and Affect: Mood normal.         Speech: Speech normal.         Behavior: Behavior normal.         Judgment: Judgment normal.       Assessment & Plan   Diagnoses and all orders for this visit:    1. Other chronic pain (Primary)    2. Chronic right-sided low back pain with right-sided sciatica    3. Lumbar disc herniation with radiculopathy    4. Encounter for long-term (current) use of high-risk medication    Other orders  -     gabapentin (NEURONTIN) 600 MG tablet; Take 1 tablet by mouth 3 (Three) Times a Day.  Dispense: 90 tablet; Refill: 1  -     traMADol (ULTRAM) 50 MG tablet; Take 1 tablet by mouth 3 (Three) Times a Day As Needed for Moderate Pain.  Dispense: 90 tablet; Refill: 1      --- The urine drug screen confirmation from 9/14/2022 has been reviewed and the result is appropriate based on patient history and MARY BETH report  --- CSA updated 9/14/2022  --- Refill tramadol. Patient appears stable with current regimen. No adverse effects. Regarding continuation of opioids, there is no evidence of aberrant behavior or any red flags.  The patient continues with appropriate response to opioid therapy. ADL's remain intact by self.   --- Refill Gabapentin.   --- Follow-up 2 months or sooner if needed.      MARY BETH REPORT  As part of the patient's treatment plan, I am prescribing controlled substances. The patient has been made aware of appropriate use of such medications, including potential risk of somnolence, limited ability to drive and/or work safely, and the potential for dependence or overdose. It has also been made clear that these medications are for use by this patient only, without concomitant use of alcohol or other substances unless prescribed.      Patient has completed prescribing agreement detailing terms of continued prescribing of controlled substances, including monitoring MARY BETH reports, urine drug screening, and pill counts if necessary. The patient is aware that inappropriate use will results in cessation of prescribing such medications.    As the clinician, I personally reviewed the MAYR BETH from 12/14/2022 while the patient was in the office today.    History and physical exam exhibit continued safe and appropriate use of controlled substances.    Dictated utilizing Dragon dictation.     Patient remained masked during entire encounter. No cough present. I donned a mask and eye protection throughout entire visit. Prior to donning mask and eye protection, hand hygiene was performed, as well as when it was doffed.  I was closer than 6 feet, but not for an extended period of time. No obvious exposure to any bodily fluids.

## 2022-12-14 NOTE — PROGRESS NOTES
"Follow Up [facility] Note     Chief Complaint:  NITA     Primary Care Physician: Nessa Gaston APRN    Interval History:   The patient is a 47 y.o. male  who I last saw 2022 and that note was reviewed.  The patient reports he is doing well without new complaints.  He goes to bed at 10 PM and awakens at 5 AM.    Patient had lab work 2022 which he asked me to review.  Neurology ordered the labs.  All seem to be within normal limits except for the patient's ALT which is mildly elevated at 67.    Review of Systems:    A complete review of systems was done and all were negative with the exception of some nasal congestion and postnasal drip, shortness of breath with exertion.    Social History:    Social History     Socioeconomic History   • Marital status:    Tobacco Use   • Smoking status: Former     Packs/day: 2.00     Years: 20.00     Pack years: 40.00     Types: Cigarettes     Start date: 1987     Quit date: 2002     Years since quittin.4   • Smokeless tobacco: Never   • Tobacco comments:     I have been smoke free for 19 years as of 19.   Substance and Sexual Activity   • Alcohol use: Yes     Alcohol/week: 2.0 standard drinks     Types: 2 Shots of liquor per week     Comment: Socially   • Drug use: No   • Sexual activity: Yes     Partners: Female     Birth control/protection: None       Allergies:  Patient has no known allergies.     Medication Review: His list was reviewed.  The patient takes Ambien CR 12.5 mg at bedtime    Vital Signs:    Vitals:    22 0900   Pulse: 80   SpO2: 98%   Weight: 112 kg (246 lb)   Height: 185.4 cm (72.99\")     Body mass index is 32.46 kg/m².    Physical Exam:    Constitutional:  Well developed 47 y.o. male that appears in no apparent distress.  Awake & oriented times 3.  Normal mood with normal recent and remote memory and normal judgement.  Eyes:  Conjunctivae normal.  Oropharynx: Previously, moist mucous membranes without exudate " and a large tongue and normal uvula and class III Mallampati airway, patient is wearing a facemask.    Self-administered [place] Sleepiness Scale test results: 2-3  0-5 Lower normal daytime sleepiness  6-10 Higher normal daytime sleepiness  11-12 Mild, 13-15 Moderate, & 16-24 Severe excessive daytime sleepiness     Downloaded PAP Data Reviewed For Compliance:  DME is [facility] and he uses a fullface mask.  Downloads between 9/16 and 12/14/2022 compliance 88% and average usage 6 hours and 47 minutes and average AHI mildly abnormal at 6.2 but all subsets are normal and he has no significant leak and average auto CPAP pressure is 15 and his auto CPAP is 9-15    I have reviewed the above results and compared them with the patient's last downloads and reviewed with the patient.    Impression:   Obstructive sleep apnea adequately treated with auto CPAP. The patient appears to be at goal with good compliance and usage. The patient has no complaints of hypersomnolence.  Circadian rhythm sleep disorder, shiftwork type, early morning awakening  Psychophysiologic insomnia adequately treated with Ambien CR 12.5 mg 1/2 to 1 tablet at bedtime    Plan:  Good sleep hygiene measures should be maintained.  Weight loss would be beneficial in this patient who is obese by Body mass index is 32.46 kg/m²..      After evaluating the patient and assessing results available, the patient is benefiting from the treatment being provided.     The patient will continue auto CPAP.  After clinical evaluation and review of downloads, I recommend changes to the patient's pressures.  Auto CPAP will be changed to 9-16 cm water pressure.  A new prescription will be sent to the patient's DME.    I answered all of the patient's questions.  The patient will call for any problems and will follow up in 6 months.      Ronni Kong MD  Sleep Medicine  12/18/22  09:41 EST

## 2022-12-17 DIAGNOSIS — M17.0 ARTHRITIS OF BOTH KNEES: ICD-10-CM

## 2022-12-19 RX ORDER — ETODOLAC 500 MG/1
TABLET, FILM COATED ORAL
Qty: 60 TABLET | Refills: 5 | Status: SHIPPED | OUTPATIENT
Start: 2022-12-19

## 2022-12-21 ENCOUNTER — TELEPHONE (OUTPATIENT)
Dept: SLEEP MEDICINE | Facility: HOSPITAL | Age: 47
End: 2022-12-21

## 2023-01-16 ENCOUNTER — OFFICE VISIT (OUTPATIENT)
Dept: FAMILY MEDICINE CLINIC | Facility: CLINIC | Age: 48
End: 2023-01-16
Payer: COMMERCIAL

## 2023-01-16 VITALS
WEIGHT: 252.5 LBS | HEART RATE: 80 BPM | HEIGHT: 73 IN | DIASTOLIC BLOOD PRESSURE: 85 MMHG | OXYGEN SATURATION: 98 % | SYSTOLIC BLOOD PRESSURE: 155 MMHG | BODY MASS INDEX: 33.46 KG/M2

## 2023-01-16 DIAGNOSIS — J06.9 ACUTE URI: ICD-10-CM

## 2023-01-16 DIAGNOSIS — R09.89 UPPER RESPIRATORY SYMPTOM: Primary | ICD-10-CM

## 2023-01-16 LAB
EXPIRATION DATE: NORMAL
FLUAV AG UPPER RESP QL IA.RAPID: NOT DETECTED
FLUBV AG UPPER RESP QL IA.RAPID: NOT DETECTED
INTERNAL CONTROL: NORMAL
Lab: NORMAL
SARS-COV-2 AG UPPER RESP QL IA.RAPID: NOT DETECTED

## 2023-01-16 PROCEDURE — 87428 SARSCOV & INF VIR A&B AG IA: CPT | Performed by: NURSE PRACTITIONER

## 2023-01-16 PROCEDURE — 99213 OFFICE O/P EST LOW 20 MIN: CPT | Performed by: NURSE PRACTITIONER

## 2023-01-16 RX ORDER — DEXTROMETHORPHAN HYDROBROMIDE AND PROMETHAZINE HYDROCHLORIDE 15; 6.25 MG/5ML; MG/5ML
5 SYRUP ORAL NIGHTLY PRN
Qty: 240 ML | Refills: 0 | Status: SHIPPED | OUTPATIENT
Start: 2023-01-16 | End: 2023-01-20 | Stop reason: SDUPTHER

## 2023-01-16 RX ORDER — AZELASTINE 1 MG/ML
2 SPRAY, METERED NASAL 2 TIMES DAILY
Qty: 30 ML | Refills: 11 | Status: SHIPPED | OUTPATIENT
Start: 2023-01-16

## 2023-01-16 RX ORDER — BENZONATATE 100 MG/1
100 CAPSULE ORAL 3 TIMES DAILY PRN
Qty: 90 CAPSULE | Refills: 3 | Status: SHIPPED | OUTPATIENT
Start: 2023-01-16

## 2023-01-16 NOTE — PROGRESS NOTES
"Chief Complaint  Cough (Cough, nasal congestion, sore throat)    Subjective        Arelis Munson presents to [facility]  URI   This is a new problem. The current episode started in the past 7 days. The problem has been gradually worsening. There has been no fever. Associated symptoms include congestion, coughing, a plugged ear sensation and rhinorrhea. Pertinent negatives include no headaches. Associated symptoms comments: Post nasal drainage, myalgias, post nasal drainage. Treatments tried: OTC cold and flu medication. The treatment provided mild relief.     I have reviewed the patient's medical history in detail and updated the computerized patient record.    Current Outpatient Medications:   •  albuterol sulfate  (90 Base) MCG/ACT inhaler, 2 puffs every 4 hours as needed for SOA or wheezing. Generic., Disp: 3 g, Rfl: 1  •  Ascorbic Acid (VITAMIN C PO), Take  by mouth., Disp: , Rfl:   •  aspirin 81 MG EC tablet, Take 81 mg by mouth Daily., Disp: , Rfl:   •  B Complex Vitamins (VITAMIN B COMPLEX PO), Take  by mouth Daily. HOLD PRIOR TO SURGERY, Disp: , Rfl:   •  B-D 3CC LUER-PUMA SYR 22GX1\" 22G X 1\" 3 ML misc, , Disp: , Rfl:   •  budesonide (Pulmicort Flexhaler) 180 MCG/ACT inhaler, Use 1 or 2 inhalations each AM. Rinse and spit after use., Disp: 2 each, Rfl: 3  •  Cholecalciferol (VITAMIN D3) 5000 UNITS capsule capsule, Take 5,000 Units by mouth Daily. HOLD PRIOR TO SURGERY, Disp: , Rfl:   •  cromolyn (OPTICROM) 4 % ophthalmic solution, PLACE ONE DROP IN EACH EYE FOUR TIMES A DAY, Disp: 10 mL, Rfl: 6  •  Denta 5000 Plus 1.1 % cream, , Disp: , Rfl:   •  desonide (DESOWEN) 0.05 % cream, APPLY TO AFFECTED AREA(S) THREE TIMES A DAY, Disp: 60 each, Rfl: 6  •  Diclofenac Sodium (VOLTAREN) 1 % gel gel, Apply 4 g topically to the appropriate area as directed 4 (Four) Times a Day., Disp: 100 g, Rfl: 3  •  diphenhydrAMINE HCl (BENADRYL ALLERGY PO), Take 1 tablet by mouth Daily., Disp: " , Rfl:   •  docusate sodium (COLACE) 250 MG capsule, Take 250 mg by mouth Daily., Disp: , Rfl:   •  esomeprazole (nexIUM) 40 MG capsule, TAKE ONE CAPSULE BY MOUTH ONCE NIGHTLY, Disp: 30 capsule, Rfl: 11  •  etodolac (LODINE) 500 MG tablet, TAKE ONE TABLET BY MOUTH TWICE A DAY, Disp: 60 tablet, Rfl: 5  •  ezetimibe-simvastatin (VYTORIN) 10-40 MG per tablet, TAKE ONE TABLET BY MOUTH ONCE NIGHTLY, Disp: 30 tablet, Rfl: 11  •  FIBER PO, Take  by mouth., Disp: , Rfl:   •  FLONASE SENSIMIST 27.5 MCG/SPRAY nasal spray, USE ONE SPRAY IN EACH NOSTRIL ONCE DAILY, Disp: 9.1 mL, Rfl: 12  •  gabapentin (NEURONTIN) 600 MG tablet, Take 1 tablet by mouth 3 (Three) Times a Day., Disp: 90 tablet, Rfl: 1  •  ketoconazole (NIZORAL) 2 % cream, APPLY TO AFFECTED AREA(S) DAILY AS DIRECTED, Disp: 60 g, Rfl: 3  •  melatonin 1 MG tablet, Take 3 mg by mouth Every Night. HOLD PRIOR TO SURGERY, Disp: , Rfl:   •  montelukast (SINGULAIR) 10 MG tablet, TAKE ONE TABLET BY MOUTH ONCE NIGHTLY, Disp: 30 tablet, Rfl: 11  •  Multiple Vitamin (MULTI VITAMIN DAILY PO), Take  by mouth daily., Disp: , Rfl:   •  olopatadine (PATANASE) 0.6 % solution nasal solution, SPRAY TWO SPRAYS IN EACH NOSTRIL TWICE DAILY, Disp: 30.5 g, Rfl: 6  •  Potassium 99 MG tablet, Take  by mouth., Disp: , Rfl:   •  RESTASIS 0.05 % ophthalmic emulsion, , Disp: , Rfl:   •  rizatriptan MLT (MAXALT-MLT) 10 MG disintegrating tablet, DISSOLVE ONE TABLET BY MOUTH AS NEEDED FOR MIGRAINE; MAY REPEAT ONE TABLET IN 2 HOURS IF NEEDED., Disp: 27 tablet, Rfl: 2  •  SALINE NASAL SPRAY NA, into each nostril., Disp: , Rfl:   •  selenium sulfide (SELSUN) 2.5 % lotion, APPLY TO AFFECTED AREA(S) AS DIRECTED DAILY AS NEEDED FOR DANDRUFF, Disp: 120 mL, Rfl: 11  •  sildenafil (VIAGRA) 100 MG tablet, TAKE ONE TABLET BY MOUTH AS NEEDED FOR ERECTILE DYSFUNCTION, Disp: 5 tablet, Rfl: 5  •  Testosterone Cypionate (DEPOTESTOTERONE CYPIONATE) 200 MG/ML injection, Inject  into the appropriate muscle as directed  "by prescriber Every 14 (Fourteen) Days., Disp: , Rfl:   •  traMADol (ULTRAM) 50 MG tablet, Take 1 tablet by mouth 3 (Three) Times a Day As Needed for Moderate Pain., Disp: 90 tablet, Rfl: 1  •  Unable to find, 1 each 1 (One) Time. Jina allergy relief, q AM, Disp: , Rfl:   •  zolpidem CR (Ambien CR) 12.5 MG CR tablet, Take 1 tablet by mouth At Night As Needed for Sleep., Disp: 90 tablet, Rfl: 1  Objective   Vital Signs:  /85 (BP [place] Right arm, Patient Position: Sitting, Cuff Size: Adult)   Pulse 80   Ht 185.4 cm (72.99\")   Wt 115 kg (252 lb 8 oz)   SpO2 98%   BMI 33.32 kg/m²   Estimated body mass index is 33.32 kg/m² as calculated from the following:    Height as of this encounter: 185.4 cm (72.99\").    Weight as of this encounter: 115 kg (252 lb 8 oz).             Physical Exam  Constitutional:       Appearance: He is obese. He is ill-appearing.   HENT:      Right Ear: Tympanic membrane normal.      Left Ear: Tympanic membrane normal.      Nose: Congestion and rhinorrhea present.      Mouth/Throat:      Mouth: Mucous membranes are moist.      Pharynx: Oropharynx is clear.   Cardiovascular:      Rate and Rhythm: Normal rate and regular rhythm.      Pulses: Normal pulses.      Heart sounds: Normal heart sounds.   Pulmonary:      Effort: Pulmonary effort is normal.      Breath sounds: Normal breath sounds.   Skin:     General: Skin is warm and dry.   Neurological:      Mental Status: He is alert and oriented to person, place, and time.   Psychiatric:      Comments: No acute distress        Result Review :                   Assessment and Plan   Diagnoses and all orders for this visit:    1. Upper respiratory symptom (Primary)  -     POCT SARS-CoV-2 Antigen MAT    You have been diagnosed with an upper respiratory infection which is likely viral.  Viruses are not killed by antibiotics and therefore an antibiotic is not prescribed for you today.  Viral illness can last between 3 in 14 days, and symptoms " may persist the entire course of the illness.  Symptomatic treatment is best.  You may take Mucinex D for relieving congestion and cough.  If you have high blood pressure, do not take Mucinex D, instead opting for plain Mucinex and Coricidin HBP.  Take Ibuprofen or Tylenol as needed for pain or fever.  Oral antihistamine, such as Zyrtec or Claritin may help reduce ear pressure and relieve some nasal symptoms.  A saline nasal spray may be used to keep nose clear from discharge.  Be sure that you are increasing your intake of clear to decaffeinated fluids and get plenty of rest.  If your symptoms worsen or persist follow up as needed.         Follow Up   No follow-ups on file.  Patient was given instructions and counseling regarding his condition or for health maintenance advice. Please see specific information pulled into the AVS if appropriate.

## 2023-01-20 DIAGNOSIS — J06.9 ACUTE URI: ICD-10-CM

## 2023-01-20 RX ORDER — DEXTROMETHORPHAN HYDROBROMIDE AND PROMETHAZINE HYDROCHLORIDE 15; 6.25 MG/5ML; MG/5ML
5 SYRUP ORAL 4 TIMES DAILY PRN
Qty: 240 ML | Refills: 0 | Status: SHIPPED | OUTPATIENT
Start: 2023-01-20

## 2023-02-13 RX ORDER — SILDENAFIL 100 MG/1
TABLET, FILM COATED ORAL
Qty: 5 TABLET | Refills: 5 | Status: SHIPPED | OUTPATIENT
Start: 2023-02-13

## 2023-02-15 ENCOUNTER — OFFICE VISIT (OUTPATIENT)
Dept: PAIN MEDICINE | Facility: CLINIC | Age: 48
End: 2023-02-15
Payer: COMMERCIAL

## 2023-02-15 VITALS
HEART RATE: 75 BPM | RESPIRATION RATE: 18 BRPM | DIASTOLIC BLOOD PRESSURE: 78 MMHG | TEMPERATURE: 97.5 F | OXYGEN SATURATION: 96 % | HEIGHT: 73 IN | BODY MASS INDEX: 32.66 KG/M2 | SYSTOLIC BLOOD PRESSURE: 118 MMHG | WEIGHT: 246.4 LBS

## 2023-02-15 DIAGNOSIS — Z79.899 ENCOUNTER FOR LONG-TERM (CURRENT) USE OF HIGH-RISK MEDICATION: ICD-10-CM

## 2023-02-15 DIAGNOSIS — G89.29 OTHER CHRONIC PAIN: Primary | ICD-10-CM

## 2023-02-15 DIAGNOSIS — G89.29 CHRONIC RIGHT-SIDED LOW BACK PAIN WITH RIGHT-SIDED SCIATICA: ICD-10-CM

## 2023-02-15 DIAGNOSIS — M51.16 LUMBAR DISC HERNIATION WITH RADICULOPATHY: ICD-10-CM

## 2023-02-15 DIAGNOSIS — M54.41 CHRONIC RIGHT-SIDED LOW BACK PAIN WITH RIGHT-SIDED SCIATICA: ICD-10-CM

## 2023-02-15 PROCEDURE — 99213 OFFICE O/P EST LOW 20 MIN: CPT | Performed by: NURSE PRACTITIONER

## 2023-02-15 PROCEDURE — 80305 DRUG TEST PRSMV DIR OPT OBS: CPT | Performed by: NURSE PRACTITIONER

## 2023-02-15 RX ORDER — TRAMADOL HYDROCHLORIDE 50 MG/1
50 TABLET ORAL 3 TIMES DAILY PRN
Qty: 90 TABLET | Refills: 1 | Status: SHIPPED | OUTPATIENT
Start: 2023-02-15

## 2023-02-15 RX ORDER — GABAPENTIN 600 MG/1
600 TABLET ORAL 3 TIMES DAILY
Qty: 90 TABLET | Refills: 1 | Status: SHIPPED | OUTPATIENT
Start: 2023-02-15

## 2023-02-15 NOTE — PROGRESS NOTES
CHIEF COMPLAINT  Back pain  Patient in office today reports fluctuating pain over the last 8 weeks .   URINE POC:NEG    Subjective   Arelis Munson is a 48 y.o. male  who presents for follow-up.  He has a history of back pain.  Today his pain is 6/10VAS in severity. He states that his pain has remained stable, it does fluctuate depending on his level of activity. He continues with Gabapentin 600 mg 3/day and Tramadol 50 mg 3/day.This medication regimen decreases his pain by a moderate and allows him to continue working. ADLs by self. He denies any side effects including somnolence or constipation.     Previously evaluated by Dr. Doran who recommended a minimally invasive right L3-L4 lateral discectomy.  Patient is waiting on surgery until he is in a better place financially and closer to half-way. Previously discussed that if his pain worsens I recommend proceeding with surgery.      He continues to work full time as a .      Procedure list (performed at [facility]):  8/22/2022-LESI at L3-4--75-80% relief x a few weeks  7/22/2022-LESI at L3-4--No relief  6/21/2022-LESI at L3-4--95% relief for 1 month     Additional steroid containing procedures in last 12 months:  12/14/2022-bilateral knee injections (160 mg of Depo-Medrol total)  9/8/2022-bilateral knee injections with orthopedics (160 mg of Depo-Medrol total)  6/1/2022-bilateral knee injections with orthopedics (160 mg of Depo-Medrol total)  4/18/2022-right GT bursa injection (80mg Depo-Medrol)  2/23/2022-bilateral knee injections (160 mg of Depo-Medrol total)  11/17/2021-bilateral knee injections (160 mg of Depo-Medrol total)    Back Pain  This is a chronic problem. The problem occurs intermittently. The problem is unchanged. The pain is present in the lumbar spine. The quality of the pain is described as aching, burning and stabbing. The pain radiates to the right thigh (stopping at the knee). The pain is at a severity of 6/10. Exacerbated by: nothing  in particular, small movements with increase his pain. Associated symptoms include headaches (migraines) and weakness (right side). Pertinent negatives include no abdominal pain, chest pain, dysuria, fever or numbness. He has tried NSAIDs and heat (PT, massage chair, Gabapentin, and Tramadol) for the symptoms.      PEG Assessment   What number best describes your pain on average in the past week?7  What number best describes how, during the past week, pain has interfered with your enjoyment of life?7  What number best describes how, during the past week, pain has interfered with your general activity?  7    The following portions of the patient's history were reviewed and updated as appropriate: allergies, current medications, past family history, past medical history, past social history, past surgical history and problem list.    Review of Systems   Constitutional: Negative for activity change, fatigue and fever.   HENT: Negative for congestion.    Eyes: Positive for visual disturbance (blurred vision).   Respiratory: Negative for cough and chest tightness.    Cardiovascular: Negative for chest pain.   Gastrointestinal: Negative for abdominal pain, constipation and diarrhea.   Genitourinary: Negative for difficulty urinating and dysuria.   Musculoskeletal: Positive for back pain.   Neurological: Positive for weakness (right side) and headaches (migraines). Negative for dizziness, light-headedness and numbness.   Psychiatric/Behavioral: Positive for sleep disturbance (sleep apnea). Negative for agitation and suicidal ideas. The patient is not nervous/anxious.      --  The aforementioned information the Chief Complaint section and above subjective data including any HPI data, and also the Review of Systems data, has been personally reviewed and affirmed.  --    Vitals:    02/15/23 1518   BP: 118/78   BP [place] Left arm   Patient Position: Sitting   Cuff Size: Large Adult   Pulse: 75   Resp: 18   Temp: 97.5 °F  "(36.4 °C)   TempSrc: Temporal   SpO2: 96%   Weight: 112 kg (246 lb 6.4 oz)   Height: 185.4 cm (72.99\")   PainSc:   6     Objective   Physical Exam  Vitals and nursing note reviewed.   Constitutional:       Appearance: Normal appearance. He is well-developed.   Eyes:      General: Lids are normal.   Cardiovascular:      Rate and Rhythm: Normal rate.   Pulmonary:      Effort: Pulmonary effort is normal.   Musculoskeletal:      Lumbar back: Tenderness present. Decreased range of motion.   Neurological:      Mental Status: He is alert and oriented to person, place, and time.      Motor: No weakness.   Psychiatric:         Attention and Perception: Attention normal.         Mood and Affect: Mood normal.         Speech: Speech normal.         Behavior: Behavior normal.         Judgment: Judgment normal.       Assessment & Plan   Diagnoses and all orders for this visit:    1. Other chronic pain (Primary)    2. Chronic right-sided low back pain with right-sided sciatica    3. Lumbar disc herniation with radiculopathy    4. Encounter for long-term (current) use of high-risk medication      --- Routine UDS in office today as part of monitoring requirements for controlled substances.  The specimen was viewed and the immunoassay result reviewed and is NEG.  This specimen will be sent to [facility] laboratory for confirmation.     --- CSA updated 9/14/2022  --- Refill Tramadol. Patient appears stable with current regimen. No adverse effects. Regarding continuation of opioids, there is no evidence of aberrant behavior or any red flags.  The patient continues with appropriate response to opioid therapy. ADL's remain intact by self.   --- Refill Gabapentin.   --- Follow-up 2 months or sooner if needed.      MARY BETH REPORT  As part of the patient's treatment plan, I am prescribing controlled substances. The patient has been made aware of appropriate use of such medications, including potential risk of somnolence, limited ability to " drive and/or work safely, and the potential for dependence or overdose. It has also been made clear that these medications are for use by this patient only, without concomitant use of alcohol or other substances unless prescribed.     Patient has completed prescribing agreement detailing terms of continued prescribing of controlled substances, including monitoring MARY BETH reports, urine drug screening, and pill counts if necessary. The patient is aware that inappropriate use will results in cessation of prescribing such medications.    As the clinician, I personally reviewed the MARY BETH from 2/15/2023 while the patient was in the office today.    History and physical exam exhibit continued safe and appropriate use of controlled substances.    Dictated utilizing Dragon dictation.     Patient remained masked during entire encounter. No cough present. I donned a mask and eye protection throughout entire visit. Prior to donning mask and eye protection, hand hygiene was performed, as well as when it was doffed.  I was closer than 6 feet, but not for an extended period of time. No obvious exposure to any bodily fluids.

## 2023-02-20 DIAGNOSIS — F51.04 PSYCHOPHYSIOLOGICAL INSOMNIA: ICD-10-CM

## 2023-02-20 DIAGNOSIS — Z99.89 OSA ON CPAP: ICD-10-CM

## 2023-02-20 DIAGNOSIS — G47.33 OSA ON CPAP: ICD-10-CM

## 2023-02-20 RX ORDER — ZOLPIDEM TARTRATE 12.5 MG/1
12.5 TABLET, FILM COATED, EXTENDED RELEASE ORAL NIGHTLY PRN
Qty: 90 TABLET | Refills: 1 | Status: SHIPPED | OUTPATIENT
Start: 2023-02-20

## 2023-03-13 ENCOUNTER — CLINICAL SUPPORT (OUTPATIENT)
Dept: ORTHOPEDIC SURGERY | Facility: CLINIC | Age: 48
End: 2023-03-13
Payer: COMMERCIAL

## 2023-03-13 VITALS — TEMPERATURE: 97.3 F | WEIGHT: 250 LBS | HEIGHT: 73 IN | BODY MASS INDEX: 33.13 KG/M2

## 2023-03-13 DIAGNOSIS — M17.0 PRIMARY OSTEOARTHRITIS OF BOTH KNEES: ICD-10-CM

## 2023-03-13 DIAGNOSIS — R52 PAIN: Primary | ICD-10-CM

## 2023-03-13 PROCEDURE — 73562 X-RAY EXAM OF KNEE 3: CPT | Performed by: NURSE PRACTITIONER

## 2023-03-13 PROCEDURE — 20610 DRAIN/INJ JOINT/BURSA W/O US: CPT | Performed by: NURSE PRACTITIONER

## 2023-03-13 RX ORDER — METHYLPREDNISOLONE ACETATE 80 MG/ML
80 INJECTION, SUSPENSION INTRA-ARTICULAR; INTRALESIONAL; INTRAMUSCULAR; SOFT TISSUE
Status: COMPLETED | OUTPATIENT
Start: 2023-03-13 | End: 2023-03-13

## 2023-03-13 RX ORDER — LIDOCAINE HYDROCHLORIDE 10 MG/ML
2 INJECTION, SOLUTION EPIDURAL; INFILTRATION; INTRACAUDAL; PERINEURAL
Status: COMPLETED | OUTPATIENT
Start: 2023-03-13 | End: 2023-03-13

## 2023-03-13 RX ADMIN — METHYLPREDNISOLONE ACETATE 80 MG: 80 INJECTION, SUSPENSION INTRA-ARTICULAR; INTRALESIONAL; INTRAMUSCULAR; SOFT TISSUE at 15:23

## 2023-03-13 RX ADMIN — LIDOCAINE HYDROCHLORIDE 2 ML: 10 INJECTION, SOLUTION EPIDURAL; INFILTRATION; INTRACAUDAL; PERINEURAL at 15:23

## 2023-03-13 NOTE — PROGRESS NOTES
"Patient: Arelis Munson  YOB: 1975  Date of Service: 3/13/2023    Chief Complaints:   Chief Complaint   Patient presents with   • Left Knee - Pain   • Right Knee - Pain       Subjective: Here for both knees hurting and desires conservative treatment     History of Present Illness: Pt is seen in the office today with complaints of   Chief Complaint   Patient presents with   • Left Knee - Pain   • Right Knee - Pain   .  KNEE: TIMING:  The pain is described as ACUTE.  LOCATION: medial joint line tenderness. AGGRAVATING FACTORS:  Is worsened by prolonged standing, sitting, walking and squatting activities.  ASSOCIATED SYMPTOMS:  swelling, tenderness, and aching.    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     Allergies: No Known Allergies    Medications:   Home Medications:  Current Outpatient Medications on File Prior to Visit   Medication Sig   • albuterol sulfate  (90 Base) MCG/ACT inhaler 2 puffs every 4 hours as needed for SOA or wheezing. Generic.   • Ascorbic Acid (VITAMIN C PO) Take  by mouth.   • aspirin 81 MG EC tablet Take 1 tablet by mouth Daily.   • azelastine (ASTELIN) 0.1 % nasal spray 2 sprays into the nostril(s) as directed by provider 2 (Two) Times a Day. Use in each nostril as directed   • B Complex Vitamins (VITAMIN B COMPLEX PO) Take  by mouth Daily. HOLD PRIOR TO SURGERY   • B-D 3CC LUER-PUMA SYR 22GX1\" 22G X 1\" 3 ML misc    • benzonatate (TESSALON) 100 MG capsule Take 1 capsule by mouth 3 (Three) Times a Day As Needed for Cough.   • budesonide (Pulmicort Flexhaler) 180 MCG/ACT inhaler Use 1 or 2 inhalations each AM. Rinse and spit after use.   • Cholecalciferol (VITAMIN D3) 5000 UNITS capsule capsule Take 1 capsule by mouth Daily. HOLD PRIOR TO SURGERY   • cromolyn (OPTICROM) 4 % ophthalmic solution PLACE ONE DROP IN EACH EYE FOUR TIMES A DAY   • Denta 5000 Plus 1.1 % cream    • desonide (DESOWEN) 0.05 % cream APPLY TO AFFECTED AREA(S) THREE TIMES A DAY   • Diclofenac Sodium " (VOLTAREN) 1 % gel gel Apply 4 g topically to the appropriate area as directed 4 (Four) Times a Day.   • diphenhydrAMINE HCl (BENADRYL ALLERGY PO) Take 1 tablet by mouth Daily.   • docusate sodium (COLACE) 250 MG capsule Take 1 capsule by mouth Daily.   • esomeprazole (nexIUM) 40 MG capsule TAKE ONE CAPSULE BY MOUTH ONCE NIGHTLY   • etodolac (LODINE) 500 MG tablet TAKE ONE TABLET BY MOUTH TWICE A DAY   • ezetimibe-simvastatin (VYTORIN) 10-40 MG per tablet TAKE ONE TABLET BY MOUTH ONCE NIGHTLY   • FIBER PO Take  by mouth.   • FLONASE SENSIMIST 27.5 MCG/SPRAY nasal spray USE ONE SPRAY IN EACH NOSTRIL ONCE DAILY   • gabapentin (NEURONTIN) 600 MG tablet Take 1 tablet by mouth 3 (Three) Times a Day.   • ketoconazole (NIZORAL) 2 % cream APPLY TO AFFECTED AREA(S) DAILY AS DIRECTED   • melatonin 1 MG tablet Take 3 tablets by mouth Every Night. HOLD PRIOR TO SURGERY   • montelukast (SINGULAIR) 10 MG tablet TAKE ONE TABLET BY MOUTH ONCE NIGHTLY   • Multiple Vitamin (MULTI VITAMIN DAILY PO) Take  by mouth daily.   • olopatadine (PATANASE) 0.6 % solution nasal solution SPRAY TWO SPRAYS IN EACH NOSTRIL TWICE DAILY   • Potassium 99 MG tablet Take  by mouth.   • promethazine-dextromethorphan (PROMETHAZINE-DM) 6.25-15 MG/5ML syrup Take 5 mL by mouth 4 (Four) Times a Day As Needed for Cough.   • RESTASIS 0.05 % ophthalmic emulsion    • rizatriptan MLT (MAXALT-MLT) 10 MG disintegrating tablet DISSOLVE ONE TABLET BY MOUTH AS NEEDED FOR MIGRAINE; MAY REPEAT ONE TABLET IN 2 HOURS IF NEEDED.   • SALINE NASAL SPRAY NA into each nostril.   • selenium sulfide (SELSUN) 2.5 % lotion APPLY TO AFFECTED AREA(S) AS DIRECTED DAILY AS NEEDED FOR DANDRUFF   • sildenafil (VIAGRA) 100 MG tablet TAKE 1 TABLET BY MOUTH AS NEEDED FOR ERECTILE DYSFUNCTION   • Testosterone Cypionate (DEPOTESTOTERONE CYPIONATE) 200 MG/ML injection Inject  into the appropriate muscle as directed by prescriber Every 14 (Fourteen) Days.   • traMADol (ULTRAM) 50 MG tablet Take  1 tablet by mouth 3 (Three) Times a Day As Needed for Moderate Pain.   • Unable to find 1 each 1 (One) Time. [employer] allergy relief, q AM   • zolpidem CR (Ambien CR) 12.5 MG CR tablet Take 1 tablet by mouth At Night As Needed for Sleep.     No current facility-administered medications on file prior to visit.     Current Medications:  Scheduled Meds:  Continuous Infusions:No current facility-administered medications for this visit.    PRN Meds:.    I have reviewed the patient's medical history in detail and updated the computerized patient record.  Review and summarization of old records include:    Past Medical History:   Diagnosis Date   • Anemia    • Arthritis     OSTEO   • Asthma    • COVID-19 04/2021   • ED (erectile dysfunction)    • Elevated cholesterol    • Environmental allergies    • Erectile dysfunction    • Fracture, fibula     RIGHT AND ANKLE   • Gastroesophageal reflux disease without esophagitis 07/11/2016   • Low back pain    • Migraine with aura and without status migrainosus, not intractable 07/11/2016   • Pain and swelling of toe of right foot    • Peripheral neuropathy    • Seasonal allergies    • Sleep apnea     CPAP   • Testosterone deficiency 07/11/2016        Past Surgical History:   Procedure Laterality Date   • ANKLE ARTHROSCOPY Right 01/26/2018    Procedure: RT ANKLE ARTHROSCOPY SUBCHONDROPLASTY TALUS ;  Surgeon: Lebron Spivey Jr., MD;  [place] [facility] [place] [facility] [place];  Service:    • ANKLE LIGAMENT RECONSTRUCTION Right 01/26/2018    Procedure: HARDWARE REMOVAL POSS LATERAL LIGAMENT RECONSTRUCTION ;  Surgeon: Lebron Spivey Jr., MD;  [place] [facility] [place];  Service:    • CARPAL TUNNEL RELEASE Right    • CUBITAL TUNNEL RELEASE Right     NERVE RELEASE   • EAR BIOPSY Right     MYRINGOTOMY W/BONES REPLACED INNER EAR   • EPIDURAL BLOCK     • FRACTURE SURGERY      Dr. Lebron Spivey   • KNEE SURGERY Right     MENISCUS REPAIR   • NOSE SURGERY     • ORIF FIBULA FRACTURE Right 05/17/2017    ANKLE INCLUDED   •  SINUS SURGERY      Dr. Vilchis   • TONSILLECTOMY      As a kid        Social History     Occupational History   • Not on file   Tobacco Use   • Smoking status: Former     Packs/day: 2.00     Years: 20.00     Pack years: 40.00     Types: Cigarettes     Start date: 1987     Quit date: 2002     Years since quittin.7   • Smokeless tobacco: Never   • Tobacco comments:     I have been smoke free for 19 years as of 19.   Substance and Sexual Activity   • Alcohol use: Yes     Alcohol/week: 2.0 standard drinks     Types: 2 Shots of liquor per week     Comment: Socially   • Drug use: No   • Sexual activity: Yes     Partners: Female     Birth control/protection: None      Social History     Social History Narrative   • Not on file        Family History   Problem Relation Age of Onset   • Lung cancer Mother    • Cancer Mother          of lung, breast, and brain cancer.   • Lung cancer Father    • Cancer Father          of lung cancer.   • Malig Hyperthermia Neg Hx        ROS: 14 point review of systems was performed and was negative except for documented findings in HPI and today's encounter.     Allergies: No Known Allergies  Constitutional:  Denies fever, shaking or chills   Eyes:  Denies change in visual acuity   HENT:  Denies nasal congestion or sore throat   Respiratory:  Denies cough or shortness of breath   Cardiovascular:  Denies chest pain or severe LE edema   GI:  Denies abdominal pain, nausea, vomiting, bloody stools or diarrhea   Musculoskeletal:  Numbness, tingling, or loss of motor function only as noted above in history of present illness.  : Denies painful urination or hematuria  Integument:  Denies rash, lesion or ulceration   Neurologic:  Denies headache or focal weakness  Endocrine:  Denies lymphadenopathy  Psych:  Denies confusion or change in mental status   Hem:  Denies active bleeding      Physical Exam: 48 y.o. male  Wt Readings from Last 3 Encounters:   23 113 kg (250  lb)   02/15/23 112 kg (246 lb 6.4 oz)   01/16/23 115 kg (252 lb 8 oz)       Body mass index is 32.98 kg/m².  Facility age limit for growth percentiles is 20 years.  Vitals:    03/13/23 1533   Temp: 97.3 °F (36.3 °C)     Vital signs reviewed.   General Appearance:    Alert, cooperative, in no acute distress                  Eyes: conjunctiva clear  ENT: external ears and nose atraumatic  CV: no peripheral edema  Resp: normal respiratory effort  Skin: no rashes or wounds; normal turgor  Psych: mood and affect appropriate  Lymph: no nodes appreciated  Neuro: gross sensation intact  Vascular:  Palpable peripheral pulse in noted extremity  Musculoskeletal Extremities: KNEE Exam: medial and lateral joint line tenderness with crepitation, synovitis, swelling, and joint effusion bilateral knee.      Diagnostic Data:  Imaging done today and discussed with the patient:    Indication: pain related symptoms,  Views: 3V AP, LAT & 40 degree PA bilateral knee(s)   Findings: advanced arthritis  Comparison views: viewed last xray done in the office.      Procedure:  Large Joint Arthrocentesis: R knee  Date/Time: 3/13/2023 3:23 PM  Consent given by: patient  Site marked: site marked  Timeout: Immediately prior to procedure a time out was called to verify the correct patient, procedure, equipment, support staff and site/side marked as required   Supporting Documentation  Indications: pain, joint swelling and diagnostic evaluation   Procedure Details  [place] knee - R knee  Preparation: Patient was prepped and draped in the usual sterile fashion  Needle gauge: 21G.  Medications administered: 80 mg methylPREDNISolone acetate 80 MG/ML; 2 mL lidocaine PF 1% 1 %  Patient tolerance: patient tolerated the procedure well with no immediate complications    Large Joint Arthrocentesis: L knee  Date/Time: 3/13/2023 3:23 PM  Consent given by: patient  Site marked: site marked  Timeout: Immediately prior to procedure a time out was called to  verify the correct patient, procedure, equipment, support staff and site/side marked as required   Supporting Documentation  Indications: pain   Procedure Details  [place] knee - L knee  Preparation: Patient was prepped and draped in the usual sterile fashion  Needle gauge: 21G.  Medications administered: 80 mg methylPREDNISolone acetate 80 MG/ML; 2 mL lidocaine PF 1% 1 %  Patient tolerance: patient tolerated the procedure well with no immediate complications          Assessment:     ICD-10-CM ICD-9-CM   1. Pain  R52 780.96   2. Primary osteoarthritis of both knees  M17.0 715.16       Plan:   Follow up as indicated.  Ice, elevate, and rest as needed.  The diagnosis(es), natural history, pathophysiology and treatment for diagnosis(es) were discussed. Opportunity given and questions answered.  Biomechanics of pertinent body areas discussed.  When appropriate, the use of ambulatory aids discussed.  BMI:  The concept of BMI body mass index and its importance and implications discussed.    EXERCISES:  Advice on benefits of, and types of regular/moderate exercise pertaining to orthopedic diagnosis(es).  MEDICATIONS:  The risks, benefits, warnings,side effects and alternatives of medications discussed.  Inflammation/pain control; with cold, heat, elevation and/or liniments discussed as appropriate  Cortisone Injection. See procedure note.  HOME EXERCISE/PT program encouraged  MEDICAL RECORDS reviewed from other provider(s) for past and current medical history pertinent to this complaint.    3/13/2023

## 2023-04-10 ENCOUNTER — TELEPHONE (OUTPATIENT)
Dept: NEUROSURGERY | Facility: CLINIC | Age: 48
End: 2023-04-10

## 2023-04-10 NOTE — TELEPHONE ENCOUNTER
Caller: Stephanie Munson    Relationship to patient: Emergency Contact    Best call back number: [phone]    Patient is needing: STEPHANIE CALLED, STATES PATIENT WOULD LIKE TO SCHEDULE HIS BACK SX. PLEASE CALL PATIENT OR STEPHANIE TO SCHEDULE.    THANK YOU

## 2023-04-11 ENCOUNTER — OFFICE VISIT (OUTPATIENT)
Dept: NEUROSURGERY | Facility: CLINIC | Age: 48
End: 2023-04-11
Payer: COMMERCIAL

## 2023-04-11 VITALS
WEIGHT: 250 LBS | SYSTOLIC BLOOD PRESSURE: 112 MMHG | DIASTOLIC BLOOD PRESSURE: 70 MMHG | BODY MASS INDEX: 33.13 KG/M2 | HEART RATE: 78 BPM | OXYGEN SATURATION: 95 % | HEIGHT: 73 IN

## 2023-04-11 DIAGNOSIS — M54.41 CHRONIC BILATERAL LOW BACK PAIN WITH RIGHT-SIDED SCIATICA: ICD-10-CM

## 2023-04-11 DIAGNOSIS — M51.16 LUMBAR DISC HERNIATION WITH RADICULOPATHY: Primary | ICD-10-CM

## 2023-04-11 DIAGNOSIS — G89.29 CHRONIC BILATERAL LOW BACK PAIN WITH RIGHT-SIDED SCIATICA: ICD-10-CM

## 2023-04-11 DIAGNOSIS — M17.0 PRIMARY OSTEOARTHRITIS OF BOTH KNEES: ICD-10-CM

## 2023-04-11 PROCEDURE — 99213 OFFICE O/P EST LOW 20 MIN: CPT | Performed by: NEUROLOGICAL SURGERY

## 2023-04-11 NOTE — PROGRESS NOTES
"Subjective   History of Present Illness: Arelis Munson is a 48 y.o. male is here today for follow-up to discuss surgery. Mr. Munson was last seen on 08-30-22 with complaints of back pain that radiates into the right hip and leg. He had 3 L-ESIs at [facility] pain management center last summer the first injection and last injection gave him results but he was having persistent low back pain and was referred to [facility] pain management since he did not feel his symptoms warranted surgery at the time.  He contacted my office to discuss surgery option with persistent low back and right lower extremity pain although since I last saw him he started to get left-sided back pain as well.  He is struggling with both a potential right knee replacement in addition to the accelerated low back problem.    While in the room and during my examination of the patient I wore a mask.  I washed my hands before and after this patient encounter.  The patient was also wearing a mask.    History of Present Illness    Tobacco Use: Medium Risk   • Smoking Tobacco Use: Former   • Smokeless Tobacco Use: Never   • Passive Exposure: Not on file        The following portions of the patient's history were reviewed and updated as appropriate: allergies, current medications, past family history, past medical history, past social history, past surgical history and problem list.    Review of Systems    Objective     Vitals:    04/11/23 1538   BP: 112/70   Pulse: 78   SpO2: 95%   Weight: 113 kg (250 lb)   Height: 185.4 cm (72.99\")     Body mass index is 32.99 kg/m².      Physical Exam  Neurologic Exam    Physical Exam:    CONSTITUTIONAL:  appears well developed, well-nourished and in no acute distress.    NECK: the neck is supple and symmetric. The trachea is midline with no masses.      PULMONARY: Respiratory effort is normal with no increased work of breathing or signs of respiratory distress.    CARDIOVASCULAR: Pedal " pulses are +2/4 bilaterally. Examination of the extremities shows no edema or varicosities.    MUSCULOSKELETAL: Gait lumbago style gait is somewhat wide-based and he does guard the right leg    SKIN: The skin is warm, dry and intact.      NEUROLOGIC:   Cranial Nerves 2-12 intact  Normal motor strength noted. Muscle bulk and tone are normal.  Sensory exam is normal to fine touch to confrontational testing bilaterally  Reflexes on the right side demonstrates 2/4 Knee Jerk Reflex, 2/4 Ankle Jerk Reflex and no ankle clonus on the right.   Reflexes on the left side demonstrates 2/4 Knee Jerk Reflex, 2/4 Ankle Jerk Reflex and no ankle clonus on the left.  Superficial/Primitive Reflexes: primitive reflexes were absent.  Hill's, Babinski, and Clonus signs all negative.  No coordination deficit observed.  Radicular testing showed a negative Jose Manuel (KATELYN) test but he does get pain in the right knee with Jose Manuel's test and equivocal straight leg raise with tightness in the medial aspect of the right knee with straight leg raising  Cortical function is intact and without deficits. Speech is normal.    PSYCHIATRIC: oriented to person, place and time. Patient's mood and affect are normal.      Assessment & Plan   Independent Review of Radiographic Studies:      I personally reviewed the images from the following studies.    MRI of the lumbar spine done on May 3, 2022 to [facility] reveals a transitional lumbosacral anatomy with a partially lumbarized S1.  At L3-4, there is a broad-based disc bulge slightly to the right causing significant right neuroforaminal narrowing and there is also a far right posterolateral disc protrusion is present extending into and lateral to the right neural foramen and contacting and deflects the exiting right L3 nerve within and lateral to the right neural foramen.  At L4-5, there is a slight disc bulge to the right and only mild right foraminal narrowing.  At L5-S1 there are mild  degenerative and arthritic changes with only mild bilateral foraminal narrowing.    Medical Decision Making:      In the interim he has not developed loss of L3 nerve root function but it would be difficult to ascertain L3 nerve root function since there is no reflex associated with the L3 nerve root and its primarily proximal motor strength which he does have some difficulty with the right leg but certainly no weakness.  He does not have any numbness in the L3 dermatome either but he clearly does have irritation with activity into the right leg that would be consistent with L3.  Most importantly is he starting to get left-sided symptoms that would not be explained by a far lateral disc herniation on the right at L3-4.    Wrist and foremost since his MRI is 11 months old and his symptoms have changed I need to repeat the MRI of the lumbar spine to determine if he still a candidate to consider a right far lateral discectomy at L3-4 or whether there is some other pathology that might need to be addressed.  We discussed again that we do not generally offer surgery for the purpose of relief of back pain but we reserve surgery for radicular symptoms or radiculopathy with loss of nerve function.  I will see him back upon position of the MRI and we will discuss options at that time.    He was hoping if surgery is contemplated it could be coordinated with a knee replacement on the right so that he would miss less work.    Return for review of completed images.    Diagnoses and all orders for this visit:    1. Lumbar disc herniation with radiculopathy (Primary)  -     MRI Lumbar Spine Without Contrast; Future    2. Chronic bilateral low back pain with right-sided sciatica  -     MRI Lumbar Spine Without Contrast; Future    3. Primary osteoarthritis of both knees             Jovon Doran MD FACS FAANS  Neurological Surgery

## 2023-04-12 ENCOUNTER — OFFICE VISIT (OUTPATIENT)
Dept: PAIN MEDICINE | Facility: CLINIC | Age: 48
End: 2023-04-12
Payer: COMMERCIAL

## 2023-04-12 VITALS
BODY MASS INDEX: 33.24 KG/M2 | DIASTOLIC BLOOD PRESSURE: 74 MMHG | HEIGHT: 73 IN | TEMPERATURE: 98 F | HEART RATE: 73 BPM | SYSTOLIC BLOOD PRESSURE: 116 MMHG | WEIGHT: 250.8 LBS | OXYGEN SATURATION: 96 %

## 2023-04-12 DIAGNOSIS — G89.29 OTHER CHRONIC PAIN: ICD-10-CM

## 2023-04-12 DIAGNOSIS — M54.41 CHRONIC RIGHT-SIDED LOW BACK PAIN WITH RIGHT-SIDED SCIATICA: ICD-10-CM

## 2023-04-12 DIAGNOSIS — M53.3 SACROILIAC JOINT DYSFUNCTION OF LEFT SIDE: ICD-10-CM

## 2023-04-12 DIAGNOSIS — Z79.899 ENCOUNTER FOR LONG-TERM (CURRENT) USE OF HIGH-RISK MEDICATION: ICD-10-CM

## 2023-04-12 DIAGNOSIS — M51.16 LUMBAR DISC HERNIATION WITH RADICULOPATHY: Primary | ICD-10-CM

## 2023-04-12 DIAGNOSIS — G89.29 CHRONIC RIGHT-SIDED LOW BACK PAIN WITH RIGHT-SIDED SCIATICA: ICD-10-CM

## 2023-04-12 PROCEDURE — 99214 OFFICE O/P EST MOD 30 MIN: CPT | Performed by: NURSE PRACTITIONER

## 2023-04-12 RX ORDER — TRAMADOL HYDROCHLORIDE 50 MG/1
50 TABLET ORAL 3 TIMES DAILY PRN
Qty: 90 TABLET | Refills: 1 | Status: SHIPPED | OUTPATIENT
Start: 2023-04-12

## 2023-04-12 RX ORDER — GABAPENTIN 800 MG/1
800 TABLET ORAL 3 TIMES DAILY
Qty: 90 TABLET | Refills: 0 | Status: SHIPPED | OUTPATIENT
Start: 2023-04-12

## 2023-04-12 NOTE — PROGRESS NOTES
CHIEF COMPLAINT    Follow up back pain -- the pain is worsened since last visit. He is starting to have issues on the left side of back. MRI ordered yesterday.    Subjective   Arelis Munson is a 48 y.o. male  who presents for follow-up.  He has a history of back pain. Today his pain is 8/10VAS in severity. He continues to have right low back pain radiating into the right lower extremity. He also now has new left sided low back pain that is not radiating into the left lower extremity.  He continues with Gabapentin 600 mg 3/day, Tramadol 50 mg 3/day. He states that his medication regimen is not as helpful as previously since his left low back pain started. He denies any side effects including somnolence or constipation. ADLs by self.     Previously he was offered a minimally invasive right L3-L4 lateral discectomy with Dr. Doran.  Mr. Munson postponed this and was reevaluated by Dr. Doran yesterday. He is now pending repeat MRI.    He continues to work full time as a .     He is on etodolac (managed by PCP).      Procedure list (performed at [facility]):  8/22/2022-LESI at L3-4--75-80% relief x a few weeks  7/22/2022-LESI at L3-4--No relief  6/21/2022-LESI at L3-4--95% relief for 1 month     Additional steroid containing procedures in last 12 months (with ortho):  3/13/2023-bilateral knee injections (160 mg of Depo-Medrol total)  12/14/2022-bilateral knee injections (160 mg of Depo-Medrol total)  9/8/2022-bilateral knee injections with orthopedics (160 mg of Depo-Medrol total)  6/1/2022-bilateral knee injections with orthopedics (160 mg of Depo-Medrol total)  4/18/2022-right GT bursa injection (80mg Depo-Medrol)    Back Pain  This is a chronic problem. The problem occurs intermittently. The problem has been gradually worsening since onset. The pain is present in the lumbar spine. The quality of the pain is described as aching, burning and stabbing. The pain radiates to the right thigh (stopping at the  knee). The pain is at a severity of 6/10. Exacerbated by: nothing in particular, small movements with increase his pain. Associated symptoms include headaches (migraines) and weakness (back and right leg, right knee). Pertinent negatives include no abdominal pain, chest pain, dysuria, fever or numbness. He has tried NSAIDs and heat (PT, massage chair, Gabapentin, and Tramadol) for the symptoms.      PEG Assessment   What number best describes your pain on average in the past week?10  What number best describes how, during the past week, pain has interfered with your enjoyment of life?7  What number best describes how, during the past week, pain has interfered with your general activity?  7    Review of Pertinent Medical Data ---  Office visit from 4/11/2023 with Dr. Doran reviewed.  Patient seen in follow-up to discuss surgery.  He was last evaluated on 8/30/2022 with complaints of back pain radiating into the right hip and leg.  He completed 3 lumbar epidural steroid injections at [facility] pain management center.  He was then referred to [facility] pain management since he did not feel his symptoms warranted surgery at that time.  He does not have numbness in the L3 dermatome but he does have irritation with activity into the right leg that would be consistent with L3.  He is also starting to get left-sided symptoms which would not be explained by a far lateral disc herniation on the right at L3/4.  His MRI is 11 months old and his symptoms have changed recommend repeat MRI of the lumbar spine.  Follow-up after MRI to discuss options.    The following portions of the patient's history were reviewed and updated as appropriate: allergies, current medications, past family history, past medical history, past social history, past surgical history and problem list.    Review of Systems   Constitutional: Negative for chills, fatigue and fever.   Respiratory: Negative for cough and shortness of  "breath.    Cardiovascular: Negative for chest pain.   Gastrointestinal: Negative for abdominal pain, constipation and diarrhea.   Genitourinary: Negative for difficulty urinating and dysuria.   Musculoskeletal: Positive for back pain.   Neurological: Positive for weakness (back and right leg, right knee) and headaches (migraines). Negative for dizziness and numbness.   Psychiatric/Behavioral: Positive for sleep disturbance.     --  The aforementioned information the Chief Complaint section and above subjective data including any HPI data, and also the Review of Systems data, has been personally reviewed and affirmed.  --    Vitals:    04/12/23 1539   BP: 116/74   BP [place] Left arm   Patient Position: Sitting   Pulse: 73   Temp: 98 °F (36.7 °C)   TempSrc: Temporal   SpO2: 96%   Weight: 114 kg (250 lb 12.8 oz)   Height: 185.4 cm (72.99\")   PainSc:   8   PainLoc: Back     Objective   Physical Exam  Vitals and nursing note reviewed.   Constitutional:       Appearance: Normal appearance. He is well-developed.   Eyes:      General: Lids are normal.   Cardiovascular:      Rate and Rhythm: Normal rate.   Pulmonary:      Effort: Pulmonary effort is normal.   Musculoskeletal:      Lumbar back: Tenderness and bony tenderness present. Decreased range of motion. Positive right straight leg raise test. Negative left straight leg raise test.      Right knee: Decreased range of motion. Tenderness present.      Comments:   +Waqas Sujatha  +Left SI Compression, -Right  +Left Gaenslen's, -Right  -Waqas Thigh Thrust   Neurological:      Mental Status: He is alert and oriented to person, place, and time.      Gait: Gait abnormal.   Psychiatric:         Attention and Perception: Attention normal.         Mood and Affect: Mood normal.         Speech: Speech normal.         Behavior: Behavior normal.         Judgment: Judgment normal.       Assessment & Plan   Diagnoses and all orders for this visit:    1. Lumbar disc herniation with " radiculopathy (Primary)    2. Chronic right-sided low back pain with right-sided sciatica    3. Encounter for long-term (current) use of high-risk medication    4. Other chronic pain    5. Sacroiliac joint dysfunction of left side      --- I do think that he has inflammation of the left SI joint due to the change in his gait from his right knee pain. He may benefit from Left SI joint injections. Will wait until his lumbar MRI is updated and he has followed up with Dr. Doran.  --- Our ability to do a SI injection may be precluded by the number of steroid exposures he has had within the last 12 months. Discussed maximum recommend steroid exposures in 12 month time frame is 4-6. I will need to discuss with our physicians regarding if we can move forward with any steroid containing procedures as he has exceeded this.   --- The urine drug screen confirmation from 2/15/2023 has been reviewed and the result is appropriate based on patient history and MARY BETH report  --- CSA updated 9/14/2023  --- Refill Tramadol 50 mg. DNF 4/15/2023 applied. Patient appears stable with current regimen. No adverse effects. Regarding continuation of opioids, there is no evidence of aberrant behavior or any red flags.  The patient continues with appropriate response to opioid therapy. ADL's remain intact by self.   --- Increase Gabapentin to 800 mg TID  --- Trial Compounded Pain Cream  --- Follow-up 1 month or sooner if needed.      MARY BETH REPORT  As part of the patient's treatment plan, I am prescribing controlled substances. The patient has been made aware of appropriate use of such medications, including potential risk of somnolence, limited ability to drive and/or work safely, and the potential for dependence or overdose. It has also been made clear that these medications are for use by this patient only, without concomitant use of alcohol or other substances unless prescribed.     Patient has completed prescribing agreement detailing  terms of continued prescribing of controlled substances, including monitoring MARY BETH reports, urine drug screening, and pill counts if necessary. The patient is aware that inappropriate use will results in cessation of prescribing such medications.    As the clinician, I personally reviewed the MARY BETH from 4/12/2023 while the patient was in the office today.    History and physical exam exhibit continued safe and appropriate use of controlled substances.    Dictated utilizing Dragon dictation.

## 2023-04-17 ENCOUNTER — TELEPHONE (OUTPATIENT)
Dept: PAIN MEDICINE | Facility: CLINIC | Age: 48
End: 2023-04-17
Payer: COMMERCIAL

## 2023-04-17 NOTE — TELEPHONE ENCOUNTER
Please let Mr. Munson know that after discussion with Dr. Robison he is not a candidate for any steroid containing procedures from our office as he has had too many steroid exposures in the last 12 months. Continue to work with Dr. Doran at this time and I will see him back at his scheduled appointment in May.

## 2023-04-26 ENCOUNTER — ON CAMPUS - OUTPATIENT (OUTPATIENT)
Dept: URBAN - METROPOLITAN AREA HOSPITAL 114 | Facility: HOSPITAL | Age: 48
End: 2023-04-26
Payer: COMMERCIAL

## 2023-04-26 ENCOUNTER — ANESTHESIA (OUTPATIENT)
Dept: GASTROENTEROLOGY | Facility: HOSPITAL | Age: 48
End: 2023-04-26
Payer: COMMERCIAL

## 2023-04-26 ENCOUNTER — ANESTHESIA EVENT (OUTPATIENT)
Dept: GASTROENTEROLOGY | Facility: HOSPITAL | Age: 48
End: 2023-04-26
Payer: COMMERCIAL

## 2023-04-26 ENCOUNTER — HOSPITAL ENCOUNTER (OUTPATIENT)
Facility: HOSPITAL | Age: 48
Setting detail: HOSPITAL OUTPATIENT SURGERY
Discharge: HOME | End: 2023-04-26
Attending: INTERNAL MEDICINE | Admitting: INTERNAL MEDICINE
Payer: COMMERCIAL

## 2023-04-26 VITALS
OXYGEN SATURATION: 97 % | DIASTOLIC BLOOD PRESSURE: 78 MMHG | SYSTOLIC BLOOD PRESSURE: 123 MMHG | BODY MASS INDEX: 32.07 KG/M2 | RESPIRATION RATE: 20 BRPM | HEIGHT: 73 IN | WEIGHT: 242 LBS | HEART RATE: 57 BPM

## 2023-04-26 DIAGNOSIS — Z12.11 ENCOUNTER FOR SCREENING FOR MALIGNANT NEOPLASM OF COLON: ICD-10-CM

## 2023-04-26 DIAGNOSIS — K64.0 FIRST DEGREE HEMORRHOIDS: ICD-10-CM

## 2023-04-26 DIAGNOSIS — K57.30 DIVERTICULOSIS OF LARGE INTESTINE WITHOUT PERFORATION OR ABS: ICD-10-CM

## 2023-04-26 PROCEDURE — 45378 DIAGNOSTIC COLONOSCOPY: CPT | Mod: 33 | Performed by: INTERNAL MEDICINE

## 2023-04-26 PROCEDURE — 25010000002 PROPOFOL 10 MG/ML EMULSION: Performed by: ANESTHESIOLOGY

## 2023-04-26 RX ORDER — PROMETHAZINE HYDROCHLORIDE 25 MG/1
25 SUPPOSITORY RECTAL ONCE AS NEEDED
Status: DISCONTINUED | OUTPATIENT
Start: 2023-04-26 | End: 2023-04-26 | Stop reason: HOSPADM

## 2023-04-26 RX ORDER — LIDOCAINE HYDROCHLORIDE 20 MG/ML
INJECTION, SOLUTION INFILTRATION; PERINEURAL AS NEEDED
Status: DISCONTINUED | OUTPATIENT
Start: 2023-04-26 | End: 2023-04-26 | Stop reason: SURG

## 2023-04-26 RX ORDER — SODIUM CHLORIDE 0.9 % (FLUSH) 0.9 %
10 SYRINGE (ML) INJECTION AS NEEDED
Status: DISCONTINUED | OUTPATIENT
Start: 2023-04-26 | End: 2023-04-26 | Stop reason: HOSPADM

## 2023-04-26 RX ORDER — PROPOFOL 10 MG/ML
VIAL (ML) INTRAVENOUS CONTINUOUS PRN
Status: DISCONTINUED | OUTPATIENT
Start: 2023-04-26 | End: 2023-04-26 | Stop reason: SURG

## 2023-04-26 RX ORDER — PROMETHAZINE HYDROCHLORIDE 25 MG/1
25 TABLET ORAL ONCE AS NEEDED
Status: DISCONTINUED | OUTPATIENT
Start: 2023-04-26 | End: 2023-04-26 | Stop reason: HOSPADM

## 2023-04-26 RX ORDER — SODIUM CHLORIDE, SODIUM LACTATE, POTASSIUM CHLORIDE, CALCIUM CHLORIDE 600; 310; 30; 20 MG/100ML; MG/100ML; MG/100ML; MG/100ML
1000 INJECTION, SOLUTION INTRAVENOUS CONTINUOUS
Status: DISCONTINUED | OUTPATIENT
Start: 2023-04-26 | End: 2023-04-26 | Stop reason: HOSPADM

## 2023-04-26 RX ADMIN — SODIUM CHLORIDE, POTASSIUM CHLORIDE, SODIUM LACTATE AND CALCIUM CHLORIDE 1000 ML: 600; 310; 30; 20 INJECTION, SOLUTION INTRAVENOUS at 09:44

## 2023-04-26 RX ADMIN — LIDOCAINE HYDROCHLORIDE 50 MG: 20 INJECTION, SOLUTION INFILTRATION; PERINEURAL at 10:10

## 2023-04-26 RX ADMIN — PROPOFOL 200 MCG/KG/MIN: 10 INJECTION, EMULSION INTRAVENOUS at 10:11

## 2023-04-26 NOTE — ANESTHESIA POSTPROCEDURE EVALUATION
Patient: Arelis Munson    Procedure Summary     Date: 04/26/23 Room / [place]  [facility] /  [facility]    Anesthesia Start: 1005 Anesthesia Stop: 1035    Procedure: COLONOSCOPY to cecum and TI: Diagnosis:     Surgeons: Je Eddy MD Provider: Bertrand Cortés MD    Anesthesia Type: MAC ASA Status: 3          Anesthesia Type: MAC    Vitals  Vitals Value Taken Time   /78 04/26/23 1051   Temp     Pulse 57 04/26/23 1051   Resp 20 04/26/23 1051   SpO2 97 % 04/26/23 1051           Post Anesthesia Care and Evaluation    Patient location during evaluation: bedside  Pain management: adequate    Airway patency: patent  Anesthetic complications: No anesthetic complications    Cardiovascular status: acceptable  Respiratory status: acceptable  Hydration status: acceptable

## 2023-04-26 NOTE — H&P
[facility]   HISTORY AND PHYSICAL    Patient Name: Arelis Munson  : 1975  MRN: [id number]  Primary Care Physician:  Nessa Gaston APRN  Date of admission: 2023    Subjective   Subjective     Chief Complaint: screening     History of Present Illness  Patient feeling fine  No bowel issues.   Review of Systems     Personal History     Past Medical History:   Diagnosis Date   • Anemia    • Arthritis     OSTEO   • Asthma    • Chronic pain disorder 2021   • COVID-19 2021   • ED (erectile dysfunction)    • Elevated cholesterol    • Environmental allergies    • Erectile dysfunction    • Fracture, fibula     RIGHT AND ANKLE   • Gastroesophageal reflux disease without esophagitis 2016   • Joint pain 2002   • Low back pain    • Migraine with aura and without status migrainosus, not intractable 2016   • Pain and swelling of toe of right foot    • Peripheral neuropathy    • Seasonal allergies    • Sleep apnea     CPAP   • Testosterone deficiency 2016       Past Surgical History:   Procedure Laterality Date   • ANKLE ARTHROSCOPY Right 2018    Procedure: RT ANKLE ARTHROSCOPY SUBCHONDROPLASTY TALUS ;  Surgeon: Lebron Spivey Jr., MD;  [place] [facility] OR [place]:    • ANKLE LIGAMENT RECONSTRUCTION Right 2018    Procedure: HARDWARE REMOVAL POSS LATERAL LIGAMENT RECONSTRUCTION ;  Surgeon: Lebron Spivey Jr., MD;  [place] [facility] OR [place]:    • CARPAL TUNNEL RELEASE Right    • CUBITAL TUNNEL RELEASE Right     NERVE RELEASE   • EAR BIOPSY Right     MYRINGOTOMY W/BONES REPLACED INNER EAR   • EPIDURAL BLOCK     • FRACTURE SURGERY      Dr. Lebron Spivey   • KNEE SURGERY Right     MENISCUS REPAIR   • NOSE SURGERY     • ORIF FIBULA FRACTURE Right 2017    ANKLE INCLUDED   • ORTHOPEDIC SURGERY      Dr. Tyrell Eddy   • SINUS SURGERY      Dr. Vilchis   • TONSILLECTOMY      As a kid       Family History: family history includes Cancer in his father and mother;  Colon cancer in his maternal grandfather; Lung cancer in his father and mother. Otherwise pertinent FHx was reviewed and not pertinent to current issue.    Social History:  reports that he quit smoking about 20 years ago. His smoking use included cigarettes. He started smoking about 35 years ago. He has a 40.00 pack-year smoking history. He has never used smokeless tobacco. He reports current alcohol use of about 2.0 standard drinks per week. He reports that he does not use drugs.    Home Medications:  Ascorbic Acid, B Complex Vitamins, Diclofenac Sodium, Fiber, Potassium, Saline, Syringe/Needle (Disp), Testosterone Cypionate, Unable to find, albuterol sulfate HFA, aspirin, azelastine, benzonatate, budesonide, desonide, diphenhydrAMINE HCl, docusate sodium, esomeprazole, etodolac, ezetimibe-simvastatin, fluticasone, gabapentin, ketoconazole, melatonin, montelukast, multivitamin, olopatadine, promethazine-dextromethorphan, rizatriptan MLT, selenium sulfide, sildenafil, traMADol, vitamin D3, and zolpidem CR    Allergies:  No Known Allergies    Objective    Objective     Vitals:   Heart Rate:  [70] 70  Resp:  [16] 16  BP: (133)/(82) 133/82    Physical Exam    Result Review    Result Review:  I have personally reviewed the results from the time of this admission to 4/26/2023 10:08 EDT and agree with these findings:  []  Laboratory list / accordion  []  Microbiology  []  Radiology  []  EKG/Telemetry   []  Cardiology/Vascular   []  Pathology  []  Old records  []  Other:  Most notable findings include:      Assessment & Plan   Assessment / Plan     Brief Patient Summary:  Arelis Munson is a 48 y.o. male who  Screening for colon cancer     Active Hospital Problems:  There are no active hospital problems to display for this patient.    Plan: Colonoscopy risks, alternatives and benefits discussed with patient and the patient is agreeable to having procedure done.      DVT prophylaxis:  No DVT prophylaxis order currently  exists.    CODE STATUS:       Admission Status:  I believe this patient meets outpatient  status.    Je Eddy MD

## 2023-04-26 NOTE — ANESTHESIA PREPROCEDURE EVALUATION
Anesthesia Evaluation     NPO Solid Status: > 8 hours             Airway   Mallampati: III  TM distance: >3 FB  Neck ROM: full  Possible difficult intubation and Small opening  Dental - normal exam     Pulmonary - normal exam   (+) asthma,sleep apnea,   Cardiovascular - normal exam    (+) hyperlipidemia,       Neuro/Psych  GI/Hepatic/Renal/Endo    (+)  GERD,      Musculoskeletal     Abdominal    Substance History      OB/GYN          Other                        Anesthesia Plan    ASA 3     MAC       Anesthetic plan, risks, benefits, and alternatives have been provided, discussed and informed consent has been obtained with: patient.        CODE STATUS:

## 2023-04-27 ENCOUNTER — HOSPITAL ENCOUNTER (OUTPATIENT)
Dept: MRI IMAGING | Facility: HOSPITAL | Age: 48
Discharge: HOME | End: 2023-04-27
Payer: COMMERCIAL

## 2023-04-27 DIAGNOSIS — M51.16 LUMBAR DISC HERNIATION WITH RADICULOPATHY: ICD-10-CM

## 2023-04-27 DIAGNOSIS — G89.29 CHRONIC BILATERAL LOW BACK PAIN WITH RIGHT-SIDED SCIATICA: ICD-10-CM

## 2023-04-27 DIAGNOSIS — M54.41 CHRONIC BILATERAL LOW BACK PAIN WITH RIGHT-SIDED SCIATICA: ICD-10-CM

## 2023-04-27 PROCEDURE — 72148 MRI LUMBAR SPINE W/O DYE: CPT

## 2023-05-04 ENCOUNTER — OFFICE VISIT (OUTPATIENT)
Dept: NEUROSURGERY | Facility: CLINIC | Age: 48
End: 2023-05-04
Payer: COMMERCIAL

## 2023-05-04 VITALS
SYSTOLIC BLOOD PRESSURE: 126 MMHG | BODY MASS INDEX: 32.07 KG/M2 | WEIGHT: 242 LBS | HEIGHT: 73 IN | DIASTOLIC BLOOD PRESSURE: 80 MMHG

## 2023-05-04 DIAGNOSIS — M54.41 CHRONIC BILATERAL LOW BACK PAIN WITH RIGHT-SIDED SCIATICA: ICD-10-CM

## 2023-05-04 DIAGNOSIS — M51.16 LUMBAR DISC HERNIATION WITH RADICULOPATHY: Primary | ICD-10-CM

## 2023-05-04 DIAGNOSIS — G89.29 CHRONIC BILATERAL LOW BACK PAIN WITH RIGHT-SIDED SCIATICA: ICD-10-CM

## 2023-05-04 PROCEDURE — 99214 OFFICE O/P EST MOD 30 MIN: CPT | Performed by: NEUROLOGICAL SURGERY

## 2023-05-04 NOTE — PROGRESS NOTES
"Subjective   History of Present Illness: Arelis Munson is a 48 y.o. male is here today for follow-up with a new lumbar MRI that was ordered for low back pain that radiates into the right leg.     Today, Mr. Munson reports back pain that radiates into the right leg with some tingling and weakness.  He is following up today to go over the MRI that was ordered because he started having left sided back pain in addition to the right low back and right lower extremity pain.    Back Pain  The pain is present in the lumbar spine. The quality of the pain is described as shooting, stabbing, aching and burning. The pain radiates to the right thigh, right foot and right knee. The symptoms are aggravated by position. Associated symptoms include tingling and weakness.       Tobacco Use: Medium Risk   • Smoking Tobacco Use: Former   • Smokeless Tobacco Use: Never   • Passive Exposure: Not on file        The following portions of the patient's history were reviewed and updated as appropriate: allergies, current medications, past family history, past medical history, past social history, past surgical history and problem list.    Review of Systems   Musculoskeletal: Positive for back pain.   Neurological: Positive for tingling and weakness.       Objective     Vitals:    05/04/23 1334   BP: 126/80   Weight: 110 kg (242 lb)   Height: 185.4 cm (73\")     Body mass index is 31.93 kg/m².      Physical Exam  Neurologic Exam    Physical Exam:    CONSTITUTIONAL:  appears well developed, well-nourished and in no acute distress.    NECK: the neck is supple and symmetric. The trachea is midline with no masses.      PULMONARY: Respiratory effort is normal with no increased work of breathing or signs of respiratory distress.    CARDIOVASCULAR: Pedal pulses are +2/4 bilaterally. Examination of the extremities shows no edema or varicosities.    MUSCULOSKELETAL: Gait does guard the right leg secondary to his orthopedic knee problem as well as " his lumbago    SKIN: The skin is warm, dry and intact.      NEUROLOGIC:   Cranial Nerves 2-12 intact  Normal motor strength noted. Muscle bulk and tone are normal.  Sensory exam is normal to fine touch to confrontational testing bilaterally  Reflexes on the right side demonstrates 2/4 Knee Jerk Reflex, 2/4 Ankle Jerk Reflex and no ankle clonus on the right.   Reflexes on the left side demonstrates 2/4 Knee Jerk Reflex, 2/4 Ankle Jerk Reflex and no ankle clonus on the left.  Superficial/Primitive Reflexes: primitive reflexes were absent.  Hill's, Babinski, and Clonus signs all negative.  No coordination deficit observed.  Radicular testing showed a negative Jose Manuel (KATELYN) test but he does get pain in the right knee with Jose Manuel's test and equivocal straight leg raise with tightness in the medial aspect of the right knee with straight leg raising  Cortical function is intact and without deficits. Speech is normal.    PSYCHIATRIC: oriented to person, place and time. Patient's mood and affect are normal.    Assessment & Plan   Independent Review of Radiographic Studies:      I personally reviewed the images from the following studies.    MRI of the lumbar spine was done at [facility] on April 27, 2023 and continues to document the progression of the discogenic change at L3-4 with severe right neuroforaminal stenosis due to the disc protrusion and facet arthropathy on the right side much greater than the left.  At L4-5 more moderate changes are seen slightly more to the left than the right.    Medical Decision Making:      The MRI still documents the right neuroforaminal narrowing quite severely at L3-L4 and since his symptoms have not responded and in fact progressed over more than the last 6 months it is appropriate to consider a right L3-4 lumbar microscopic discectomy.  He may not gain much impact on his pattern of back pain but this should protect the nerve to the right leg where he is getting radiating  right leg pain.    A lumbar microscopic discectomy involves a small skin incision localized over the affected disc which is confirmed by X-ray. The natural space between lamina bones is widened with a drill and the nerve is gently retracted to expose the disc abnormality. The fragments of disc or bone spurs are removed to make more room around the nerve then the skin is closed with sutures    The patient understands that there are inherent risks to surgery including bleeding, infection, anesthetic risk, death, heart attack, stroke, damage to nerves, weakness, numbness, paralysis, failure to improve, need for further surgery, CSF leak, recurrence, persistent pain, and any other unforeseen complications. They comprehend and had opportunity to ask further questions.    Return for follow-up after surgery.    Diagnoses and all orders for this visit:    1. Lumbar disc herniation with radiculopathy (Primary)  -     Case Request; Standing  -     ceFAZolin (ANCEF) 2 g in sodium chloride 0.9 % 100 mL IVPB  -     Case Request    2. Chronic bilateral low back pain with right-sided sciatica    Other orders  -     Follow Anesthesia Guidelines / Protocol; Future  -     Obtain informed consent  -     Provide NPO Instructions to Patient; Future  -     Follow Anesthesia Guidelines / Protocol; Standing  -     Verify NPO Status; Standing  -     Obtain Informed Consent; Standing  -     SCD (sequential compression device)- to be placed on patient in Pre-op; Standing  -     Instructions on Coughing, Deep Breathing & Incentive Spirometry; Standing             Jovon Doran MD FACS FAANS  Neurological Surgery

## 2023-05-05 ENCOUNTER — TELEPHONE (OUTPATIENT)
Dept: PAIN MEDICINE | Facility: CLINIC | Age: 48
End: 2023-05-05
Payer: COMMERCIAL

## 2023-05-05 DIAGNOSIS — M51.16 LUMBAR DISC HERNIATION WITH RADICULOPATHY: ICD-10-CM

## 2023-05-05 DIAGNOSIS — M54.41 CHRONIC RIGHT-SIDED LOW BACK PAIN WITH RIGHT-SIDED SCIATICA: ICD-10-CM

## 2023-05-05 DIAGNOSIS — G89.29 CHRONIC RIGHT-SIDED LOW BACK PAIN WITH RIGHT-SIDED SCIATICA: ICD-10-CM

## 2023-05-05 RX ORDER — TRAMADOL HYDROCHLORIDE 50 MG/1
50 TABLET ORAL 3 TIMES DAILY PRN
Qty: 9 TABLET | Refills: 1 | Status: CANCELLED | OUTPATIENT
Start: 2023-05-05

## 2023-05-05 NOTE — TELEPHONE ENCOUNTER
He actually has a refill on his tramadol that he can fill when due. He doesn't need a temporary supply. Thanks!

## 2023-05-09 ENCOUNTER — PRE-ADMISSION TESTING (OUTPATIENT)
Dept: PREADMISSION TESTING | Facility: HOSPITAL | Age: 48
End: 2023-05-09
Payer: COMMERCIAL

## 2023-05-09 VITALS
BODY MASS INDEX: 32.97 KG/M2 | DIASTOLIC BLOOD PRESSURE: 94 MMHG | TEMPERATURE: 98.4 F | WEIGHT: 248.8 LBS | HEART RATE: 79 BPM | OXYGEN SATURATION: 97 % | SYSTOLIC BLOOD PRESSURE: 138 MMHG | HEIGHT: 73 IN | RESPIRATION RATE: 16 BRPM

## 2023-05-09 LAB
ANION GAP SERPL CALCULATED.3IONS-SCNC: 8.4 MMOL/L (ref 5–15)
BUN SERPL-MCNC: 9 MG/DL (ref 6–20)
BUN/CREAT SERPL: 8.4 (ref 7–25)
CALCIUM SPEC-SCNC: 9.5 MG/DL (ref 8.6–10.5)
CHLORIDE SERPL-SCNC: 105 MMOL/L (ref 98–107)
CO2 SERPL-SCNC: 28.6 MMOL/L (ref 22–29)
CREAT SERPL-MCNC: 1.07 MG/DL (ref 0.76–1.27)
DEPRECATED RDW RBC AUTO: 44.1 FL (ref 37–54)
EGFRCR SERPLBLD CKD-EPI 2021: 84.5 ML/MIN/1.73
ERYTHROCYTE [DISTWIDTH] IN BLOOD BY AUTOMATED COUNT: 12.9 % (ref 12.3–15.4)
GLUCOSE SERPL-MCNC: 82 MG/DL (ref 65–99)
HCT VFR BLD AUTO: 48.4 % (ref 37.5–51)
HGB BLD-MCNC: 16.6 G/DL (ref 13–17.7)
MCH RBC QN AUTO: 31.2 PG (ref 26.6–33)
MCHC RBC AUTO-ENTMCNC: 34.3 G/DL (ref 31.5–35.7)
MCV RBC AUTO: 91 FL (ref 79–97)
PLATELET # BLD AUTO: 294 10*3/MM3 (ref 140–450)
PMV BLD AUTO: 8.7 FL (ref 6–12)
POTASSIUM SERPL-SCNC: 4.1 MMOL/L (ref 3.5–5.2)
RBC # BLD AUTO: 5.32 10*6/MM3 (ref 4.14–5.8)
SODIUM SERPL-SCNC: 142 MMOL/L (ref 136–145)
WBC NRBC COR # BLD: 7.38 10*3/MM3 (ref 3.4–10.8)

## 2023-05-09 PROCEDURE — 80048 BASIC METABOLIC PNL TOTAL CA: CPT

## 2023-05-09 PROCEDURE — 93005 ELECTROCARDIOGRAM TRACING: CPT

## 2023-05-09 PROCEDURE — 36415 COLL VENOUS BLD VENIPUNCTURE: CPT

## 2023-05-09 PROCEDURE — 85027 COMPLETE CBC AUTOMATED: CPT

## 2023-05-09 NOTE — DISCHARGE INSTRUCTIONS
Take the following medications the morning of surgery:    PULMICORT  GABAPENTIN    ARRIVE AT 0800.      If you are on prescription narcotic pain medication to control your pain you may also take that medication the morning of surgery.    General Instructions:  Do not eat solid food after midnight the night before surgery.  You may drink clear liquids day of surgery but must stop at least one hour before your hospital arrival time.  It is beneficial for you to have a clear drink that contains carbohydrates the day of surgery.  We suggest a 12 to 20 ounce bottle of Gatorade or Powerade for non-diabetic patients or a 12 to 20 ounce bottle of G2 or Powerade Zero for diabetic patients. (Pediatric patients, are not advised to drink a 12 to 20 ounce carbohydrate drink)    Clear liquids are liquids you can see through.  Nothing red in color.     Plain water                               Sports drinks  Sodas                                   Gelatin (Jell-O)  Fruit juices without pulp such as white grape juice and apple juice  Popsicles that contain no fruit or yogurt  Tea or coffee (no cream or milk added)  Gatorade / Powerade  G2 / Powerade Zero    Infants may have breast milk up to four hours before surgery.  Infants drinking formula may drink formula up to six hours before surgery.   Patients who avoid smoking, chewing tobacco and alcohol for 4 weeks prior to surgery have a reduced risk of post-operative complications.  Quit smoking as many days before surgery as you can.  Do not smoke, use chewing tobacco or drink alcohol the day of surgery.   If applicable bring your C-PAP/ BI-PAP machine in with you to preop day of surgery.  Bring any papers given to you in the doctor’s office.  Wear clean comfortable clothes.  Do not wear contact lenses, false eyelashes or make-up.  Bring a case for your glasses.   Bring crutches or walker if applicable.  Remove all piercings.  Leave jewelry and any other valuables at home.  Hair  extensions with metal clips must be removed prior to surgery.  The Pre-Admission Testing nurse will instruct you to bring medications if unable to obtain an accurate list in Pre-Admission Testing.        If you were given a blood bank ID arm band remember to bring it with you the day of surgery.    Preventing a Surgical Site Infection:  For 2 to 3 days before surgery, avoid shaving with a razor because the razor can irritate skin and make it easier to develop an infection.    Any areas of open skin can increase the risk of a post-operative wound infection by allowing bacteria to enter and travel throughout the body.  Notify your surgeon if you have any skin wounds / rashes even if it is not near the expected surgical site.  The area will need assessed to determine if surgery should be delayed until it is healed.  The night prior to surgery shower using a fresh bar of anti-bacterial soap (such as Dial) and clean washcloth.  Sleep in a clean bed with clean clothing.  Do not allow pets to sleep with you.  Shower on the morning of surgery using a fresh bar of anti-bacterial soap (such as Dial) and clean washcloth.  Dry with a clean towel and dress in clean clothing.  Ask your surgeon if you will be receiving antibiotics prior to surgery.  Make sure you, your family, and all healthcare providers clean their hands with soap and water or an alcohol based hand  before caring for you or your wound.    Day of surgery:  Your arrival time is approximately two hours before your scheduled surgery time.  Upon arrival, a Pre-op nurse and Anesthesiologist will review your health history, obtain vital signs, and answer questions you may have.  The only belongings needed at this time will be a list of your home medications and if applicable your C-PAP/BI-PAP machine.  A Pre-op nurse will start an IV and you may receive medication in preparation for surgery, including something to help you relax.     Please be aware that  surgery does come with discomfort.  We want to make every effort to control your discomfort so please discuss any uncontrolled symptoms with your nurse.   Your doctor will most likely have prescribed pain medications.      If you are going home after surgery you will receive individualized written care instructions before being discharged.  A responsible adult must drive you to and from the hospital on the day of your surgery and stay with you for 24 hours.  Discharge prescriptions can be filled by the hospital pharmacy during regular pharmacy hours.  If you are having surgery late in the day/evening your prescription may be e-prescribed to your pharmacy.  Please verify your pharmacy hours or chose a 24 hour pharmacy to avoid not having access to your prescription because your pharmacy has closed for the day.    If you are staying overnight following surgery, you will be transported to your hospital room following the recovery period.  [facility] has all private rooms.    If you have any questions please call Pre-Admission Testing at [phone].  Deductibles and co-payments are collected on the day of service. Please be prepared to pay the required co-pay, deductible or deposit on the day of service as defined by your plan.    Call your surgeon immediately if you experience any of the following symptoms:  Sore Throat  Shortness of Breath or difficulty breathing  Cough  Chills  Body soreness or muscle pain  Headache  Fever  New loss of taste or smell  Do not arrive for your surgery ill.  Your procedure will need to be rescheduled to another time.  You will need to call your physician before the day of surgery to avoid any unnecessary exposure to hospital staff as well as other patients.

## 2023-05-10 LAB — QT INTERVAL: 362 MS

## 2023-05-12 DIAGNOSIS — M54.41 CHRONIC RIGHT-SIDED LOW BACK PAIN WITH RIGHT-SIDED SCIATICA: ICD-10-CM

## 2023-05-12 DIAGNOSIS — G89.29 CHRONIC RIGHT-SIDED LOW BACK PAIN WITH RIGHT-SIDED SCIATICA: ICD-10-CM

## 2023-05-12 DIAGNOSIS — M51.16 LUMBAR DISC HERNIATION WITH RADICULOPATHY: ICD-10-CM

## 2023-05-12 RX ORDER — GABAPENTIN 800 MG/1
TABLET ORAL
Qty: 90 TABLET | Refills: 1 | Status: SHIPPED | OUTPATIENT
Start: 2023-05-12

## 2023-05-13 ENCOUNTER — PATIENT MESSAGE (OUTPATIENT)
Dept: NEUROSURGERY | Facility: CLINIC | Age: 48
End: 2023-05-13
Payer: COMMERCIAL

## 2023-05-15 RX ORDER — SELENIUM SULFIDE 2.5 MG/100ML
LOTION TOPICAL
Qty: 120 ML | Refills: 11 | Status: SHIPPED | OUTPATIENT
Start: 2023-05-15

## 2023-05-16 ENCOUNTER — TELEPHONE (OUTPATIENT)
Dept: NEUROSURGERY | Facility: CLINIC | Age: 48
End: 2023-05-16
Payer: COMMERCIAL

## 2023-05-16 NOTE — H&P
"Subjective   History of Present Illness: Arelis Munson is a 48 y.o. male is here today for lumbar surgical intervention to address low back pain that radiates into the right leg.     Mr. Munson reports back pain that radiates into the right leg with some tingling and weakness.  He is following up today to go over the MRI that was ordered because he started having left sided back pain in addition to the right low back and right lower extremity pain.    This document is an updated document from my office visits formulated as a history and physical for the purposes of surgery.    Back Pain  The pain is present in the lumbar spine. The quality of the pain is described as shooting, stabbing, aching and burning. The pain radiates to the right thigh, right foot and right knee. The symptoms are aggravated by position. Associated symptoms include tingling and weakness.       Tobacco Use: Medium Risk   • Smoking Tobacco Use: Former   • Smokeless Tobacco Use: Never   • Passive Exposure: Not on file        The following portions of the patient's history were reviewed and updated as appropriate: allergies, current medications, past family history, past medical history, past social history, past surgical history and problem list.    Review of Systems   Musculoskeletal: Positive for back pain.   Neurological: Positive for tingling and weakness.       Objective     Vitals:    05/04/23 1334   BP: 126/80   Weight: 110 kg (242 lb)   Height: 185.4 cm (73\")     Body mass index is 31.93 kg/m².      Physical Exam  Neurologic Exam    Physical Exam:    CONSTITUTIONAL:  appears well developed, well-nourished and in no acute distress.    NECK: the neck is supple and symmetric. The trachea is midline with no masses.      PULMONARY: Respiratory effort is normal with no increased work of breathing or signs of respiratory distress.    CARDIOVASCULAR: Pedal pulses are +2/4 bilaterally. Examination of the extremities shows no edema or " varicosities.    MUSCULOSKELETAL: Gait does guard the right leg secondary to his orthopedic knee problem as well as his lumbago    SKIN: The skin is warm, dry and intact.      NEUROLOGIC:   Cranial Nerves 2-12 intact  Normal motor strength noted. Muscle bulk and tone are normal.  Sensory exam is normal to fine touch to confrontational testing bilaterally  Reflexes on the right side demonstrates 2/4 Knee Jerk Reflex, 2/4 Ankle Jerk Reflex and no ankle clonus on the right.   Reflexes on the left side demonstrates 2/4 Knee Jerk Reflex, 2/4 Ankle Jerk Reflex and no ankle clonus on the left.  Superficial/Primitive Reflexes: primitive reflexes were absent.  Hill's, Babinski, and Clonus signs all negative.  No coordination deficit observed.  Radicular testing showed a negative Jose Manuel (KATELYN) test but he does get pain in the right knee with Jose Manuel's test and equivocal straight leg raise with tightness in the medial aspect of the right knee with straight leg raising  Cortical function is intact and without deficits. Speech is normal.    PSYCHIATRIC: oriented to person, place and time. Patient's mood and affect are normal.    Assessment & Plan   Independent Review of Radiographic Studies:      I personally reviewed the images from the following studies.    MRI of the lumbar spine was done at [facility] on April 27, 2023 and continues to document the progression of the discogenic change at L3-4 with severe right neuroforaminal stenosis due to the disc protrusion and facet arthropathy on the right side much greater than the left.  At L4-5 more moderate changes are seen slightly more to the left than the right.    Medical Decision Making:      The MRI still documents the right neuroforaminal narrowing quite severely at L3-L4 and since his symptoms have not responded and in fact progressed over more than the last 6 months it is appropriate to consider a right L3-4 lumbar microscopic discectomy.  He may not gain much  impact on his pattern of back pain but this should protect the nerve to the right leg where he is getting radiating right leg pain.    A lumbar microscopic discectomy involves a small skin incision localized over the affected disc which is confirmed by X-ray. The natural space between lamina bones is widened with a drill and the nerve is gently retracted to expose the disc abnormality. The fragments of disc or bone spurs are removed to make more room around the nerve then the skin is closed with sutures    The patient understands that there are inherent risks to surgery including bleeding, infection, anesthetic risk, death, heart attack, stroke, damage to nerves, weakness, numbness, paralysis, failure to improve, need for further surgery, CSF leak, recurrence, persistent pain, and any other unforeseen complications. They comprehend and had opportunity to ask further questions.    Return for follow-up after surgery.    Diagnoses and all orders for this visit:    1. Lumbar disc herniation with radiculopathy (Primary)  -     Case Request; Standing  -     ceFAZolin (ANCEF) 2 g in sodium chloride 0.9 % 100 mL IVPB  -     Case Request    2. Chronic bilateral low back pain with right-sided sciatica    Other orders  -     Follow Anesthesia Guidelines / Protocol; Future  -     Obtain informed consent  -     Provide NPO Instructions to Patient; Future  -     Follow Anesthesia Guidelines / Protocol; Standing  -     Verify NPO Status; Standing  -     Obtain Informed Consent; Standing  -     SCD (sequential compression device)- to be placed on patient in Pre-op; Standing  -     Instructions on Coughing, Deep Breathing & Incentive Spirometry; Standing             Jovon Doran MD FACS [facility]  Neurological Surgery

## 2023-05-16 NOTE — TELEPHONE ENCOUNTER
Caller: ADRY    Relationship: Payer    Best call back number: [phone]      Who are you requesting to speak with (clinical staff, provider,  specific staff member): CLINICAL    Do you know the name of the person who called: ADRY    What was the call regarding: ADRY FROM [employer] WOULD LIKE TO KNOW IF HER PAPERWORK WAS RECEIVED FOR THIS PATIENT.  PLEASE CALL ADRY WITH AN UPDATE.    THANK YOU!    Do you require a callback: YES

## 2023-05-17 ENCOUNTER — APPOINTMENT (OUTPATIENT)
Dept: GENERAL RADIOLOGY | Facility: HOSPITAL | Age: 48
End: 2023-05-17
Payer: COMMERCIAL

## 2023-05-17 ENCOUNTER — ANESTHESIA EVENT (OUTPATIENT)
Dept: PERIOP | Facility: HOSPITAL | Age: 48
End: 2023-05-17
Payer: COMMERCIAL

## 2023-05-17 ENCOUNTER — HOSPITAL ENCOUNTER (OUTPATIENT)
Facility: HOSPITAL | Age: 48
Setting detail: HOSPITAL OUTPATIENT SURGERY
Discharge: HOME | End: 2023-05-17
Attending: NEUROLOGICAL SURGERY | Admitting: NEUROLOGICAL SURGERY
Payer: COMMERCIAL

## 2023-05-17 ENCOUNTER — ANESTHESIA (OUTPATIENT)
Dept: PERIOP | Facility: HOSPITAL | Age: 48
End: 2023-05-17
Payer: COMMERCIAL

## 2023-05-17 VITALS
RESPIRATION RATE: 16 BRPM | HEART RATE: 83 BPM | DIASTOLIC BLOOD PRESSURE: 89 MMHG | OXYGEN SATURATION: 94 % | TEMPERATURE: 98 F | SYSTOLIC BLOOD PRESSURE: 127 MMHG

## 2023-05-17 DIAGNOSIS — M51.16 LUMBAR DISC HERNIATION WITH RADICULOPATHY: ICD-10-CM

## 2023-05-17 PROCEDURE — 25010000002 SUGAMMADEX 200 MG/2ML SOLUTION: Performed by: NURSE ANESTHETIST, CERTIFIED REGISTERED

## 2023-05-17 PROCEDURE — 25010000002 CEFAZOLIN IN DEXTROSE 2-4 GM/100ML-% SOLUTION: Performed by: NEUROLOGICAL SURGERY

## 2023-05-17 PROCEDURE — 25010000002 HYDROMORPHONE 1 MG/ML SOLUTION: Performed by: NURSE ANESTHETIST, CERTIFIED REGISTERED

## 2023-05-17 PROCEDURE — 25010000002 KETOROLAC TROMETHAMINE PER 15 MG: Performed by: NEUROLOGICAL SURGERY

## 2023-05-17 PROCEDURE — 25010000002 DEXAMETHASONE SODIUM PHOSPHATE 20 MG/5ML SOLUTION: Performed by: NURSE ANESTHETIST, CERTIFIED REGISTERED

## 2023-05-17 PROCEDURE — 25010000002 MIDAZOLAM PER 1 MG: Performed by: ANESTHESIOLOGY

## 2023-05-17 PROCEDURE — 25010000002 ONDANSETRON PER 1 MG: Performed by: NURSE ANESTHETIST, CERTIFIED REGISTERED

## 2023-05-17 PROCEDURE — 72020 X-RAY EXAM OF SPINE 1 VIEW: CPT

## 2023-05-17 PROCEDURE — 25010000002 MAGNESIUM SULFATE PER 500 MG OF MAGNESIUM: Performed by: NURSE ANESTHETIST, CERTIFIED REGISTERED

## 2023-05-17 PROCEDURE — 25010000002 HYDROMORPHONE PER 4 MG: Performed by: NURSE ANESTHETIST, CERTIFIED REGISTERED

## 2023-05-17 PROCEDURE — 63030 LAMOT DCMPRN NRV RT 1 LMBR: CPT | Performed by: SPECIALIST/TECHNOLOGIST, OTHER

## 2023-05-17 PROCEDURE — 25010000002 FENTANYL CITRATE (PF) 50 MCG/ML SOLUTION: Performed by: NURSE ANESTHETIST, CERTIFIED REGISTERED

## 2023-05-17 PROCEDURE — 25010000002 PROPOFOL 10 MG/ML EMULSION: Performed by: NURSE ANESTHETIST, CERTIFIED REGISTERED

## 2023-05-17 PROCEDURE — 63030 LAMOT DCMPRN NRV RT 1 LMBR: CPT | Performed by: NEUROLOGICAL SURGERY

## 2023-05-17 DEVICE — FLOSEAL HEMOSTATIC MATRIX, 10ML
Type: IMPLANTABLE DEVICE | Site: SPINE LUMBAR | Status: FUNCTIONAL
Brand: FLOSEAL HEMOSTATIC MATRIX

## 2023-05-17 RX ORDER — PHENYLEPHRINE HCL IN 0.9% NACL 1 MG/10 ML
SYRINGE (ML) INTRAVENOUS AS NEEDED
Status: DISCONTINUED | OUTPATIENT
Start: 2023-05-17 | End: 2023-05-17 | Stop reason: SURG

## 2023-05-17 RX ORDER — KETOROLAC TROMETHAMINE 30 MG/ML
15 INJECTION, SOLUTION INTRAMUSCULAR; INTRAVENOUS EVERY 6 HOURS PRN
Status: DISCONTINUED | OUTPATIENT
Start: 2023-05-17 | End: 2023-05-17 | Stop reason: HOSPADM

## 2023-05-17 RX ORDER — FENTANYL CITRATE 50 UG/ML
50 INJECTION, SOLUTION INTRAMUSCULAR; INTRAVENOUS
Status: DISCONTINUED | OUTPATIENT
Start: 2023-05-17 | End: 2023-05-17 | Stop reason: HOSPADM

## 2023-05-17 RX ORDER — NALOXONE HCL 0.4 MG/ML
0.2 VIAL (ML) INJECTION AS NEEDED
Status: DISCONTINUED | OUTPATIENT
Start: 2023-05-17 | End: 2023-05-17 | Stop reason: HOSPADM

## 2023-05-17 RX ORDER — OXYCODONE AND ACETAMINOPHEN 7.5; 325 MG/1; MG/1
1 TABLET ORAL EVERY 4 HOURS PRN
Status: DISCONTINUED | OUTPATIENT
Start: 2023-05-17 | End: 2023-05-17 | Stop reason: HOSPADM

## 2023-05-17 RX ORDER — HYDROMORPHONE HYDROCHLORIDE 1 MG/ML
0.5 INJECTION, SOLUTION INTRAMUSCULAR; INTRAVENOUS; SUBCUTANEOUS
Status: DISCONTINUED | OUTPATIENT
Start: 2023-05-17 | End: 2023-05-17 | Stop reason: HOSPADM

## 2023-05-17 RX ORDER — ONDANSETRON 2 MG/ML
4 INJECTION INTRAMUSCULAR; INTRAVENOUS EVERY 6 HOURS PRN
Status: DISCONTINUED | OUTPATIENT
Start: 2023-05-17 | End: 2023-05-17 | Stop reason: HOSPADM

## 2023-05-17 RX ORDER — LABETALOL HYDROCHLORIDE 5 MG/ML
5 INJECTION, SOLUTION INTRAVENOUS
Status: DISCONTINUED | OUTPATIENT
Start: 2023-05-17 | End: 2023-05-17 | Stop reason: HOSPADM

## 2023-05-17 RX ORDER — SODIUM CHLORIDE AND POTASSIUM CHLORIDE 150; 900 MG/100ML; MG/100ML
75 INJECTION, SOLUTION INTRAVENOUS CONTINUOUS
Status: DISCONTINUED | OUTPATIENT
Start: 2023-05-17 | End: 2023-05-17 | Stop reason: HOSPADM

## 2023-05-17 RX ORDER — FENTANYL CITRATE 50 UG/ML
INJECTION, SOLUTION INTRAMUSCULAR; INTRAVENOUS AS NEEDED
Status: DISCONTINUED | OUTPATIENT
Start: 2023-05-17 | End: 2023-05-17 | Stop reason: SURG

## 2023-05-17 RX ORDER — SODIUM CHLORIDE 0.9 % (FLUSH) 0.9 %
3 SYRINGE (ML) INJECTION EVERY 12 HOURS SCHEDULED
Status: DISCONTINUED | OUTPATIENT
Start: 2023-05-17 | End: 2023-05-17 | Stop reason: HOSPADM

## 2023-05-17 RX ORDER — AMOXICILLIN 250 MG
1 CAPSULE ORAL NIGHTLY PRN
Status: DISCONTINUED | OUTPATIENT
Start: 2023-05-17 | End: 2023-05-17 | Stop reason: HOSPADM

## 2023-05-17 RX ORDER — HYDROCODONE BITARTRATE AND ACETAMINOPHEN 7.5; 325 MG/1; MG/1
1 TABLET ORAL ONCE AS NEEDED
Status: COMPLETED | OUTPATIENT
Start: 2023-05-17 | End: 2023-05-17

## 2023-05-17 RX ORDER — CEFAZOLIN SODIUM 2 G/100ML
2 INJECTION, SOLUTION INTRAVENOUS ONCE
Status: COMPLETED | OUTPATIENT
Start: 2023-05-17 | End: 2023-05-17

## 2023-05-17 RX ORDER — NALOXONE HCL 0.4 MG/ML
0.4 VIAL (ML) INJECTION
Status: DISCONTINUED | OUTPATIENT
Start: 2023-05-17 | End: 2023-05-17 | Stop reason: HOSPADM

## 2023-05-17 RX ORDER — FLUMAZENIL 0.1 MG/ML
0.2 INJECTION INTRAVENOUS AS NEEDED
Status: DISCONTINUED | OUTPATIENT
Start: 2023-05-17 | End: 2023-05-17 | Stop reason: HOSPADM

## 2023-05-17 RX ORDER — PROMETHAZINE HYDROCHLORIDE 25 MG/1
25 TABLET ORAL ONCE AS NEEDED
Status: DISCONTINUED | OUTPATIENT
Start: 2023-05-17 | End: 2023-05-17 | Stop reason: HOSPADM

## 2023-05-17 RX ORDER — ONDANSETRON 2 MG/ML
INJECTION INTRAMUSCULAR; INTRAVENOUS AS NEEDED
Status: DISCONTINUED | OUTPATIENT
Start: 2023-05-17 | End: 2023-05-17 | Stop reason: SURG

## 2023-05-17 RX ORDER — DIPHENHYDRAMINE HYDROCHLORIDE 50 MG/ML
12.5 INJECTION INTRAMUSCULAR; INTRAVENOUS
Status: DISCONTINUED | OUTPATIENT
Start: 2023-05-17 | End: 2023-05-17 | Stop reason: HOSPADM

## 2023-05-17 RX ORDER — SODIUM CHLORIDE, SODIUM LACTATE, POTASSIUM CHLORIDE, CALCIUM CHLORIDE 600; 310; 30; 20 MG/100ML; MG/100ML; MG/100ML; MG/100ML
INJECTION, SOLUTION INTRAVENOUS CONTINUOUS PRN
Status: DISCONTINUED | OUTPATIENT
Start: 2023-05-17 | End: 2023-05-17 | Stop reason: SURG

## 2023-05-17 RX ORDER — PROMETHAZINE HYDROCHLORIDE 25 MG/1
25 SUPPOSITORY RECTAL ONCE AS NEEDED
Status: DISCONTINUED | OUTPATIENT
Start: 2023-05-17 | End: 2023-05-17 | Stop reason: HOSPADM

## 2023-05-17 RX ORDER — CYCLOBENZAPRINE HCL 10 MG
10 TABLET ORAL 3 TIMES DAILY PRN
Qty: 60 TABLET | Refills: 3 | Status: SHIPPED | OUTPATIENT
Start: 2023-05-17

## 2023-05-17 RX ORDER — ACETAMINOPHEN 325 MG/1
650 TABLET ORAL EVERY 4 HOURS PRN
Status: DISCONTINUED | OUTPATIENT
Start: 2023-05-17 | End: 2023-05-17 | Stop reason: HOSPADM

## 2023-05-17 RX ORDER — MORPHINE SULFATE 2 MG/ML
2 INJECTION, SOLUTION INTRAMUSCULAR; INTRAVENOUS EVERY 4 HOURS PRN
Status: DISCONTINUED | OUTPATIENT
Start: 2023-05-17 | End: 2023-05-17 | Stop reason: HOSPADM

## 2023-05-17 RX ORDER — IPRATROPIUM BROMIDE AND ALBUTEROL SULFATE 2.5; .5 MG/3ML; MG/3ML
3 SOLUTION RESPIRATORY (INHALATION) ONCE AS NEEDED
Status: DISCONTINUED | OUTPATIENT
Start: 2023-05-17 | End: 2023-05-17 | Stop reason: HOSPADM

## 2023-05-17 RX ORDER — SODIUM CHLORIDE 0.9 % (FLUSH) 0.9 %
10 SYRINGE (ML) INJECTION AS NEEDED
Status: DISCONTINUED | OUTPATIENT
Start: 2023-05-17 | End: 2023-05-17 | Stop reason: HOSPADM

## 2023-05-17 RX ORDER — LIDOCAINE HYDROCHLORIDE 10 MG/ML
0.5 INJECTION, SOLUTION EPIDURAL; INFILTRATION; INTRACAUDAL; PERINEURAL ONCE AS NEEDED
Status: DISCONTINUED | OUTPATIENT
Start: 2023-05-17 | End: 2023-05-17 | Stop reason: HOSPADM

## 2023-05-17 RX ORDER — FENTANYL CITRATE 50 UG/ML
50 INJECTION, SOLUTION INTRAMUSCULAR; INTRAVENOUS ONCE AS NEEDED
Status: DISCONTINUED | OUTPATIENT
Start: 2023-05-17 | End: 2023-05-17 | Stop reason: HOSPADM

## 2023-05-17 RX ORDER — ONDANSETRON 4 MG/1
4 TABLET, FILM COATED ORAL EVERY 6 HOURS PRN
Status: DISCONTINUED | OUTPATIENT
Start: 2023-05-17 | End: 2023-05-17 | Stop reason: HOSPADM

## 2023-05-17 RX ORDER — DROPERIDOL 2.5 MG/ML
0.62 INJECTION, SOLUTION INTRAMUSCULAR; INTRAVENOUS
Status: DISCONTINUED | OUTPATIENT
Start: 2023-05-17 | End: 2023-05-17 | Stop reason: HOSPADM

## 2023-05-17 RX ORDER — SODIUM CHLORIDE 0.9 % (FLUSH) 0.9 %
3-10 SYRINGE (ML) INJECTION AS NEEDED
Status: DISCONTINUED | OUTPATIENT
Start: 2023-05-17 | End: 2023-05-17 | Stop reason: HOSPADM

## 2023-05-17 RX ORDER — ROCURONIUM BROMIDE 10 MG/ML
INJECTION, SOLUTION INTRAVENOUS AS NEEDED
Status: DISCONTINUED | OUTPATIENT
Start: 2023-05-17 | End: 2023-05-17 | Stop reason: SURG

## 2023-05-17 RX ORDER — HYDRALAZINE HYDROCHLORIDE 20 MG/ML
5 INJECTION INTRAMUSCULAR; INTRAVENOUS
Status: DISCONTINUED | OUTPATIENT
Start: 2023-05-17 | End: 2023-05-17 | Stop reason: HOSPADM

## 2023-05-17 RX ORDER — ONDANSETRON 2 MG/ML
4 INJECTION INTRAMUSCULAR; INTRAVENOUS ONCE AS NEEDED
Status: DISCONTINUED | OUTPATIENT
Start: 2023-05-17 | End: 2023-05-17 | Stop reason: HOSPADM

## 2023-05-17 RX ORDER — PROPOFOL 10 MG/ML
VIAL (ML) INTRAVENOUS AS NEEDED
Status: DISCONTINUED | OUTPATIENT
Start: 2023-05-17 | End: 2023-05-17 | Stop reason: SURG

## 2023-05-17 RX ORDER — GLYCOPYRROLATE 0.2 MG/ML
INJECTION INTRAMUSCULAR; INTRAVENOUS AS NEEDED
Status: DISCONTINUED | OUTPATIENT
Start: 2023-05-17 | End: 2023-05-17 | Stop reason: SURG

## 2023-05-17 RX ORDER — FAMOTIDINE 10 MG/ML
20 INJECTION, SOLUTION INTRAVENOUS ONCE
Status: COMPLETED | OUTPATIENT
Start: 2023-05-17 | End: 2023-05-17

## 2023-05-17 RX ORDER — SODIUM CHLORIDE 9 MG/ML
40 INJECTION, SOLUTION INTRAVENOUS AS NEEDED
Status: DISCONTINUED | OUTPATIENT
Start: 2023-05-17 | End: 2023-05-17 | Stop reason: HOSPADM

## 2023-05-17 RX ORDER — EPHEDRINE SULFATE 50 MG/ML
5 INJECTION, SOLUTION INTRAVENOUS ONCE AS NEEDED
Status: DISCONTINUED | OUTPATIENT
Start: 2023-05-17 | End: 2023-05-17 | Stop reason: HOSPADM

## 2023-05-17 RX ORDER — BUPIVACAINE HYDROCHLORIDE AND EPINEPHRINE 5; 5 MG/ML; UG/ML
INJECTION, SOLUTION EPIDURAL; INTRACAUDAL; PERINEURAL AS NEEDED
Status: DISCONTINUED | OUTPATIENT
Start: 2023-05-17 | End: 2023-05-17 | Stop reason: HOSPADM

## 2023-05-17 RX ORDER — MIDAZOLAM HYDROCHLORIDE 1 MG/ML
1 INJECTION INTRAMUSCULAR; INTRAVENOUS
Status: DISCONTINUED | OUTPATIENT
Start: 2023-05-17 | End: 2023-05-17 | Stop reason: HOSPADM

## 2023-05-17 RX ORDER — DEXAMETHASONE SODIUM PHOSPHATE 4 MG/ML
INJECTION, SOLUTION INTRA-ARTICULAR; INTRALESIONAL; INTRAMUSCULAR; INTRAVENOUS; SOFT TISSUE AS NEEDED
Status: DISCONTINUED | OUTPATIENT
Start: 2023-05-17 | End: 2023-05-17 | Stop reason: SURG

## 2023-05-17 RX ORDER — HYDROCODONE BITARTRATE AND ACETAMINOPHEN 7.5; 325 MG/1; MG/1
1 TABLET ORAL EVERY 6 HOURS PRN
Qty: 40 TABLET | Refills: 0 | Status: SHIPPED | OUTPATIENT
Start: 2023-05-17

## 2023-05-17 RX ORDER — MAGNESIUM SULFATE HEPTAHYDRATE 500 MG/ML
INJECTION, SOLUTION INTRAMUSCULAR; INTRAVENOUS AS NEEDED
Status: DISCONTINUED | OUTPATIENT
Start: 2023-05-17 | End: 2023-05-17 | Stop reason: SURG

## 2023-05-17 RX ORDER — HYDROCODONE BITARTRATE AND ACETAMINOPHEN 7.5; 325 MG/1; MG/1
1 TABLET ORAL EVERY 4 HOURS PRN
Status: DISCONTINUED | OUTPATIENT
Start: 2023-05-17 | End: 2023-05-17 | Stop reason: HOSPADM

## 2023-05-17 RX ORDER — CYCLOBENZAPRINE HCL 10 MG
10 TABLET ORAL 3 TIMES DAILY PRN
Status: DISCONTINUED | OUTPATIENT
Start: 2023-05-17 | End: 2023-05-17 | Stop reason: HOSPADM

## 2023-05-17 RX ORDER — SUCCINYLCHOLINE/SOD CL,ISO/PF 200MG/10ML
SYRINGE (ML) INTRAVENOUS AS NEEDED
Status: DISCONTINUED | OUTPATIENT
Start: 2023-05-17 | End: 2023-05-17 | Stop reason: SURG

## 2023-05-17 RX ORDER — SODIUM CHLORIDE, SODIUM LACTATE, POTASSIUM CHLORIDE, CALCIUM CHLORIDE 600; 310; 30; 20 MG/100ML; MG/100ML; MG/100ML; MG/100ML
9 INJECTION, SOLUTION INTRAVENOUS CONTINUOUS
Status: DISCONTINUED | OUTPATIENT
Start: 2023-05-17 | End: 2023-05-17 | Stop reason: HOSPADM

## 2023-05-17 RX ORDER — HYDROCODONE BITARTRATE AND ACETAMINOPHEN 7.5; 325 MG/1; MG/1
2 TABLET ORAL EVERY 4 HOURS PRN
Status: DISCONTINUED | OUTPATIENT
Start: 2023-05-17 | End: 2023-05-17 | Stop reason: HOSPADM

## 2023-05-17 RX ADMIN — CYCLOBENZAPRINE 10 MG: 10 TABLET, FILM COATED ORAL at 10:24

## 2023-05-17 RX ADMIN — PROPOFOL 200 MG: 10 INJECTION, EMULSION INTRAVENOUS at 07:37

## 2023-05-17 RX ADMIN — HYDROCODONE BITARTRATE AND ACETAMINOPHEN 1 TABLET: 7.5; 325 TABLET ORAL at 10:36

## 2023-05-17 RX ADMIN — HYDROMORPHONE HYDROCHLORIDE 0.5 MG: 1 INJECTION, SOLUTION INTRAMUSCULAR; INTRAVENOUS; SUBCUTANEOUS at 08:04

## 2023-05-17 RX ADMIN — Medication 100 MCG: at 08:16

## 2023-05-17 RX ADMIN — GLYCOPYRROLATE 0.1 MG: 1 INJECTION INTRAMUSCULAR; INTRAVENOUS at 08:06

## 2023-05-17 RX ADMIN — ONDANSETRON 4 MG: 2 INJECTION INTRAMUSCULAR; INTRAVENOUS at 09:34

## 2023-05-17 RX ADMIN — Medication 100 MCG: at 08:30

## 2023-05-17 RX ADMIN — ROCURONIUM BROMIDE 10 MG: 10 INJECTION, SOLUTION INTRAVENOUS at 08:04

## 2023-05-17 RX ADMIN — ROCURONIUM BROMIDE 10 MG: 10 INJECTION, SOLUTION INTRAVENOUS at 08:45

## 2023-05-17 RX ADMIN — KETOROLAC TROMETHAMINE 15 MG: 30 INJECTION, SOLUTION INTRAMUSCULAR; INTRAVENOUS at 10:23

## 2023-05-17 RX ADMIN — MAGNESIUM SULFATE HEPTAHYDRATE 2 G: 500 INJECTION, SOLUTION INTRAMUSCULAR; INTRAVENOUS at 09:32

## 2023-05-17 RX ADMIN — Medication 160 MG: at 07:37

## 2023-05-17 RX ADMIN — SUGAMMADEX 200 MG: 100 INJECTION, SOLUTION INTRAVENOUS at 09:36

## 2023-05-17 RX ADMIN — FENTANYL CITRATE 50 MCG: 50 INJECTION, SOLUTION INTRAMUSCULAR; INTRAVENOUS at 07:37

## 2023-05-17 RX ADMIN — SODIUM CHLORIDE, POTASSIUM CHLORIDE, SODIUM LACTATE AND CALCIUM CHLORIDE 9 ML/HR: 600; 310; 30; 20 INJECTION, SOLUTION INTRAVENOUS at 06:51

## 2023-05-17 RX ADMIN — ROCURONIUM BROMIDE 40 MG: 10 INJECTION, SOLUTION INTRAVENOUS at 07:41

## 2023-05-17 RX ADMIN — Medication 150 MCG: at 09:07

## 2023-05-17 RX ADMIN — FAMOTIDINE 20 MG: 10 INJECTION INTRAVENOUS at 06:49

## 2023-05-17 RX ADMIN — HYDROMORPHONE HYDROCHLORIDE 0.5 MG: 1 INJECTION, SOLUTION INTRAMUSCULAR; INTRAVENOUS; SUBCUTANEOUS at 11:02

## 2023-05-17 RX ADMIN — CEFAZOLIN SODIUM 2 G: 2 INJECTION, SOLUTION INTRAVENOUS at 07:20

## 2023-05-17 RX ADMIN — Medication 200 MCG: at 08:35

## 2023-05-17 RX ADMIN — ROCURONIUM BROMIDE 10 MG: 10 INJECTION, SOLUTION INTRAVENOUS at 07:37

## 2023-05-17 RX ADMIN — SODIUM CHLORIDE, POTASSIUM CHLORIDE, SODIUM LACTATE AND CALCIUM CHLORIDE: 600; 310; 30; 20 INJECTION, SOLUTION INTRAVENOUS at 07:27

## 2023-05-17 RX ADMIN — DEXAMETHASONE SODIUM PHOSPHATE 8 MG: 4 INJECTION, SOLUTION INTRAMUSCULAR; INTRAVENOUS at 08:06

## 2023-05-17 RX ADMIN — MIDAZOLAM 1 MG: 1 INJECTION INTRAMUSCULAR; INTRAVENOUS at 06:48

## 2023-05-17 NOTE — ANESTHESIA PREPROCEDURE EVALUATION
Anesthesia Evaluation     NPO Solid Status: > 8 hours             Airway   Mallampati: III  TM distance: >3 FB  Neck ROM: full  Small opening  Dental - normal exam     Pulmonary - normal exam   (+) asthma,sleep apnea,   Cardiovascular - normal exam    (+) hyperlipidemia,       Neuro/Psych  GI/Hepatic/Renal/Endo    (+) obesity,  GERD,      Musculoskeletal     Abdominal    Substance History      OB/GYN          Other            Phys Exam Other: Grade 1 view 2018                  Anesthesia Plan    ASA 3     general     intravenous induction     Anesthetic plan, risks, benefits, and alternatives have been provided, discussed and informed consent has been obtained with: patient.    Plan discussed with CRNA.        CODE STATUS:

## 2023-05-17 NOTE — ANESTHESIA PROCEDURE NOTES
Peripheral IV    Line placed for Fluids/Medication Admin.  Preanesthetic Checklist  Completed: patient identified, IV checked, site marked, risks and benefits discussed, surgical consent, monitors and equipment checked, pre-op evaluation and timeout performed  Peripheral IV Prep   Patient position: supine   Prep: ChloraPrep  Patient monitoring: heart rate, cardiac monitor and continuous pulse ox  Peripheral IV Procedure   Laterality:right  [place]  Hand  Catheter size: 16 G          Post Assessment   Dressing Type: tape and transparent.    IV Dressing/Site: clean, dry and intact

## 2023-05-17 NOTE — ANESTHESIA POSTPROCEDURE EVALUATION
Patient: Arelis Munson    Procedure Summary     Date: 05/17/23 Room / [place] [facility] OR [address] [place] [address] MAIN OR    Anesthesia Start: 0727 Anesthesia Stop: 0959    Procedure: Lumbar Microscopic Discectomy, right lumbar 3 lumbar 4 (Right: Spine Lumbar) Diagnosis:       Lumbar disc herniation with radiculopathy      (Lumbar disc herniation with radiculopathy [M51.16])    Surgeons: Jovon Doran MD Provider: Bryce Curry MD    Anesthesia Type: general ASA Status: 3          Anesthesia Type: general    Vitals  Vitals Value Taken Time   /89 05/17/23 1200   Temp 36.7 °C (98 °F) 05/17/23 0955   Pulse 80 05/17/23 1202   Resp 16 05/17/23 1145   SpO2 92 % 05/17/23 1202   Vitals shown include unvalidated device data.        Post Anesthesia Care and Evaluation    Patient location during evaluation: PACU  Patient participation: complete - patient participated  Level of consciousness: awake and alert  Pain management: adequate    Airway patency: patent  Anesthetic complications: No anesthetic complications  PONV Status: controlled  Cardiovascular status: acceptable and hemodynamically stable  Respiratory status: acceptable  Hydration status: acceptable    Comments: /88 (BP [place] Right arm, Patient Position: Sitting)   Pulse 78   Temp 36.7 °C (98 °F) (Oral)   Resp 16   SpO2 94%

## 2023-05-17 NOTE — ANESTHESIA PROCEDURE NOTES
Airway  Urgency: elective    Date/Time: 5/17/2023 7:40 AM  Airway not difficult    General Information and Staff    Patient location during procedure: OR    Indications and Patient Condition  Indications for airway management: airway protection    Preoxygenated: yes  Mask difficulty assessment: 2 - vent by mask + OA or adjuvant +/- NMBA    Final Airway Details  Final airway type: endotracheal airway      Successful airway: ETT  Cuffed: yes   Successful intubation technique: direct laryngoscopy  Facilitating devices/methods: intubating stylet  Endotracheal tube insertion site: oral  Blade: Emy  Blade size: 4  ETT size (mm): 7.5  Cormack-Lehane Classification: grade IIa - partial view of glottis  Placement verified by: chest auscultation and capnometry   Measured from: lips  ETT/EBT  to lips (cm): 23  Number of attempts at approach: 1  Assessment: lips, teeth, and gum same as pre-op and atraumatic intubation

## 2023-05-17 NOTE — ADDENDUM NOTE
Addendum  created 05/17/23 1310 by Bryce Curry MD    Attestation recorded in Intraprocedure, Intraprocedure Attestations filed

## 2023-05-17 NOTE — DISCHARGE INSTRUCTIONS
[facility]  [address], [address]  [place] [address]  Phone:  [phone]  Fax:  [phone]    Dr. Jovon Doran MD FACS [facility]            Low Back Surgery Post-Operative Instructions  (Lumbar Discectomy or Lumbar Decompression)        Change your bandage in about 24 hours following surgery and you may replace with another guaze bandage or leave it off.  Check the cut (incision) made by the surgeon twice a day for signs of infection.  Some signs of infection may include:  A foul smelling, greenish or yellowish discharge form the wound.  Increased pain  Increased redness over the incision (operative) site  Skin edges separate  Flu-like symptoms (problems)  A temperature above 101.5° F (38.6° C) .    You may resume normal diet as you tolerate    No lifting overhead, although you may place your arms above your head.  DO NOT lift anything over five (5) pounds.  Walking is allowed and encouraged. There are no restrictions on sitting or climbing stairs, but refrain from overly strenuous activity.  You may notice that a constant position (standing or sitting) greater than 45 minutes may tend to make you more sore and therefore it is recommended that you change positions frequently. Do not drive until you are seen back for your first postoperative visit, although you may be a passenger in a car.      Walking is permitted.  You may use a treadmill without an incline.  Back off on activity if you have discomfort.  You may also use stairs as much as you can tolerate, just take it slow and easy.    Refrain from any sexual activity until after your first evaluation at the office.     Back pain after surgery may worsen 2 to 3 days following surgery.  It should smooth out after the third day.  Continue the pain medication and muscle relaxers as prescribed.  You may also need to take a stool softener like Senokot-S if you develop constipation.    You may shower and pat the incision dry, but do not soak  your wound in water such as a swimming pool, hot tub or lake.   Avoid direct sunlight to the wound for at least three (3) months.  You may apply ice to the surgical site for 15 to 20 minutes each hour while awake for the first few days following surgery.  Simply put the ice in a plastic bag in place a towel between the bag of ice and your skin.    You have Steri-Strips applied to the skin edges of your incision.  They should fall off in 7 to 10 days, however, if they do not fall off by the 10th day you may remove them.    You will leave the hospital with prescriptions for pain medicine and a muscle relaxer.  These medications must be taken as prescribed only.  If a refill of your pain medication is required, in order to have this medication refilled, you must contact the office 3 days (72 hrs) prior to running out, but the amount prescribed is generally enough to last until the first post-operative visit.      A small amount of bleeding from the incision during the first few days is not unusual.       You should have a post-operative visit approximately 2 weeks after surgery.  If you do not have a scheduled appointment, call the office at [phone] to schedule one.  We will discuss return to work at your first post-operative visit, but you can expect to be off of work for an average of 6 weeks.     Notify the office if there are any problems, such as:    Excessive back or leg pain   If you lose control of urine or bowel movements  Persistent temperature of 101.5° F (38.6° C) or greater that is not relieved by Tylenol.  Excessive bleeding, redness, heat, leakage of fluid, swelling or pus around the incision   If you develop difficulty breathing, have chest pain or shortness of breath, call 911.  If you develop swelling in the calf or leg  Your pain is not controlled with medication  You seem to be getting worse rather than better    Thank you.

## 2023-05-17 NOTE — OP NOTE
Lumbar Microscopic Discectomy Procedure Note    Arelis Munson  5/17/2023  [id number]    SURGEON  Jovon Doran MD    Assistant:  Jayna Mercer CSA was present throughout the entire surgery to provide intraoperative suction, retraction, suturing, and irrigation to promote visualization by the operative surgeon and assist with opening and closure.  The skilled assistance was necessary for the success of this case.  Our practice does not have a relationship with neurosurgical residents.    Indication:Intractable leg pain, L3 radiculopathy on the right    Pre-op Diagnosis:   Lumbar Herniated Nucleus Pulposus, L3-L4 on the Right    Post-op Diagnosis:     Lumbar Herniated Nucleus Pulposus, chronic with bony osteophytic change and neuroforaminal stenosis, L3-L4 on the Right    Procedure Performed:  Lumbar Microscopic Discectomy with extensive medial facetectomy and foraminotomies, L3-L4 on the Right    1.  Laminotomy (Hemilaminectomy), with decompression of nerve root, including extensive medial partial facetectomy, extensive foraminotomy and excision of herniated intervertebral disc, L3-L4 on the Right.    2. Intraoperative Microdissection    Spinal Surgery Levels Completed:1 Level     Anesthesia: General    Staff:   Circulator: Michael Bolivar RN; Karyn Tadeo RN  Scrub Person: Kalli Dave; Adrianna Nuñez  Assistant: Stephanie Martinez CSA    Estimated Blood Loss: 100ml    Specimens:                None     Drains: None    Findings: Severe neuroforaminal stenosis above the level of the L3-4 disc secondary to disc osteophyte complex and medial facet hypertrophy contributing to significant L3 nerve root compression under the facet joint into the far lateral compartment.  After completion of an extensive foraminotomy and discectomy on the right at L3-L4 nerve probe passed freely along the L3 and the L4 nerve roots on the right    Complications: None immediate    Narrative Description:     Positioning:  After  operative consent, the patient was taken to the operating room in stable condition and placed under general endotracheal intubation. Once adequate anesthesia was administered the patient was placed in the prone position on a Pramod frame, then the frame cranked into a flexion position. After adequate prepping and draping, needle localization of the L3-L4 level with the needle apparent on the second film at the level of the L4 pedicle just below the L3-4 disc space was confirmed and the skin was infiltrated with local anesthesia.    Approach:  The skin incision was taken down to the superficial fascia which was then incised with the Bovie electrocautery. A subperiosteal dissection was performed down to the level of the facet joints. A Barney self-retaining retractor was placed in position on the right.    Operating Microscope: The operating microscope was draped using sterile technique and brought into the field and the rest of the operation was performed under operative magnification with microdissection technique and micro-illumination with the aid of the operating microscope.    Lumbar decompression:  The laminotomy was performed at L3 on the right with a pneumatic drill. The ligamentum flavum was resected with a Kerrison ronguer. The L4 nerve root was now decompressed and the microscope was now used for microdissection. Gentle palpation with a nerve hook along the L3 nerve root confirmed impingement but the impingement was from very large bony osteophytic changes above the level of the disc space and extending out far laterally under the facet joint and using a series of Kerrison rongeurs and curettes the bony hypertrophy was removed from the right medial facet as well as the margin of the disc.  Very wide bony decompression was required to make sure the L3 nerve root was completely freed well out into the far lateral compartment although the medial facet was not removed past the level of the pedicles.  The  wound was irrigated and the Valsalva maneuver did not express CSF. The Rivera ball probe passed freely along the L3 and L4 nerve roots on the right. Hemostasis was complete with Flow-Seal and bipolar electrocautery.    Closure:  The fascia was now closed with 0 Vicryl suture. The superficial subcutaneous tissues closed with 2-0 Vicryl suture and the skin with 3-0 Vicryl suture in a running subcuticular fashion. Iodine impregnated Steri-strips and a dry dressing was applied. The patient arose from anesthesia in stable condition and was transported to postop recovery.    Jovon Doran MD     Date: 5/17/2023  Time: 09:51 EDT

## 2023-05-22 NOTE — PROGRESS NOTES
"Subjective   History of Present Illness: Arelis Munson is a 48 y.o. male is here today for follow-up. Mr. Munson is almost two weeks out from having a right L3-4 discectomy with extensive facetectomy and foraminotomies on 05-17-23.    Today, Mr. Munson reports he is doing okay.  He says he does get episodic pain in the back initially just on the left but then now both proximal thighs will get sharp short-lived pain that does not appear to have any particular onset activity.  It is short-lived and he does not develop any weakness or numbness associated with it.  The pain does not extend into the distal lower extremities.  The incision looks clean and dry. No redness, swelling or drainage. Patient denies having fever.     History of Present Illness    Tobacco Use: Medium Risk   • Smoking Tobacco Use: Former   • Smokeless Tobacco Use: Never   • Passive Exposure: Not on file        The following portions of the patient's history were reviewed and updated as appropriate: allergies, current medications, past family history, past medical history, past social history, past surgical history and problem list.    Review of Systems    Objective     Vitals:    05/30/23 0838   BP: 130/82   Weight: 112 kg (248 lb)   Height: 185.4 cm (73\")     Body mass index is 32.72 kg/m².      Physical Exam  Neurologic Exam    Physical Exam:    CONSTITUTIONAL:  appears well developed, well-nourished and in no acute distress.    NECK: the neck is supple and symmetric. The trachea is midline with no masses.      PULMONARY: Respiratory effort is normal with no increased work of breathing or signs of respiratory distress.    CARDIOVASCULAR: Pedal pulses are +2/4 bilaterally. Examination of the extremities shows no edema or varicosities.    MUSCULOSKELETAL: Gait normal guards his back just a little bit when changing positions    SKIN: The skin is warm, dry and intact.  Lumbar incisions intact healing well some of the Steri-Strips remain in place " I remove them    NEUROLOGIC:   Normal motor strength noted. Muscle bulk and tone are normal.  Sensory exam is normal to fine touch  Straight leg raising test is negative bilaterally  Cortical function is intact and without deficits. Speech is normal.    PSYCHIATRIC: oriented to person, place and time. Patient's mood and affect are normal.    Assessment & Plan     Medical Decision Making:      The patient can now lift up to 25 lbs. and may soak the incision in a bath or pool if desired. They will arrange Physical Therapy 2-3 times per week for 3 weeks to initiate a home exercise plan.  The pattern of pain he is having postoperatively is fairly typical from the muscular dissection and should resolve with time and the physical therapy techniques.  It is safe for the patient to drive if they are comfortable driving and not consuming narcotic pain medications. Follow up is arranged following PT. All questions were reviewed and answered.    Return in about 4 weeks (around 6/27/2023) for discussion of Physical Therapy results.    Diagnoses and all orders for this visit:    1. Status post lumbar surgery (Primary)  -     Ambulatory Referral to Physical Therapy Evaluate and treat    2. Lumbar disc herniation with radiculopathy  -     Ambulatory Referral to Physical Therapy Evaluate and treat  -     HYDROcodone-acetaminophen (NORCO) 7.5-325 MG per tablet; Take 1 tablet by mouth Every 6 (Six) Hours As Needed for Moderate Pain or Severe Pain.  Dispense: 25 tablet; Refill: 0             Jovon Doran MD [facility]  Neurological Surgery

## 2023-05-30 ENCOUNTER — OFFICE VISIT (OUTPATIENT)
Dept: NEUROSURGERY | Facility: CLINIC | Age: 48
End: 2023-05-30

## 2023-05-30 VITALS
SYSTOLIC BLOOD PRESSURE: 130 MMHG | HEIGHT: 73 IN | WEIGHT: 248 LBS | DIASTOLIC BLOOD PRESSURE: 82 MMHG | BODY MASS INDEX: 32.87 KG/M2

## 2023-05-30 DIAGNOSIS — M51.16 LUMBAR DISC HERNIATION WITH RADICULOPATHY: ICD-10-CM

## 2023-05-30 DIAGNOSIS — Z98.890 STATUS POST LUMBAR SURGERY: Primary | ICD-10-CM

## 2023-05-30 PROCEDURE — 99024 POSTOP FOLLOW-UP VISIT: CPT | Performed by: NEUROLOGICAL SURGERY

## 2023-05-30 RX ORDER — HYDROCODONE BITARTRATE AND ACETAMINOPHEN 7.5; 325 MG/1; MG/1
1 TABLET ORAL EVERY 6 HOURS PRN
Qty: 25 TABLET | Refills: 0 | Status: SHIPPED | OUTPATIENT
Start: 2023-05-30

## 2023-06-05 ENCOUNTER — OFFICE VISIT (OUTPATIENT)
Dept: FAMILY MEDICINE CLINIC | Facility: CLINIC | Age: 48
End: 2023-06-05
Payer: COMMERCIAL

## 2023-06-05 ENCOUNTER — TELEPHONE (OUTPATIENT)
Dept: PAIN MEDICINE | Facility: CLINIC | Age: 48
End: 2023-06-05
Payer: COMMERCIAL

## 2023-06-05 ENCOUNTER — TELEPHONE (OUTPATIENT)
Dept: ORTHOPEDIC SURGERY | Facility: CLINIC | Age: 48
End: 2023-06-05
Payer: COMMERCIAL

## 2023-06-05 VITALS
WEIGHT: 249.4 LBS | HEIGHT: 73 IN | OXYGEN SATURATION: 97 % | HEART RATE: 102 BPM | DIASTOLIC BLOOD PRESSURE: 92 MMHG | SYSTOLIC BLOOD PRESSURE: 150 MMHG | BODY MASS INDEX: 33.05 KG/M2

## 2023-06-05 DIAGNOSIS — Z98.890 STATUS POST LUMBAR SURGERY: ICD-10-CM

## 2023-06-05 DIAGNOSIS — M54.50 LUMBAR PAIN: ICD-10-CM

## 2023-06-05 DIAGNOSIS — M25.561 BILATERAL CHRONIC KNEE PAIN: ICD-10-CM

## 2023-06-05 DIAGNOSIS — M17.0 PRIMARY OSTEOARTHRITIS OF BOTH KNEES: Primary | ICD-10-CM

## 2023-06-05 DIAGNOSIS — G89.29 BILATERAL CHRONIC KNEE PAIN: ICD-10-CM

## 2023-06-05 DIAGNOSIS — M25.562 BILATERAL CHRONIC KNEE PAIN: ICD-10-CM

## 2023-06-05 DIAGNOSIS — E78.00 ELEVATED CHOLESTEROL: ICD-10-CM

## 2023-06-05 PROBLEM — H16.229 KERATOCONJUNCTIVITIS SICCA NOT SPECIFIED AS SJOGREN'S: Status: ACTIVE | Noted: 2018-11-28

## 2023-06-05 NOTE — TELEPHONE ENCOUNTER
Spoke with pt's wife and his appt was moved to 8:40 on 6/19.  She will send Dr Doran a message to make sure it's safe for him to have a steroid injection.

## 2023-06-05 NOTE — TELEPHONE ENCOUNTER
I am happy to release Mr. Munson's medication management to Dr. Palma. We will be happy to see him back for any interventional procedures if needed in the future.

## 2023-06-05 NOTE — PROGRESS NOTES
Subjective   Arelis Munson is a 48 y.o. male.     Chief Complaint   Patient presents with    Heartburn    Establish Care       History of Present Illness   48-year-old male presenting as a new patient to our office.  Previously had been seen by LORENZO Ziegler.  Has known history of GERD, osteoarthritis of knees, migraine headaches, lumbar disc herniation with radiculopathy status post lumbar surgery, asthma, NITA on CPAP, testosterone deficiency.  Currently is followed by orthopedics, pain management, neuro spine, urology and pulmonology.  Had back pain and was receiving injections through pain management.  Subsequently, had lumbar discectomy 5/17/23.  Back pain.  Has also knee OA and pain issues.  Will be having knee surgery in future but has to wait till back healed and planned in 4-6 months.  Currently, is using hydrocodone post back surgery and last used 3 nights ago and using the muscle relaxer.  The nerve pain in improved but will be starting the PT soon.  Has been doing well with no new issues or problems.    The following portions of the patient's history were reviewed and updated as appropriate: allergies, current medications, past family history, past medical history, past social history, past surgical history and problem list.    Depression Screen:      1/16/2023    11:33 AM   PHQ-2/PHQ-9 Depression Screening   Little Interest or Pleasure in Doing Things 0-->not at all   Feeling Down, Depressed or Hopeless 0-->not at all   Trouble Falling or Staying Asleep, or Sleeping Too Much 0-->not at all   Feeling Tired or Having Little Energy 0-->not at all   Poor Appetite or Overeating 0-->not at all   Feeling Bad about Yourself - or that You are a Failure or Have Let Yourself or Your Family Down 0-->not at all   Trouble Concentrating on Things, Such as Reading the Newspaper or Watching Television 0-->not at all   Moving or Speaking So Slowly that Other People Could Have Noticed? Or the Opposite - Being So  Fidgety 0-->not at all   Thoughts that You Would be Better Off Dead or of Hurting Yourself in Some Way 0-->not at all   PHQ-9: Brief Depression Severity Measure Score 0   If You Checked Off Any Problems, How Difficult Have These Problems Made It For You to Do Your Work, Take Care of Things at Home, or Get Along with Other People? not difficult at all       Past Medical History:   Diagnosis Date    Anemia     Arthritis     OSTEO    Asthma     Chronic pain disorder 07/01/2021    COVID-19 04/2021    ED (erectile dysfunction)     Elevated cholesterol     Environmental allergies     Erectile dysfunction     Fracture, fibula     RIGHT AND ANKLE    Gastroesophageal reflux disease without esophagitis 07/11/2016    Joint pain 09/01/2002    Low back pain     Migraine with aura and without status migrainosus, not intractable 07/11/2016    Pain and swelling of toe of right foot     Peripheral neuropathy     Seasonal allergies     Sleep apnea     CPAP    Testosterone deficiency 07/11/2016       Past Surgical History:   Procedure Laterality Date    ANKLE ARTHROSCOPY Right 01/26/2018    Procedure: RT ANKLE ARTHROSCOPY SUBCHONDROPLASTY TALUS ;  Surgeon: Lebron Spivey Jr., MD;  [place] [facility] [place] [facility] [place];  Service:     ANKLE LIGAMENT RECONSTRUCTION Right 01/26/2018    Procedure: HARDWARE REMOVAL POSS LATERAL LIGAMENT RECONSTRUCTION ;  Surgeon: Lebron Spivey Jr., MD;  [place] [facility] OR [facility];  Service:     CARPAL TUNNEL RELEASE Right     COLONOSCOPY N/A 4/26/2023    Procedure: COLONOSCOPY to cecum and TI:;  Surgeon: Je Eddy MD;  [place] [facility] ENDOSCOPY;  Service: Gastroenterology;  Laterality: N/A;  pre:  screening  post:  diverticulosis, hemorrhoids    CUBITAL TUNNEL RELEASE Right     NERVE RELEASE    EAR BIOPSY Right     MYRINGOTOMY W/BONES REPLACED INNER EAR    EPIDURAL BLOCK      FRACTURE SURGERY      Dr. Lebron Spivey    KNEE SURGERY Right     MENISCUS REPAIR    LUMBAR DISCECTOMY Right 5/17/2023    Procedure: Lumbar  "Microscopic Discectomy, right lumbar 3 lumbar 4;  Surgeon: Jovon Doran MD;  [place] [facility] MAIN OR;  Service: Neurosurgery;  Laterality: Right;    NOSE SURGERY      ORIF FIBULA FRACTURE Right 2017    ANKLE INCLUDED    ORTHOPEDIC SURGERY      Dr. Tyrell Eddy    SINUS SURGERY      Dr. Vilchis    TONSILLECTOMY      As a kid       Family History   Problem Relation Age of Onset    Lung cancer Mother     Cancer Mother          of lung, breast, and brain cancer.    Lung cancer Father     Cancer Father          of lung cancer.    Colon cancer Maternal Grandfather     Malig Hyperthermia Neg Hx        Social History     Socioeconomic History    Marital status:    Tobacco Use    Smoking status: Former     Packs/day: 2.00     Years: 20.00     Pack years: 40.00     Types: Cigarettes     Start date: 1987     Quit date: 2002     Years since quittin.9    Smokeless tobacco: Never    Tobacco comments:     I have been smoke free for 19 years as of 19.   Vaping Use    Vaping Use: Never used   Substance and Sexual Activity    Alcohol use: Yes     Alcohol/week: 2.0 standard drinks     Types: 2 Shots of liquor per week     Comment: Socially    Drug use: No    Sexual activity: Yes     Partners: Female     Birth control/protection: None       Current Outpatient Medications   Medication Sig Dispense Refill    albuterol sulfate  (90 Base) MCG/ACT inhaler 2 puffs every 4 hours as needed for SOA or wheezing. Generic. 3 g 1    Ascorbic Acid (VITAMIN C PO) Take  by mouth. HOLD PER MD INSTR      aspirin 81 MG EC tablet Take 1 tablet by mouth Daily. HOLD PER MD INSTR      azelastine (ASTELIN) 0.1 % nasal spray 2 sprays into the nostril(s) as directed by provider 2 (Two) Times a Day. Use in each nostril as directed 30 mL 11    B Complex Vitamins (VITAMIN B COMPLEX PO) Take  by mouth Daily. HOLD PRIOR TO SURGERY      B-D 3CC LUER-PUMA SYR 22GX1\" 22G X 1\" 3 ML misc       budesonide (Pulmicort " Flexhaler) 180 MCG/ACT inhaler Use 1 or 2 inhalations each AM. Rinse and spit after use. 2 each 3    Cholecalciferol (VITAMIN D3) 5000 UNITS capsule capsule Take 1 capsule by mouth Daily. HOLD PRIOR TO SURGERY      cyclobenzaprine (FLEXERIL) 10 MG tablet Take 1 tablet by mouth 3 (Three) Times a Day As Needed for Muscle Spasms. 60 tablet 3    desonide (DESOWEN) 0.05 % cream APPLY TO AFFECTED AREA(S) THREE TIMES A DAY 60 each 6    Diclofenac Sodium (VOLTAREN) 1 % gel gel Apply 4 g topically to the appropriate area as directed 4 (Four) Times a Day. 100 g 3    diphenhydrAMINE HCl (BENADRYL ALLERGY PO) Take 1 tablet by mouth Daily.      docusate sodium (COLACE) 250 MG capsule Take 1 capsule by mouth Daily.      esomeprazole (nexIUM) 40 MG capsule TAKE ONE CAPSULE BY MOUTH ONCE NIGHTLY 30 capsule 11    etodolac (LODINE) 500 MG tablet TAKE ONE TABLET BY MOUTH TWICE A DAY 60 tablet 5    ezetimibe-simvastatin (VYTORIN) 10-40 MG per tablet TAKE ONE TABLET BY MOUTH ONCE NIGHTLY 30 tablet 11    FIBER PO Take  by mouth.      FLONASE SENSIMIST 27.5 MCG/SPRAY nasal spray USE ONE SPRAY IN EACH NOSTRIL ONCE DAILY 9.1 mL 12    gabapentin (NEURONTIN) 800 MG tablet TAKE ONE TABLET BY MOUTH THREE TIMES A DAY 90 tablet 1    HYDROcodone-acetaminophen (NORCO) 7.5-325 MG per tablet Take 1 tablet by mouth Every 6 (Six) Hours As Needed for Moderate Pain or Severe Pain. 25 tablet 0    ketoconazole (NIZORAL) 2 % cream APPLY TO AFFECTED AREA(S) DAILY AS DIRECTED 60 g 3    melatonin 1 MG tablet Take 3 tablets by mouth Every Night. HOLD PRIOR TO SURGERY      montelukast (SINGULAIR) 10 MG tablet TAKE ONE TABLET BY MOUTH ONCE NIGHTLY 30 tablet 11    Multiple Vitamin (MULTI VITAMIN DAILY PO) Take  by mouth Daily. HOLD PER MD NUÑEZ      olopatadine (PATANASE) 0.6 % solution nasal solution SPRAY TWO SPRAYS IN EACH NOSTRIL TWICE DAILY 30.5 g 6    Potassium 99 MG tablet Take  by mouth.      rizatriptan MLT (MAXALT-MLT) 10 MG disintegrating tablet  "DISSOLVE ONE TABLET BY MOUTH AS NEEDED FOR MIGRAINE; MAY REPEAT ONE TABLET IN 2 HOURS IF NEEDED. 27 tablet 2    SALINE NASAL SPRAY NA into each nostril.      selenium sulfide (SELSUN) 2.5 % lotion APPLY TO AFFECTED AREA(S) DAILY AS NEEDED FOR DANDRUFF AS DIRECTED 120 mL 11    sildenafil (VIAGRA) 100 MG tablet TAKE 1 TABLET BY MOUTH AS NEEDED FOR ERECTILE DYSFUNCTION 5 tablet 5    Testosterone Cypionate (DEPOTESTOTERONE CYPIONATE) 200 MG/ML injection Inject  into the appropriate muscle as directed by prescriber Every 14 (Fourteen) Days.      zolpidem CR (Ambien CR) 12.5 MG CR tablet Take 1 tablet by mouth At Night As Needed for Sleep. 90 tablet 1     No current facility-administered medications for this visit.       Review of Systems   Constitutional:  Negative for activity change, appetite change, fatigue, fever, unexpected weight gain and unexpected weight loss.   HENT:  Negative for nosebleeds, rhinorrhea, trouble swallowing and voice change.    Eyes:  Negative for visual disturbance.   Respiratory:  Negative for cough, chest tightness, shortness of breath and wheezing.    Cardiovascular:  Negative for chest pain, palpitations and leg swelling.   Gastrointestinal:  Negative for abdominal pain, blood in stool, constipation, diarrhea, nausea, vomiting, GERD and indigestion.   Genitourinary:  Negative for dysuria, frequency and hematuria.   Musculoskeletal:  Positive for arthralgias and back pain. Negative for myalgias.   Skin:  Negative for rash and wound.   Neurological:  Negative for dizziness, tremors, weakness, light-headedness, numbness, headache and memory problem.   Hematological:  Negative for adenopathy. Does not bruise/bleed easily.   Psychiatric/Behavioral:  Negative for sleep disturbance and depressed mood. The patient is not nervous/anxious.      Objective   /92 (BP [place] Left arm, Patient Position: Sitting, Cuff Size: Large Adult)   Pulse 102   Ht 185.4 cm (73\")   Wt 113 kg (249 lb 6.4 oz) "   SpO2 97%   BMI 32.90 kg/m²     Physical Exam  Vitals and nursing note reviewed.   Constitutional:       General: He is not in acute distress.     Appearance: He is well-developed. He is obese. He is not diaphoretic.   HENT:      Head: Normocephalic and atraumatic.      Right Ear: External ear normal.      Left Ear: External ear normal.      Nose: Nose normal.   Eyes:      Conjunctiva/sclera: Conjunctivae normal.      Pupils: Pupils are equal, round, and reactive to light.   Neck:      Thyroid: No thyromegaly.      Trachea: No tracheal deviation.   Cardiovascular:      Rate and Rhythm: Normal rate and regular rhythm.      Heart sounds: Normal heart sounds. No murmur heard.    No friction rub. No gallop.   Pulmonary:      Effort: Pulmonary effort is normal. No respiratory distress.      Breath sounds: Normal breath sounds.   Abdominal:      General: Bowel sounds are normal.      Palpations: Abdomen is soft. There is no mass.      Tenderness: There is no abdominal tenderness. There is no guarding.   Musculoskeletal:         General: Normal range of motion.      Cervical back: Normal range of motion and neck supple.   Lymphadenopathy:      Cervical: No cervical adenopathy.   Skin:     General: Skin is warm and dry.      Capillary Refill: Capillary refill takes less than 2 seconds.      Findings: No rash.   Neurological:      Mental Status: He is alert and oriented to person, place, and time.      Motor: No abnormal muscle tone.      Deep Tendon Reflexes: Reflexes normal.   Psychiatric:         Behavior: Behavior normal.         Thought Content: Thought content normal.         Judgment: Judgment normal.       Recent Results (from the past 2016 hour(s))   Basic Metabolic Panel    Collection Time: 05/09/23  2:10 PM    Specimen: Blood   Result Value Ref Range    Glucose 82 65 - 99 mg/dL    BUN 9 6 - 20 mg/dL    Creatinine 1.07 0.76 - 1.27 mg/dL    Sodium 142 136 - 145 mmol/L    Potassium 4.1 3.5 - 5.2 mmol/L    Chloride  105 98 - 107 mmol/L    CO2 28.6 22.0 - 29.0 mmol/L    Calcium 9.5 8.6 - 10.5 mg/dL    BUN/Creatinine Ratio 8.4 7.0 - 25.0    Anion Gap 8.4 5.0 - 15.0 mmol/L    eGFR 84.5 >60.0 mL/min/1.73   CBC (No Diff)    Collection Time: 05/09/23  2:10 PM    Specimen: Blood   Result Value Ref Range    WBC 7.38 3.40 - 10.80 10*3/mm3    RBC 5.32 4.14 - 5.80 10*6/mm3    Hemoglobin 16.6 13.0 - 17.7 g/dL    Hematocrit 48.4 37.5 - 51.0 %    MCV 91.0 79.0 - 97.0 fL    MCH 31.2 26.6 - 33.0 pg    MCHC 34.3 31.5 - 35.7 g/dL    RDW 12.9 12.3 - 15.4 %    RDW-SD 44.1 37.0 - 54.0 fl    MPV 8.7 6.0 - 12.0 fL    Platelets 294 140 - 450 10*3/mm3   ECG 12 Lead    Collection Time: 05/09/23  2:59 PM   Result Value Ref Range    QT Interval 362 ms     Assessment & Plan   Diagnoses and all orders for this visit:    1. Primary osteoarthritis of both knees (Primary)    2. Status post lumbar surgery    3. Lumbar pain    4. Elevated cholesterol  -     Comprehensive Metabolic Panel; Future  -     Lipid Panel; Future    5. Bilateral chronic knee pain      Plan to stop the hydrocodone and change back to gabapentin TID and tramadol. Then start weaning down on the gabapentin in the furture.  Recommend that if he is changing to us then to have clearance from pain management.  Follow up with ortho and to consider the knee surgery later this year.  Will check the labs as ordered.  Controlled substance agreement discussed.  Given the form which she will review at home and bring back in later if okayed by pain management.           COVID-19 Precautions - Patient was compliant in wearing a mask. When I saw the patient, I used appropriate personal protective equipment (PPE) including mask and eye shield (standard procedure).  Additionally, I used gown and gloves if indicated.  Hand hygiene was completed before and after seeing the patient.  Dictated utilizing Dragon Dictation

## 2023-06-05 NOTE — TELEPHONE ENCOUNTER
"----- Message from LORENZO Vega sent at 6/5/2023  9:30 AM EDT -----  Regarding: FW: Earlier Appointment  Contact: [phone]  Happy to see him whatever time works best - have him clear the cortisone injection with his neurosurgeon post op - should be fine - cim  ----- Message -----  From: Erin Ritchie MA  Sent: 6/5/2023   9:03 AM EDT  To: LORENZO Vega  Subject: Earlier Appointment                              Please review pt message       ----- Message -----  From: Arelis Munson \"Pham\"  Sent: 6/5/2023   8:55 AM EDT  To: Praveena Zaidi [facility]  Subject: Earlier Appointment                              Good day to you Kalyani I'm currently off work due to back surgery on 5- and was wondering if my appointment on 6- could be moved up to earlier in the day?    Please let me know and or you can call my wife Stephanie Munson [phone] and she can get it scheduled.    Thank you!    Arelis \"Ankit\" Arpit    "

## 2023-06-05 NOTE — TELEPHONE ENCOUNTER
Into mother's room to check infant BS and temp. Temp of 97.6 axillary after 30 minutes of skin to skin with mom. Mom stated that she is tired, so asked nurse to wrap baby and put in crib. BS 31, explained to mom that we would need to supplement to get infant's BS up, mom agreed. Infant to nursery and placed under warmer, nippled 16 ml, tolerated well.    2300  Infant temp 99.1, BS 61. Infant returned to mom and instructed on next feeding and blood sugar check. Infant swaddled in open crib, no distress noted. 2330  Report given to A DARRICK Bates. Report included Delivery Summary, SBAR, IO, Kardex, and MAR. Patient called and stated that his PCP Dr. Scruggs told him that he can provide his care after he is released by Dr. Doran and he would like you to send a message/ or a put a note in the chart that you are releasing him to Dr. Scruggs for further treatment. Please advise.

## 2023-06-07 LAB
ALBUMIN SERPL-MCNC: 4.3 G/DL (ref 4–5)
ALBUMIN/GLOB SERPL: 2.4 {RATIO} (ref 1.2–2.2)
ALP SERPL-CCNC: 76 IU/L (ref 44–121)
ALT SERPL-CCNC: 32 IU/L (ref 0–44)
AMBIG ABBREV CMP14 DEFAULT: NORMAL
AMBIG ABBREV LP DEFAULT: NORMAL
AST SERPL-CCNC: 22 IU/L (ref 0–40)
BILIRUB SERPL-MCNC: 0.3 MG/DL (ref 0–1.2)
BUN SERPL-MCNC: 8 MG/DL (ref 6–24)
BUN/CREAT SERPL: 8 (ref 9–20)
CALCIUM SERPL-MCNC: 9.4 MG/DL (ref 8.7–10.2)
CHLORIDE SERPL-SCNC: 101 MMOL/L (ref 96–106)
CHOLEST SERPL-MCNC: 129 MG/DL (ref 100–199)
CO2 SERPL-SCNC: 28 MMOL/L (ref 20–29)
CREAT SERPL-MCNC: 0.97 MG/DL (ref 0.76–1.27)
EGFRCR SERPLBLD CKD-EPI 2021: 95 ML/MIN/1.73
GLOBULIN SER CALC-MCNC: 1.8 G/DL (ref 1.5–4.5)
GLUCOSE SERPL-MCNC: 86 MG/DL (ref 70–99)
HDLC SERPL-MCNC: 37 MG/DL
LDLC SERPL CALC-MCNC: 60 MG/DL (ref 0–99)
POTASSIUM SERPL-SCNC: 4.5 MMOL/L (ref 3.5–5.2)
PROT SERPL-MCNC: 6.1 G/DL (ref 6–8.5)
SODIUM SERPL-SCNC: 143 MMOL/L (ref 134–144)
TRIGL SERPL-MCNC: 194 MG/DL (ref 0–149)
VLDLC SERPL CALC-MCNC: 32 MG/DL (ref 5–40)

## 2023-06-08 ENCOUNTER — PATIENT ROUNDING (BHMG ONLY) (OUTPATIENT)
Dept: FAMILY MEDICINE CLINIC | Facility: CLINIC | Age: 48
End: 2023-06-08
Payer: COMMERCIAL

## 2023-06-08 NOTE — PROGRESS NOTES
A My-Chart message has been sent to the patient for PATIENT ROUNDING with [facility] [place]

## 2023-06-14 ENCOUNTER — OFFICE VISIT (OUTPATIENT)
Dept: SLEEP MEDICINE | Facility: HOSPITAL | Age: 48
End: 2023-06-14
Payer: COMMERCIAL

## 2023-06-14 VITALS — HEART RATE: 83 BPM | BODY MASS INDEX: 32.47 KG/M2 | OXYGEN SATURATION: 98 % | HEIGHT: 73 IN | WEIGHT: 245 LBS

## 2023-06-14 DIAGNOSIS — G47.33 OSA ON CPAP: Primary | ICD-10-CM

## 2023-06-14 DIAGNOSIS — Z99.89 OSA ON CPAP: Primary | ICD-10-CM

## 2023-06-14 DIAGNOSIS — F51.04 PSYCHOPHYSIOLOGICAL INSOMNIA: ICD-10-CM

## 2023-06-14 PROCEDURE — G0463 HOSPITAL OUTPT CLINIC VISIT: HCPCS

## 2023-06-14 RX ORDER — EZETIMIBE AND SIMVASTATIN 10; 40 MG/1; MG/1
TABLET ORAL
Qty: 30 TABLET | Refills: 11 | Status: SHIPPED | OUTPATIENT
Start: 2023-06-14

## 2023-06-14 NOTE — PROGRESS NOTES
"Follow Up Sleep Disorders Center Note     Chief Complaint:  NITA     Primary Care Physician: Terrell Palma MD    Interval History:   The patient is a 48 y.o. male  who I last saw 2022 and that note was reviewed.  The patient reports he is unchanged without new complaints.  He goes to bed 11 PM and gets out of bed at 5 AM.    The patient reports having back surgery 2023 and he states his back has improved.    Review of Systems:    A complete review of systems was done and all were negative with the exception of some nasal congestion and postnasal drip and some ADAM    Social History:    Social History     Socioeconomic History   • Marital status:    Tobacco Use   • Smoking status: Former     Packs/day: 2.00     Years: 20.00     Pack years: 40.00     Types: Cigarettes     Start date: 1987     Quit date: 2002     Years since quittin.9   • Smokeless tobacco: Never   • Tobacco comments:     I have been smoke free for 19 years as of 19.   Vaping Use   • Vaping Use: Never used   Substance and Sexual Activity   • Alcohol use: Yes     Alcohol/week: 2.0 standard drinks     Types: 2 Shots of liquor per week     Comment: Socially   • Drug use: No   • Sexual activity: Yes     Partners: Female     Birth control/protection: None       Allergies:  Patient has no known allergies.     Medication Review:  Reviewed.      Vital Signs:    Vitals:    23 0700   Pulse: 83   SpO2: 98%   Weight: 111 kg (245 lb)   Height: 185.4 cm (72.99\")     Body mass index is 32.33 kg/m².    Physical Exam:    Constitutional:  Well developed 48 y.o. male that appears in no apparent distress.  Awake & oriented times 3.  Normal mood with normal recent and remote memory and normal judgement.  Eyes:  Conjunctivae normal.  Oropharynx: Previously, moist mucous membranes without exudate and a large tongue and normal uvula and class III Mallampati airway.    Self-administered [place] Sleepiness Scale test results: 3, " previously 2-3  0-5 Lower normal daytime sleepiness  6-10 Higher normal daytime sleepiness  11-12 Mild, 13-15 Moderate, & 16-24 Severe excessive daytime sleepiness     Downloaded PAP Data Reviewed For Therapeutic Response and Compliance:  DME is previously [employer] and will be changed to [employer] and he uses a fullface mask.  Downloads between 3/17 and 6/14/2023 compliance 90%.  Average usage is 6 hours and 42 minutes.  Average AHI is normal without leak.  Average auto CPAP pressure is 15.9 and his new DreamStation 2 auto CPAP is set at 4-20.  Previously, he is 9-15    I have reviewed the above results and compared them with the patient's last downloads and reviewed with the patient.    Impression:   Obstructive sleep apnea adequately treated with  DreamStation 2 auto CPAP. The patient appears to be at goal with good compliance and usage. The patient has no complaints of hypersomnolence.  Circadian rhythm sleep disorder, shiftwork type, early morning awakening  Psychophysiologic insomnia adequately treated with Ambien CR 12.5 mg 1/2 to 1 tablet at bedtime     Plan:  Good sleep hygiene measures should be maintained.  Weight loss would be beneficial in this patient who is obese by Body mass index is 32.33 kg/m²..      After evaluating the patient and assessing results available, the patient is benefiting from the treatment being provided.     The patient will continue DreamStation 2 auto CPAP.  Potential side effects of CPAP therapy reviewed and addressed as needed.  After clinical evaluation and review of downloads, I recommend changes to the patient's pressures.  The patient's new DreamStation 2 auto CPAP will be changed to 9-15.  A new prescription will be sent to the patient's DME.    The patient continues to take Ambien CR 12.5 mg at bedtime.  Mateo reviewed.  Other medications also noted.    I answered all of the patient's questions.  The patient will call the Sleep Disorder Center for any problems with side effects  of CPAP therapy and will follow up in 6 months.      Ronni Kong MD  Sleep Medicine  06/14/23  08:38 EDT

## 2023-06-19 ENCOUNTER — CLINICAL SUPPORT (OUTPATIENT)
Dept: ORTHOPEDIC SURGERY | Facility: CLINIC | Age: 48
End: 2023-06-19
Payer: COMMERCIAL

## 2023-06-19 ENCOUNTER — TELEPHONE (OUTPATIENT)
Dept: FAMILY MEDICINE CLINIC | Facility: CLINIC | Age: 48
End: 2023-06-19
Payer: COMMERCIAL

## 2023-06-19 ENCOUNTER — TELEPHONE (OUTPATIENT)
Dept: SLEEP MEDICINE | Facility: HOSPITAL | Age: 48
End: 2023-06-19
Payer: COMMERCIAL

## 2023-06-19 VITALS — WEIGHT: 250 LBS | TEMPERATURE: 97.6 F | HEIGHT: 73 IN | BODY MASS INDEX: 33.13 KG/M2

## 2023-06-19 DIAGNOSIS — M17.0 PRIMARY OSTEOARTHRITIS OF BOTH KNEES: Primary | ICD-10-CM

## 2023-06-19 RX ORDER — METHYLPREDNISOLONE ACETATE 80 MG/ML
1 INJECTION, SUSPENSION INTRA-ARTICULAR; INTRALESIONAL; INTRAMUSCULAR; SOFT TISSUE
Status: COMPLETED | OUTPATIENT
Start: 2023-06-19 | End: 2023-06-19

## 2023-06-19 RX ORDER — LIDOCAINE HYDROCHLORIDE 10 MG/ML
2 INJECTION, SOLUTION EPIDURAL; INFILTRATION; INTRACAUDAL; PERINEURAL
Status: COMPLETED | OUTPATIENT
Start: 2023-06-19 | End: 2023-06-19

## 2023-06-19 RX ADMIN — METHYLPREDNISOLONE ACETATE 1 ML: 80 INJECTION, SUSPENSION INTRA-ARTICULAR; INTRALESIONAL; INTRAMUSCULAR; SOFT TISSUE at 09:00

## 2023-06-19 RX ADMIN — LIDOCAINE HYDROCHLORIDE 2 ML: 10 INJECTION, SOLUTION EPIDURAL; INFILTRATION; INTRACAUDAL; PERINEURAL at 09:02

## 2023-06-19 RX ADMIN — METHYLPREDNISOLONE ACETATE 1 ML: 80 INJECTION, SUSPENSION INTRA-ARTICULAR; INTRALESIONAL; INTRAMUSCULAR; SOFT TISSUE at 09:02

## 2023-06-19 RX ADMIN — LIDOCAINE HYDROCHLORIDE 2 ML: 10 INJECTION, SOLUTION EPIDURAL; INFILTRATION; INTRACAUDAL; PERINEURAL at 09:00

## 2023-06-19 NOTE — PROGRESS NOTES
"Patient Name: Arelis Munson   YOB: 1975  Referring Primary Care Physician: Terrell Palma MD  BMI: Body mass index is 32.98 kg/m².    Chief Complaint:    Chief Complaint   Patient presents with   • Right Knee - Follow-up, Pain   • Left Knee - Follow-up, Pain        HPI:  Here for both knees hurting and desires conservative treatment    Arelis Munson is a 48 y.o. male who presents today for evaluation of   Chief Complaint   Patient presents with   • Right Knee - Follow-up, Pain   • Left Knee - Follow-up, Pain           Subjective   Medications:   Home Medications:  Current Outpatient Medications on File Prior to Visit   Medication Sig   • albuterol sulfate  (90 Base) MCG/ACT inhaler 2 puffs every 4 hours as needed for SOA or wheezing. Generic.   • Ascorbic Acid (VITAMIN C PO) Take  by mouth. HOLD PER MD INSTR   • aspirin 81 MG EC tablet Take 1 tablet by mouth Daily. HOLD PER MD INSTR   • azelastine (ASTELIN) 0.1 % nasal spray 2 sprays into the nostril(s) as directed by provider 2 (Two) Times a Day. Use in each nostril as directed   • B Complex Vitamins (VITAMIN B COMPLEX PO) Take  by mouth Daily. HOLD PRIOR TO SURGERY   • B-D 3CC LUER-PUMA SYR 22GX1\" 22G X 1\" 3 ML misc    • budesonide (Pulmicort Flexhaler) 180 MCG/ACT inhaler Use 1 or 2 inhalations each AM. Rinse and spit after use.   • Cholecalciferol (VITAMIN D3) 5000 UNITS capsule capsule Take 1 capsule by mouth Daily. HOLD PRIOR TO SURGERY   • cyclobenzaprine (FLEXERIL) 10 MG tablet Take 1 tablet by mouth 3 (Three) Times a Day As Needed for Muscle Spasms.   • desonide (DESOWEN) 0.05 % cream APPLY TO AFFECTED AREA(S) THREE TIMES A DAY   • Diclofenac Sodium (VOLTAREN) 1 % gel gel Apply 4 g topically to the appropriate area as directed 4 (Four) Times a Day.   • diphenhydrAMINE HCl (BENADRYL ALLERGY PO) Take 1 tablet by mouth Daily.   • docusate sodium (COLACE) 250 MG capsule Take 1 capsule by mouth Daily.   • esomeprazole (nexIUM) 40 MG " capsule TAKE ONE CAPSULE BY MOUTH ONCE NIGHTLY   • etodolac (LODINE) 500 MG tablet TAKE ONE TABLET BY MOUTH TWICE A DAY   • ezetimibe-simvastatin (VYTORIN) 10-40 MG per tablet TAKE ONE TABLET BY MOUTH ONCE NIGHTLY   • FIBER PO Take  by mouth.   • FLONASE SENSIMIST 27.5 MCG/SPRAY nasal spray USE ONE SPRAY IN EACH NOSTRIL ONCE DAILY   • gabapentin (NEURONTIN) 800 MG tablet TAKE ONE TABLET BY MOUTH THREE TIMES A DAY   • HYDROcodone-acetaminophen (NORCO) 7.5-325 MG per tablet Take 1 tablet by mouth Every 6 (Six) Hours As Needed for Moderate Pain or Severe Pain.   • ketoconazole (NIZORAL) 2 % cream APPLY TO AFFECTED AREA(S) DAILY AS DIRECTED   • melatonin 1 MG tablet Take 3 tablets by mouth Every Night. HOLD PRIOR TO SURGERY   • montelukast (SINGULAIR) 10 MG tablet TAKE ONE TABLET BY MOUTH ONCE NIGHTLY   • Multiple Vitamin (MULTI VITAMIN DAILY PO) Take  by mouth Daily. HOLD PER MD INSTR   • olopatadine (PATANASE) 0.6 % solution nasal solution SPRAY TWO SPRAYS IN EACH NOSTRIL TWICE DAILY   • Potassium 99 MG tablet Take  by mouth.   • rizatriptan MLT (MAXALT-MLT) 10 MG disintegrating tablet DISSOLVE ONE TABLET BY MOUTH AS NEEDED FOR MIGRAINE; MAY REPEAT ONE TABLET IN 2 HOURS IF NEEDED.   • SALINE NASAL SPRAY NA into each nostril.   • selenium sulfide (SELSUN) 2.5 % lotion APPLY TO AFFECTED AREA(S) DAILY AS NEEDED FOR DANDRUFF AS DIRECTED   • sildenafil (VIAGRA) 100 MG tablet TAKE 1 TABLET BY MOUTH AS NEEDED FOR ERECTILE DYSFUNCTION   • Testosterone Cypionate (DEPOTESTOTERONE CYPIONATE) 200 MG/ML injection Inject  into the appropriate muscle as directed by prescriber Every 14 (Fourteen) Days.   • zolpidem CR (Ambien CR) 12.5 MG CR tablet Take 1 tablet by mouth At Night As Needed for Sleep.     No current facility-administered medications on file prior to visit.     Current Medications:  Scheduled Meds:  Continuous Infusions:No current facility-administered medications for this visit.    PRN Meds:.    I have reviewed the  patient's medical history in detail and updated the computerized patient record.  Review and summarization of old records includes:    Past Medical History:   Diagnosis Date   • Anemia    • Arthritis     OSTEO   • Asthma    • Chronic pain disorder 07/01/2021   • COVID-19 04/2021   • ED (erectile dysfunction)    • Elevated cholesterol    • Environmental allergies    • Erectile dysfunction    • Fracture, fibula     RIGHT AND ANKLE   • Gastroesophageal reflux disease without esophagitis 07/11/2016   • Joint pain 09/01/2002   • Low back pain    • Migraine with aura and without status migrainosus, not intractable 07/11/2016   • Pain and swelling of toe of right foot    • Peripheral neuropathy    • Seasonal allergies    • Sleep apnea     CPAP   • Testosterone deficiency 07/11/2016        Past Surgical History:   Procedure Laterality Date   • ANKLE ARTHROSCOPY Right 01/26/2018    Procedure: RT ANKLE ARTHROSCOPY SUBCHONDROPLASTY TALUS ;  Surgeon: Lebron Spivey Jr., MD;  [facility];  Service:    • ANKLE LIGAMENT RECONSTRUCTION Right 01/26/2018    Procedure: HARDWARE REMOVAL POSS LATERAL LIGAMENT RECONSTRUCTION ;  Surgeon: Lebron Spivey Jr., MD;  [facility];  Service:    • CARPAL TUNNEL RELEASE Right    • COLONOSCOPY N/A 4/26/2023    Procedure: COLONOSCOPY to cecum and TI:;  Surgeon: Je Eddy MD;  [place] [facility] ENDOSCOPY;  Service: Gastroenterology;  Laterality: N/A;  pre:  screening  post:  diverticulosis, hemorrhoids   • CUBITAL TUNNEL RELEASE Right     NERVE RELEASE   • EAR BIOPSY Right     MYRINGOTOMY W/BONES REPLACED INNER EAR   • EPIDURAL BLOCK     • FRACTURE SURGERY      Dr. Lebron Spivey   • KNEE SURGERY Right     MENISCUS REPAIR   • LUMBAR DISCECTOMY Right 5/17/2023    Procedure: Lumbar Microscopic Discectomy, right lumbar 3 lumbar 4;  Surgeon: Jovon Doran MD;  [place] [facility] MAIN OR;  Service: Neurosurgery;  Laterality: Right;   • NOSE SURGERY     • ORIF FIBULA FRACTURE  Right 2017    ANKLE INCLUDED   • ORTHOPEDIC SURGERY      Dr. Tyrell Eddy   • SINUS SURGERY      Dr. Vilchis   • TONSILLECTOMY      As a kid        Social History     Occupational History   • Not on file   Tobacco Use   • Smoking status: Former     Packs/day: 2.00     Years: 20.00     Pack years: 40.00     Types: Cigarettes     Start date: 1987     Quit date: 2002     Years since quittin.9   • Smokeless tobacco: Never   • Tobacco comments:     I have been smoke free for 19 years as of 19.   Vaping Use   • Vaping Use: Never used   Substance and Sexual Activity   • Alcohol use: Yes     Alcohol/week: 2.0 standard drinks     Types: 2 Shots of liquor per week     Comment: Socially   • Drug use: No   • Sexual activity: Yes     Partners: Female     Birth control/protection: None      Social History     Social History Narrative   • Not on file        Family History   Problem Relation Age of Onset   • Lung cancer Mother    • Cancer Mother          of lung, breast, and brain cancer.   • Lung cancer Father    • Cancer Father          of lung cancer.   • Colon cancer Maternal Grandfather    • Malig Hyperthermia Neg Hx        ROS: 14 point review of systems was performed and all other systems were reviewed and are negative except for documented findings in HPI and today's encounter.     Allergies: No Known Allergies  Constitutional:  Denies fever, shaking or chills   Eyes:  Denies change in visual acuity   HENT:  Denies nasal congestion or sore throat   Respiratory:  Denies cough or shortness of breath   Cardiovascular:  Denies chest pain or severe LE edema   GI:  Denies abdominal pain, nausea, vomiting, bloody stools or diarrhea   Musculoskeletal:  Numbness, tingling, pain, or loss of motor function only as noted above in history of present illness.  : Denies painful urination or hematuria  Integument:  Denies rash, lesion or ulceration   Neurologic:  Denies headache or focal  "weakness  Endocrine:  Denies lymphadenopathy  Psych:  Denies confusion or change in mental status   Hem:  Denies active bleeding    OBJECTIVE:  Physical Exam: 48 y.o. male  Wt Readings from Last 3 Encounters:   06/19/23 113 kg (250 lb)   06/14/23 111 kg (245 lb)   06/05/23 113 kg (249 lb 6.4 oz)     Ht Readings from Last 1 Encounters:   06/19/23 185.4 cm (73\")     Body mass index is 32.98 kg/m².  Vitals:    06/19/23 0847   Temp: 97.6 °F (36.4 °C)     Vital signs reviewed.     General Appearance:    Alert, cooperative, in no acute distress                  Eyes: conjunctiva clear  ENT: external ears and nose atraumatic  CV: no peripheral edema  Resp: normal respiratory effort  Skin: no rashes or wounds; normal turgor  Psych: mood and affect appropriate  Lymph: no nodes appreciated  Neuro: gross sensation intact  Vascular:  Palpable peripheral pulse in noted extremity  Musculoskeletal Extremities: skin warm, dry and intact and ttp to both knees with medial joint line pain, calves soft and nttp, ambulates with no assistive devices, medial effusion     Radiology:   Reviewed and tricompartmental djd     Assessment:     ICD-10-CM ICD-9-CM   1. Primary osteoarthritis of both knees  M17.0 715.16        Large Joint Arthrocentesis: R knee  Date/Time: 6/19/2023 9:00 AM  Consent given by: patient  Site marked: site marked  Timeout: Immediately prior to procedure a time out was called to verify the correct patient, procedure, equipment, support staff and site/side marked as required   Supporting Documentation  Indications: pain, joint swelling and diagnostic evaluation   Procedure Details  [place] knee - R knee  Preparation: Patient was prepped and draped in the usual sterile fashion  Needle gauge: 21G.  Medications administered: 1 mL methylPREDNISolone acetate 80 MG/ML; 2 mL lidocaine PF 1% 1 %  Patient tolerance: patient tolerated the procedure well with no immediate complications    Large Joint Arthrocentesis: L " knee  Date/Time: 6/19/2023 9:02 AM  Consent given by: patient  Site marked: site marked  Timeout: Immediately prior to procedure a time out was called to verify the correct patient, procedure, equipment, support staff and site/side marked as required   Supporting Documentation  Indications: pain   Procedure Details  [place] knee - L knee  Preparation: Patient was prepped and draped in the usual sterile fashion  Needle gauge: 21G.  Medications administered: 2 mL lidocaine PF 1% 1 %; 1 mL methylPREDNISolone acetate 80 MG/ML  Patient tolerance: patient tolerated the procedure well with no immediate complications          MDM/Plan:   The diagnosis(es), natural history, pathophysiology and treatment for diagnosis(es) were discussed. Opportunity given and questions answered.  Biomechanics of pertinent body areas discussed.  When appropriate, the use of ambulatory aids discussed.  BMI:  The concept of BMI body mass index and its importance and implications discussed.    EXERCISES:  Advice on benefits of, and types of regular/moderate exercise pertaining to orthopedic diagnosis(es).  MEDICATIONS:  The risks, benefits, warnings,side effects and alternatives of medications discussed.  Inflammation/pain control; with cold, heat, elevation and/or liniments discussed as appropriate  Cortisone Injection. See procedure note.  MEDICAL RECORDS reviewed from other provider(s) for past and current medical history pertinent to this complaint.      6/19/2023    Much of this encounter note is an electronic transcription/translation of spoken language to printed text. The electronic translation of spoken language may permit erroneous, or at times, nonsensical words or phrases to be inadvertently transcribed; Although I have reviewed the note for such errors, some may still exist

## 2023-06-19 NOTE — TELEPHONE ENCOUNTER
Called patient to let him know he is needing to come back in to complete paperwork for controls, he does not have a follow up appt.  He states that he is needing is gabapentin and tramadol called in via you from other doctor?  Are we just waiting for him to fill out paperwork, does he need followup, does it need urine?  Please advise.

## 2023-06-22 NOTE — TELEPHONE ENCOUNTER
Spoke to patient's wife and voiced understanding.   3 month f/u scheduled.   ENMA Norton/REGINAR

## 2023-06-27 PROBLEM — M51.16 LUMBAR DISC HERNIATION WITH RADICULOPATHY: Status: RESOLVED | Noted: 2022-06-02 | Resolved: 2023-06-27

## 2023-07-31 RX ORDER — CYCLOBENZAPRINE HCL 10 MG
TABLET ORAL
Qty: 60 TABLET | Refills: 3 | Status: SHIPPED | OUTPATIENT
Start: 2023-07-31

## 2023-08-07 ENCOUNTER — TELEPHONE (OUTPATIENT)
Dept: ORTHOPEDIC SURGERY | Facility: CLINIC | Age: 48
End: 2023-08-07

## 2023-08-07 NOTE — TELEPHONE ENCOUNTER
Provider: ROHIT DEMARCO  Caller: ADRY BLOOM  Relationship to Patient: SPOUSE    Phone Number: [phone]    Reason for Call: SCHEDULE INJECTION BILAT KNEE    When was the patient last seen: 6.19.23

## 2023-08-14 RX ORDER — MONTELUKAST SODIUM 10 MG/1
TABLET ORAL
Qty: 30 TABLET | Refills: 5 | OUTPATIENT
Start: 2023-08-14

## 2023-08-28 NOTE — PROGRESS NOTES
"Subjective   History of Present Illness: Arelis Munson is a 48 y.o. male is here today for 6 week follow-up. He had a right L3-4 discectomy with extensive facetectomy and foraminotomies on 05-17-23.     Today, Mr. Munson reports he is doing well. He reports increased back pain after work. He denies any proximal related leg pain still has issues with his right knee that he has been followed by orthopedics for some time.  He is going to continue to accelerate his activity at work and he expects to probably follow-up with orthopedics next year to consider his knee replacement.     History of Present Illness    Tobacco Use: Medium Risk    Smoking Tobacco Use: Former    Smokeless Tobacco Use: Never    Passive Exposure: Past        The following portions of the patient's history were reviewed and updated as appropriate: allergies, current medications, past family history, past medical history, past social history, past surgical history and problem list.    Review of Systems    Objective     Vitals:    08/31/23 1044   BP: 132/80   Weight: 113 kg (250 lb)   Height: 185.4 cm (73\")     Body mass index is 32.98 kg/mý.      Physical Exam  Neurologic Exam    Physical Exam:    CONSTITUTIONAL:  appears well developed, well-nourished and in no acute distress.    NECK: the neck is supple and symmetric. The trachea is midline with no masses.      PULMONARY: Respiratory effort is normal with no increased work of breathing or signs of respiratory distress.    CARDIOVASCULAR: Pedal pulses are +2/4 bilaterally. Examination of the extremities shows no edema or varicosities.    MUSCULOSKELETAL: Gait normal    SKIN: The skin is warm, dry and intact.  Lumbar incision healed    NEUROLOGIC:   Normal motor strength noted. Muscle bulk and tone are normal.  Sensory exam is normal to fine touch  Right leg raising test negative bilaterally  Cortical function is intact and without deficits. Speech is normal.    PSYCHIATRIC: oriented to person, " place and time. Patient's mood and affect are normal.    Assessment & Plan     Medical Decision Making:      Patient is done remarkably well following lumbar surgery for radiculopathy.  He no longer has any residual radiculopathy but still has some chronic back issues that he is working through.  At this point he can resume his normal activity and follow-up on an as-needed basis    Return if he develops any back related issues.    Diagnoses and all orders for this visit:    1. Status post lumbar surgery (Primary)    2. Chronic midline low back pain without sciatica             Jovon Doran MD FACS FAANS  Neurological Surgery

## 2023-08-29 DIAGNOSIS — F51.04 PSYCHOPHYSIOLOGICAL INSOMNIA: ICD-10-CM

## 2023-08-29 DIAGNOSIS — G47.33 OSA ON CPAP: ICD-10-CM

## 2023-08-29 DIAGNOSIS — Z99.89 OSA ON CPAP: ICD-10-CM

## 2023-08-29 RX ORDER — ZOLPIDEM TARTRATE 12.5 MG/1
12.5 TABLET, FILM COATED, EXTENDED RELEASE ORAL NIGHTLY PRN
Qty: 90 TABLET | Refills: 1 | Status: SHIPPED | OUTPATIENT
Start: 2023-08-29

## 2023-08-31 ENCOUNTER — OFFICE VISIT (OUTPATIENT)
Dept: FAMILY MEDICINE CLINIC | Facility: CLINIC | Age: 48
End: 2023-08-31
Payer: COMMERCIAL

## 2023-08-31 ENCOUNTER — OFFICE VISIT (OUTPATIENT)
Dept: NEUROSURGERY | Facility: CLINIC | Age: 48
End: 2023-08-31
Payer: COMMERCIAL

## 2023-08-31 VITALS
TEMPERATURE: 97.5 F | OXYGEN SATURATION: 97 % | WEIGHT: 240.4 LBS | BODY MASS INDEX: 31.86 KG/M2 | HEIGHT: 73 IN | SYSTOLIC BLOOD PRESSURE: 134 MMHG | HEART RATE: 84 BPM | DIASTOLIC BLOOD PRESSURE: 102 MMHG

## 2023-08-31 VITALS
BODY MASS INDEX: 33.13 KG/M2 | SYSTOLIC BLOOD PRESSURE: 132 MMHG | WEIGHT: 250 LBS | DIASTOLIC BLOOD PRESSURE: 80 MMHG | HEIGHT: 73 IN

## 2023-08-31 DIAGNOSIS — M51.16 LUMBAR DISC HERNIATION WITH RADICULOPATHY: ICD-10-CM

## 2023-08-31 DIAGNOSIS — M54.50 CHRONIC MIDLINE LOW BACK PAIN WITHOUT SCIATICA: ICD-10-CM

## 2023-08-31 DIAGNOSIS — M54.41 CHRONIC RIGHT-SIDED LOW BACK PAIN WITH RIGHT-SIDED SCIATICA: ICD-10-CM

## 2023-08-31 DIAGNOSIS — G89.29 CHRONIC MIDLINE LOW BACK PAIN WITHOUT SCIATICA: ICD-10-CM

## 2023-08-31 DIAGNOSIS — M17.0 ARTHRITIS OF BOTH KNEES: ICD-10-CM

## 2023-08-31 DIAGNOSIS — G89.29 CHRONIC RIGHT-SIDED LOW BACK PAIN WITH RIGHT-SIDED SCIATICA: ICD-10-CM

## 2023-08-31 DIAGNOSIS — Z98.890 STATUS POST LUMBAR SURGERY: Primary | ICD-10-CM

## 2023-08-31 PROCEDURE — 99213 OFFICE O/P EST LOW 20 MIN: CPT | Performed by: INTERNAL MEDICINE

## 2023-08-31 RX ORDER — TRAMADOL HYDROCHLORIDE 50 MG/1
50 TABLET ORAL EVERY 8 HOURS PRN
Qty: 90 TABLET | Refills: 2 | Status: SHIPPED | OUTPATIENT
Start: 2023-08-31

## 2023-08-31 RX ORDER — ETODOLAC 500 MG/1
500 TABLET, FILM COATED ORAL 2 TIMES DAILY
Qty: 60 TABLET | Refills: 2 | Status: SHIPPED | OUTPATIENT
Start: 2023-08-31

## 2023-08-31 RX ORDER — GABAPENTIN 800 MG/1
800 TABLET ORAL 3 TIMES DAILY
Qty: 90 TABLET | Refills: 2 | Status: SHIPPED | OUTPATIENT
Start: 2023-08-31

## 2023-08-31 NOTE — PROGRESS NOTES
Subjective   Arelis Munson is a 48 y.o. male.     Chief Complaint   Patient presents with    chronic midline low back pain    bilateral chronic knee pain    Asthma    Heartburn     GERD       History of Present Illness    Answers submitted by the patient for this visit:  Primary Reason for Visit (Submitted on 8/31/2023)  What is the primary reason for your visit?: Other  Other (Submitted on 8/31/2023)  Please describe your symptoms.: Follow up to first visit to let you know how I'm doing with my medications and since back surgery on 5-. Also to know how often I'm supposed to come in to see you. Meaning do I have to come every six months or once a year for lab work and new RXs and or refill requests. , , I know to call for an appointment if I'm not feeling well outside of whatever the normal visit schedule will be.  Have you had these symptoms before?: No  How long have you been having these symptoms?: Greater than 2 weeks  Please list any medications you are currently taking for this condition.: See medications in my chart.  Please describe any probable cause for these symptoms. : No symptoms please see comments above.    History of GERD, osteoarthritis of knees, migraine headaches, lumbar disc herniation with radiculopathy status post lumbar surgery, asthma, NITA on CPAP, testosterone deficiency.  Currently is followed by orthopedics, pain management, neuro spine, urology and pulmonology.      Had back pain and was receiving injections through pain management.  Subsequently, had lumbar discectomy 5/17/23.  Has also has knee OA and pain issues.  Will be having knee surgery in future but has to wait till back healed.  Currently is using diclofenac gel 4 times a day, etodolac 500 mg twice daily, gabapentin 800 mg 3 times daily (last filled 7/19/2023 #90 tab), and tramadol 50 mg every 8 hours as needed (last filled on 7/19/2023 #90 tabs).    3 of GERD and currently is on Nexium 40 mg daily.    History of asthma  and NITA on auto CPAP.  Is on Singulair 10 mg daily along with Pulmicort Flexhaler 180 mcg per actuation 1 to 2 puffs every morning and albuterol inhaler as needed.    History of hyperlipidemia.  Is currently utilizing as a Benja-simvastatin 10-40 mg daily.  Currently without any problems or side effects.    The following portions of the patient's history were reviewed and updated as appropriate: allergies, current medications, past family history, past medical history, past social history, past surgical history and problem list.    Depression Screen:      1/16/2023    11:33 AM   PHQ-2/PHQ-9 Depression Screening   Little Interest or Pleasure in Doing Things 0-->not at all   Feeling Down, Depressed or Hopeless 0-->not at all   Trouble Falling or Staying Asleep, or Sleeping Too Much 0-->not at all   Feeling Tired or Having Little Energy 0-->not at all   Poor Appetite or Overeating 0-->not at all   Feeling Bad about Yourself - or that You are a Failure or Have Let Yourself or Your Family Down 0-->not at all   Trouble Concentrating on Things, Such as Reading the Newspaper or Watching Television 0-->not at all   Moving or Speaking So Slowly that Other People Could Have Noticed? Or the Opposite - Being So Fidgety 0-->not at all   Thoughts that You Would be Better Off Dead or of Hurting Yourself in Some Way 0-->not at all   PHQ-9: Brief Depression Severity Measure Score 0   If You Checked Off Any Problems, How Difficult Have These Problems Made It For You to Do Your Work, Take Care of Things at Home, or Get Along with Other People? not difficult at all       Past Medical History:   Diagnosis Date    Anemia     Arthritis     OSTEO    Asthma     Chronic pain disorder 07/01/2021    COVID-19 04/2021    CTS (carpal tunnel syndrome)     Not since surgery    ED (erectile dysfunction)     Elevated cholesterol     Environmental allergies     Erectile dysfunction     Fracture, fibula     RIGHT AND ANKLE    Gastroesophageal reflux  disease without esophagitis 2016    Joint pain 2002    Low back pain     Migraine with aura and without status migrainosus, not intractable 2016    Pain and swelling of toe of right foot     Peripheral neuropathy     Seasonal allergies     Sleep apnea     CPAP    Testosterone deficiency 2016       Past Surgical History:   Procedure Laterality Date    ANKLE ARTHROSCOPY Right 2018    Procedure: RT ANKLE ARTHROSCOPY SUBCHONDROPLASTY TALUS ;  Surgeon: Lebron Spivey Jr., MD;  [place]  [facility];  Service:     ANKLE LIGAMENT RECONSTRUCTION Right 2018    Procedure: HARDWARE REMOVAL POSS LATERAL LIGAMENT RECONSTRUCTION ;  Surgeon: Lebron Spivey Jr., MD;  [place]  [facility];  Service:     CARPAL TUNNEL RELEASE Right     COLONOSCOPY N/A 2023    Procedure: COLONOSCOPY to cecum and TI:;  Surgeon: Je Eddy MD;  [place] [facility] END[facility]COPY;  Service: Gastroenterology;  Laterality: N/A;  pre:  screening  post:  diverticulosis, hemorrhoids    CUBITAL TUNNEL RELEASE Right     NERVE RELEASE    EAR BIOPSY Right     MYRINGOTOMY W/BONES REPLACED INNER EAR    EPIDURAL BLOCK      FRACTURE SURGERY      Dr. Lebron Spivey    KNEE SURGERY Right     MENISCUS REPAIR    LUMBAR DISCECTOMY Right 2023    Procedure: Lumbar Microscopic Discectomy, right lumbar 3 lumbar 4;  Surgeon: Jovon Doran MD;  [place] [facility] MAIN OR;  Service: Neurosurgery;  Laterality: Right;    NOSE SURGERY      ORIF FIBULA FRACTURE Right 2017    ANKLE INCLUDED    ORTHOPEDIC SURGERY      Dr. Tyrell Eddy    SINUS SURGERY      Dr. Vilchis    TONSILLECTOMY      As a kid       Family History   Problem Relation Age of Onset    Lung cancer Mother     Cancer Mother          of lung, breast, and brain cancer.    Lung cancer Father     Cancer Father          of lung cancer.    Colon cancer Maternal Grandfather     Malig Hyperthermia Neg Hx        Social History     Socioeconomic History    Marital  "status:    Tobacco Use    Smoking status: Former     Packs/day: 2.00     Years: 20.00     Pack years: 40.00     Types: Cigarettes     Start date: 1987     Quit date: 2002     Years since quittin.1     Passive exposure: Past    Smokeless tobacco: Never    Tobacco comments:     I have been smoke free for 19 years as of 19.   Vaping Use    Vaping Use: Never used   Substance and Sexual Activity    Alcohol use: Yes     Alcohol/week: 2.0 standard drinks     Types: 2 Shots of liquor per week     Comment: Socially    Drug use: No    Sexual activity: Yes     Partners: Female     Birth control/protection: None       Current Outpatient Medications   Medication Sig Dispense Refill    albuterol sulfate  (90 Base) MCG/ACT inhaler 2 puffs every 4 hours as needed for SOA or wheezing. Generic. 3 g 1    Ascorbic Acid (VITAMIN C PO) Take  by mouth. HOLD PER MD INSTR      aspirin 81 MG EC tablet Take 1 tablet by mouth Daily. HOLD PER MD INSTR      azelastine (ASTELIN) 0.1 % nasal spray 2 sprays into the nostril(s) as directed by provider 2 (Two) Times a Day. Use in each nostril as directed 30 mL 11    B Complex Vitamins (VITAMIN B COMPLEX PO) Take  by mouth Daily. HOLD PRIOR TO SURGERY      B-D 3CC LUER-PUMA SYR 22GX1\" 22G X 1\" 3 ML misc       budesonide (Pulmicort Flexhaler) 180 MCG/ACT inhaler Use 1 or 2 inhalations each AM. Rinse and spit after use. 2 each 3    Cholecalciferol (VITAMIN D3) 5000 UNITS capsule capsule Take 1 capsule by mouth Daily. HOLD PRIOR TO SURGERY      cyclobenzaprine (FLEXERIL) 10 MG tablet TAKE ONE TABLET BY MOUTH THREE TIMES A DAY AS NEEDED FOR MUSCLE SPASMS 60 tablet 3    desonide (DESOWEN) 0.05 % cream Apply  topically to the appropriate area as directed See Admin Instructions. Apply to the affected areas three times daily 60 each 6    Diclofenac Sodium (VOLTAREN) 1 % gel gel Apply 4 g topically to the appropriate area as directed 4 (Four) Times a Day. 100 g 3    " diphenhydrAMINE HCl (BENADRYL ALLERGY PO) Take 1 tablet by mouth Daily.      docusate sodium (COLACE) 250 MG capsule Take 1 capsule by mouth Daily.      esomeprazole (nexIUM) 40 MG capsule Take 1 capsule by mouth Every Night. 30 capsule 5    etodolac (LODINE) 500 MG tablet Take 1 tablet by mouth 2 (Two) Times a Day. 60 tablet 2    ezetimibe-simvastatin (VYTORIN) 10-40 MG per tablet Take 1 tablet by mouth Every Night. 30 tablet 5    FIBER PO Take  by mouth.      gabapentin (NEURONTIN) 800 MG tablet Take 1 tablet by mouth 3 (Three) Times a Day. 90 tablet 2    ketoconazole (NIZORAL) 2 % cream Apply  topically to the appropriate area as directed Daily. 60 g 3    melatonin 1 MG tablet Take 3 tablets by mouth Every Night. HOLD PRIOR TO SURGERY      montelukast (SINGULAIR) 10 MG tablet Take 1 tablet by mouth Every Night. 30 tablet 5    Multiple Vitamin (MULTI VITAMIN DAILY PO) Take  by mouth Daily. HOLD PER MD NUÑEZ      olopatadine (PATANASE) 0.6 % solution nasal solution 2 sprays by Each Nare route 2 (Two) Times a Day. 30.5 g 5    Potassium 99 MG tablet Take  by mouth.      rizatriptan MLT (MAXALT-MLT) 10 MG disintegrating tablet Place 1 tablet on the tongue As Needed for Migraine. May repeat in 2 hours if needed 27 tablet 2    SALINE NASAL SPRAY NA into each nostril.      selenium sulfide (SELSUN) 2.5 % lotion Apply  topically to the appropriate area as directed Daily As Needed for Dandruff. 120 mL 5    sildenafil (VIAGRA) 100 MG tablet Take 1 tablet by mouth As Needed for Erectile Dysfunction. 5 tablet 5    Testosterone Cypionate (DEPOTESTOTERONE CYPIONATE) 200 MG/ML injection Inject 1 mL into the appropriate muscle as directed by prescriber Every 14 (Fourteen) Days. 2 mL 3    traMADol (ULTRAM) 50 MG tablet Take 1 tablet by mouth Every 8 (Eight) Hours As Needed for Moderate Pain. 90 tablet 2    zolpidem CR (Ambien CR) 12.5 MG CR tablet Take 1 tablet by mouth At Night As Needed for Sleep. 90 tablet 1     No current  "facility-administered medications for this visit.       Review of Systems   Constitutional:  Negative for activity change, appetite change, fatigue, fever, unexpected weight gain and unexpected weight loss.   HENT:  Negative for nosebleeds, rhinorrhea, trouble swallowing and voice change.    Eyes:  Negative for visual disturbance.   Respiratory:  Negative for cough, chest tightness, shortness of breath and wheezing.    Cardiovascular:  Negative for chest pain, palpitations and leg swelling.   Gastrointestinal:  Negative for abdominal pain, blood in stool, constipation, diarrhea, nausea, vomiting, GERD and indigestion.   Genitourinary:  Negative for dysuria, frequency and hematuria.   Musculoskeletal:  Positive for arthralgias and back pain. Negative for myalgias.   Skin:  Negative for rash and wound.   Neurological:  Negative for dizziness, tremors, weakness, light-headedness, numbness, headache and memory problem.   Hematological:  Negative for adenopathy. Does not bruise/bleed easily.   Psychiatric/Behavioral:  Negative for sleep disturbance and depressed mood. The patient is not nervous/anxious.      Objective   BP (!) 134/102 (BP [place] Left arm, Patient Position: Sitting, Cuff Size: Large Adult)   Pulse 84   Temp 97.5 øF (36.4 øC) (Temporal)   Ht 185.4 cm (72.99\")   Wt 109 kg (240 lb 6.4 oz)   SpO2 97%   BMI 31.72 kg/mý     Physical Exam  Vitals and nursing note reviewed.   Constitutional:       General: He is not in acute distress.     Appearance: He is well-developed. He is not diaphoretic.   HENT:      Head: Normocephalic and atraumatic.      Right Ear: External ear normal.      Left Ear: External ear normal.      Nose: Nose normal.   Eyes:      Conjunctiva/sclera: Conjunctivae normal.      Pupils: Pupils are equal, round, and reactive to light.   Neck:      Thyroid: No thyromegaly.      Trachea: No tracheal deviation.   Cardiovascular:      Rate and Rhythm: Normal rate and regular rhythm.      " Heart sounds: Normal heart sounds. No murmur heard.    No friction rub. No gallop.   Pulmonary:      Effort: Pulmonary effort is normal. No respiratory distress.      Breath sounds: Normal breath sounds.   Abdominal:      General: Bowel sounds are normal.      Palpations: Abdomen is soft. There is no mass.      Tenderness: There is no abdominal tenderness. There is no guarding.   Musculoskeletal:         General: Normal range of motion.      Cervical back: Normal range of motion and neck supple.   Lymphadenopathy:      Cervical: No cervical adenopathy.   Skin:     General: Skin is warm and dry.      Capillary Refill: Capillary refill takes less than 2 seconds.      Findings: No rash.   Neurological:      Mental Status: He is alert and oriented to person, place, and time.      Motor: No abnormal muscle tone.      Deep Tendon Reflexes: Reflexes normal.   Psychiatric:         Behavior: Behavior normal.         Thought Content: Thought content normal.         Judgment: Judgment normal.       No results found for this or any previous visit (from the past 2016 hour(s)).  Assessment & Plan   Diagnoses and all orders for this visit:    1. Lumbar disc herniation with radiculopathy  -     gabapentin (NEURONTIN) 800 MG tablet; Take 1 tablet by mouth 3 (Three) Times a Day.  Dispense: 90 tablet; Refill: 2  -     traMADol (ULTRAM) 50 MG tablet; Take 1 tablet by mouth Every 8 (Eight) Hours As Needed for Moderate Pain.  Dispense: 90 tablet; Refill: 2    2. Chronic right-sided low back pain with right-sided sciatica  -     gabapentin (NEURONTIN) 800 MG tablet; Take 1 tablet by mouth 3 (Three) Times a Day.  Dispense: 90 tablet; Refill: 2  -     traMADol (ULTRAM) 50 MG tablet; Take 1 tablet by mouth Every 8 (Eight) Hours As Needed for Moderate Pain.  Dispense: 90 tablet; Refill: 2    3. Arthritis of both knees  -     etodolac (LODINE) 500 MG tablet; Take 1 tablet by mouth 2 (Two) Times a Day.  Dispense: 60 tablet; Refill: 2    We  discussed his medications.  Continue with the gabapentin and the tramadol but I did asked that he try and start trying to wean down on his tramadol since his back pain is better.  Try and take a half a tablet at a time and see how he does.  I do note that he continues having the knee pain which is probably going to make it harder to stop some of these medicines but then goodness he is not taking the hydrocodone anymore.  He will be having some knee surgery but will probably be next year.  MARY BETH run and reviewed.  Risks of the medication include but are not limited to fatigue, somnolence, increased risk of falls, constipation, allergic reaction, dependence, and addiction.           COVID-19 Precautions - Patient was compliant in wearing a mask. When I saw the patient, I used appropriate personal protective equipment (PPE) including mask and eye shield (standard procedure).  Additionally, I used gown and gloves if indicated.  Hand hygiene was completed before and after seeing the patient.  Dictated utilizing Dragon Dictation

## 2023-09-21 ENCOUNTER — CLINICAL SUPPORT (OUTPATIENT)
Dept: ORTHOPEDIC SURGERY | Facility: CLINIC | Age: 48
End: 2023-09-21
Payer: COMMERCIAL

## 2023-09-21 VITALS — TEMPERATURE: 97.3 F | BODY MASS INDEX: 33.13 KG/M2 | HEIGHT: 73 IN | WEIGHT: 250 LBS

## 2023-09-21 DIAGNOSIS — M17.0 PRIMARY OSTEOARTHRITIS OF BOTH KNEES: Primary | ICD-10-CM

## 2023-09-21 RX ORDER — METHYLPREDNISOLONE ACETATE 80 MG/ML
1 INJECTION, SUSPENSION INTRA-ARTICULAR; INTRALESIONAL; INTRAMUSCULAR; SOFT TISSUE
Status: COMPLETED | OUTPATIENT
Start: 2023-09-21 | End: 2023-09-21

## 2023-09-21 RX ORDER — LIDOCAINE HYDROCHLORIDE 20 MG/ML
2 INJECTION, SOLUTION EPIDURAL; INFILTRATION; INTRACAUDAL; PERINEURAL
Status: COMPLETED | OUTPATIENT
Start: 2023-09-21 | End: 2023-09-21

## 2023-09-21 RX ADMIN — LIDOCAINE HYDROCHLORIDE 2 ML: 20 INJECTION, SOLUTION EPIDURAL; INFILTRATION; INTRACAUDAL; PERINEURAL at 16:24

## 2023-09-21 RX ADMIN — METHYLPREDNISOLONE ACETATE 1 ML: 80 INJECTION, SUSPENSION INTRA-ARTICULAR; INTRALESIONAL; INTRAMUSCULAR; SOFT TISSUE at 16:24

## 2023-09-21 NOTE — PROGRESS NOTES
"Patient: Arelis Munson  YOB: 1975  Date of Service: 9/21/2023    Chief Complaints:   Chief Complaint   Patient presents with    Left Knee - Pain, Follow-up    Right Knee - Pain, Follow-up       Subjective: Here for both knees and desires conservative treatment    History of Present Illness: Pt is seen in the office today with complaints of   Chief Complaint   Patient presents with    Left Knee - Pain, Follow-up    Right Knee - Pain, Follow-up   .  KNEE: TIMING:  The pain is described as ACUTE on CHRONIC.  LOCATION: medial joint line tenderness. AGGRAVATING FACTORS:  Is worsened by prolonged standing, sitting, walking and squatting activities.  ASSOCIATED SYMPTOMS:  swelling, tenderness, and aching.    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     Allergies: No Known Allergies    Medications:   Home Medications:  Current Outpatient Medications on File Prior to Visit   Medication Sig    albuterol sulfate  (90 Base) MCG/ACT inhaler 2 puffs every 4 hours as needed for SOA or wheezing. Generic.    Ascorbic Acid (VITAMIN C PO) Take  by mouth. HOLD PER MD INSTR    aspirin 81 MG EC tablet Take 1 tablet by mouth Daily. HOLD PER MD INSTR    azelastine (ASTELIN) 0.1 % nasal spray 2 sprays into the nostril(s) as directed by provider 2 (Two) Times a Day. Use in each nostril as directed    B Complex Vitamins (VITAMIN B COMPLEX PO) Take  by mouth Daily. HOLD PRIOR TO SURGERY    B-D 3CC LUER-PUMA SYR 22GX1\" 22G X 1\" 3 ML misc     budesonide (Pulmicort Flexhaler) 180 MCG/ACT inhaler Use 1 or 2 inhalations each AM. Rinse and spit after use.    Cholecalciferol (VITAMIN D3) 5000 UNITS capsule capsule Take 1 capsule by mouth Daily. HOLD PRIOR TO SURGERY    cyclobenzaprine (FLEXERIL) 10 MG tablet TAKE ONE TABLET BY MOUTH THREE TIMES A DAY AS NEEDED FOR MUSCLE SPASMS    desonide (DESOWEN) 0.05 % cream Apply  topically to the appropriate area as directed See Admin Instructions. Apply to the affected areas three times " daily    Diclofenac Sodium (VOLTAREN) 1 % gel gel Apply 4 g topically to the appropriate area as directed 4 (Four) Times a Day.    diphenhydrAMINE HCl (BENADRYL ALLERGY PO) Take 1 tablet by mouth Daily.    docusate sodium (COLACE) 250 MG capsule Take 1 capsule by mouth Daily.    esomeprazole (nexIUM) 40 MG capsule Take 1 capsule by mouth Every Night.    etodolac (LODINE) 500 MG tablet Take 1 tablet by mouth 2 (Two) Times a Day.    ezetimibe-simvastatin (VYTORIN) 10-40 MG per tablet Take 1 tablet by mouth Every Night.    FIBER PO Take  by mouth.    gabapentin (NEURONTIN) 800 MG tablet Take 1 tablet by mouth 3 (Three) Times a Day.    ketoconazole (NIZORAL) 2 % cream Apply  topically to the appropriate area as directed Daily.    melatonin 1 MG tablet Take 3 tablets by mouth Every Night. HOLD PRIOR TO SURGERY    montelukast (SINGULAIR) 10 MG tablet Take 1 tablet by mouth Every Night.    Multiple Vitamin (MULTI VITAMIN DAILY PO) Take  by mouth Daily. HOLD PER MD NUÑEZ    olopatadine (PATANASE) 0.6 % solution nasal solution 2 sprays by Each Nare route 2 (Two) Times a Day.    Potassium 99 MG tablet Take  by mouth.    rizatriptan MLT (MAXALT-MLT) 10 MG disintegrating tablet Place 1 tablet on the tongue As Needed for Migraine. May repeat in 2 hours if needed    SALINE NASAL SPRAY NA into each nostril.    selenium sulfide (SELSUN) 2.5 % lotion Apply  topically to the appropriate area as directed Daily As Needed for Dandruff.    sildenafil (VIAGRA) 100 MG tablet Take 1 tablet by mouth As Needed for Erectile Dysfunction.    Testosterone Cypionate (DEPOTESTOTERONE CYPIONATE) 200 MG/ML injection Inject 1 mL into the appropriate muscle as directed by prescriber Every 14 (Fourteen) Days.    traMADol (ULTRAM) 50 MG tablet Take 1 tablet by mouth Every 8 (Eight) Hours As Needed for Moderate Pain.    zolpidem CR (Ambien CR) 12.5 MG CR tablet Take 1 tablet by mouth At Night As Needed for Sleep.     No current facility-administered  medications on file prior to visit.     Current Medications:  Scheduled Meds:  Continuous Infusions:No current facility-administered medications for this visit.    PRN Meds:.    I have reviewed the patient's medical history in detail and updated the computerized patient record.  Review and summarization of old records include:    Past Medical History:   Diagnosis Date    Anemia     Arthritis     OSTEO    Asthma     Chronic pain disorder 07/01/2021    COVID-19 04/2021    CTS (carpal tunnel syndrome)     Not since surgery    ED (erectile dysfunction)     Elevated cholesterol     Environmental allergies     Erectile dysfunction     Fracture, fibula     RIGHT AND ANKLE    Gastroesophageal reflux disease without esophagitis 07/11/2016    Joint pain 09/01/2002    Low back pain     Migraine with aura and without status migrainosus, not intractable 07/11/2016    Pain and swelling of toe of right foot     Peripheral neuropathy     Seasonal allergies     Sleep apnea     CPAP    Testosterone deficiency 07/11/2016        Past Surgical History:   Procedure Laterality Date    ANKLE ARTHROSCOPY Right 01/26/2018    Procedure: RT ANKLE ARTHROSCOPY SUBCHONDROPLASTY TALUS ;  Surgeon: Lebron Spivey Jr., MD;  [place] [facility] OR [facility] [place];  Service:     ANKLE LIGAMENT RECONSTRUCTION Right 01/26/2018    Procedure: HARDWARE REMOVAL POSS LATERAL LIGAMENT RECONSTRUCTION ;  Surgeon: Lebron Spivey Jr., MD;  [place] [facility] OR [facility];  Service:     CARPAL TUNNEL RELEASE Right     COLONOSCOPY N/A 04/26/2023    Procedure: COLONOSCOPY to cecum and TI:;  Surgeon: Je Eddy MD;  [place] [facility] ENDOSCOPY;  Service: Gastroenterology;  Laterality: N/A;  pre:  screening  post:  diverticulosis, hemorrhoids    CUBITAL TUNNEL RELEASE Right     NERVE RELEASE    EAR BIOPSY Right     MYRINGOTOMY W/BONES REPLACED INNER EAR    EPIDURAL BLOCK      FRACTURE SURGERY      Dr. Lebron Spivey    KNEE SURGERY Right     MENISCUS REPAIR    LUMBAR DISCECTOMY Right 05/17/2023     Procedure: Lumbar Microscopic Discectomy, right lumbar 3 lumbar 4;  Surgeon: Jovon Doran MD;  [place] [facility] MAIN OR;  Service: Neurosurgery;  Laterality: Right;    NOSE SURGERY      ORIF FIBULA FRACTURE Right 2017    ANKLE INCLUDED    ORTHOPEDIC SURGERY      Dr. Tyrell Eddy    SINUS SURGERY      Dr. Vilchis    TONSILLECTOMY      As a kid        Social History     Occupational History    Not on file   Tobacco Use    Smoking status: Former     Packs/day: 2.00     Years: 20.00     Pack years: 40.00     Types: Cigarettes     Start date: 1987     Quit date: 2002     Years since quittin.2     Passive exposure: Past    Smokeless tobacco: Never    Tobacco comments:     I have been smoke free for 19 years as of 19.   Vaping Use    Vaping Use: Never used   Substance and Sexual Activity    Alcohol use: Yes     Alcohol/week: 2.0 standard drinks     Types: 2 Shots of liquor per week     Comment: Socially    Drug use: No    Sexual activity: Yes     Partners: Female     Birth control/protection: None      Social History     Social History Narrative    Not on file        Family History   Problem Relation Age of Onset    Lung cancer Mother     Cancer Mother          of lung, breast, and brain cancer.    Lung cancer Father     Cancer Father          of lung cancer.    Colon cancer Maternal Grandfather     Malig Hyperthermia Neg Hx        ROS: 14 point review of systems was performed and was negative except for documented findings in HPI and today's encounter.     Allergies: No Known Allergies  Constitutional:  Denies fever, shaking or chills   Eyes:  Denies change in visual acuity   HENT:  Denies nasal congestion or sore throat   Respiratory:  Denies cough or shortness of breath   Cardiovascular:  Denies chest pain or severe LE edema   GI:  Denies abdominal pain, nausea, vomiting, bloody stools or diarrhea   Musculoskeletal:  Numbness, tingling, or loss of motor function only as  noted above in history of present illness.  : Denies painful urination or hematuria  Integument:  Denies rash, lesion or ulceration   Neurologic:  Denies headache or focal weakness  Endocrine:  Denies lymphadenopathy  Psych:  Denies confusion or change in mental status   Hem:  Denies active bleeding      Physical Exam: 48 y.o. male  Wt Readings from Last 3 Encounters:   09/21/23 113 kg (250 lb)   08/31/23 109 kg (240 lb 6.4 oz)   08/31/23 113 kg (250 lb)       Body mass index is 32.98 kg/m².  Facility age limit for growth percentiles is 20 years.  Vitals:    09/21/23 1605   Temp: 97.3 °F (36.3 °C)     Vital signs reviewed.   General Appearance:    Alert, cooperative, in no acute distress                  Eyes: conjunctiva clear  ENT: external ears and nose atraumatic  CV: no peripheral edema  Resp: normal respiratory effort  Skin: no rashes or wounds; normal turgor  Psych: mood and affect appropriate  Lymph: no nodes appreciated  Neuro: gross sensation intact  Vascular:  Palpable peripheral pulse in noted extremity  Musculoskeletal Extremities: KNEE Exam: medial and lateral joint line tenderness with crepitation, synovitis, swelling, and joint effusion bilateral knee.      Diagnostic Data:  Imaging done today and discussed with the patient:    Indication: pain related symptoms,  Views: 3V AP, LAT & 40 degree PA bilateral knee(s)   Findings: advanced arthritis  Comparison views: viewed last xray done in the office.      Procedure:  Large Joint Arthrocentesis: R knee  Date/Time: 9/21/2023 4:24 PM  Consent given by: patient  Site marked: site marked  Timeout: Immediately prior to procedure a time out was called to verify the correct patient, procedure, equipment, support staff and site/side marked as required   Supporting Documentation  Indications: pain, joint swelling and diagnostic evaluation   Procedure Details  [place] knee - R knee  Preparation: Patient was prepped and draped in the usual sterile  fashion  Needle gauge: 21G.  Medications administered: 1 mL methylPREDNISolone acetate 80 MG/ML; 2 mL lidocaine PF 2% 2 %  Patient tolerance: patient tolerated the procedure well with no immediate complications      Large Joint Arthrocentesis: L knee  Date/Time: 9/21/2023 4:24 PM  Consent given by: patient  Site marked: site marked  Timeout: Immediately prior to procedure a time out was called to verify the correct patient, procedure, equipment, support staff and site/side marked as required   Supporting Documentation  Indications: pain   Procedure Details  [place] knee - L knee  Preparation: Patient was prepped and draped in the usual sterile fashion  Needle gauge: 21G.  Medications administered: 1 mL methylPREDNISolone acetate 80 MG/ML; 2 mL lidocaine PF 2% 2 %  Patient tolerance: patient tolerated the procedure well with no immediate complications      Assessment:     ICD-10-CM ICD-9-CM   1. Primary osteoarthritis of both knees  M17.0 715.16       Plan:   Follow up as indicated.  Ice, elevate, and rest as needed.  The diagnosis(es), natural history, pathophysiology and treatment for diagnosis(es) were discussed. Opportunity given and questions answered.  Biomechanics of pertinent body areas discussed.  When appropriate, the use of ambulatory aids discussed.  EXERCISES:  Advice on benefits of, and types of regular/moderate exercise pertaining to orthopedic diagnosis(es).  MEDICATIONS:  The risks, benefits, warnings,side effects and alternatives of medications discussed.  Inflammation/pain control; with cold, heat, elevation and/or liniments discussed as appropriate  Cortisone Injection. See procedure note.  MEDICAL RECORDS reviewed from other provider(s) for past and current medical history pertinent to this complaint.    9/21/2023

## 2023-11-20 RX ORDER — CYCLOBENZAPRINE HCL 10 MG
TABLET ORAL
Qty: 60 TABLET | Refills: 3 | Status: SHIPPED | OUTPATIENT
Start: 2023-11-20

## 2023-11-20 NOTE — TELEPHONE ENCOUNTER
Rx Refill Note  Requested Prescriptions     Pending Prescriptions Disp Refills    cyclobenzaprine (FLEXERIL) 10 MG tablet [Pharmacy Med Name: CYCLOBENZAPRINE 10 MG TABLET] 60 tablet 3     Sig: TAKE ONE TABLET BY MOUTH THREE TIMES A DAY AS NEEDED FOR MUSCLE SPASMS      Last office visit with prescribing clinician: 8/31/2023   Last telemedicine visit with prescribing clinician: Visit date not found   Next office visit with prescribing clinician: Visit date not found                         Would you like a call back once the refill request has been completed: [] Yes [] No    If the office needs to give you a call back, can they leave a voicemail: [] Yes [] No    Enid Morrison MA  11/20/23, 07:43 EST

## 2023-12-08 DIAGNOSIS — M17.0 ARTHRITIS OF BOTH KNEES: ICD-10-CM

## 2023-12-12 RX ORDER — SILDENAFIL 100 MG/1
100 TABLET, FILM COATED ORAL AS NEEDED
Qty: 5 TABLET | Refills: 5 | Status: SHIPPED | OUTPATIENT
Start: 2023-12-12

## 2023-12-12 RX ORDER — ETODOLAC 500 MG/1
500 TABLET, FILM COATED ORAL 2 TIMES DAILY
Qty: 60 TABLET | Refills: 2 | Status: SHIPPED | OUTPATIENT
Start: 2023-12-12

## 2023-12-13 ENCOUNTER — OFFICE VISIT (OUTPATIENT)
Dept: SLEEP MEDICINE | Facility: HOSPITAL | Age: 48
End: 2023-12-13
Payer: COMMERCIAL

## 2023-12-13 VITALS — BODY MASS INDEX: 32.39 KG/M2 | HEART RATE: 97 BPM | OXYGEN SATURATION: 97 % | WEIGHT: 244.4 LBS | HEIGHT: 73 IN

## 2023-12-13 DIAGNOSIS — F51.04 PSYCHOPHYSIOLOGICAL INSOMNIA: ICD-10-CM

## 2023-12-13 DIAGNOSIS — G47.26 CIRCADIAN RHYTHM SLEEP DISORDER, SHIFT WORK TYPE: ICD-10-CM

## 2023-12-13 DIAGNOSIS — G47.33 OSA ON CPAP: Primary | ICD-10-CM

## 2023-12-13 PROCEDURE — G0463 HOSPITAL OUTPT CLINIC VISIT: HCPCS

## 2023-12-13 NOTE — PROGRESS NOTES
"Follow Up [facility] Note     Chief Complaint:  NITA     Primary Care Physician: Terrell Palma MD    Interval History:   The patient is a 48 y.o. male  who I last saw 2023 and that note was reviewed.  The patient reports he is doing well without new complaints.  Occasionally he will wake up during the nighttime due to the mask.  He goes to bed 11 PM and gets up at 5 AM.  He leaves for work at 5:30 AM and his work shift starts at 7 AM.    The patient continues to take Ambien CR 12.5 mg at bedtime    Review of Systems:    A complete review of systems was done and all were negative with the exception of some nasal congestion postnasal drip, ear pain, and ADAM    Social History:    Social History     Socioeconomic History    Marital status:    Tobacco Use    Smoking status: Former     Packs/day: 2.00     Years: 20.00     Additional pack years: 0.00     Total pack years: 40.00     Types: Cigarettes     Start date: 1987     Quit date: 2002     Years since quittin.4     Passive exposure: Past    Smokeless tobacco: Never    Tobacco comments:     I have been smoke free for 19 years as of 19.   Vaping Use    Vaping Use: Never used   Substance and Sexual Activity    Alcohol use: Yes     Alcohol/week: 2.0 standard drinks of alcohol     Types: 2 Shots of liquor per week     Comment: Socially    Drug use: No    Sexual activity: Yes     Partners: Female     Birth control/protection: None       Allergies:  Patient has no known allergies.     Medication Review: His list was reviewed.      Vital Signs:    Vitals:    23 0700   Pulse: 97   SpO2: 97%   Weight: 111 kg (244 lb 6.4 oz)   Height: 185.4 cm (73\")     Body mass index is 32.24 kg/m².    Physical Exam:    Constitutional:  Well developed 48 y.o. male that appears in no apparent distress.  Awake & oriented times 3.  Normal mood with normal recent and remote memory and normal judgement.  Eyes:  Conjunctivae normal.  Oropharynx: " Previously, moist mucous membranes without exudate and a large tongue and normal uvula and class III Mallampati airway.    Self-administered Gilmore Sleepiness Scale test results: 3  0-5 Lower normal daytime sleepiness  6-10 Higher normal daytime sleepiness  11-12 Mild, 13-15 Moderate, & 16-24 Severe excessive daytime sleepiness     Downloaded PAP Data Evaluated For Therapeutic Response and Compliance:  DME is [employer] and he uses a fullface mask.  Downloads between 9/14 and 12/12/2023 compliance 90%.  Average usage is 6 hours and 14 minutes.  Average AHI is normal without leak.  Average auto CPAP pressure is 13.7 and his DreamStation 2 auto CPAP is set at 9-15    I have reviewed the above results and compared them with the patient's last downloads and reviewed with the patient.    Impression:   Obstructive sleep apnea adequately treated with  DreamStation 2 auto CPAP. The patient appears to be at goal with good compliance and usage. The patient has no complaints of hypersomnolence.  Circadian rhythm sleep disorder, shiftwork type, early morning awakening  Psychophysiologic insomnia adequately treated with Ambien CR 12.5 mg 1/2 to 1 tablet at bedtime     Is obese plan:  Good sleep hygiene measures should be maintained.  Weight loss would be beneficial in this patient who is obese by Body mass index is 32.24 kg/m²..      After evaluating the patient and assessing results available, the patient is benefiting from the treatment being provided.     The patient will continue DreamStation 2 auto CPAP.  Potential side effects of CPAP therapy reviewed and addressed as needed.  After clinical evaluation and review of downloads, I recommend no changes to the patient's pressures.  A new prescription will be sent to the patient's DME.    The patient continues to take Ambien CR 12.5 mg at bedtime.  Mateo reviewed and there is no irregularities.  It is noted that the patient takes gabapentin, tramadol, and testosterone.    I  answered all of the patient's questions.  The patient will call the [facility] for any problems and will follow up in 6 months.      Ronni Kong MD  Sleep Medicine  12/13/23  08:57 EST

## 2023-12-14 ENCOUNTER — OFFICE VISIT (OUTPATIENT)
Dept: FAMILY MEDICINE CLINIC | Facility: CLINIC | Age: 48
End: 2023-12-14
Payer: COMMERCIAL

## 2023-12-14 VITALS
HEIGHT: 73 IN | SYSTOLIC BLOOD PRESSURE: 130 MMHG | BODY MASS INDEX: 32.34 KG/M2 | HEART RATE: 91 BPM | DIASTOLIC BLOOD PRESSURE: 70 MMHG | OXYGEN SATURATION: 100 % | TEMPERATURE: 96.6 F | WEIGHT: 244 LBS

## 2023-12-14 DIAGNOSIS — M25.561 BILATERAL CHRONIC KNEE PAIN: ICD-10-CM

## 2023-12-14 DIAGNOSIS — E34.9 TESTOSTERONE DEFICIENCY: ICD-10-CM

## 2023-12-14 DIAGNOSIS — G89.29 BILATERAL CHRONIC KNEE PAIN: ICD-10-CM

## 2023-12-14 DIAGNOSIS — M54.50 CHRONIC MIDLINE LOW BACK PAIN WITHOUT SCIATICA: Primary | ICD-10-CM

## 2023-12-14 DIAGNOSIS — M54.50 LUMBAR PAIN: ICD-10-CM

## 2023-12-14 DIAGNOSIS — M17.0 ARTHRITIS OF BOTH KNEES: ICD-10-CM

## 2023-12-14 DIAGNOSIS — M25.562 BILATERAL CHRONIC KNEE PAIN: ICD-10-CM

## 2023-12-14 DIAGNOSIS — G89.29 CHRONIC MIDLINE LOW BACK PAIN WITHOUT SCIATICA: Primary | ICD-10-CM

## 2023-12-14 PROCEDURE — 99214 OFFICE O/P EST MOD 30 MIN: CPT | Performed by: INTERNAL MEDICINE

## 2023-12-14 RX ORDER — TESTOSTERONE CYPIONATE 200 MG/ML
200 INJECTION, SOLUTION INTRAMUSCULAR
Qty: 10 ML | Refills: 1 | Status: SHIPPED | OUTPATIENT
Start: 2023-12-14

## 2023-12-14 RX ORDER — EZETIMIBE AND SIMVASTATIN 10; 40 MG/1; MG/1
1 TABLET ORAL NIGHTLY
Qty: 30 TABLET | Refills: 5 | Status: SHIPPED | OUTPATIENT
Start: 2023-12-14

## 2023-12-14 RX ORDER — ESOMEPRAZOLE MAGNESIUM 40 MG/1
40 CAPSULE, DELAYED RELEASE ORAL NIGHTLY
Qty: 30 CAPSULE | Refills: 5 | Status: SHIPPED | OUTPATIENT
Start: 2023-12-14

## 2023-12-14 RX ORDER — SYRINGE W-NEEDLE,DISPOSAB,3 ML 25GX5/8"
1 SYRINGE, EMPTY DISPOSABLE MISCELLANEOUS
Qty: 15 EACH | Refills: 3 | Status: SHIPPED | OUTPATIENT
Start: 2023-12-14

## 2023-12-14 RX ORDER — MONTELUKAST SODIUM 10 MG/1
10 TABLET ORAL NIGHTLY
Qty: 30 TABLET | Refills: 5 | Status: SHIPPED | OUTPATIENT
Start: 2023-12-14

## 2023-12-14 NOTE — PROGRESS NOTES
Subjective   Arelis Munson is a 48 y.o. male.     Chief Complaint   Patient presents with    Migraine    GI Problem    Follow-up     Medication refill        History of Present Illness   History of GERD, osteoarthritis of knees, migraine headaches, lumbar disc herniation with radiculopathy status post lumbar surgery, asthma, NITA on CPAP, testosterone deficiency.  Currently is followed by orthopedics, pain management, neuro spine, urology and pulmonology.     History of hypogonadism previously followed by urology and was on testosterone cypionate 200 mg/mL 1 cc IM every 2 weeks.  Last labs done on 12/10/2023 demonstrated hemoglobin of 16.7, creatinine 1.05, AST 31, ALT 62, total testosterone 439, and PSA of 0.3.  Has been doing well and is wanting to change so his testosterone management and injections are through his primary Care provider and not the urologist.  It was determined okay by the urologist for us to take over at this time with serial labs every 6 months.  Last injection on 12/8/23    History of asthma and NITA on auto CPAP.  Is on Singulair 10 mg daily along with Pulmicort Flexhaler 180 mcg per actuation 1 to 2 puffs every morning and albuterol inhaler as needed.     History of migraine headaches and using maxalt as needed.    History of hyperlipidemia.  Is currently utilizing as a Ezetimibe-simvastatin 10-40 mg daily.  Currently without any problems or side effects.    Had back pain and was receiving injections through pain management.  Subsequently, had lumbar discectomy 5/17/23.  Has also has knee OA and pain issues.  Will be having knee surgery in future but has to wait till back healed.  Currently is using diclofenac gel 4 times a day, etodolac 500 mg twice daily, gabapentin 800 mg 3 times daily (last filled 7/19/2023 #90 tab), and tramadol 50 mg every 8 hours as needed (last filled on 7/19/2023 #90 tabs).     History of obstructive sleep apnea being followed by Dr. Kong of sleep medicine and  on CPAP.  Doing well no changes with most recent visit on 12/13/2023 with sleep specialist.    The following portions of the patient's history were reviewed and updated as appropriate: allergies, current medications, past family history, past medical history, past social history, past surgical history and problem list.    Depression Screen:      1/16/2023    11:33 AM   PHQ-2/PHQ-9 Depression Screening   Little Interest or Pleasure in Doing Things 0-->not at all   Feeling Down, Depressed or Hopeless 0-->not at all   Trouble Falling or Staying Asleep, or Sleeping Too Much 0-->not at all   Feeling Tired or Having Little Energy 0-->not at all   Poor Appetite or Overeating 0-->not at all   Feeling Bad about Yourself - or that You are a Failure or Have Let Yourself or Your Family Down 0-->not at all   Trouble Concentrating on Things, Such as Reading the Newspaper or Watching Television 0-->not at all   Moving or Speaking So Slowly that Other People Could Have Noticed? Or the Opposite - Being So Fidgety 0-->not at all   Thoughts that You Would be Better Off Dead or of Hurting Yourself in Some Way 0-->not at all   PHQ-9: Brief Depression Severity Measure Score 0   If You Checked Off Any Problems, How Difficult Have These Problems Made It For You to Do Your Work, Take Care of Things at Home, or Get Along with Other People? not difficult at all       Past Medical History:   Diagnosis Date    Anemia     Arthritis     OSTEO    Asthma     Chronic pain disorder 07/01/2021    COVID-19 04/2021    CTS (carpal tunnel syndrome)     Not since surgery    ED (erectile dysfunction)     Elevated cholesterol     Environmental allergies     Erectile dysfunction     Fracture, fibula     RIGHT AND ANKLE    Gastroesophageal reflux disease without esophagitis 07/11/2016    Joint pain 09/01/2002    Low back pain     Migraine with aura and without status migrainosus, not intractable 07/11/2016    Pain and swelling of toe of right foot      Peripheral neuropathy     Seasonal allergies     Sleep apnea     CPAP    Testosterone deficiency 2016       Past Surgical History:   Procedure Laterality Date    ANKLE ARTHROSCOPY Right 2018    Procedure: RT ANKLE ARTHROSCOPY SUBCHONDROPLASTY TALUS ;  Surgeon: Lebron Spivey Jr., MD;  [place]  [facility];  Service:     ANKLE LIGAMENT RECONSTRUCTION Right 2018    Procedure: HARDWARE REMOVAL POSS LATERAL LIGAMENT RECONSTRUCTION ;  Surgeon: Lebron Spivey Jr., MD;  [place]  [facility];  Service:     CARPAL TUNNEL RELEASE Right     COLONOSCOPY N/A 2023    Procedure: COLONOSCOPY to cecum and TI:;  Surgeon: Je Eddy MD;  [place] [facility];  Service: Gastroenterology;  Laterality: N/A;  pre:  screening  post:  diverticulosis, hemorrhoids    CUBITAL TUNNEL RELEASE Right     NERVE RELEASE    EAR BIOPSY Right     MYRINGOTOMY W/BONES REPLACED INNER EAR    EPIDURAL BLOCK      FRACTURE SURGERY      Dr. Lebron Spivey    KNEE SURGERY Right     MENISCUS REPAIR    LUMBAR DISCECTOMY Right 2023    Procedure: Lumbar Microscopic Discectomy, right lumbar 3 lumbar 4;  Surgeon: Jovon Doran MD;  [place] [facility] MAIN OR;  Service: Neurosurgery;  Laterality: Right;    NOSE SURGERY      ORIF FIBULA FRACTURE Right 2017    ANKLE INCLUDED    ORTHOPEDIC SURGERY      Dr. Tyrell Eddy    SINUS SURGERY      Dr. iVlchis    TONSILLECTOMY      As a kid       Family History   Problem Relation Age of Onset    Lung cancer Mother     Cancer Mother          of lung, breast, and brain cancer.    Lung cancer Father     Cancer Father          of lung cancer.    Colon cancer Maternal Grandfather     Malig Hyperthermia Neg Hx        Social History     Socioeconomic History    Marital status:    Tobacco Use    Smoking status: Former     Packs/day: 2.00     Years: 20.00     Additional pack years: 0.00     Total pack years: 40.00     Types: Cigarettes     Start date: 1987     Quit  date: 2002     Years since quittin.4     Passive exposure: Past    Smokeless tobacco: Never    Tobacco comments:     I have been smoke free for 19 years as of 19.   Vaping Use    Vaping Use: Never used   Substance and Sexual Activity    Alcohol use: Yes     Alcohol/week: 2.0 standard drinks of alcohol     Types: 2 Shots of liquor per week     Comment: Socially    Drug use: No    Sexual activity: Yes     Partners: Female     Birth control/protection: None       Current Outpatient Medications   Medication Sig Dispense Refill    albuterol sulfate  (90 Base) MCG/ACT inhaler 2 puffs every 4 hours as needed for SOA or wheezing. Generic. 3 g 1    Ascorbic Acid (VITAMIN C PO) Take  by mouth. HOLD PER MD INSTR      aspirin 81 MG EC tablet Take 1 tablet by mouth Daily. HOLD PER MD INSTR      azelastine (ASTELIN) 0.1 % nasal spray 2 sprays into the nostril(s) as directed by provider 2 (Two) Times a Day. Use in each nostril as directed 30 mL 11    B Complex Vitamins (VITAMIN B COMPLEX PO) Take  by mouth Daily. HOLD PRIOR TO SURGERY      budesonide (Pulmicort Flexhaler) 180 MCG/ACT inhaler Use 1 or 2 inhalations each AM. Rinse and spit after use. 2 each 3    Cholecalciferol (VITAMIN D3) 5000 UNITS capsule capsule Take 1 capsule by mouth Daily. HOLD PRIOR TO SURGERY      cyclobenzaprine (FLEXERIL) 10 MG tablet TAKE ONE TABLET BY MOUTH THREE TIMES A DAY AS NEEDED FOR MUSCLE SPASMS 60 tablet 3    desonide (DESOWEN) 0.05 % cream Apply  topically to the appropriate area as directed See Admin Instructions. Apply to the affected areas three times daily 60 each 6    Diclofenac Sodium (VOLTAREN) 1 % gel gel Apply 4 g topically to the appropriate area as directed 4 (Four) Times a Day. 100 g 3    diphenhydrAMINE HCl (BENADRYL ALLERGY PO) Take 1 tablet by mouth Daily.      docusate sodium (COLACE) 250 MG capsule Take 1 capsule by mouth Daily.      esomeprazole (nexIUM) 40 MG capsule Take 1 capsule by mouth Every Night.  "30 capsule 5    etodolac (LODINE) 500 MG tablet TAKE 1 TABLET BY MOUTH TWICE A DAY 60 tablet 2    ezetimibe-simvastatin (VYTORIN) 10-40 MG per tablet Take 1 tablet by mouth Every Night. 30 tablet 5    FIBER PO Take  by mouth.      gabapentin (NEURONTIN) 800 MG tablet Take 1 tablet by mouth 3 (Three) Times a Day. 90 tablet 2    ketoconazole (NIZORAL) 2 % cream Apply  topically to the appropriate area as directed Daily. 60 g 3    melatonin 1 MG tablet Take 3 tablets by mouth Every Night. HOLD PRIOR TO SURGERY      montelukast (SINGULAIR) 10 MG tablet Take 1 tablet by mouth Every Night. 30 tablet 5    Multiple Vitamin (MULTI VITAMIN DAILY PO) Take  by mouth Daily. HOLD PER MD NUÑEZ      olopatadine (PATANASE) 0.6 % solution nasal solution 2 sprays by Each Nare route 2 (Two) Times a Day. 30.5 g 5    Potassium 99 MG tablet Take  by mouth.      rizatriptan MLT (MAXALT-MLT) 10 MG disintegrating tablet Place 1 tablet on the tongue As Needed for Migraine. May repeat in 2 hours if needed 27 tablet 2    SALINE NASAL SPRAY NA into each nostril.      selenium sulfide (SELSUN) 2.5 % lotion Apply  topically to the appropriate area as directed Daily As Needed for Dandruff. 120 mL 5    sildenafil (VIAGRA) 100 MG tablet TAKE 1 TABLET BY MOUTH AS NEEDED FOR ERECTILE DYSFUNCTION 5 tablet 5    Testosterone Cypionate (DEPOTESTOTERONE CYPIONATE) 200 MG/ML injection Inject 1 mL into the appropriate muscle as directed by prescriber Every 14 (Fourteen) Days. 10 mL 1    traMADol (ULTRAM) 50 MG tablet Take 1 tablet by mouth Every 8 (Eight) Hours As Needed for Moderate Pain. 90 tablet 2    zolpidem CR (Ambien CR) 12.5 MG CR tablet Take 1 tablet by mouth At Night As Needed for Sleep. 90 tablet 1    Syringe 22G X 1\" 3 ML misc Inject 1 Needle into the appropriate muscle as directed by prescriber Every 14 (Fourteen) Days. 15 each 3     No current facility-administered medications for this visit.       Review of Systems   Constitutional:  Negative " "for activity change, appetite change, fatigue, fever, unexpected weight gain and unexpected weight loss.   HENT:  Negative for nosebleeds, rhinorrhea, trouble swallowing and voice change.    Eyes:  Negative for visual disturbance.   Respiratory:  Negative for cough, chest tightness, shortness of breath and wheezing.    Cardiovascular:  Negative for chest pain, palpitations and leg swelling.   Gastrointestinal:  Negative for abdominal pain, blood in stool, constipation, diarrhea, nausea, vomiting, GERD and indigestion.   Genitourinary:  Negative for dysuria, frequency and hematuria.   Musculoskeletal:  Positive for arthralgias and back pain. Negative for myalgias.   Skin:  Negative for rash and wound.   Neurological:  Negative for dizziness, tremors, weakness, light-headedness, numbness, headache and memory problem.   Hematological:  Negative for adenopathy. Does not bruise/bleed easily.   Psychiatric/Behavioral:  Negative for sleep disturbance and depressed mood. The patient is not nervous/anxious.        Objective   /70 (BP [place] Left arm, Patient Position: Sitting, Cuff Size: Large Adult)   Pulse 91   Temp 96.6 °F (35.9 °C) (Temporal)   Ht 185.4 cm (72.99\")   Wt 111 kg (244 lb)   SpO2 100%   BMI 32.20 kg/m²     Physical Exam  Vitals and nursing note reviewed.   Constitutional:       General: He is not in acute distress.     Appearance: He is well-developed. He is not diaphoretic.   HENT:      Head: Normocephalic and atraumatic.      Right Ear: External ear normal.      Left Ear: External ear normal.      Nose: Nose normal.   Eyes:      Conjunctiva/sclera: Conjunctivae normal.      Pupils: Pupils are equal, round, and reactive to light.   Neck:      Thyroid: No thyromegaly.      Trachea: No tracheal deviation.   Cardiovascular:      Rate and Rhythm: Normal rate and regular rhythm.      Heart sounds: Normal heart sounds. No murmur heard.     No friction rub. No gallop.   Pulmonary:      Effort: " "Pulmonary effort is normal. No respiratory distress.      Breath sounds: Normal breath sounds.   Abdominal:      General: Bowel sounds are normal.      Palpations: Abdomen is soft. There is no mass.      Tenderness: There is no abdominal tenderness. There is no guarding.   Musculoskeletal:         General: Normal range of motion.      Cervical back: Normal range of motion and neck supple.   Lymphadenopathy:      Cervical: No cervical adenopathy.   Skin:     General: Skin is warm and dry.      Capillary Refill: Capillary refill takes less than 2 seconds.      Findings: No rash.   Neurological:      Mental Status: He is alert and oriented to person, place, and time.      Motor: No abnormal muscle tone.      Deep Tendon Reflexes: Reflexes normal.   Psychiatric:         Behavior: Behavior normal.         Thought Content: Thought content normal.         Judgment: Judgment normal.         No results found for this or any previous visit (from the past 2016 hour(s)).  Assessment & Plan   Diagnoses and all orders for this visit:    1. Chronic midline low back pain without sciatica (Primary)    2. Testosterone deficiency  -     Testosterone Cypionate (DEPOTESTOTERONE CYPIONATE) 200 MG/ML injection; Inject 1 mL into the appropriate muscle as directed by prescriber Every 14 (Fourteen) Days.  Dispense: 10 mL; Refill: 1    3. Lumbar pain    4. Arthritis of both knees    5. Bilateral chronic knee pain    Other orders  -     Syringe 22G X 1\" 3 ML misc; Inject 1 Needle into the appropriate muscle as directed by prescriber Every 14 (Fourteen) Days.  Dispense: 15 each; Refill: 3  -     esomeprazole (nexIUM) 40 MG capsule; Take 1 capsule by mouth Every Night.  Dispense: 30 capsule; Refill: 5  -     ezetimibe-simvastatin (VYTORIN) 10-40 MG per tablet; Take 1 tablet by mouth Every Night.  Dispense: 30 tablet; Refill: 5  -     montelukast (SINGULAIR) 10 MG tablet; Take 1 tablet by mouth Every Night.  Dispense: 30 tablet; Refill: " 5      Records from urology reviewed and discussed with patient from his visit within the last 7 days.  Continue the testosterone.  Continue all other medications at this time.  We will take over his testosterone medication and monitoring.  Will follow-up on his testosterone every 6 months and his pain medicines every 3.MARY BETH run and reviewed.  Risks of the medication include but are not limited to fatigue, somnolence, increased risk of falls, constipation, allergic reaction, dependence, and addiction.           COVID-19 Precautions - Patient was compliant in wearing a mask. When I saw the patient, I used appropriate personal protective equipment (PPE) including mask and eye shield (standard procedure).  Additionally, I used gown and gloves if indicated.  Hand hygiene was completed before and after seeing the patient.  Dictated utilizing Dragon Dictation

## 2023-12-19 DIAGNOSIS — F51.04 PSYCHOPHYSIOLOGICAL INSOMNIA: ICD-10-CM

## 2023-12-19 DIAGNOSIS — G47.33 OSA ON CPAP: ICD-10-CM

## 2023-12-19 RX ORDER — ZOLPIDEM TARTRATE 12.5 MG/1
12.5 TABLET, FILM COATED, EXTENDED RELEASE ORAL NIGHTLY PRN
Qty: 90 TABLET | Refills: 1 | Status: SHIPPED | OUTPATIENT
Start: 2023-12-19

## 2023-12-22 DIAGNOSIS — M17.0 ARTHRITIS OF BOTH KNEES: ICD-10-CM

## 2023-12-22 RX ORDER — SILDENAFIL 100 MG/1
100 TABLET, FILM COATED ORAL AS NEEDED
Qty: 5 TABLET | Refills: 5 | Status: SHIPPED | OUTPATIENT
Start: 2023-12-22

## 2023-12-22 RX ORDER — ETODOLAC 500 MG/1
500 TABLET, FILM COATED ORAL 2 TIMES DAILY
Qty: 180 TABLET | Refills: 3 | Status: SHIPPED | OUTPATIENT
Start: 2023-12-22

## 2024-01-11 DIAGNOSIS — J06.9 ACUTE URI: ICD-10-CM

## 2024-01-11 RX ORDER — AZELASTINE HYDROCHLORIDE 137 UG/1
SPRAY, METERED NASAL
Qty: 30 ML | Refills: 11 | OUTPATIENT
Start: 2024-01-11

## 2024-01-17 ENCOUNTER — OFFICE VISIT (OUTPATIENT)
Dept: ORTHOPEDIC SURGERY | Facility: CLINIC | Age: 49
End: 2024-01-17
Payer: COMMERCIAL

## 2024-01-17 VITALS — BODY MASS INDEX: 33.13 KG/M2 | WEIGHT: 250 LBS | HEIGHT: 73 IN | TEMPERATURE: 98.7 F

## 2024-01-17 DIAGNOSIS — M17.0 PRIMARY OSTEOARTHRITIS OF BOTH KNEES: Primary | ICD-10-CM

## 2024-01-17 RX ADMIN — LIDOCAINE HYDROCHLORIDE 2 ML: 10 INJECTION, SOLUTION EPIDURAL; INFILTRATION; INTRACAUDAL; PERINEURAL at 15:40

## 2024-01-17 RX ADMIN — METHYLPREDNISOLONE ACETATE 1 ML: 80 INJECTION, SUSPENSION INTRA-ARTICULAR; INTRALESIONAL; INTRAMUSCULAR; SOFT TISSUE at 15:40

## 2024-01-17 NOTE — PROGRESS NOTES
Patient: Arelis Munson  YOB: 1975  Date of Service: 1/18/2024    Chief Complaints:   Chief Complaint   Patient presents with    Left Knee - Pain, Follow-up    Right Knee - Pain, Follow-up       Subjective: Here for both knees hurting and desires conservative treatment. Pt is doing much better from his back surgery and feels better overall.     History of Present Illness: Pt is seen in the office today with complaints of   Chief Complaint   Patient presents with    Left Knee - Pain, Follow-up    Right Knee - Pain, Follow-up   .  KNEE: TIMING:  The pain is described as CHRONIC.  LOCATION: medial joint line tenderness. AGGRAVATING FACTORS:  Is worsened by prolonged standing, sitting, walking and squatting activities.  ASSOCIATED SYMPTOMS:  swelling, tenderness, and aching.    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     Allergies: No Known Allergies    Medications:   Home Medications:  Current Outpatient Medications on File Prior to Visit   Medication Sig    albuterol sulfate  (90 Base) MCG/ACT inhaler 2 puffs every 4 hours as needed for SOA or wheezing. Generic.    Ascorbic Acid (VITAMIN C PO) Take  by mouth. HOLD PER MD INSTR    aspirin 81 MG EC tablet Take 1 tablet by mouth Daily. HOLD PER MD INSTR    azelastine (ASTELIN) 0.1 % nasal spray 2 sprays into the nostril(s) as directed by provider 2 (Two) Times a Day. Use in each nostril as directed    B Complex Vitamins (VITAMIN B COMPLEX PO) Take  by mouth Daily. HOLD PRIOR TO SURGERY    budesonide (Pulmicort Flexhaler) 180 MCG/ACT inhaler Use 1 or 2 inhalations each AM. Rinse and spit after use.    Cholecalciferol (VITAMIN D3) 5000 UNITS capsule capsule Take 1 capsule by mouth Daily. HOLD PRIOR TO SURGERY    cyclobenzaprine (FLEXERIL) 10 MG tablet TAKE ONE TABLET BY MOUTH THREE TIMES A DAY AS NEEDED FOR MUSCLE SPASMS    desonide (DESOWEN) 0.05 % cream Apply  topically to the appropriate area as directed See Admin Instructions. Apply to the  "affected areas three times daily    Diclofenac Sodium (VOLTAREN) 1 % gel gel Apply 4 g topically to the appropriate area as directed 4 (Four) Times a Day.    diphenhydrAMINE HCl (BENADRYL ALLERGY PO) Take 1 tablet by mouth Daily.    docusate sodium (COLACE) 250 MG capsule Take 1 capsule by mouth Daily.    esomeprazole (nexIUM) 40 MG capsule Take 1 capsule by mouth Every Night.    etodolac (LODINE) 500 MG tablet Take 1 tablet by mouth 2 (Two) Times a Day.    ezetimibe-simvastatin (VYTORIN) 10-40 MG per tablet Take 1 tablet by mouth Every Night.    FIBER PO Take  by mouth.    gabapentin (NEURONTIN) 800 MG tablet Take 1 tablet by mouth 3 (Three) Times a Day.    ketoconazole (NIZORAL) 2 % cream Apply  topically to the appropriate area as directed Daily.    melatonin 1 MG tablet Take 3 tablets by mouth Every Night. HOLD PRIOR TO SURGERY    montelukast (SINGULAIR) 10 MG tablet Take 1 tablet by mouth Every Night.    Multiple Vitamin (MULTI VITAMIN DAILY PO) Take  by mouth Daily. HOLD PER MD NUÑEZ    olopatadine (PATANASE) 0.6 % solution nasal solution 2 sprays by Each Nare route 2 (Two) Times a Day.    Potassium 99 MG tablet Take  by mouth.    rizatriptan MLT (MAXALT-MLT) 10 MG disintegrating tablet Place 1 tablet on the tongue As Needed for Migraine. May repeat in 2 hours if needed    SALINE NASAL SPRAY NA into each nostril.    selenium sulfide (SELSUN) 2.5 % lotion Apply  topically to the appropriate area as directed Daily As Needed for Dandruff.    sildenafil (VIAGRA) 100 MG tablet Take 1 tablet by mouth As Needed for Erectile Dysfunction.    Syringe 22G X 1\" 3 ML misc Inject 1 Needle into the appropriate muscle as directed by prescriber Every 14 (Fourteen) Days.    Testosterone Cypionate (DEPOTESTOTERONE CYPIONATE) 200 MG/ML injection Inject 1 mL into the appropriate muscle as directed by prescriber Every 14 (Fourteen) Days.    traMADol (ULTRAM) 50 MG tablet Take 1 tablet by mouth Every 8 (Eight) Hours As Needed for " Moderate Pain.    zolpidem CR (Ambien CR) 12.5 MG CR tablet Take 1 tablet by mouth At Night As Needed for Sleep.     No current facility-administered medications on file prior to visit.     Current Medications:  Scheduled Meds:  Continuous Infusions:No current facility-administered medications for this visit.    PRN Meds:.    I have reviewed the patient's medical history in detail and updated the computerized patient record.  Review and summarization of old records include:    Past Medical History:   Diagnosis Date    Anemia     Arthritis     OSTEO    Asthma     Chronic pain disorder 07/01/2021    COVID-19 04/2021    CTS (carpal tunnel syndrome)     Not since surgery    ED (erectile dysfunction)     Elevated cholesterol     Environmental allergies     Erectile dysfunction     Fracture, fibula     RIGHT AND ANKLE    Gastroesophageal reflux disease without esophagitis 07/11/2016    Joint pain 09/01/2002    Low back pain     Migraine with aura and without status migrainosus, not intractable 07/11/2016    Pain and swelling of toe of right foot     Peripheral neuropathy     Seasonal allergies     Sleep apnea     CPAP    Testosterone deficiency 07/11/2016        Past Surgical History:   Procedure Laterality Date    ANKLE ARTHROSCOPY Right 01/26/2018    Procedure: RT ANKLE ARTHROSCOPY SUBCHONDROPLASTY TALUS ;  Surgeon: Lebron Spivey Jr., MD;  [place] [facility] [place] [place]:     ANKLE LIGAMENT RECONSTRUCTION Right 01/26/2018    Procedure: HARDWARE REMOVAL POSS LATERAL LIGAMENT RECONSTRUCTION ;  Surgeon: Lebron Spivey Jr., MD;  [place] [facility] OR [facility];  Service:     CARPAL TUNNEL RELEASE Right     COLONOSCOPY N/A 04/26/2023    Procedure: COLONOSCOPY to cecum and TI:;  Surgeon: Je Eddy MD;  [place] [facility] ENDOSCOPY;  Service: Gastroenterology;  Laterality: N/A;  pre:  screening  post:  diverticulosis, hemorrhoids    CUBITAL TUNNEL RELEASE Right     NERVE RELEASE    EAR BIOPSY Right     MYRINGOTOMY W/BONES REPLACED  INNER EAR    EPIDURAL BLOCK      FRACTURE SURGERY      Dr. Lebron Spivey    KNEE SURGERY Right     MENISCUS REPAIR    LUMBAR DISCECTOMY Right 2023    Procedure: Lumbar Microscopic Discectomy, right lumbar 3 lumbar 4;  Surgeon: Jovon Doran MD;  [place] [facility] OR;  Service: Neurosurgery;  Laterality: Right;    NOSE SURGERY      ORIF FIBULA FRACTURE Right 2017    ANKLE INCLUDED    ORTHOPEDIC SURGERY      Dr. Tyrell Eddy    SINUS SURGERY      Dr. Vilchis    TONSILLECTOMY      As a kid        Social History     Occupational History    Not on file   Tobacco Use    Smoking status: Former     Packs/day: 2.00     Years: 20.00     Additional pack years: 0.00     Total pack years: 40.00     Types: Cigarettes     Start date: 1987     Quit date: 2002     Years since quittin.5     Passive exposure: Past    Smokeless tobacco: Never    Tobacco comments:     I have been smoke free for 19 years as of 19.   Vaping Use    Vaping Use: Never used   Substance and Sexual Activity    Alcohol use: Yes     Alcohol/week: 2.0 standard drinks of alcohol     Types: 2 Shots of liquor per week     Comment: Socially    Drug use: No    Sexual activity: Yes     Partners: Female     Birth control/protection: None      Social History     Social History Narrative    Not on file        Family History   Problem Relation Age of Onset    Lung cancer Mother     Cancer Mother          of lung, breast, and brain cancer.    Lung cancer Father     Cancer Father          of lung cancer.    Colon cancer Maternal Grandfather     Malig Hyperthermia Neg Hx        ROS: 14 point review of systems was performed and was negative except for documented findings in HPI and today's encounter.     Allergies: No Known Allergies  Constitutional:  Denies fever, shaking or chills   Eyes:  Denies change in visual acuity   HENT:  Denies nasal congestion or sore throat   Respiratory:  Denies cough or shortness of breath    Cardiovascular:  Denies chest pain or severe LE edema   GI:  Denies abdominal pain, nausea, vomiting, bloody stools or diarrhea   Musculoskeletal:  Numbness, tingling, or loss of motor function only as noted above in history of present illness.  : Denies painful urination or hematuria  Integument:  Denies rash, lesion or ulceration   Neurologic:  Denies headache or focal weakness  Endocrine:  Denies lymphadenopathy  Psych:  Denies confusion or change in mental status   Hem:  Denies active bleeding      Physical Exam: 48 y.o. male  Wt Readings from Last 3 Encounters:   01/17/24 113 kg (250 lb)   12/14/23 111 kg (244 lb)   12/13/23 111 kg (244 lb 6.4 oz)       Facility age limit for growth %osvaldo is 20 years.  Vitals:    01/17/24 1530   Temp: 98.7 °F (37.1 °C)     Vital signs reviewed.   General Appearance:    Alert, cooperative, in no acute distress                  Eyes: conjunctiva clear  ENT: external ears and nose atraumatic  CV: no peripheral edema  Resp: normal respiratory effort  Skin: no rashes or wounds; normal turgor  Psych: mood and affect appropriate  Lymph: no nodes appreciated  Neuro: gross sensation intact  Vascular:  Palpable peripheral pulse in noted extremity  Musculoskeletal Extremities: KNEE Exam: medial and lateral joint line tenderness with crepitation, synovitis, swelling, and joint effusion bilateral knee.      Diagnostic Data:  Imaging done previously in the office and discussed with the patient:    Procedure:  Large Joint Arthrocentesis: R knee  Date/Time: 1/17/2024 3:40 PM  Consent given by: patient  Site marked: site marked  Timeout: Immediately prior to procedure a time out was called to verify the correct patient, procedure, equipment, support staff and site/side marked as required   Supporting Documentation  Indications: pain, joint swelling and diagnostic evaluation   Procedure Details  [place] knee - R knee  Preparation: Patient was prepped and draped in the usual sterile  fashion  Needle gauge: 21G.  Medications administered: 1 mL methylPREDNISolone acetate 80 MG/ML; 2 mL lidocaine PF 1% 1 %  Patient tolerance: patient tolerated the procedure well with no immediate complications      Large Joint Arthrocentesis: L knee  Date/Time: 1/17/2024 3:40 PM  Consent given by: patient  Site marked: site marked  Timeout: Immediately prior to procedure a time out was called to verify the correct patient, procedure, equipment, support staff and site/side marked as required   Supporting Documentation  Indications: pain   Procedure Details  [place] knee - L knee  Preparation: Patient was prepped and draped in the usual sterile fashion  Needle gauge: 21G.  Medications administered: 1 mL methylPREDNISolone acetate 80 MG/ML; 2 mL lidocaine PF 1% 1 %  Patient tolerance: patient tolerated the procedure well with no immediate complications        Assessment:     ICD-10-CM ICD-9-CM   1. Primary osteoarthritis of both knees  M17.0 715.16       Plan:   Follow up as indicated.  Ice, elevate, and rest as needed.  The diagnosis(es), natural history, pathophysiology and treatment for diagnosis(es) were discussed. Opportunity given and questions answered.  Biomechanics of pertinent body areas discussed.  When appropriate, the use of ambulatory aids discussed.  BMI:  The concept of BMI body mass index and its importance and implications discussed.    EXERCISES:  Advice on benefits of, and types of regular/moderate exercise pertaining to orthopedic diagnosis(es).  MEDICATIONS:  The risks, benefits, warnings,side effects and alternatives of medications discussed.  Inflammation/pain control; with cold, heat, elevation and/or liniments discussed as appropriate  Cortisone Injection. See procedure note.  HOME EXERCISE/PT program encouraged  MEDICAL RECORDS reviewed from other provider(s) for past and current medical history pertinent to this complaint.    1/18/2024

## 2024-01-18 RX ORDER — LIDOCAINE HYDROCHLORIDE 10 MG/ML
2 INJECTION, SOLUTION EPIDURAL; INFILTRATION; INTRACAUDAL; PERINEURAL
Status: COMPLETED | OUTPATIENT
Start: 2024-01-17 | End: 2024-01-17

## 2024-01-18 RX ORDER — METHYLPREDNISOLONE ACETATE 80 MG/ML
1 INJECTION, SUSPENSION INTRA-ARTICULAR; INTRALESIONAL; INTRAMUSCULAR; SOFT TISSUE
Status: COMPLETED | OUTPATIENT
Start: 2024-01-17 | End: 2024-01-17

## 2024-01-28 DIAGNOSIS — M51.16 LUMBAR DISC HERNIATION WITH RADICULOPATHY: ICD-10-CM

## 2024-01-28 DIAGNOSIS — M54.41 CHRONIC RIGHT-SIDED LOW BACK PAIN WITH RIGHT-SIDED SCIATICA: ICD-10-CM

## 2024-01-28 DIAGNOSIS — G89.29 CHRONIC RIGHT-SIDED LOW BACK PAIN WITH RIGHT-SIDED SCIATICA: ICD-10-CM

## 2024-01-29 RX ORDER — RIZATRIPTAN BENZOATE 10 MG/1
TABLET, ORALLY DISINTEGRATING ORAL
Qty: 18 TABLET | Refills: 0 | Status: SHIPPED | OUTPATIENT
Start: 2024-01-29

## 2024-01-29 RX ORDER — TRAMADOL HYDROCHLORIDE 50 MG/1
50 TABLET ORAL EVERY 8 HOURS PRN
Qty: 90 TABLET | Refills: 0 | Status: SHIPPED | OUTPATIENT
Start: 2024-01-29

## 2024-01-29 RX ORDER — GABAPENTIN 800 MG/1
800 TABLET ORAL 3 TIMES DAILY
Qty: 90 TABLET | Refills: 1 | Status: SHIPPED | OUTPATIENT
Start: 2024-01-29

## 2024-01-29 NOTE — TELEPHONE ENCOUNTER
LOV-12/14/2023  NOV-3/28/2024  LF-8/31/2023- gabapentin and tramadol         7/20/2023- rizatriptan

## 2024-02-06 ENCOUNTER — PRIOR AUTHORIZATION (OUTPATIENT)
Dept: FAMILY MEDICINE CLINIC | Facility: CLINIC | Age: 49
End: 2024-02-06
Payer: COMMERCIAL

## 2024-02-06 NOTE — TELEPHONE ENCOUNTER
I initiated a PA on the patients testosterone and am awaiting a decision.      KEY:[id number]    PA has been approved

## 2024-02-23 DIAGNOSIS — G89.29 CHRONIC RIGHT-SIDED LOW BACK PAIN WITH RIGHT-SIDED SCIATICA: ICD-10-CM

## 2024-02-23 DIAGNOSIS — M51.16 LUMBAR DISC HERNIATION WITH RADICULOPATHY: ICD-10-CM

## 2024-02-23 DIAGNOSIS — M54.41 CHRONIC RIGHT-SIDED LOW BACK PAIN WITH RIGHT-SIDED SCIATICA: ICD-10-CM

## 2024-02-27 RX ORDER — TRAMADOL HYDROCHLORIDE 50 MG/1
50 TABLET ORAL EVERY 8 HOURS PRN
Qty: 90 TABLET | Refills: 0 | Status: SHIPPED | OUTPATIENT
Start: 2024-02-27

## 2024-03-18 RX ORDER — CYCLOBENZAPRINE HCL 10 MG
TABLET ORAL
Qty: 60 TABLET | Refills: 3 | Status: SHIPPED | OUTPATIENT
Start: 2024-03-18

## 2024-03-18 NOTE — TELEPHONE ENCOUNTER
Rx Refill Note  Requested Prescriptions     Pending Prescriptions Disp Refills    cyclobenzaprine (FLEXERIL) 10 MG tablet [Pharmacy Med Name: CYCLOBENZAPRINE 10 MG TABLET] 60 tablet 3     Sig: TAKE ONE TABLET BY MOUTH THREE TIMES A DAY AS NEEDED FOR MUSCLE SPASMS      Last office visit with prescribing clinician: 8/31/2023   Last telemedicine visit with prescribing clinician: Visit date not found   Next office visit with prescribing clinician: Visit date not found                         Would you like a call back once the refill request has been completed: [] Yes [] No    If the office needs to give you a call back, can they leave a voicemail: [] Yes [] No    Enid Aldana MA  03/18/24, 07:59 EDT

## 2024-03-28 ENCOUNTER — OFFICE VISIT (OUTPATIENT)
Dept: FAMILY MEDICINE CLINIC | Facility: CLINIC | Age: 49
End: 2024-03-28
Payer: COMMERCIAL

## 2024-03-28 VITALS
OXYGEN SATURATION: 93 % | DIASTOLIC BLOOD PRESSURE: 84 MMHG | BODY MASS INDEX: 32.68 KG/M2 | SYSTOLIC BLOOD PRESSURE: 132 MMHG | WEIGHT: 246.6 LBS | HEIGHT: 73 IN | HEART RATE: 85 BPM | TEMPERATURE: 97.5 F

## 2024-03-28 DIAGNOSIS — E34.9 TESTOSTERONE DEFICIENCY: Primary | ICD-10-CM

## 2024-03-28 DIAGNOSIS — R73.03 PREDIABETES: ICD-10-CM

## 2024-03-28 DIAGNOSIS — R74.01 ELEVATED ALT MEASUREMENT: ICD-10-CM

## 2024-03-28 DIAGNOSIS — E78.00 ELEVATED CHOLESTEROL: ICD-10-CM

## 2024-03-28 DIAGNOSIS — E66.9 OBESITY (BMI 30.0-34.9): ICD-10-CM

## 2024-03-28 PROCEDURE — 99214 OFFICE O/P EST MOD 30 MIN: CPT | Performed by: INTERNAL MEDICINE

## 2024-03-28 RX ORDER — SYRINGE W-NEEDLE,DISPOSAB,3 ML 25GX5/8"
1 SYRINGE, EMPTY DISPOSABLE MISCELLANEOUS
Qty: 20 EACH | Refills: 3 | Status: SHIPPED | OUTPATIENT
Start: 2024-03-28

## 2024-03-28 NOTE — PROGRESS NOTES
Subjective   Arelis Munson is a 49 y.o. male.     Chief Complaint   Patient presents with    Follow-up    Med Refill   Answers submitted by the patient for this visit:  Primary Reason for Visit (Submitted on 3/27/2024)  What is the primary reason for your visit?: Other  Other (Submitted on 3/27/2024)  Please describe your symptoms.: 3 Month Visit for Rx.  Also would like to talk about my testosterone levels. And discuss how I'm overly tired all the time, sometimes get the shakes if I don't eat timely, and how some of my lab work levels are consistently high and or increasing slowly. Finally how do you know when and if you're considered a pre-diabetic.  Have you had these symptoms before?: Yes  How long have you been having these symptoms?: Greater than 2 weeks  Please list any medications you are currently taking for this condition.: On file at your office.    History of Present Illness   History of GERD, osteoarthritis of knees, migraine headaches, lumbar disc herniation with radiculopathy status post lumbar surgery, asthma, NITA on CPAP, testosterone deficiency.  Currently is followed by orthopedics, pain management, neuro spine, urology and pulmonology.      History of hypogonadism previously followed by urology and was on testosterone cypionate 200 mg/mL 1 cc IM every 2 weeks.  Last labs done on 1/29/24.  Has been doing well and is wanting to change so his testosterone management and injections are through primary are and not the urologist.  It was determined okay by the urologist for us to take over at this time with serial labs every 6 months.  Last injection on 3/15/24.     History of asthma and NITA on auto CPAP.  Is on Singulair 10 mg daily along with Pulmicort Flexhaler 180 mcg per actuation 1 to 2 puffs every morning and albuterol inhaler as needed.     History of migraine headaches and using maxalt as needed.     History of hyperlipidemia.  Is currently utilizing as a Ezetimibe-simvastatin 10-40 mg  daily.  Currently without any problems or side effects.     Had back pain and was receiving injections through pain management.  Subsequently, had lumbar discectomy 5/17/23.  Has also has knee OA and pain issues.  Will be having knee surgery in future but has to wait till back healed.  Currently is using diclofenac gel 4 times a day, etodolac 500 mg twice daily, gabapentin 800 mg 3 times daily (last filled 7/19/2023 #90 tab), and tramadol 50 mg every 8 hours as needed (last filled on 7/19/2023 #90 tabs).      History of obstructive sleep apnea being followed by Dr. Kong of sleep medicine and on CPAP.  Doing well no changes with most recent visit on 12/13/2023 with sleep specialist.    The following portions of the patient's history were reviewed and updated as appropriate: allergies, current medications, past family history, past medical history, past social history, past surgical history and problem list.    Depression Screen:      3/28/2024     4:18 PM   PHQ-2/PHQ-9 Depression Screening   Little Interest or Pleasure in Doing Things 0-->not at all   Feeling Down, Depressed or Hopeless 0-->not at all   PHQ-9: Brief Depression Severity Measure Score 0       Past Medical History:   Diagnosis Date    Anemia     Arthritis     OSTEO    Asthma     Chronic pain disorder 07/01/2021    COVID-19 04/2021    CTS (carpal tunnel syndrome)     Not since surgery    ED (erectile dysfunction)     Elevated cholesterol     Environmental allergies     Erectile dysfunction     Fracture, fibula     RIGHT AND ANKLE    Gastroesophageal reflux disease without esophagitis 07/11/2016    Joint pain 09/01/2002    Low back pain     Migraine with aura and without status migrainosus, not intractable 07/11/2016    Pain and swelling of toe of right foot     Peripheral neuropathy     Seasonal allergies     Sleep apnea     CPAP    Testosterone deficiency 07/11/2016       Past Surgical History:   Procedure Laterality Date    ANKLE ARTHROSCOPY Right  2018    Procedure: RT ANKLE ARTHROSCOPY SUBCHONDROPLASTY TALUS ;  Surgeon: Lebron Spivey Jr., MD;  [place]  [facility];  Service:     ANKLE LIGAMENT RECONSTRUCTION Right 2018    Procedure: HARDWARE REMOVAL POSS LATERAL LIGAMENT RECONSTRUCTION ;  Surgeon: Lebron Spivey Jr., MD;  [place]  [facility];  Service:     CARPAL TUNNEL RELEASE Right     COLONOSCOPY N/A 2023    Procedure: COLONOSCOPY to cecum and TI:;  Surgeon: Je Eddy MD;  [place] [facility];  Service: Gastroenterology;  Laterality: N/A;  pre:  screening  post:  diverticulosis, hemorrhoids    CUBITAL TUNNEL RELEASE Right     NERVE RELEASE    EAR BIOPSY Right     MYRINGOTOMY W/BONES REPLACED INNER EAR    EPIDURAL BLOCK      FRACTURE SURGERY      Dr. Lebron Spivey    KNEE SURGERY Right     MENISCUS REPAIR    LUMBAR DISCECTOMY Right 2023    Procedure: Lumbar Microscopic Discectomy, right lumbar 3 lumbar 4;  Surgeon: Jovon Doran MD;  [place] [facility] MAIN OR;  Service: Neurosurgery;  Laterality: Right;    NOSE SURGERY      ORIF FIBULA FRACTURE Right 2017    ANKLE INCLUDED    ORTHOPEDIC SURGERY      Dr. Tyrell Eddy    SINUS SURGERY      Dr. Vilchis    TONSILLECTOMY      As a kid       Family History   Problem Relation Age of Onset    Lung cancer Mother     Cancer Mother          of lung, breast, and brain cancer.    Lung cancer Father     Cancer Father          of lung cancer.    Colon cancer Maternal Grandfather     Malig Hyperthermia Neg Hx        Social History     Socioeconomic History    Marital status:    Tobacco Use    Smoking status: Former     Current packs/day: 0.00     Average packs/day: 2.0 packs/day for 20.0 years (40.0 ttl pk-yrs)     Types: Cigarettes     Start date: 1987     Quit date: 2002     Years since quittin.7     Passive exposure: Past    Smokeless tobacco: Never    Tobacco comments:     I have been smoke free for 19 years as of 19.   Vaping Use     Vaping status: Never Used   Substance and Sexual Activity    Alcohol use: Yes     Alcohol/week: 2.0 standard drinks of alcohol     Types: 2 Shots of liquor per week     Comment: Socially    Drug use: No    Sexual activity: Yes     Partners: Female     Birth control/protection: None       Current Outpatient Medications   Medication Sig Dispense Refill    albuterol sulfate  (90 Base) MCG/ACT inhaler 2 puffs every 4 hours as needed for SOA or wheezing. Generic. 3 g 1    Ascorbic Acid (VITAMIN C PO) Take  by mouth. HOLD PER MD INSTR      aspirin 81 MG EC tablet Take 1 tablet by mouth Daily. HOLD PER MD INSTR      azelastine (ASTELIN) 0.1 % nasal spray 2 sprays into the nostril(s) as directed by provider 2 (Two) Times a Day. Use in each nostril as directed 30 mL 11    B Complex Vitamins (VITAMIN B COMPLEX PO) Take  by mouth Daily. HOLD PRIOR TO SURGERY      budesonide (Pulmicort Flexhaler) 180 MCG/ACT inhaler Use 1 or 2 inhalations each AM. Rinse and spit after use. 2 each 3    Cholecalciferol (VITAMIN D3) 5000 UNITS capsule capsule Take 1 capsule by mouth Daily. HOLD PRIOR TO SURGERY      cyclobenzaprine (FLEXERIL) 10 MG tablet TAKE ONE TABLET BY MOUTH THREE TIMES A DAY AS NEEDED FOR MUSCLE SPASMS 60 tablet 3    desonide (DESOWEN) 0.05 % cream Apply  topically to the appropriate area as directed See Admin Instructions. Apply to the affected areas three times daily 60 each 6    Diclofenac Sodium (VOLTAREN) 1 % gel gel Apply 4 g topically to the appropriate area as directed 4 (Four) Times a Day. 100 g 3    diphenhydrAMINE HCl (BENADRYL ALLERGY PO) Take 1 tablet by mouth Daily.      docusate sodium (COLACE) 250 MG capsule Take 1 capsule by mouth Daily.      esomeprazole (nexIUM) 40 MG capsule Take 1 capsule by mouth Every Night. 30 capsule 5    etodolac (LODINE) 500 MG tablet Take 1 tablet by mouth 2 (Two) Times a Day. 180 tablet 3    ezetimibe-simvastatin (VYTORIN) 10-40 MG per tablet Take 1 tablet by mouth  "Every Night. 30 tablet 5    FIBER PO Take  by mouth.      gabapentin (NEURONTIN) 800 MG tablet TAKE ONE TABLET BY MOUTH THREE TIMES A DAY 90 tablet 1    ketoconazole (NIZORAL) 2 % cream Apply  topically to the appropriate area as directed Daily. 60 g 3    melatonin 1 MG tablet Take 3 tablets by mouth Every Night. HOLD PRIOR TO SURGERY      montelukast (SINGULAIR) 10 MG tablet Take 1 tablet by mouth Every Night. 30 tablet 5    Multiple Vitamin (MULTI VITAMIN DAILY PO) Take  by mouth Daily. HOLD PER MD NUÑEZ      olopatadine (PATANASE) 0.6 % solution nasal solution 2 sprays by Each Nare route 2 (Two) Times a Day. 30.5 g 5    Potassium 99 MG tablet Take  by mouth.      rizatriptan MLT (MAXALT-MLT) 10 MG disintegrating tablet DISSOLVE 1 TABLET BY MOUTH AT ONSET OF HEADACHE; MAY REPEAT 1 TABLET IN 2 HOURS IF NEEDED. 18 tablet 0    SALINE NASAL SPRAY NA into each nostril.      selenium sulfide (SELSUN) 2.5 % lotion Apply  topically to the appropriate area as directed Daily As Needed for Dandruff. 120 mL 5    sildenafil (VIAGRA) 100 MG tablet Take 1 tablet by mouth As Needed for Erectile Dysfunction. 5 tablet 5    Syringe 22G X 1\" 3 ML misc Inject 1 Needle into the appropriate muscle as directed by prescriber Every 7 (Seven) Days. 20 each 3    Testosterone Cypionate (DEPOTESTOTERONE CYPIONATE) 200 MG/ML injection Inject 1 mL into the appropriate muscle as directed by prescriber Every 14 (Fourteen) Days. 10 mL 1    traMADol (ULTRAM) 50 MG tablet TAKE ONE TABLET BY MOUTH EVERY 8 HOURS AS NEEDED FOR MODERATE PAIN 90 tablet 0    zolpidem CR (Ambien CR) 12.5 MG CR tablet Take 1 tablet by mouth At Night As Needed for Sleep. 90 tablet 1     No current facility-administered medications for this visit.       Review of Systems   Constitutional:  Positive for fatigue. Negative for activity change, appetite change, fever, unexpected weight gain and unexpected weight loss.   HENT:  Negative for nosebleeds, rhinorrhea, trouble " "swallowing and voice change.    Eyes:  Negative for visual disturbance.   Respiratory:  Negative for cough, chest tightness, shortness of breath and wheezing.    Cardiovascular:  Negative for chest pain, palpitations and leg swelling.   Gastrointestinal:  Negative for abdominal pain, blood in stool, constipation, diarrhea, nausea, vomiting, GERD and indigestion.   Genitourinary:  Negative for dysuria, frequency and hematuria.   Musculoskeletal:  Negative for arthralgias, back pain and myalgias.   Skin:  Negative for rash and wound.   Neurological:  Negative for dizziness, tremors, weakness, light-headedness, numbness, headache and memory problem.   Hematological:  Negative for adenopathy. Does not bruise/bleed easily.   Psychiatric/Behavioral:  Negative for sleep disturbance and depressed mood. The patient is not nervous/anxious.        Objective   /84 (BP [place] Left arm, Patient Position: Sitting, Cuff Size: Large Adult)   Pulse 85   Temp 97.5 °F (36.4 °C) (Temporal)   Ht 185.4 cm (72.99\")   Wt 112 kg (246 lb 9.6 oz)   SpO2 93%   BMI 32.54 kg/m²     Physical Exam  Vitals and nursing note reviewed.   Constitutional:       General: He is not in acute distress.     Appearance: He is well-developed. He is obese. He is not diaphoretic.   HENT:      Head: Normocephalic and atraumatic.      Right Ear: External ear normal.      Left Ear: External ear normal.      Nose: Nose normal.   Eyes:      Conjunctiva/sclera: Conjunctivae normal.      Pupils: Pupils are equal, round, and reactive to light.   Neck:      Thyroid: No thyromegaly.      Trachea: No tracheal deviation.   Cardiovascular:      Rate and Rhythm: Normal rate and regular rhythm.      Heart sounds: Normal heart sounds. No murmur heard.     No friction rub. No gallop.   Pulmonary:      Effort: Pulmonary effort is normal. No respiratory distress.      Breath sounds: Normal breath sounds.   Abdominal:      General: Bowel sounds are normal.      " "Palpations: Abdomen is soft. There is no mass.      Tenderness: There is no abdominal tenderness. There is no guarding.   Musculoskeletal:         General: Normal range of motion.      Cervical back: Normal range of motion and neck supple.   Lymphadenopathy:      Cervical: No cervical adenopathy.   Skin:     General: Skin is warm and dry.      Capillary Refill: Capillary refill takes less than 2 seconds.      Findings: No rash.   Neurological:      Mental Status: He is alert and oriented to person, place, and time.      Motor: No abnormal muscle tone.      Deep Tendon Reflexes: Reflexes normal.   Psychiatric:         Behavior: Behavior normal.         Thought Content: Thought content normal.         Judgment: Judgment normal.         No results found for this or any previous visit (from the past 2016 hour(s)).  Assessment & Plan   Diagnoses and all orders for this visit:    1. Testosterone deficiency (Primary)  -     Testosterone (Free & Total), LC / MS; Future  -     Syringe 22G X 1\" 3 ML misc; Inject 1 Needle into the appropriate muscle as directed by prescriber Every 7 (Seven) Days.  Dispense: 20 each; Refill: 3    2. Elevated cholesterol    3. Prediabetes    4. Elevated ALT measurement    5. Obesity (BMI 30.0-34.9)      Encouraged low carbohydrate diet, exercise, weight loss, and avoid alcohol.  Follow the A1c about every 6 months along with all labs including the A1c, CBC, CMP, lipid panel, and testosterone level.  Check morning testosterone level.  His last injection was 13 days ago.  He is going to change the testosterone to 0.5 cc weekly instead of 1 cc every 2 weeks.         COVID-19 Precautions - Patient was compliant in wearing a mask. When I saw the patient, I used appropriate personal protective equipment (PPE) including mask and eye shield (standard procedure).  Additionally, I used gown and gloves if indicated.  Hand hygiene was completed before and after seeing the patient.  Dictated utilizing Dragon " Dictation

## 2024-03-29 DIAGNOSIS — M51.16 LUMBAR DISC HERNIATION WITH RADICULOPATHY: ICD-10-CM

## 2024-03-29 DIAGNOSIS — M54.41 CHRONIC RIGHT-SIDED LOW BACK PAIN WITH RIGHT-SIDED SCIATICA: ICD-10-CM

## 2024-03-29 DIAGNOSIS — G89.29 CHRONIC RIGHT-SIDED LOW BACK PAIN WITH RIGHT-SIDED SCIATICA: ICD-10-CM

## 2024-03-29 NOTE — TELEPHONE ENCOUNTER
Rx Refill Note  Requested Prescriptions     Pending Prescriptions Disp Refills    gabapentin (NEURONTIN) 800 MG tablet [Pharmacy Med Name: GABAPENTIN 800 MG TABLET] 90 tablet      Sig: TAKE ONE TABLET BY MOUTH THREE TIMES A DAY    traMADol (ULTRAM) 50 MG tablet [Pharmacy Med Name: traMADol HCL 50MG TABLET] 90 tablet      Sig: TAKE ONE TABLET BY MOUTH EVERY 8 HOURS AS NEEDED FOR MODERATE PAIN      Last office visit with prescribing clinician: 3/28/2024   Last telemedicine visit with prescribing clinician: Visit date not found   Next office visit with prescribing clinician: 6/6/2024                         Would you like a call back once the refill request has been completed: [] Yes [] No    If the office needs to give you a call back, can they leave a voicemail: [] Yes [] No    Tanya Gibson MA  03/29/24, 08:12 EDT   Yes

## 2024-03-30 RX ORDER — TRAMADOL HYDROCHLORIDE 50 MG/1
50 TABLET ORAL EVERY 8 HOURS PRN
Qty: 90 TABLET | Refills: 0 | Status: SHIPPED | OUTPATIENT
Start: 2024-03-30

## 2024-03-30 RX ORDER — GABAPENTIN 800 MG/1
800 TABLET ORAL 3 TIMES DAILY
Qty: 90 TABLET | Refills: 0 | Status: SHIPPED | OUTPATIENT
Start: 2024-03-30

## 2024-04-01 ENCOUNTER — PRIOR AUTHORIZATION (OUTPATIENT)
Dept: FAMILY MEDICINE CLINIC | Facility: CLINIC | Age: 49
End: 2024-04-01
Payer: COMMERCIAL

## 2024-04-01 NOTE — TELEPHONE ENCOUNTER
A PA has been initiated on the patients testosterone and I am awaiting a decision.    KEY:[id number]    PA was denied due to quantity limits but one has been approved previously for 10 vials per 30 day supply until 2/05/2025.

## 2024-04-18 ENCOUNTER — OFFICE VISIT (OUTPATIENT)
Dept: ORTHOPEDIC SURGERY | Facility: CLINIC | Age: 49
End: 2024-04-18
Payer: COMMERCIAL

## 2024-04-18 VITALS — WEIGHT: 250 LBS | BODY MASS INDEX: 33.13 KG/M2 | HEIGHT: 73 IN | TEMPERATURE: 98 F

## 2024-04-18 DIAGNOSIS — M17.0 PRIMARY OSTEOARTHRITIS OF BOTH KNEES: ICD-10-CM

## 2024-04-18 DIAGNOSIS — R52 PAIN: Primary | ICD-10-CM

## 2024-04-18 PROCEDURE — 20610 DRAIN/INJ JOINT/BURSA W/O US: CPT | Performed by: NURSE PRACTITIONER

## 2024-04-18 PROCEDURE — 73562 X-RAY EXAM OF KNEE 3: CPT | Performed by: NURSE PRACTITIONER

## 2024-04-18 RX ADMIN — METHYLPREDNISOLONE ACETATE 1 ML: 80 INJECTION, SUSPENSION INTRA-ARTICULAR; INTRALESIONAL; INTRAMUSCULAR; SOFT TISSUE at 16:00

## 2024-04-18 RX ADMIN — LIDOCAINE HYDROCHLORIDE 2 ML: 20 INJECTION, SOLUTION EPIDURAL; INFILTRATION; INTRACAUDAL; PERINEURAL at 16:00

## 2024-04-18 NOTE — PROGRESS NOTES
Patient: Arelis Munson  YOB: 1975  Date of Service: 4/23/2024    Chief Complaints:   Chief Complaint   Patient presents with    Left Knee - Pain, Follow-up    Right Knee - Pain, Follow-up       Subjective:    History of Present Illness: Pt is seen in the office today with complaints of   Chief Complaint   Patient presents with    Left Knee - Pain, Follow-up    Right Knee - Pain, Follow-up   .  KNEE: TIMING:  The pain is described as ACUTE on CHRONIC.  LOCATION: medial joint line tenderness. AGGRAVATING FACTORS:  Is worsened by prolonged standing, sitting, walking and squatting activities.  ASSOCIATED SYMPTOMS:  swelling, tenderness, and aching.    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     Allergies: No Known Allergies    Medications:   Home Medications:  Current Outpatient Medications on File Prior to Visit   Medication Sig    albuterol sulfate  (90 Base) MCG/ACT inhaler 2 puffs every 4 hours as needed for SOA or wheezing. Generic.    Ascorbic Acid (VITAMIN C PO) Take  by mouth. HOLD PER MD INSTR    aspirin 81 MG EC tablet Take 1 tablet by mouth Daily. HOLD PER MD INSTR    azelastine (ASTELIN) 0.1 % nasal spray 2 sprays into the nostril(s) as directed by provider 2 (Two) Times a Day. Use in each nostril as directed    B Complex Vitamins (VITAMIN B COMPLEX PO) Take  by mouth Daily. HOLD PRIOR TO SURGERY    budesonide (Pulmicort Flexhaler) 180 MCG/ACT inhaler Use 1 or 2 inhalations each AM. Rinse and spit after use.    Cholecalciferol (VITAMIN D3) 5000 UNITS capsule capsule Take 1 capsule by mouth Daily. HOLD PRIOR TO SURGERY    cyclobenzaprine (FLEXERIL) 10 MG tablet TAKE ONE TABLET BY MOUTH THREE TIMES A DAY AS NEEDED FOR MUSCLE SPASMS    desonide (DESOWEN) 0.05 % cream Apply  topically to the appropriate area as directed See Admin Instructions. Apply to the affected areas three times daily    Diclofenac Sodium (VOLTAREN) 1 % gel gel Apply 4 g topically to the appropriate area as directed  "4 (Four) Times a Day.    diphenhydrAMINE HCl (BENADRYL ALLERGY PO) Take 1 tablet by mouth Daily.    docusate sodium (COLACE) 250 MG capsule Take 1 capsule by mouth Daily.    esomeprazole (nexIUM) 40 MG capsule Take 1 capsule by mouth Every Night.    etodolac (LODINE) 500 MG tablet Take 1 tablet by mouth 2 (Two) Times a Day.    ezetimibe-simvastatin (VYTORIN) 10-40 MG per tablet Take 1 tablet by mouth Every Night.    FIBER PO Take  by mouth.    gabapentin (NEURONTIN) 800 MG tablet TAKE ONE TABLET BY MOUTH THREE TIMES A DAY    ketoconazole (NIZORAL) 2 % cream Apply  topically to the appropriate area as directed Daily.    melatonin 1 MG tablet Take 3 tablets by mouth Every Night. HOLD PRIOR TO SURGERY    montelukast (SINGULAIR) 10 MG tablet Take 1 tablet by mouth Every Night.    Multiple Vitamin (MULTI VITAMIN DAILY PO) Take  by mouth Daily. HOLD PER MD NUÑEZ    olopatadine (PATANASE) 0.6 % solution nasal solution 2 sprays by Each Nare route 2 (Two) Times a Day.    Potassium 99 MG tablet Take  by mouth.    rizatriptan MLT (MAXALT-MLT) 10 MG disintegrating tablet DISSOLVE 1 TABLET BY MOUTH AT ONSET OF HEADACHE; MAY REPEAT 1 TABLET IN 2 HOURS IF NEEDED.    SALINE NASAL SPRAY NA into each nostril.    selenium sulfide (SELSUN) 2.5 % lotion Apply  topically to the appropriate area as directed Daily As Needed for Dandruff.    sildenafil (VIAGRA) 100 MG tablet Take 1 tablet by mouth As Needed for Erectile Dysfunction.    Syringe 22G X 1\" 3 ML misc Inject 1 Needle into the appropriate muscle as directed by prescriber Every 7 (Seven) Days.    Testosterone Cypionate (DEPOTESTOTERONE CYPIONATE) 200 MG/ML injection Inject 1 mL into the appropriate muscle as directed by prescriber Every 14 (Fourteen) Days.    traMADol (ULTRAM) 50 MG tablet TAKE ONE TABLET BY MOUTH EVERY 8 HOURS AS NEEDED FOR MODERATE PAIN    zolpidem CR (Ambien CR) 12.5 MG CR tablet Take 1 tablet by mouth At Night As Needed for Sleep.     No current " facility-administered medications on file prior to visit.     Current Medications:  Scheduled Meds:  Continuous Infusions:No current facility-administered medications for this visit.    PRN Meds:.    I have reviewed the patient's medical history in detail and updated the computerized patient record.  Review and summarization of old records include:    Past Medical History:   Diagnosis Date    Anemia     Arthritis     OSTEO    Asthma     Chronic pain disorder 07/01/2021    COVID-19 04/2021    CTS (carpal tunnel syndrome)     Not since surgery    ED (erectile dysfunction)     Elevated cholesterol     Environmental allergies     Erectile dysfunction     Fracture, fibula     RIGHT AND ANKLE    Gastroesophageal reflux disease without esophagitis 07/11/2016    Joint pain 09/01/2002    Low back pain     Migraine with aura and without status migrainosus, not intractable 07/11/2016    Pain and swelling of toe of right foot     Peripheral neuropathy     Seasonal allergies     Sleep apnea     CPAP    Testosterone deficiency 07/11/2016        Past Surgical History:   Procedure Laterality Date    ANKLE ARTHROSCOPY Right 01/26/2018    Procedure: RT ANKLE ARTHROSCOPY SUBCHONDROPLASTY TALUS ;  Surgeon: Lebron Spivey Jr., MD;  [place] [facility] OR [facility];  Service:     ANKLE LIGAMENT RECONSTRUCTION Right 01/26/2018    Procedure: HARDWARE REMOVAL POSS LATERAL LIGAMENT RECONSTRUCTION ;  Surgeon: Lebron Spivey Jr., MD;  [place] [facility] OR [facility];  Service:     CARPAL TUNNEL RELEASE Right     COLONOSCOPY N/A 04/26/2023    Procedure: COLONOSCOPY to cecum and TI:;  Surgeon: Je Eddy MD;  [place] [facility] ENDOSCOPY;  Service: Gastroenterology;  Laterality: N/A;  pre:  screening  post:  diverticulosis, hemorrhoids    CUBITAL TUNNEL RELEASE Right     NERVE RELEASE    EAR BIOPSY Right     MYRINGOTOMY W/BONES REPLACED INNER EAR    EPIDURAL BLOCK      FRACTURE SURGERY      Dr. Lebron Spivey    KNEE SURGERY Right     MENISCUS REPAIR    LUMBAR  DISCECTOMY Right 2023    Procedure: Lumbar Microscopic Discectomy, right lumbar 3 lumbar 4;  Surgeon: Jovon Doran MD;  [place] [facility] OR;  Service: Neurosurgery;  Laterality: Right;    NOSE SURGERY      ORIF FIBULA FRACTURE Right 2017    ANKLE INCLUDED    ORTHOPEDIC SURGERY      Dr. Tyrell Eddy    SINUS SURGERY      Dr. Vilchis    TONSILLECTOMY      As a kid        Social History     Occupational History    Not on file   Tobacco Use    Smoking status: Former     Current packs/day: 0.00     Average packs/day: 2.0 packs/day for 20.0 years (40.0 ttl pk-yrs)     Types: Cigarettes     Start date: 1987     Quit date: 2002     Years since quittin.8     Passive exposure: Past    Smokeless tobacco: Never    Tobacco comments:     I have been smoke free for 19 years as of 19.   Vaping Use    Vaping status: Never Used   Substance and Sexual Activity    Alcohol use: Yes     Alcohol/week: 2.0 standard drinks of alcohol     Types: 2 Shots of liquor per week     Comment: Socially    Drug use: No    Sexual activity: Yes     Partners: Female     Birth control/protection: None      Social History     Social History Narrative    Not on file        Family History   Problem Relation Age of Onset    Lung cancer Mother     Cancer Mother          of lung, breast, and brain cancer.    Lung cancer Father     Cancer Father          of lung cancer.    Colon cancer Maternal Grandfather     Malig Hyperthermia Neg Hx        ROS: 14 point review of systems was performed and was negative except for documented findings in HPI and today's encounter.     Allergies: No Known Allergies  Constitutional:  Denies fever, shaking or chills   Eyes:  Denies change in visual acuity   HENT:  Denies nasal congestion or sore throat   Respiratory:  Denies cough or shortness of breath   Cardiovascular:  Denies chest pain or severe LE edema   GI:  Denies abdominal pain, nausea, vomiting, bloody stools or  diarrhea   Musculoskeletal:  Numbness, tingling, or loss of motor function only as noted above in history of present illness.  : Denies painful urination or hematuria  Integument:  Denies rash, lesion or ulceration   Neurologic:  Denies headache or focal weakness  Endocrine:  Denies lymphadenopathy  Psych:  Denies confusion or change in mental status   Hem:  Denies active bleeding      Physical Exam: 49 y.o. male  Wt Readings from Last 3 Encounters:   04/18/24 113 kg (250 lb)   03/28/24 112 kg (246 lb 9.6 oz)   01/17/24 113 kg (250 lb)       Body mass index is 32.98 kg/m².  Facility age limit for growth %osvaldo is 20 years.  Vitals:    04/18/24 1535   Temp: 98 °F (36.7 °C)     Vital signs reviewed.   General Appearance:    Alert, cooperative, in no acute distress                  Eyes: conjunctiva clear  ENT: external ears and nose atraumatic  CV: no peripheral edema  Resp: normal respiratory effort  Skin: no rashes or wounds; normal turgor  Psych: mood and affect appropriate  Lymph: no nodes appreciated  Neuro: gross sensation intact  Vascular:  Palpable peripheral pulse in noted extremity  Musculoskeletal Extremities: KNEE Exam: medial and lateral joint line tenderness with crepitation, synovitis, swelling, and joint effusion bilateral knee.      Diagnostic Data:  Imaging done today and discussed with the patient:    Indication: pain related symptoms,  Views: 3V AP, LAT & 40 degree PA bilateral knee(s)   Findings: advanced arthritis  Comparison views: viewed last xray done in the office.      Procedure:  Large Joint Arthrocentesis: R knee  Date/Time: 4/18/2024 4:00 PM  Consent given by: patient  Site marked: site marked  Timeout: Immediately prior to procedure a time out was called to verify the correct patient, procedure, equipment, support staff and site/side marked as required   Supporting Documentation  Indications: pain, joint swelling and diagnostic evaluation   Procedure Details  [place] knee - R  knee  Preparation: Patient was prepped and draped in the usual sterile fashion  Needle gauge: 21G.  Medications administered: 1 mL methylPREDNISolone acetate 80 MG/ML; 2 mL lidocaine PF 2% 2 %  Patient tolerance: patient tolerated the procedure well with no immediate complications      Large Joint Arthrocentesis: L knee  Date/Time: 4/18/2024 4:00 PM  Consent given by: patient  Site marked: site marked  Timeout: Immediately prior to procedure a time out was called to verify the correct patient, procedure, equipment, support staff and site/side marked as required   Supporting Documentation  Indications: pain   Procedure Details  [place] knee - L knee  Preparation: Patient was prepped and draped in the usual sterile fashion  Needle gauge: 21G.  Medications administered: 1 mL methylPREDNISolone acetate 80 MG/ML; 2 mL lidocaine PF 2% 2 %  Patient tolerance: patient tolerated the procedure well with no immediate complications        Assessment:     ICD-10-CM ICD-9-CM   1. Pain  R52 780.96   2. Primary osteoarthritis of both knees  M17.0 715.16       Plan:   Follow up as indicated.  Ice, elevate, and rest as needed.  The diagnosis(es), natural history, pathophysiology and treatment for diagnosis(es) were discussed. Opportunity given and questions answered.  Biomechanics of pertinent body areas discussed.  When appropriate, the use of ambulatory aids discussed.  EXERCISES:  Advice on benefits of, and types of regular/moderate exercise pertaining to orthopedic diagnosis(es).  MEDICATIONS:  The risks, benefits, warnings,side effects and alternatives of medications discussed.  Inflammation/pain control; with cold, heat, elevation and/or liniments discussed as appropriate  Cortisone Injection. See procedure note.  HOME EXERCISE/PT program encouraged    4/23/2024

## 2024-04-23 RX ORDER — LIDOCAINE HYDROCHLORIDE 20 MG/ML
2 INJECTION, SOLUTION EPIDURAL; INFILTRATION; INTRACAUDAL; PERINEURAL
Status: COMPLETED | OUTPATIENT
Start: 2024-04-18 | End: 2024-04-18

## 2024-04-23 RX ORDER — METHYLPREDNISOLONE ACETATE 80 MG/ML
1 INJECTION, SUSPENSION INTRA-ARTICULAR; INTRALESIONAL; INTRAMUSCULAR; SOFT TISSUE
Status: COMPLETED | OUTPATIENT
Start: 2024-04-18 | End: 2024-04-18

## 2024-04-30 DIAGNOSIS — M51.16 LUMBAR DISC HERNIATION WITH RADICULOPATHY: ICD-10-CM

## 2024-04-30 DIAGNOSIS — M54.41 CHRONIC RIGHT-SIDED LOW BACK PAIN WITH RIGHT-SIDED SCIATICA: ICD-10-CM

## 2024-04-30 DIAGNOSIS — G89.29 CHRONIC RIGHT-SIDED LOW BACK PAIN WITH RIGHT-SIDED SCIATICA: ICD-10-CM

## 2024-04-30 RX ORDER — TRAMADOL HYDROCHLORIDE 50 MG/1
50 TABLET ORAL EVERY 8 HOURS PRN
Qty: 90 TABLET | Refills: 0 | Status: SHIPPED | OUTPATIENT
Start: 2024-04-30

## 2024-05-01 DIAGNOSIS — G89.29 CHRONIC RIGHT-SIDED LOW BACK PAIN WITH RIGHT-SIDED SCIATICA: ICD-10-CM

## 2024-05-01 DIAGNOSIS — M51.16 LUMBAR DISC HERNIATION WITH RADICULOPATHY: ICD-10-CM

## 2024-05-01 DIAGNOSIS — M54.41 CHRONIC RIGHT-SIDED LOW BACK PAIN WITH RIGHT-SIDED SCIATICA: ICD-10-CM

## 2024-05-01 RX ORDER — GABAPENTIN 800 MG/1
800 TABLET ORAL 3 TIMES DAILY
Qty: 90 TABLET | Refills: 1 | Status: SHIPPED | OUTPATIENT
Start: 2024-05-01

## 2024-05-20 RX ORDER — SELENIUM SULFIDE 2.5 MG/100ML
LOTION TOPICAL
Qty: 120 ML | Refills: 5 | Status: SHIPPED | OUTPATIENT
Start: 2024-05-20

## 2024-06-01 DIAGNOSIS — G89.29 CHRONIC RIGHT-SIDED LOW BACK PAIN WITH RIGHT-SIDED SCIATICA: ICD-10-CM

## 2024-06-01 DIAGNOSIS — M51.16 LUMBAR DISC HERNIATION WITH RADICULOPATHY: ICD-10-CM

## 2024-06-01 DIAGNOSIS — M54.41 CHRONIC RIGHT-SIDED LOW BACK PAIN WITH RIGHT-SIDED SCIATICA: ICD-10-CM

## 2024-06-03 RX ORDER — TRAMADOL HYDROCHLORIDE 50 MG/1
50 TABLET ORAL EVERY 8 HOURS PRN
Qty: 90 TABLET | Refills: 0 | Status: SHIPPED | OUTPATIENT
Start: 2024-06-03 | End: 2024-06-06 | Stop reason: SDUPTHER

## 2024-06-06 ENCOUNTER — OFFICE VISIT (OUTPATIENT)
Dept: SLEEP MEDICINE | Facility: HOSPITAL | Age: 49
End: 2024-06-06
Payer: COMMERCIAL

## 2024-06-06 ENCOUNTER — OFFICE VISIT (OUTPATIENT)
Dept: FAMILY MEDICINE CLINIC | Facility: CLINIC | Age: 49
End: 2024-06-06
Payer: COMMERCIAL

## 2024-06-06 ENCOUNTER — TELEPHONE (OUTPATIENT)
Dept: FAMILY MEDICINE CLINIC | Facility: CLINIC | Age: 49
End: 2024-06-06
Payer: COMMERCIAL

## 2024-06-06 VITALS — OXYGEN SATURATION: 97 % | HEIGHT: 73 IN | BODY MASS INDEX: 31.54 KG/M2 | HEART RATE: 87 BPM | WEIGHT: 238 LBS

## 2024-06-06 VITALS
HEART RATE: 70 BPM | WEIGHT: 237 LBS | TEMPERATURE: 98 F | HEIGHT: 73 IN | OXYGEN SATURATION: 97 % | SYSTOLIC BLOOD PRESSURE: 134 MMHG | DIASTOLIC BLOOD PRESSURE: 82 MMHG | BODY MASS INDEX: 31.41 KG/M2

## 2024-06-06 DIAGNOSIS — E78.00 ELEVATED CHOLESTEROL: ICD-10-CM

## 2024-06-06 DIAGNOSIS — G47.33 OSA ON CPAP: ICD-10-CM

## 2024-06-06 DIAGNOSIS — Z51.81 ENCOUNTER FOR MEDICATION MONITORING: ICD-10-CM

## 2024-06-06 DIAGNOSIS — M51.16 LUMBAR DISC HERNIATION WITH RADICULOPATHY: ICD-10-CM

## 2024-06-06 DIAGNOSIS — G89.29 CHRONIC MIDLINE LOW BACK PAIN WITHOUT SCIATICA: ICD-10-CM

## 2024-06-06 DIAGNOSIS — M54.41 CHRONIC RIGHT-SIDED LOW BACK PAIN WITH RIGHT-SIDED SCIATICA: Primary | ICD-10-CM

## 2024-06-06 DIAGNOSIS — F51.04 PSYCHOPHYSIOLOGICAL INSOMNIA: ICD-10-CM

## 2024-06-06 DIAGNOSIS — R20.0 LEFT ARM NUMBNESS: ICD-10-CM

## 2024-06-06 DIAGNOSIS — Z91.09 ENVIRONMENTAL ALLERGIES: ICD-10-CM

## 2024-06-06 DIAGNOSIS — G89.29 CHRONIC RIGHT-SIDED LOW BACK PAIN WITH RIGHT-SIDED SCIATICA: Primary | ICD-10-CM

## 2024-06-06 DIAGNOSIS — M54.50 CHRONIC MIDLINE LOW BACK PAIN WITHOUT SCIATICA: ICD-10-CM

## 2024-06-06 DIAGNOSIS — K21.9 GASTROESOPHAGEAL REFLUX DISEASE WITHOUT ESOPHAGITIS: ICD-10-CM

## 2024-06-06 DIAGNOSIS — M79.2 RADICULAR PAIN IN LEFT ARM: ICD-10-CM

## 2024-06-06 PROCEDURE — G0463 HOSPITAL OUTPT CLINIC VISIT: HCPCS

## 2024-06-06 PROCEDURE — 99214 OFFICE O/P EST MOD 30 MIN: CPT | Performed by: INTERNAL MEDICINE

## 2024-06-06 PROCEDURE — 99213 OFFICE O/P EST LOW 20 MIN: CPT | Performed by: INTERNAL MEDICINE

## 2024-06-06 RX ORDER — EZETIMIBE AND SIMVASTATIN 10; 40 MG/1; MG/1
1 TABLET ORAL NIGHTLY
Qty: 30 TABLET | Refills: 5 | Status: SHIPPED | OUTPATIENT
Start: 2024-06-06

## 2024-06-06 RX ORDER — ESOMEPRAZOLE MAGNESIUM 40 MG/1
40 CAPSULE, DELAYED RELEASE ORAL NIGHTLY
Qty: 30 CAPSULE | Refills: 5 | Status: SHIPPED | OUTPATIENT
Start: 2024-06-06

## 2024-06-06 RX ORDER — GABAPENTIN 800 MG/1
800 TABLET ORAL 3 TIMES DAILY
Qty: 90 TABLET | Refills: 1 | Status: SHIPPED | OUTPATIENT
Start: 2024-06-06

## 2024-06-06 RX ORDER — METHYLPREDNISOLONE 4 MG/1
TABLET ORAL
Qty: 21 TABLET | Refills: 0 | Status: SHIPPED | OUTPATIENT
Start: 2024-06-06

## 2024-06-06 RX ORDER — ZOLPIDEM TARTRATE 12.5 MG/1
12.5 TABLET, FILM COATED, EXTENDED RELEASE ORAL NIGHTLY PRN
Qty: 90 TABLET | Refills: 1 | Status: SHIPPED | OUTPATIENT
Start: 2024-06-06

## 2024-06-06 RX ORDER — MONTELUKAST SODIUM 10 MG/1
10 TABLET ORAL NIGHTLY
Qty: 30 TABLET | Refills: 5 | Status: SHIPPED | OUTPATIENT
Start: 2024-06-06

## 2024-06-06 RX ORDER — TRAMADOL HYDROCHLORIDE 50 MG/1
50 TABLET ORAL EVERY 8 HOURS PRN
Qty: 90 TABLET | Refills: 1 | Status: SHIPPED | OUTPATIENT
Start: 2024-06-06

## 2024-06-06 RX ORDER — CYCLOBENZAPRINE HCL 10 MG
10 TABLET ORAL 3 TIMES DAILY PRN
Qty: 60 TABLET | Refills: 3 | Status: SHIPPED | OUTPATIENT
Start: 2024-06-06

## 2024-06-06 NOTE — PROGRESS NOTES
Subjective   Arelis Munson is a 49 y.o. male.     Chief Complaint   Patient presents with    Follow-up    Numbness     He has had some numbness and tingling in his left arm since memorial day. He said it comes and goes. It starts at his shoulder and comes down       History of Present Illness     History of GERD, osteoarthritis of knees, migraine headaches, lumbar disc herniation with radiculopathy status post lumbar surgery, asthma, NITA on CPAP, testosterone deficiency.  Currently is followed by orthopedics, pain management, neuro spine, urology and pulmonology.      For the last 11 days has been having some numbness and tingling in his left arm.  This is off-and-on.  Starts at the shoulder and goes down to the hand.  This is intermittent.    History of hypogonadism previously followed by urology and was on testosterone cypionate 200 mg/mL 1 cc IM every 2 weeks.  Last labs done on 3/29/24 and within the goal range.  Has been doing well and is wanting to change so his testosterone management and injections are through primary are and not the urologist.  It was determined okay by the urologist for us to take over at this time with serial labs every 6 months.  Last refill picked up on 5/22/2024..     History of asthma and NITA on auto CPAP.  Is on Singulair 10 mg daily along with Pulmicort Flexhaler 180 mcg per actuation 1 to 2 puffs every morning and albuterol inhaler as needed.     History of migraine headaches and using maxalt as needed.     History of hyperlipidemia.  Is currently utilizing as a Ezetimibe-simvastatin 10-40 mg daily.  Currently without any problems or side effects.  Last lab done on 1/29/2024.     Had back pain and was receiving injections through pain management.  Subsequently, had lumbar discectomy 5/17/23.  Has also has knee OA and pain issues.  Will be having knee surgery in future but has to wait till back healed.  Currently is using diclofenac gel 4 times a day, etodolac 500 mg twice daily,  gabapentin 800 mg 3 times daily (last filled 7/19/2023 #90 tab), and tramadol 50 mg every 8 hours as needed (last filled on 7/19/2023 #90 tabs).      History of obstructive sleep apnea being followed by Dr. Kong of sleep medicine and on CPAP.  Doing well no changes with most recent visit on 12/13/2023 with sleep specialist.     The following portions of the patient's history were reviewed and updated as appropriate: allergies, current medications, past family history, past medical history, past social history, past surgical history and problem list.    Depression Screen:      3/28/2024     4:18 PM   PHQ-2/PHQ-9 Depression Screening   Little Interest or Pleasure in Doing Things 0-->not at all   Feeling Down, Depressed or Hopeless 0-->not at all   PHQ-9: Brief Depression Severity Measure Score 0       Past Medical History:   Diagnosis Date    Allergic July 2001    Seasonal    Anemia     Arthritis     OSTEO    Asthma     Chronic pain disorder 07/01/2021    COVID-19 04/2021    CTS (carpal tunnel syndrome)     Not since surgery    ED (erectile dysfunction)     Elevated cholesterol     Environmental allergies     Erectile dysfunction     Fracture, fibula     RIGHT AND ANKLE    Gastroesophageal reflux disease without esophagitis 07/11/2016    HL (hearing loss) [facility]    Getting Worse Especially in Right Ear    Joint pain 09/01/2002    Low back pain     Migraine with aura and without status migrainosus, not intractable 07/11/2016    Pain and swelling of toe of right foot     Peripheral neuropathy     Seasonal allergies     Sleep apnea     CPAP    Testosterone deficiency 07/11/2016       Past Surgical History:   Procedure Laterality Date    ANKLE ARTHROSCOPY Right 01/26/2018    Procedure: RT ANKLE ARTHROSCOPY SUBCHONDROPLASTY TALUS ;  Surgeon: Lebron Spivey Jr., MD;  [place] [facility] [place] [facility] [place]:     ANKLE LIGAMENT RECONSTRUCTION Right 01/26/2018    Procedure: HARDWARE REMOVAL POSS LATERAL LIGAMENT RECONSTRUCTION  ;  Surgeon: Lebron Spivey Jr., MD;  [place]  [facility];  Service:     CARPAL TUNNEL RELEASE Right     COLONOSCOPY N/A 2023    Procedure: COLONOSCOPY to cecum and TI:;  Surgeon: Je Eddy MD;  [place]  [facility];  Service: Gastroenterology;  Laterality: N/A;  pre:  screening  post:  diverticulosis, hemorrhoids    CUBITAL TUNNEL RELEASE Right     NERVE RELEASE    EAR BIOPSY Right     MYRINGOTOMY W/BONES REPLACED INNER EAR    EPIDURAL BLOCK      FRACTURE SURGERY      Dr. Lebron Spivey    KNEE SURGERY Right     MENISCUS REPAIR    LUMBAR DISCECTOMY Right 2023    Procedure: Lumbar Microscopic Discectomy, right lumbar 3 lumbar 4;  Surgeon: Jovon Doran MD;  [place] [facility] MAIN OR;  Service: Neurosurgery;  Laterality: Right;    NOSE SURGERY      ORIF FIBULA FRACTURE Right 2017    ANKLE INCLUDED    ORTHOPEDIC SURGERY      Dr. Tyrell Eddy    SINUS SURGERY      Dr. Vilchis    TONSILLECTOMY      As a kid       Family History   Problem Relation Age of Onset    Lung cancer Mother     Cancer Mother          of lung, breast, and brain cancer.    Lung cancer Father     Cancer Father          of lung cancer.    Colon cancer Maternal Grandfather     Malig Hyperthermia Neg Hx        Social History     Socioeconomic History    Marital status:    Tobacco Use    Smoking status: Former     Current packs/day: 0.00     Average packs/day: 2.0 packs/day for 22.0 years (44.0 ttl pk-yrs)     Types: Cigarettes     Start date: 1987     Quit date: 2002     Years since quittin.9     Passive exposure: Past    Smokeless tobacco: Never    Tobacco comments:     I have been smoke free for 19 years as of 19.   Vaping Use    Vaping status: Never Used   Substance and Sexual Activity    Alcohol use: Yes     Alcohol/week: 2.0 standard drinks of alcohol     Types: 2 Shots of liquor per week     Comment: Socially    Drug use: No    Sexual activity: Yes     Partners: Female      Birth control/protection: None       Current Outpatient Medications   Medication Sig Dispense Refill    albuterol sulfate  (90 Base) MCG/ACT inhaler 2 puffs every 4 hours as needed for SOA or wheezing. Generic. 3 g 1    Ascorbic Acid (VITAMIN C PO) Take  by mouth. HOLD PER MD INSTR      aspirin 81 MG EC tablet Take 1 tablet by mouth Daily. HOLD PER MD INSTR      azelastine (ASTELIN) 0.1 % nasal spray 2 sprays into the nostril(s) as directed by provider 2 (Two) Times a Day. Use in each nostril as directed 30 mL 11    B Complex Vitamins (VITAMIN B COMPLEX PO) Take  by mouth Daily. HOLD PRIOR TO SURGERY      budesonide (Pulmicort Flexhaler) 180 MCG/ACT inhaler Use 1 or 2 inhalations each AM. Rinse and spit after use. 2 each 3    Cholecalciferol (VITAMIN D3) 5000 UNITS capsule capsule Take 1 capsule by mouth Daily. HOLD PRIOR TO SURGERY      cyclobenzaprine (FLEXERIL) 10 MG tablet Take 1 tablet by mouth 3 (Three) Times a Day As Needed for Muscle Spasms. 60 tablet 3    desonide (DESOWEN) 0.05 % cream Apply  topically to the appropriate area as directed See Admin Instructions. Apply to the affected areas three times daily 60 each 6    Diclofenac Sodium (VOLTAREN) 1 % gel gel Apply 4 g topically to the appropriate area as directed 4 (Four) Times a Day. 100 g 3    diphenhydrAMINE HCl (BENADRYL ALLERGY PO) Take 1 tablet by mouth Daily.      docusate sodium (COLACE) 250 MG capsule Take 1 capsule by mouth Daily.      esomeprazole (nexIUM) 40 MG capsule Take 1 capsule by mouth Every Night. 30 capsule 5    etodolac (LODINE) 500 MG tablet Take 1 tablet by mouth 2 (Two) Times a Day. 180 tablet 3    ezetimibe-simvastatin (VYTORIN) 10-40 MG per tablet Take 1 tablet by mouth Every Night. 30 tablet 5    FIBER PO Take  by mouth.      gabapentin (NEURONTIN) 800 MG tablet Take 1 tablet by mouth 3 (Three) Times a Day. 90 tablet 1    ketoconazole (NIZORAL) 2 % cream Apply  topically to the appropriate area as directed Daily. 60 g 3  "   melatonin 1 MG tablet Take 3 tablets by mouth Every Night. HOLD PRIOR TO SURGERY      montelukast (SINGULAIR) 10 MG tablet Take 1 tablet by mouth Every Night. 30 tablet 5    Multiple Vitamin (MULTI VITAMIN DAILY PO) Take  by mouth Daily. HOLD PER MD NUÑEZ      olopatadine (PATANASE) 0.6 % solution nasal solution 2 sprays by Each Nare route 2 (Two) Times a Day. 30.5 g 5    Potassium 99 MG tablet Take  by mouth.      rizatriptan MLT (MAXALT-MLT) 10 MG disintegrating tablet DISSOLVE 1 TABLET BY MOUTH AT ONSET OF HEADACHE; MAY REPEAT 1 TABLET IN 2 HOURS IF NEEDED. 18 tablet 0    SALINE NASAL SPRAY NA into each nostril.      selenium sulfide (SELSUN) 2.5 % lotion APPLY TO AFFECTED AREA(S) DAILY AS NEEDED FOR DANDRUFF AS DIRECTED 120 mL 5    sildenafil (VIAGRA) 100 MG tablet Take 1 tablet by mouth As Needed for Erectile Dysfunction. 5 tablet 5    Syringe 22G X 1\" 3 ML misc Inject 1 Needle into the appropriate muscle as directed by prescriber Every 7 (Seven) Days. 20 each 3    Testosterone Cypionate (DEPOTESTOTERONE CYPIONATE) 200 MG/ML injection Inject 1 mL into the appropriate muscle as directed by prescriber Every 14 (Fourteen) Days. 10 mL 1    traMADol (ULTRAM) 50 MG tablet Take 1 tablet by mouth Every 8 (Eight) Hours As Needed for Moderate Pain. 90 tablet 1    zolpidem CR (Ambien CR) 12.5 MG CR tablet Take 1 tablet by mouth At Night As Needed for Sleep. 90 tablet 1    methylPREDNISolone (MEDROL) 4 MG dose pack Take as directed on package instructions. 21 tablet 0     No current facility-administered medications for this visit.       Review of Systems   Constitutional:  Negative for activity change, appetite change, fatigue, fever, unexpected weight gain and unexpected weight loss.   HENT:  Negative for nosebleeds, rhinorrhea, trouble swallowing and voice change.    Eyes:  Negative for visual disturbance.   Respiratory:  Negative for cough, chest tightness, shortness of breath and wheezing.    Cardiovascular:  " "Negative for chest pain, palpitations and leg swelling.   Gastrointestinal:  Negative for abdominal pain, blood in stool, constipation, diarrhea, nausea, vomiting, GERD and indigestion.   Genitourinary:  Negative for dysuria, frequency and hematuria.   Musculoskeletal:  Negative for arthralgias, back pain and myalgias.   Skin:  Negative for rash and wound.   Neurological:  Positive for numbness. Negative for dizziness, tremors, weakness, light-headedness, headache and memory problem.        Left arm intermittent numb from shoulder to hand.   Hematological:  Negative for adenopathy. Does not bruise/bleed easily.   Psychiatric/Behavioral:  Negative for sleep disturbance and depressed mood. The patient is not nervous/anxious.        Objective   /82 (BP [place] Left arm, Patient Position: Sitting, Cuff Size: Adult)   Pulse 70   Temp 98 °F (36.7 °C) (Temporal)   Ht 185.4 cm (72.99\")   Wt 108 kg (237 lb)   SpO2 97%   BMI 31.28 kg/m²     Physical Exam  Vitals and nursing note reviewed.   Constitutional:       General: He is not in acute distress.     Appearance: He is well-developed. He is obese. He is not diaphoretic.   HENT:      Head: Normocephalic and atraumatic.      Right Ear: External ear normal.      Left Ear: External ear normal.      Nose: Nose normal.   Eyes:      Conjunctiva/sclera: Conjunctivae normal.      Pupils: Pupils are equal, round, and reactive to light.   Neck:      Thyroid: No thyromegaly.      Trachea: No tracheal deviation.   Cardiovascular:      Rate and Rhythm: Normal rate and regular rhythm.      Heart sounds: Normal heart sounds. No murmur heard.     No friction rub. No gallop.   Pulmonary:      Effort: Pulmonary effort is normal. No respiratory distress.      Breath sounds: Normal breath sounds.   Abdominal:      General: Bowel sounds are normal.      Palpations: Abdomen is soft. There is no mass.      Tenderness: There is no abdominal tenderness. There is no guarding. "   Musculoskeletal:         General: Normal range of motion.      Cervical back: Normal range of motion and neck supple.   Lymphadenopathy:      Cervical: No cervical adenopathy.   Skin:     General: Skin is warm and dry.      Capillary Refill: Capillary refill takes less than 2 seconds.      Findings: No rash.   Neurological:      Mental Status: He is alert and oriented to person, place, and time.      Motor: No abnormal muscle tone.      Deep Tendon Reflexes: Reflexes normal.   Psychiatric:         Behavior: Behavior normal.         Thought Content: Thought content normal.         Judgment: Judgment normal.       Recent Results (from the past 2016 hour(s))   Testosterone (Free & Total), LC / MS    Collection Time: 03/29/24  8:25 AM    Specimen: Blood   Result Value Ref Range    Testosterone, Total 682.2 264.0 - 916.0 ng/dL    Testosterone, Free 7.7 7.2 - 24.0 pg/mL     Assessment & Plan   Diagnoses and all orders for this visit:    1. Chronic right-sided low back pain with right-sided sciatica (Primary)  -     traMADol (ULTRAM) 50 MG tablet; Take 1 tablet by mouth Every 8 (Eight) Hours As Needed for Moderate Pain.  Dispense: 90 tablet; Refill: 1  -     gabapentin (NEURONTIN) 800 MG tablet; Take 1 tablet by mouth 3 (Three) Times a Day.  Dispense: 90 tablet; Refill: 1  -     Compliance Drug Analysis, Ur - Urine, Clean Catch    2. Lumbar disc herniation with radiculopathy  -     traMADol (ULTRAM) 50 MG tablet; Take 1 tablet by mouth Every 8 (Eight) Hours As Needed for Moderate Pain.  Dispense: 90 tablet; Refill: 1  -     gabapentin (NEURONTIN) 800 MG tablet; Take 1 tablet by mouth 3 (Three) Times a Day.  Dispense: 90 tablet; Refill: 1  -     Compliance Drug Analysis, Ur - Urine, Clean Catch    3. Elevated cholesterol  -     ezetimibe-simvastatin (VYTORIN) 10-40 MG per tablet; Take 1 tablet by mouth Every Night.  Dispense: 30 tablet; Refill: 5    4. Gastroesophageal reflux disease without esophagitis  -      esomeprazole (nexIUM) 40 MG capsule; Take 1 capsule by mouth Every Night.  Dispense: 30 capsule; Refill: 5    5. Chronic midline low back pain without sciatica  -     cyclobenzaprine (FLEXERIL) 10 MG tablet; Take 1 tablet by mouth 3 (Three) Times a Day As Needed for Muscle Spasms.  Dispense: 60 tablet; Refill: 3    6. Environmental allergies  -     montelukast (SINGULAIR) 10 MG tablet; Take 1 tablet by mouth Every Night.  Dispense: 30 tablet; Refill: 5    7. Radicular pain in left arm  -     methylPREDNISolone (MEDROL) 4 MG dose pack; Take as directed on package instructions.  Dispense: 21 tablet; Refill: 0    8. Left arm numbness  -     methylPREDNISolone (MEDROL) 4 MG dose pack; Take as directed on package instructions.  Dispense: 21 tablet; Refill: 0    9. Encounter for medication monitoring  -     Compliance Drug Analysis, Ur - Urine, Clean Catch      Low back pain with sciatica and known lumbar disc herniation with likely radiculopathy.  Is also having radicular pain in the left arm.  Will do the Medrol Dosepak as noted above.  I suspect he probably has some disc disease in his neck as well.  Will obtain a compliance drug analysis today.  Continue with his current medications.  Labs reviewed that were available in the chart.  If he has worsening problems or if persisting issues then follow-up.  MARY BETH run and reviewed.  Risks of the medication include but are not limited to fatigue, somnolence, increased risk of falls, constipation, allergic reaction, dependence, and addiction           COVID-19 Precautions - Patient was compliant in wearing a mask. When I saw the patient, I used appropriate personal protective equipment (PPE) including mask and eye shield (standard procedure).  Additionally, I used gown and gloves if indicated.  Hand hygiene was completed before and after seeing the patient.  Dictated utilizing Dragon Dictation

## 2024-06-06 NOTE — TELEPHONE ENCOUNTER
I called and spoke with the patient and let him know that I did the letter for him so it should be in his MyChart. He verbalized understanding and will call back if needed.

## 2024-06-06 NOTE — PROGRESS NOTES
"Follow Up [facility] Disorders [facility] Note     Chief Complaint:  NITA     Primary Care Physician: Terrell Plama MD    Interval History:   The patient is a 49 y.o. male who I last saw 2023 and that note was reviewed.  The patient reports he is doing well without new complaints.  He goes to bed 11 PM and gets out of bed at 5:30 AM.  He has continued to work for shift only.    The patient continues to take Ambien CR 12.5 mg at bedtime    Review of Systems:    A complete review of systems was done and all were negative with the exception of some nasal congestion postnasal drip, shortness of breath with exertion, occasional ear pain, and some ADAM    Social History:    Social History     Socioeconomic History    Marital status:    Tobacco Use    Smoking status: Former     Current packs/day: 0.00     Average packs/day: 2.0 packs/day for 20.0 years (40.0 ttl pk-yrs)     Types: Cigarettes     Start date: 1987     Quit date: 2002     Years since quittin.9     Passive exposure: Past    Smokeless tobacco: Never    Tobacco comments:     I have been smoke free for 19 years as of 19.   Vaping Use    Vaping status: Never Used   Substance and Sexual Activity    Alcohol use: Yes     Alcohol/week: 2.0 standard drinks of alcohol     Types: 2 Shots of liquor per week     Comment: Socially    Drug use: No    Sexual activity: Yes     Partners: Female     Birth control/protection: None       Allergies:  Patient has no known allergies.     Medication Review: His list was reviewed.      Vital Signs:    Vitals:    24 0912   Pulse: 87   SpO2: 97%   Weight: 108 kg (238 lb)   Height: 185.4 cm (73\")     Body mass index is 31.4 kg/m².    Physical Exam:    Constitutional:  Well developed 49 y.o. male that appears in no apparent distress.  Awake & oriented times 3.  Normal mood with normal recent and remote memory and normal judgement.  Eyes:  Conjunctivae normal.  Oropharynx: Previously, moist mucous membranes " without exudate and a large tongue and normal uvula and class III Mallampati airway.    Self-administered [place] Sleepiness Scale test results: 3  0-5 Lower normal daytime sleepiness  6-10 Higher normal daytime sleepiness  11-12 Mild, 13-15 Moderate, & 16-24 Severe excessive daytime sleepiness     Downloaded PAP Data Evaluated For Therapeutic Response and Compliance:  DME is [employer] and he uses a fullface mask.  Downloads between 3/7 and 6/4/2024 compliance 87%.  Average usage is 6 hours and 10 minutes.  Average AHI is normal without leak.  Average auto CPAP pressure is 15 and his DreamStation 2 auto CPAP is 9-15    I have reviewed the above results and compared them with the patient's last downloads and reviewed with the patient.    Impression:   Moderate obstructive sleep apnea by overnight polysomnogram 6/19/2005, weight 244 pounds, adequately treated with DreamStation 2 auto CPAP. The patient appears to be at goal with good compliance and usage. The patient has no complaints of hypersomnolence.  Circadian rhythm sleep disorder, shiftwork type, early morning awakening  Psychophysiologic insomnia adequately treated with Ambien CR 12.5 mg 1/2 to 1 tablet at bedtime    Plan:  Good sleep hygiene measures should be maintained.  Weight loss would be beneficial in this patient who is obese by Body mass index is 31.4 kg/m².      After evaluating the patient and assessing results available, the patient is benefiting from the treatment being provided.     The patient will continue DreamStation 2 auto CPAP.  Potential side effects of not using PAP therapy reviewed and addressed as needed.  After clinical evaluation and review of downloads, I recommend no changes to the patient's pressures.  A new prescription will be sent to the patient's DME for a new ResMed auto CPAP device set at 10-16 cm H20 that he is requesting.    The patient continues to take Ambien CR 12.5 mg at bedtime. Mateo reviewed and there is no  irregularities. It is noted that the patient takes gabapentin, tramadol, and testosterone.     I answered all of the patient's questions.  The patient will call the [facility] for any problems and will follow up in 3 months.      Ronni Kong MD  Sleep Medicine  06/06/24  09:34 EDT

## 2024-06-06 NOTE — TELEPHONE ENCOUNTER
"Caller: Arelis Munson \"Pham\"    Relationship: Self    Best call back number: [phone]     What form or medical record are you requesting: WORK NOTE FOR 06/06/24    Who is requesting this form or medical record from you: [employer]     How would you like to receive the form or medical records (pick-up, mail, fax): UPLOAD IN [place]    Timeframe paperwork needed: ASAP    Additional notes: PLEASE ADVISE        "

## 2024-06-10 PROBLEM — G47.26 CIRCADIAN RHYTHM SLEEP DISORDER, SHIFT WORK TYPE: Status: RESOLVED | Noted: 2018-12-09 | Resolved: 2024-06-10

## 2024-06-15 LAB — DRUGS UR: NORMAL

## 2024-06-23 DIAGNOSIS — E34.9 TESTOSTERONE DEFICIENCY: ICD-10-CM

## 2024-06-25 RX ORDER — TESTOSTERONE CYPIONATE 200 MG/ML
INJECTION, SOLUTION INTRAMUSCULAR
Qty: 3 ML | Refills: 2 | Status: SHIPPED | OUTPATIENT
Start: 2024-06-25

## 2024-07-19 DIAGNOSIS — M79.2 RADICULAR PAIN IN LEFT ARM: ICD-10-CM

## 2024-07-19 DIAGNOSIS — R20.0 LEFT ARM NUMBNESS: ICD-10-CM

## 2024-07-22 NOTE — TELEPHONE ENCOUNTER
LOV 6/6/24  NOV None  LF 6/6/24    Refill not appropriate.  Please advise    [facility] [place]

## 2024-07-23 ENCOUNTER — OFFICE VISIT (OUTPATIENT)
Dept: ORTHOPEDIC SURGERY | Facility: CLINIC | Age: 49
End: 2024-07-23
Payer: COMMERCIAL

## 2024-07-23 VITALS — WEIGHT: 245 LBS | TEMPERATURE: 98.4 F | BODY MASS INDEX: 32.47 KG/M2 | HEIGHT: 73 IN

## 2024-07-23 DIAGNOSIS — M17.0 PRIMARY OSTEOARTHRITIS OF BOTH KNEES: Primary | ICD-10-CM

## 2024-07-23 RX ORDER — METHYLPREDNISOLONE 4 MG/1
TABLET ORAL
Qty: 21 TABLET | Refills: 0 | OUTPATIENT
Start: 2024-07-23

## 2024-07-24 RX ORDER — METHYLPREDNISOLONE ACETATE 80 MG/ML
80 INJECTION, SUSPENSION INTRA-ARTICULAR; INTRALESIONAL; INTRAMUSCULAR; SOFT TISSUE
Status: COMPLETED | OUTPATIENT
Start: 2024-07-24 | End: 2024-07-24

## 2024-07-24 RX ADMIN — METHYLPREDNISOLONE ACETATE 80 MG: 80 INJECTION, SUSPENSION INTRA-ARTICULAR; INTRALESIONAL; INTRAMUSCULAR; SOFT TISSUE at 13:22

## 2024-07-24 NOTE — PROGRESS NOTES
Patient: Arelis Munson  YOB: 1975  Date of Service: 7/24/2024    Chief Complaints:   Chief Complaint   Patient presents with   • Left Knee - Pain, Follow-up   • Right Knee - Pain, Follow-up       Subjective:Here for both knees painful and desires conservative treatment     History of Present Illness: Pt is seen in the office today with complaints of   Chief Complaint   Patient presents with   • Left Knee - Pain, Follow-up   • Right Knee - Pain, Follow-up   .  KNEE: TIMING:  The pain is described as ACUTE on CHRONIC.  LOCATION: medial joint line tenderness. AGGRAVATING FACTORS:  Is worsened by prolonged standing, sitting, walking and squatting activities.  ASSOCIATED SYMPTOMS:  swelling, tenderness, and aching.    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     Allergies: No Known Allergies    Medications:   Home Medications:  Current Outpatient Medications on File Prior to Visit   Medication Sig   • albuterol sulfate  (90 Base) MCG/ACT inhaler 2 puffs every 4 hours as needed for SOA or wheezing. Generic.   • Ascorbic Acid (VITAMIN C PO) Take  by mouth. HOLD PER MD INSTR   • aspirin 81 MG EC tablet Take 1 tablet by mouth Daily. HOLD PER MD INSTR   • azelastine (ASTELIN) 0.1 % nasal spray 2 sprays into the nostril(s) as directed by provider 2 (Two) Times a Day. Use in each nostril as directed   • B Complex Vitamins (VITAMIN B COMPLEX PO) Take  by mouth Daily. HOLD PRIOR TO SURGERY   • budesonide (Pulmicort Flexhaler) 180 MCG/ACT inhaler Use 1 or 2 inhalations each AM. Rinse and spit after use.   • Cholecalciferol (VITAMIN D3) 5000 UNITS capsule capsule Take 1 capsule by mouth Daily. HOLD PRIOR TO SURGERY   • cyclobenzaprine (FLEXERIL) 10 MG tablet Take 1 tablet by mouth 3 (Three) Times a Day As Needed for Muscle Spasms.   • desonide (DESOWEN) 0.05 % cream Apply  topically to the appropriate area as directed See Admin Instructions. Apply to the affected areas three times daily   • Diclofenac  "Sodium (VOLTAREN) 1 % gel gel Apply 4 g topically to the appropriate area as directed 4 (Four) Times a Day.   • diphenhydrAMINE HCl (BENADRYL ALLERGY PO) Take 1 tablet by mouth Daily.   • docusate sodium (COLACE) 250 MG capsule Take 1 capsule by mouth Daily.   • esomeprazole (nexIUM) 40 MG capsule Take 1 capsule by mouth Every Night.   • etodolac (LODINE) 500 MG tablet Take 1 tablet by mouth 2 (Two) Times a Day.   • ezetimibe-simvastatin (VYTORIN) 10-40 MG per tablet Take 1 tablet by mouth Every Night.   • FIBER PO Take  by mouth.   • gabapentin (NEURONTIN) 800 MG tablet Take 1 tablet by mouth 3 (Three) Times a Day.   • ketoconazole (NIZORAL) 2 % cream Apply  topically to the appropriate area as directed Daily.   • melatonin 1 MG tablet Take 3 tablets by mouth Every Night. HOLD PRIOR TO SURGERY   • methylPREDNISolone (MEDROL) 4 MG dose pack Take as directed on package instructions.   • montelukast (SINGULAIR) 10 MG tablet Take 1 tablet by mouth Every Night.   • Multiple Vitamin (MULTI VITAMIN DAILY PO) Take  by mouth Daily. HOLD PER MD INSTR   • olopatadine (PATANASE) 0.6 % solution nasal solution 2 sprays by Each Nare route 2 (Two) Times a Day.   • Potassium 99 MG tablet Take  by mouth.   • rizatriptan MLT (MAXALT-MLT) 10 MG disintegrating tablet DISSOLVE 1 TABLET BY MOUTH AT ONSET OF HEADACHE; MAY REPEAT 1 TABLET IN 2 HOURS IF NEEDED.   • SALINE NASAL SPRAY NA into each nostril.   • selenium sulfide (SELSUN) 2.5 % lotion APPLY TO AFFECTED AREA(S) DAILY AS NEEDED FOR DANDRUFF AS DIRECTED   • sildenafil (VIAGRA) 100 MG tablet Take 1 tablet by mouth As Needed for Erectile Dysfunction.   • Syringe 22G X 1\" 3 ML misc Inject 1 Needle into the appropriate muscle as directed by prescriber Every 7 (Seven) Days.   • Testosterone Cypionate (DEPOTESTOTERONE CYPIONATE) 200 MG/ML injection INJECT 1 MILLILITERS INTRAMUSCULARLY AS DIRECTED EVERY 14 DAYS   • traMADol (ULTRAM) 50 MG tablet Take 1 tablet by mouth Every 8 (Eight) " Hours As Needed for Moderate Pain.   • zolpidem CR (Ambien CR) 12.5 MG CR tablet Take 1 tablet by mouth At Night As Needed for Sleep.     No current facility-administered medications on file prior to visit.     Current Medications:  Scheduled Meds:  Continuous Infusions:No current facility-administered medications for this visit.    PRN Meds:.    I have reviewed the patient's medical history in detail and updated the computerized patient record.  Review and summarization of old records include:    Past Medical History:   Diagnosis Date   • Allergic July 2001    Seasonal   • Anemia    • Arthritis     OSTEO   • Asthma    • Chronic pain disorder 07/01/2021   • COVID-19 04/2021   • CTS (carpal tunnel syndrome)     Not since surgery   • ED (erectile dysfunction)    • Elevated cholesterol    • Environmental allergies    • Erectile dysfunction    • Fracture, fibula     RIGHT AND ANKLE   • Gastroesophageal reflux disease without esophagitis 07/11/2016   • HL (hearing loss) [facility]    Getting Worse Especially in Right Ear   • Joint pain 09/01/2002   • Low back pain    • Migraine with aura and without status migrainosus, not intractable 07/11/2016   • NITA on CPAP 06/19/2005    Overnight polysomnogram.  Weight 244 pounds.  Moderate NITA with AHI 15 events per hour.  No sleep-related hypoxia.  Treated with auto CPAP.   • Pain and swelling of toe of right foot    • Peripheral neuropathy    • Seasonal allergies    • Testosterone deficiency 07/11/2016        Past Surgical History:   Procedure Laterality Date   • ANKLE ARTHROSCOPY Right 01/26/2018    Procedure: RT ANKLE ARTHROSCOPY SUBCHONDROPLASTY TALUS ;  Surgeon: Lebron Spivey Jr., MD;  [place] [facility] OR [facility];  Service:    • ANKLE LIGAMENT RECONSTRUCTION Right 01/26/2018    Procedure: HARDWARE REMOVAL POSS LATERAL LIGAMENT RECONSTRUCTION ;  Surgeon: Lebron Spivey Jr., MD;  [place] [facility] OR [facility];  Service:    • CARPAL TUNNEL RELEASE Right    • COLONOSCOPY N/A 04/26/2023     Procedure: COLONOSCOPY to cecum and TI:;  Surgeon: Je Eddy MD;  [place] [facility] ENDOSCOPY;  Service: Gastroenterology;  Laterality: N/A;  pre:  screening  post:  diverticulosis, hemorrhoids   • CUBITAL TUNNEL RELEASE Right     NERVE RELEASE   • EAR BIOPSY Right     MYRINGOTOMY W/BONES REPLACED INNER EAR   • EPIDURAL BLOCK     • FRACTURE SURGERY      Dr. Lebron Spivey   • KNEE SURGERY Right     MENISCUS REPAIR   • LUMBAR DISCECTOMY Right 2023    Procedure: Lumbar Microscopic Discectomy, right lumbar 3 lumbar 4;  Surgeon: Jovon Doran MD;  [place] [facility] MAIN OR;  Service: Neurosurgery;  Laterality: Right;   • NOSE SURGERY     • ORIF FIBULA FRACTURE Right 2017    ANKLE INCLUDED   • ORTHOPEDIC SURGERY      Dr. Tyrell Eddy   • SINUS SURGERY      Dr. Vilchis   • TONSILLECTOMY      As a kid        Social History     Occupational History   • Not on file   Tobacco Use   • Smoking status: Former     Current packs/day: 0.00     Average packs/day: 2.0 packs/day for 22.0 years (44.0 ttl pk-yrs)     Types: Cigarettes     Start date: 1987     Quit date: 2002     Years since quittin.0     Passive exposure: Past   • Smokeless tobacco: Never   • Tobacco comments:     I have been smoke free for 19 years as of 19.   Vaping Use   • Vaping status: Never Used   Substance and Sexual Activity   • Alcohol use: Yes     Alcohol/week: 2.0 standard drinks of alcohol     Types: 2 Shots of liquor per week     Comment: Socially   • Drug use: No   • Sexual activity: Yes     Partners: Female     Birth control/protection: None      Social History     Social History Narrative   • Not on file        Family History   Problem Relation Age of Onset   • Lung cancer Mother    • Cancer Mother          of lung, breast, and brain cancer.   • Lung cancer Father    • Cancer Father          of lung cancer.   • Colon cancer Maternal Grandfather    • Malig Hyperthermia Neg Hx        ROS: 14 point review of  systems was performed and was negative except for documented findings in HPI and today's encounter.     Allergies: No Known Allergies  Constitutional:  Denies fever, shaking or chills   Eyes:  Denies change in visual acuity   HENT:  Denies nasal congestion or sore throat   Respiratory:  Denies cough or shortness of breath   Cardiovascular:  Denies chest pain or severe LE edema   GI:  Denies abdominal pain, nausea, vomiting, bloody stools or diarrhea   Musculoskeletal:  Numbness, tingling, or loss of motor function only as noted above in history of present illness.  : Denies painful urination or hematuria  Integument:  Denies rash, lesion or ulceration   Neurologic:  Denies headache or focal weakness  Endocrine:  Denies lymphadenopathy  Psych:  Denies confusion or change in mental status   Hem:  Denies active bleeding      Physical Exam: 49 y.o. male  Wt Readings from Last 3 Encounters:   07/23/24 111 kg (245 lb)   06/06/24 108 kg (237 lb)   06/06/24 108 kg (238 lb)       Body mass index is 32.32 kg/m².  Facility age limit for growth %osvaldo is 20 years.  Vitals:    07/23/24 1532   Temp: 98.4 °F (36.9 °C)     Vital signs reviewed.   General Appearance:    Alert, cooperative, in no acute distress                  Eyes: conjunctiva clear  ENT: external ears and nose atraumatic  CV: no peripheral edema  Resp: normal respiratory effort  Skin: no rashes or wounds; normal turgor  Psych: mood and affect appropriate  Lymph: no nodes appreciated  Neuro: gross sensation intact  Vascular:  Palpable peripheral pulse in noted extremity  Musculoskeletal Extremities: KNEE Exam: medial and lateral joint line tenderness with crepitation, synovitis, swelling, and joint effusion bilateral knee.      Diagnostic Data:  Imaging done previously in the office and discussed with the patient:    Procedure:  Large Joint Arthrocentesis: L knee  Date/Time: 7/24/2024 1:22 PM  Consent given by: patient  Site marked: site marked  Timeout:  Immediately prior to procedure a time out was called to verify the correct patient, procedure, equipment, support staff and site/side marked as required   Supporting Documentation  Indications: pain and joint swelling   Procedure Details  [place] knee - L knee  Preparation: Patient was prepped and draped in the usual sterile fashion  Needle gauge: 21 G.  Approach: anterolateral  Medications administered: 2 mL lidocaine (cardiac); 80 mg methylPREDNISolone acetate 80 MG/ML  Patient tolerance: patient tolerated the procedure well with no immediate complications      Assessment: No diagnosis found.    Plan:   Follow up as indicated.  Ice, elevate, and rest as needed.      7/24/2024

## 2024-08-25 DIAGNOSIS — M54.41 CHRONIC RIGHT-SIDED LOW BACK PAIN WITH RIGHT-SIDED SCIATICA: ICD-10-CM

## 2024-08-25 DIAGNOSIS — G89.29 CHRONIC MIDLINE LOW BACK PAIN WITHOUT SCIATICA: ICD-10-CM

## 2024-08-25 DIAGNOSIS — M51.16 LUMBAR DISC HERNIATION WITH RADICULOPATHY: ICD-10-CM

## 2024-08-25 DIAGNOSIS — M54.50 CHRONIC MIDLINE LOW BACK PAIN WITHOUT SCIATICA: ICD-10-CM

## 2024-08-25 DIAGNOSIS — G89.29 CHRONIC RIGHT-SIDED LOW BACK PAIN WITH RIGHT-SIDED SCIATICA: ICD-10-CM

## 2024-08-26 RX ORDER — TRAMADOL HYDROCHLORIDE 50 MG/1
50 TABLET ORAL EVERY 8 HOURS PRN
Qty: 90 TABLET | Refills: 0 | Status: SHIPPED | OUTPATIENT
Start: 2024-08-26

## 2024-08-26 RX ORDER — CYCLOBENZAPRINE HCL 10 MG
10 TABLET ORAL 3 TIMES DAILY PRN
Qty: 60 TABLET | Refills: 3 | Status: SHIPPED | OUTPATIENT
Start: 2024-08-26

## 2024-08-26 RX ORDER — GABAPENTIN 800 MG/1
800 TABLET ORAL 3 TIMES DAILY
Qty: 90 TABLET | Refills: 0 | Status: SHIPPED | OUTPATIENT
Start: 2024-08-26

## 2024-09-09 ENCOUNTER — OFFICE VISIT (OUTPATIENT)
Dept: FAMILY MEDICINE CLINIC | Facility: CLINIC | Age: 49
End: 2024-09-09
Payer: COMMERCIAL

## 2024-09-09 VITALS
DIASTOLIC BLOOD PRESSURE: 90 MMHG | SYSTOLIC BLOOD PRESSURE: 130 MMHG | TEMPERATURE: 98.6 F | OXYGEN SATURATION: 96 % | WEIGHT: 239 LBS | HEIGHT: 73 IN | BODY MASS INDEX: 31.68 KG/M2 | HEART RATE: 80 BPM

## 2024-09-09 DIAGNOSIS — M51.16 LUMBAR DISC HERNIATION WITH RADICULOPATHY: ICD-10-CM

## 2024-09-09 DIAGNOSIS — G89.29 CHRONIC RIGHT-SIDED LOW BACK PAIN WITH RIGHT-SIDED SCIATICA: ICD-10-CM

## 2024-09-09 DIAGNOSIS — R20.0 NUMBNESS AND TINGLING IN LEFT HAND: Primary | ICD-10-CM

## 2024-09-09 DIAGNOSIS — R20.2 NUMBNESS AND TINGLING IN LEFT HAND: Primary | ICD-10-CM

## 2024-09-09 DIAGNOSIS — M54.41 CHRONIC RIGHT-SIDED LOW BACK PAIN WITH RIGHT-SIDED SCIATICA: ICD-10-CM

## 2024-09-09 PROCEDURE — 99214 OFFICE O/P EST MOD 30 MIN: CPT | Performed by: INTERNAL MEDICINE

## 2024-09-09 RX ORDER — TRAMADOL HYDROCHLORIDE 50 MG/1
50 TABLET ORAL EVERY 6 HOURS PRN
Qty: 120 TABLET | Refills: 1 | Status: SHIPPED | OUTPATIENT
Start: 2024-09-09

## 2024-09-09 RX ORDER — GABAPENTIN 800 MG/1
800 TABLET ORAL 3 TIMES DAILY
Qty: 90 TABLET | Refills: 3 | Status: SHIPPED | OUTPATIENT
Start: 2024-09-09

## 2024-09-09 NOTE — PROGRESS NOTES
Subjective   Arelis Munson is a 49 y.o. male.     Chief Complaint   Patient presents with    Back Pain     Follow up.        Back Pain  Associated symptoms include numbness. Pertinent negatives include no abdominal pain, chest pain, dysuria, fever, weakness or weight loss.      History of GERD, osteoarthritis of knees, migraine headaches, lumbar disc herniation with radiculopathy status post lumbar surgery, asthma, NITA on CPAP, testosterone deficiency.  Currently is followed by orthopedics, pain management, neuro spine, urology and pulmonology.      For the last 11 days has been having some numbness and tingling in his left arm.  This is off-and-on.  Starts at the shoulder and goes down to the hand.  This is intermittent.     History of hypogonadism previously followed by urology and was on testosterone cypionate 200 mg/mL 1 cc IM every 2 weeks.  Last labs done on 3/29/24 and within the goal range.  Has been doing well and is wanting to change so his testosterone management and injections are through primary are and not the urologist.  It was determined okay by the urologist for us to take over at this time with serial labs every 6 months.  Last refill picked up on 5/22/2024..     History of asthma and NITA on auto CPAP.  Is on Singulair 10 mg daily along with Pulmicort Flexhaler 180 mcg per actuation 1 to 2 puffs every morning and albuterol inhaler as needed.     History of migraine headaches and using maxalt as needed.     History of hyperlipidemia.  Is currently utilizing as a Ezetimibe-simvastatin 10-40 mg daily.  Currently without any problems or side effects.  Last lab done on 1/29/2024.     Has persistent intermittent left hand numbness and itching feeling.  Has history of cervical bulging discs and no real neck pain.    Had back pain and was receiving injections through pain management.  Subsequently, had lumbar discectomy 5/17/23.  Has also has knee OA and pain issues.  Will be having knee surgery in  future but has to wait till back healed.  Currently is using diclofenac gel 4 times a day, etodolac 500 mg twice daily, gabapentin 800 mg 3 times daily (last filled 7/19/2023 #90 tab), and tramadol 50 mg every 8 hours as needed (last filled on 7/19/2023 #90 tabs).      History of obstructive sleep apnea being followed by Dr. Kong of sleep medicine and on CPAP.  Doing well no changes with most recent visit on 12/13/2023 with sleep specialist.    The following portions of the patient's history were reviewed and updated as appropriate: allergies, current medications, past family history, past medical history, past social history, past surgical history and problem list.    Depression Screen:      3/28/2024     4:18 PM   PHQ-2/PHQ-9 Depression Screening   Little Interest or Pleasure in Doing Things 0-->not at all   Feeling Down, Depressed or Hopeless 0-->not at all   PHQ-9: Brief Depression Severity Measure Score 0       Past Medical History:   Diagnosis Date    Allergic July 2001    Seasonal    Anemia     Arthritis     OSTEO    Asthma     Chronic pain disorder 07/01/2021    COVID-19 04/2021    CTS (carpal tunnel syndrome)     Not since surgery    ED (erectile dysfunction)     Elevated cholesterol     Environmental allergies     Erectile dysfunction     Fracture, fibula     RIGHT AND ANKLE    Gastroesophageal reflux disease without esophagitis 07/11/2016    HL (hearing loss) [facility]    Getting Worse Especially in Right Ear    Joint pain 09/01/2002    Low back pain     Migraine with aura and without status migrainosus, not intractable 07/11/2016    NITA on CPAP 06/19/2005    Overnight polysomnogram.  Weight 244 pounds.  Moderate NITA with AHI 15 events per hour.  No sleep-related hypoxia.  Treated with auto CPAP.    Pain and swelling of toe of right foot     Peripheral neuropathy     Seasonal allergies     Testosterone deficiency 07/11/2016       Past Surgical History:   Procedure Laterality Date    ANKLE ARTHROSCOPY  Right 2018    Procedure: RT ANKLE ARTHROSCOPY SUBCHONDROPLASTY TALUS ;  Surgeon: Lebron Spivey Jr., MD;  [place]  [facility];  Service:     ANKLE LIGAMENT RECONSTRUCTION Right 2018    Procedure: HARDWARE REMOVAL POSS LATERAL LIGAMENT RECONSTRUCTION ;  Surgeon: Leborn Spivey Jr., MD;  [place]  [facility];  Service:     CARPAL TUNNEL RELEASE Right     COLONOSCOPY N/A 2023    Procedure: COLONOSCOPY to cecum and TI:;  Surgeon: Je Eddy MD;  [place] [facility] ENDOSCOPY;  Service: Gastroenterology;  Laterality: N/A;  pre:  screening  post:  diverticulosis, hemorrhoids    CUBITAL TUNNEL RELEASE Right     NERVE RELEASE    EAR BIOPSY Right     MYRINGOTOMY W/BONES REPLACED INNER EAR    EPIDURAL BLOCK      FRACTURE SURGERY      Dr. Lebron Spivey    KNEE SURGERY Right     MENISCUS REPAIR    LUMBAR DISCECTOMY Right 2023    Procedure: Lumbar Microscopic Discectomy, right lumbar 3 lumbar 4;  Surgeon: Jovon Doran MD;  [place] [facility] MAIN OR;  Service: Neurosurgery;  Laterality: Right;    NOSE SURGERY      ORIF FIBULA FRACTURE Right 2017    ANKLE INCLUDED    ORTHOPEDIC SURGERY      Dr. Tyrell Eddy    SINUS SURGERY      Dr. Vilchis    TONSILLECTOMY      As a kid       Family History   Problem Relation Age of Onset    Lung cancer Mother     Cancer Mother          of lung, breast, and brain cancer.    Lung cancer Father     Cancer Father          of lung cancer.    Colon cancer Maternal Grandfather     Malig Hyperthermia Neg Hx        Social History     Socioeconomic History    Marital status:    Tobacco Use    Smoking status: Former     Current packs/day: 0.00     Average packs/day: 2.0 packs/day for 22.0 years (44.0 ttl pk-yrs)     Types: Cigarettes     Start date: 1987     Quit date: 2002     Years since quittin.2     Passive exposure: Past    Smokeless tobacco: Never    Tobacco comments:     I have been smoke free for 19 years as of 19.   Vaping  Use    Vaping status: Never Used   Substance and Sexual Activity    Alcohol use: Yes     Alcohol/week: 2.0 standard drinks of alcohol     Types: 2 Shots of liquor per week     Comment: Socially    Drug use: No    Sexual activity: Yes     Partners: Female     Birth control/protection: None       Current Outpatient Medications   Medication Sig Dispense Refill    albuterol sulfate  (90 Base) MCG/ACT inhaler 2 puffs every 4 hours as needed for SOA or wheezing. Generic. 3 g 1    Ascorbic Acid (VITAMIN C PO) Take  by mouth. HOLD PER MD INSTR      aspirin 81 MG EC tablet Take 1 tablet by mouth Daily. HOLD PER MD INSTR      azelastine (ASTELIN) 0.1 % nasal spray 2 sprays into the nostril(s) as directed by provider 2 (Two) Times a Day. Use in each nostril as directed 30 mL 11    B Complex Vitamins (VITAMIN B COMPLEX PO) Take  by mouth Daily. HOLD PRIOR TO SURGERY      budesonide (Pulmicort Flexhaler) 180 MCG/ACT inhaler Use 1 or 2 inhalations each AM. Rinse and spit after use. 2 each 3    Cholecalciferol (VITAMIN D3) 5000 UNITS capsule capsule Take 1 capsule by mouth Daily. HOLD PRIOR TO SURGERY      cyclobenzaprine (FLEXERIL) 10 MG tablet TAKE ONE TABLET BY MOUTH THREE TIMES A DAY AS NEEDED FOR MUSCLE SPASMS 60 tablet 3    desonide (DESOWEN) 0.05 % cream Apply  topically to the appropriate area as directed See Admin Instructions. Apply to the affected areas three times daily 60 each 6    Diclofenac Sodium (VOLTAREN) 1 % gel gel Apply 4 g topically to the appropriate area as directed 4 (Four) Times a Day. 100 g 3    diphenhydrAMINE HCl (BENADRYL ALLERGY PO) Take 1 tablet by mouth Daily.      docusate sodium (COLACE) 250 MG capsule Take 1 capsule by mouth Daily.      esomeprazole (nexIUM) 40 MG capsule Take 1 capsule by mouth Every Night. 30 capsule 5    etodolac (LODINE) 500 MG tablet Take 1 tablet by mouth 2 (Two) Times a Day. 180 tablet 3    ezetimibe-simvastatin (VYTORIN) 10-40 MG per tablet Take 1 tablet by mouth  "Every Night. 30 tablet 5    FIBER PO Take  by mouth.      gabapentin (NEURONTIN) 800 MG tablet TAKE 1 TABLET BY MOUTH 3 TIMES A DAY 90 tablet 0    ketoconazole (NIZORAL) 2 % cream Apply  topically to the appropriate area as directed Daily. 60 g 3    melatonin 1 MG tablet Take 3 tablets by mouth Every Night. HOLD PRIOR TO SURGERY      montelukast (SINGULAIR) 10 MG tablet Take 1 tablet by mouth Every Night. 30 tablet 5    Multiple Vitamin (MULTI VITAMIN DAILY PO) Take  by mouth Daily. HOLD PER MD NUÑEZ      olopatadine (PATANASE) 0.6 % solution nasal solution 2 sprays by Each Nare route 2 (Two) Times a Day. 30.5 g 5    Potassium 99 MG tablet Take  by mouth.      rizatriptan MLT (MAXALT-MLT) 10 MG disintegrating tablet DISSOLVE 1 TABLET BY MOUTH AT ONSET OF HEADACHE; MAY REPEAT 1 TABLET IN 2 HOURS IF NEEDED. 18 tablet 0    SALINE NASAL SPRAY NA into each nostril.      selenium sulfide (SELSUN) 2.5 % lotion APPLY TO AFFECTED AREA(S) DAILY AS NEEDED FOR DANDRUFF AS DIRECTED 120 mL 5    sildenafil (VIAGRA) 100 MG tablet Take 1 tablet by mouth As Needed for Erectile Dysfunction. 5 tablet 5    Syringe 22G X 1\" 3 ML misc Inject 1 Needle into the appropriate muscle as directed by prescriber Every 7 (Seven) Days. 20 each 3    Testosterone Cypionate (DEPOTESTOTERONE CYPIONATE) 200 MG/ML injection INJECT 1 MILLILITERS INTRAMUSCULARLY AS DIRECTED EVERY 14 DAYS 3 mL 2    traMADol (ULTRAM) 50 MG tablet TAKE ONE TABLET BY MOUTH EVERY 8 HOURS AS NEEDED FOR MODERATE PAIN 90 tablet 0    zolpidem CR (Ambien CR) 12.5 MG CR tablet Take 1 tablet by mouth At Night As Needed for Sleep. 90 tablet 1     No current facility-administered medications for this visit.       Review of Systems   Constitutional:  Negative for activity change, appetite change, fatigue, fever, unexpected weight gain and unexpected weight loss.   HENT:  Negative for nosebleeds, rhinorrhea, trouble swallowing and voice change.    Eyes:  Negative for visual disturbance. " "  Respiratory:  Negative for cough, chest tightness, shortness of breath and wheezing.    Cardiovascular:  Negative for chest pain, palpitations and leg swelling.   Gastrointestinal:  Negative for abdominal pain, blood in stool, constipation, diarrhea, nausea, vomiting, GERD and indigestion.   Genitourinary:  Negative for dysuria, frequency and hematuria.   Musculoskeletal:  Positive for arthralgias and back pain. Negative for myalgias.        Left arm and hand pain with itching but no weakness.   Skin:  Negative for rash and wound.   Neurological:  Positive for numbness. Negative for dizziness, tremors, weakness, light-headedness, headache and memory problem.   Hematological:  Negative for adenopathy. Does not bruise/bleed easily.   Psychiatric/Behavioral:  Positive for sleep disturbance. Negative for depressed mood. The patient is not nervous/anxious.        Objective   /90 (BP [place] Right arm, Patient Position: Sitting, Cuff Size: Large Adult)   Pulse 80   Temp 98.6 °F (37 °C) (Temporal)   Ht 185.4 cm (73\")   Wt 108 kg (239 lb)   SpO2 96%   BMI 31.53 kg/m²     Physical Exam  Vitals and nursing note reviewed.   Constitutional:       General: He is not in acute distress.     Appearance: He is well-developed. He is not diaphoretic.   HENT:      Head: Normocephalic and atraumatic.      Right Ear: External ear normal.      Left Ear: External ear normal.      Nose: Nose normal.   Eyes:      Conjunctiva/sclera: Conjunctivae normal.      Pupils: Pupils are equal, round, and reactive to light.   Neck:      Thyroid: No thyromegaly.      Trachea: No tracheal deviation.   Cardiovascular:      Rate and Rhythm: Normal rate and regular rhythm.      Heart sounds: Normal heart sounds. No murmur heard.     No friction rub. No gallop.   Pulmonary:      Effort: Pulmonary effort is normal. No respiratory distress.      Breath sounds: Normal breath sounds.   Abdominal:      General: Bowel sounds are normal.      " Palpations: Abdomen is soft. There is no mass.      Tenderness: There is no abdominal tenderness. There is no guarding.   Musculoskeletal:         General: Normal range of motion.      Cervical back: Normal range of motion and neck supple.   Lymphadenopathy:      Cervical: No cervical adenopathy.   Skin:     General: Skin is warm and dry.      Capillary Refill: Capillary refill takes less than 2 seconds.      Findings: No rash.   Neurological:      Mental Status: He is alert and oriented to person, place, and time.      Motor: No abnormal muscle tone.      Deep Tendon Reflexes: Reflexes normal.   Psychiatric:         Behavior: Behavior normal.         Thought Content: Thought content normal.         Judgment: Judgment normal.       No results found for this or any previous visit (from the past 2016 hour(s)).  Assessment & Plan   Diagnoses and all orders for this visit:    1. Numbness and tingling in left hand (Primary)    2. Lumbar disc herniation with radiculopathy    3. Chronic right-sided low back pain with right-sided sciatica      Patient with numbness and tingling left hand likely secondary to cervical spine disc herniation and radiculopathy.  Patient is not wanting surgery or any other changes at this time we will continue to observe.  Just like with his chronic low back pain he is not wanting surgery at this time and we will be observing this.  Continue current medications in the interim.  I did encourage him to continue to try and do stretching exercises increasing muscle mass as tolerated and keeping his weight down to prevent recurrence or worsening issues.  He will be continuing with his current medications unchanged.       Dictated utilizing Dragon Dictation

## 2024-10-13 DIAGNOSIS — M54.41 CHRONIC RIGHT-SIDED LOW BACK PAIN WITH RIGHT-SIDED SCIATICA: ICD-10-CM

## 2024-10-13 DIAGNOSIS — G89.29 CHRONIC RIGHT-SIDED LOW BACK PAIN WITH RIGHT-SIDED SCIATICA: ICD-10-CM

## 2024-10-13 DIAGNOSIS — M51.16 LUMBAR DISC HERNIATION WITH RADICULOPATHY: ICD-10-CM

## 2024-10-13 RX ORDER — TRAMADOL HYDROCHLORIDE 50 MG/1
50 TABLET ORAL EVERY 6 HOURS PRN
Qty: 120 TABLET | Refills: 1 | Status: SHIPPED | OUTPATIENT
Start: 2024-10-13

## 2024-10-13 RX ORDER — GABAPENTIN 800 MG/1
800 TABLET ORAL 3 TIMES DAILY
Qty: 90 TABLET | Refills: 0 | Status: SHIPPED | OUTPATIENT
Start: 2024-10-13

## 2024-10-30 DIAGNOSIS — E34.9 TESTOSTERONE DEFICIENCY: ICD-10-CM

## 2024-10-30 RX ORDER — TESTOSTERONE CYPIONATE 200 MG/ML
INJECTION, SOLUTION INTRAMUSCULAR
Qty: 2 ML | Refills: 2 | Status: SHIPPED | OUTPATIENT
Start: 2024-10-30

## 2024-11-04 ENCOUNTER — TELEPHONE (OUTPATIENT)
Dept: FAMILY MEDICINE CLINIC | Facility: CLINIC | Age: 49
End: 2024-11-04
Payer: COMMERCIAL

## 2024-11-04 DIAGNOSIS — E34.9 TESTOSTERONE DEFICIENCY: Primary | ICD-10-CM

## 2024-11-04 DIAGNOSIS — E78.00 ELEVATED CHOLESTEROL: ICD-10-CM

## 2024-11-04 DIAGNOSIS — Z12.5 SPECIAL SCREENING, PROSTATE CANCER: ICD-10-CM

## 2024-11-04 NOTE — TELEPHONE ENCOUNTER
The patient is scheduled with Dr Palma 12/5/24. He is needing to have his labs drawn before the appointment at a [facility] on a Saturday. Is there anyway the orders can be put into the system so they can be done before the appointment.

## 2024-11-06 ENCOUNTER — OFFICE VISIT (OUTPATIENT)
Dept: SLEEP MEDICINE | Facility: HOSPITAL | Age: 49
End: 2024-11-06
Payer: COMMERCIAL

## 2024-11-06 ENCOUNTER — OFFICE VISIT (OUTPATIENT)
Dept: FAMILY MEDICINE CLINIC | Facility: CLINIC | Age: 49
End: 2024-11-06
Payer: COMMERCIAL

## 2024-11-06 VITALS
OXYGEN SATURATION: 96 % | DIASTOLIC BLOOD PRESSURE: 98 MMHG | SYSTOLIC BLOOD PRESSURE: 148 MMHG | HEART RATE: 73 BPM | TEMPERATURE: 98.7 F | HEIGHT: 73 IN | WEIGHT: 241.4 LBS | BODY MASS INDEX: 31.99 KG/M2

## 2024-11-06 VITALS — OXYGEN SATURATION: 96 % | BODY MASS INDEX: 32.2 KG/M2 | HEART RATE: 88 BPM | HEIGHT: 73 IN | WEIGHT: 243 LBS

## 2024-11-06 DIAGNOSIS — J10.1 INFLUENZA A: Primary | ICD-10-CM

## 2024-11-06 DIAGNOSIS — G47.33 OSA ON CPAP: Primary | ICD-10-CM

## 2024-11-06 DIAGNOSIS — J45.40 MODERATE PERSISTENT ASTHMA WITHOUT COMPLICATION: ICD-10-CM

## 2024-11-06 DIAGNOSIS — F51.04 PSYCHOPHYSIOLOGICAL INSOMNIA: ICD-10-CM

## 2024-11-06 DIAGNOSIS — R05.9 COUGH, UNSPECIFIED TYPE: ICD-10-CM

## 2024-11-06 DIAGNOSIS — G47.26 CIRCADIAN RHYTHM SLEEP DISORDER, SHIFT WORK TYPE: ICD-10-CM

## 2024-11-06 LAB
EXPIRATION DATE: ABNORMAL
FLUAV AG UPPER RESP QL IA.RAPID: DETECTED
FLUBV AG UPPER RESP QL IA.RAPID: NOT DETECTED
INTERNAL CONTROL: ABNORMAL
Lab: ABNORMAL
SARS-COV-2 AG UPPER RESP QL IA.RAPID: NOT DETECTED

## 2024-11-06 PROCEDURE — G0463 HOSPITAL OUTPT CLINIC VISIT: HCPCS

## 2024-11-06 PROCEDURE — 87428 SARSCOV & INF VIR A&B AG IA: CPT | Performed by: NURSE PRACTITIONER

## 2024-11-06 PROCEDURE — 99213 OFFICE O/P EST LOW 20 MIN: CPT | Performed by: NURSE PRACTITIONER

## 2024-11-06 PROCEDURE — 99213 OFFICE O/P EST LOW 20 MIN: CPT | Performed by: INTERNAL MEDICINE

## 2024-11-06 NOTE — LETTER
November 6, 2024     Patient: Arelis Munson   YOB: 1975   Date of Visit: 11/6/2024       To Whom It May Concern:    Please excuse from work Wednesday, November 6, 2024 through [place], November 10, 2024.  Mr. Munson may return to work on Monday, November 11, 2024.         Sincerely,        LORENZO Reid

## 2024-11-06 NOTE — PROGRESS NOTES
Subjective   Arelis Munson is a 49 y.o. male.     Chief Complaint   Patient presents with    Cough    Fatigue     Since Saturday he has been sick    Nasal Congestion     Answers submitted by the patient for this visit:  Primary Reason for Visit (Submitted on 11/6/2024)  What is the primary reason for your visit?: Cough      History of Present Illness   Patient of Dr. Palma and is new to me; patient presents with c/o cough and nasal congestion; has had fatigue last several days; symptoms started on 11/2/24; has had nonproductive cough; no SOA; has stuffy nose; has aching in ears and itching of ears; has had bad headache in evenings; no fever, but has felt hot and cold; no sore throat; no nausea, vomiting or diarrhea; has head congestion; some improvement in symptoms today; has asthma; uses Pulmicort daily; has not needed to use Albuterol inhaler for long time; uses Azelastine, Flonase, and Montelukast daily; has been taking Mucinex cold and congestion and Dimetapp liquid and help minimally; has also tried Tylenol.      The following portions of the patient's history were reviewed and updated as appropriate: allergies, current medications, past family history, past medical history, past social history, past surgical history and problem list.    Current Outpatient Medications on File Prior to Visit   Medication Sig    albuterol sulfate  (90 Base) MCG/ACT inhaler 2 puffs every 4 hours as needed for SOA or wheezing. Generic.    Ascorbic Acid (VITAMIN C PO) Take  by mouth. HOLD PER MD NUÑEZ    aspirin 81 MG EC tablet Take 1 tablet by mouth Daily. HOLD PER MD NUÑEZ    azelastine (ASTELIN) 0.1 % nasal spray 2 sprays into the nostril(s) as directed by provider 2 (Two) Times a Day. Use in each nostril as directed    B Complex Vitamins (VITAMIN B COMPLEX PO) Take  by mouth Daily. HOLD PRIOR TO SURGERY    budesonide (Pulmicort Flexhaler) 180 MCG/ACT inhaler Use 1 or 2 inhalations each AM. Rinse and spit after use.     "Cholecalciferol (VITAMIN D3) 5000 UNITS capsule capsule Take 1 capsule by mouth Daily. HOLD PRIOR TO SURGERY    desonide (DESOWEN) 0.05 % cream Apply  topically to the appropriate area as directed See Admin Instructions. Apply to the affected areas three times daily    diphenhydrAMINE HCl (BENADRYL ALLERGY PO) Take 1 tablet by mouth Daily.    docusate sodium (COLACE) 250 MG capsule Take 1 capsule by mouth Daily.    esomeprazole (nexIUM) 40 MG capsule Take 1 capsule by mouth Every Night.    etodolac (LODINE) 500 MG tablet Take 1 tablet by mouth 2 (Two) Times a Day.    ezetimibe-simvastatin (VYTORIN) 10-40 MG per tablet Take 1 tablet by mouth Every Night.    FIBER PO Take  by mouth.    fluticasone (FLONASE) 50 MCG/ACT nasal spray Administer 2 sprays into the nostril(s) as directed by provider Daily.    gabapentin (NEURONTIN) 800 MG tablet Take 1 tablet by mouth 3 (Three) Times a Day.    ketoconazole (NIZORAL) 2 % cream Apply  topically to the appropriate area as directed Daily.    melatonin 1 MG tablet Take 3 tablets by mouth Every Night. HOLD PRIOR TO SURGERY    montelukast (SINGULAIR) 10 MG tablet Take 1 tablet by mouth Every Night.    Multiple Vitamin (MULTI VITAMIN DAILY PO) Take  by mouth Daily. HOLD PER MD NUÑEZ    olopatadine (PATANASE) 0.6 % solution nasal solution 2 sprays by Each Nare route 2 (Two) Times a Day.    Potassium 99 MG tablet Take  by mouth.    rizatriptan MLT (MAXALT-MLT) 10 MG disintegrating tablet DISSOLVE 1 TABLET BY MOUTH AT ONSET OF HEADACHE; MAY REPEAT 1 TABLET IN 2 HOURS IF NEEDED.    SALINE NASAL SPRAY NA into each nostril.    selenium sulfide (SELSUN) 2.5 % lotion APPLY TO AFFECTED AREA(S) DAILY AS NEEDED FOR DANDRUFF AS DIRECTED    sildenafil (VIAGRA) 100 MG tablet Take 1 tablet by mouth As Needed for Erectile Dysfunction.    Syringe 22G X 1\" 3 ML misc Inject 1 Needle into the appropriate muscle as directed by prescriber Every 7 (Seven) Days.    Testosterone Cypionate (DEPOTESTOTERONE " CYPIONATE) 200 MG/ML injection INJECT 1 ML INTRAMUSCULARLY AS DIRECTED EVERY 14 DAYS    traMADol (ULTRAM) 50 MG tablet Take 1 tablet by mouth Every 6 (Six) Hours As Needed for Moderate Pain.    zolpidem CR (Ambien CR) 12.5 MG CR tablet Take 1 tablet by mouth At Night As Needed for Sleep.    cyclobenzaprine (FLEXERIL) 10 MG tablet TAKE ONE TABLET BY MOUTH THREE TIMES A DAY AS NEEDED FOR MUSCLE SPASMS (Patient not taking: Reported on 11/6/2024)    Diclofenac Sodium (VOLTAREN) 1 % gel gel Apply 4 g topically to the appropriate area as directed 4 (Four) Times a Day. (Patient not taking: Reported on 11/6/2024)     No current facility-administered medications on file prior to visit.        Past Medical History:   Diagnosis Date    Allergic July 2001    Seasonal    Anemia     Arthritis     OSTEO    Asthma     Chronic pain disorder 07/01/2021    COVID-19 04/2021    CTS (carpal tunnel syndrome)     Not since surgery    ED (erectile dysfunction)     Elevated cholesterol     Environmental allergies     Erectile dysfunction     Fracture, fibula     RIGHT AND ANKLE    Gastroesophageal reflux disease without esophagitis 07/11/2016    HL (hearing loss) [facility]    Getting Worse Especially in Right Ear    Joint pain 09/01/2002    Low back pain     Migraine with aura and without status migrainosus, not intractable 07/11/2016    NITA on CPAP 06/19/2005    Overnight polysomnogram.  Weight 244 pounds.  Moderate NITA with AHI 15 events per hour.  No sleep-related hypoxia.  Treated with auto CPAP.    Pain and swelling of toe of right foot     Peripheral neuropathy     Seasonal allergies     Testosterone deficiency 07/11/2016       Past Surgical History:   Procedure Laterality Date    ANKLE ARTHROSCOPY Right 01/26/2018    Procedure: RT ANKLE ARTHROSCOPY SUBCHONDROPLASTY TALUS ;  Surgeon: Lebron Spivey Jr., MD;  [place] [facility];  Service:     ANKLE LIGAMENT RECONSTRUCTION Right 01/26/2018    Procedure: HARDWARE REMOVAL POSS LATERAL  LIGAMENT RECONSTRUCTION ;  Surgeon: Lebron Spivey Jr., MD;  [place]  [facility];  Service:     CARPAL TUNNEL RELEASE Right     COLONOSCOPY N/A 2023    Procedure: COLONOSCOPY to cecum and TI:;  Surgeon: Je Eddy MD;  [place]  [facility];  Service: Gastroenterology;  Laterality: N/A;  pre:  screening  post:  diverticulosis, hemorrhoids    CUBITAL TUNNEL RELEASE Right     NERVE RELEASE    EAR BIOPSY Right     MYRINGOTOMY W/BONES REPLACED INNER EAR    EPIDURAL BLOCK      FRACTURE SURGERY      Dr. Lebron Spivey    KNEE SURGERY Right     MENISCUS REPAIR    LUMBAR DISCECTOMY Right 2023    Procedure: Lumbar Microscopic Discectomy, right lumbar 3 lumbar 4;  Surgeon: Jovon Doran MD;  [place] [facility];  Service: Neurosurgery;  Laterality: Right;    NOSE SURGERY      ORIF FIBULA FRACTURE Right 2017    ANKLE INCLUDED    ORTHOPEDIC SURGERY      Dr. Tyrell Eddy    SINUS SURGERY      Dr. Vilchis    TONSILLECTOMY      As a kid       Family History   Problem Relation Age of Onset    Lung cancer Mother     Cancer Mother          of lung, breast, and brain cancer.    Lung cancer Father     Cancer Father          of lung cancer.    Colon cancer Maternal Grandfather     Malig Hyperthermia Neg Hx        Social History     Socioeconomic History    Marital status:    Tobacco Use    Smoking status: Former     Current packs/day: 0.00     Average packs/day: 2.0 packs/day for 22.0 years (44.0 ttl pk-yrs)     Types: Cigarettes     Start date: 1987     Quit date: 2002     Years since quittin.3     Passive exposure: Past    Smokeless tobacco: Never    Tobacco comments:     I have been smoke free for 19 years as of 19.   Vaping Use    Vaping status: Never Used   Substance and Sexual Activity    Alcohol use: Yes     Alcohol/week: 2.0 standard drinks of alcohol     Types: 2 Shots of liquor per week     Comment: Socially    Drug use: No    Sexual activity: Yes      "Partners: Female     Birth control/protection: None       Review of Systems   Constitutional:  Positive for appetite change (has had decreased appetite), chills and fatigue. Negative for fever and unexpected weight loss.   HENT:  Positive for ear pain (has pressure in ears), rhinorrhea and sore throat. Negative for postnasal drip.    Respiratory:  Positive for cough (will cough with talking more). Negative for shortness of breath. Chest tightness: some, mild. Wheezing: some on occasion.   Cardiovascular:  Negative for chest pain and palpitations.   Gastrointestinal:  Negative for abdominal pain.   Musculoskeletal:  Negative for myalgias.   Skin:  Negative for rash.   Neurological:  Positive for headache. Negative for dizziness and light-headedness.       Objective   Vitals:    11/06/24 1409   BP: 148/98   BP [place] Left arm   Patient Position: Sitting   Cuff Size: Large Adult   Pulse: 73   Temp: 98.7 °F (37.1 °C)   TempSrc: Temporal   SpO2: 96%   Weight: 109 kg (241 lb 6.4 oz)   Height: 185.4 cm (72.99\")     Body mass index is 31.86 kg/m².    Physical Exam  Vitals and nursing note reviewed.   Constitutional:       General: He is not in acute distress.     Appearance: He is well-developed. He is not diaphoretic.   HENT:      Head: Normocephalic.      Right Ear: External ear normal. Right ear middle ear effusion: mild. Tympanic membrane is injected and scarred.      Left Ear: External ear normal. A middle ear effusion is present. Tympanic membrane is not erythematous.      Nose:      Right Sinus: Frontal sinus tenderness present. No maxillary sinus tenderness.      Left Sinus: Frontal sinus tenderness present. No maxillary sinus tenderness.      Mouth/Throat:      Mouth: Mucous membranes are moist.      Pharynx: No oropharyngeal exudate. Posterior oropharyngeal erythema: mild.  Eyes:      Conjunctiva/sclera: Conjunctivae normal.   Neck:      Vascular: No carotid bruit.   Cardiovascular:      Rate and Rhythm: Normal " rate and regular rhythm.      Pulses: Normal pulses.      Heart sounds: Normal heart sounds. No murmur heard.  Pulmonary:      Effort: Pulmonary effort is normal. No respiratory distress.      Breath sounds: Normal breath sounds. No decreased breath sounds, wheezing or rales.   Abdominal:      General: Bowel sounds are normal.      Palpations: Abdomen is soft. There is no hepatomegaly or splenomegaly.      Tenderness: There is no abdominal tenderness. There is no guarding.   Musculoskeletal:      Cervical back: Normal range of motion and neck supple.      Right lower leg: No edema.      Left lower leg: No edema.   Lymphadenopathy:      Cervical: No cervical adenopathy.   Skin:     General: Skin is warm and dry.      Findings: No rash.   Neurological:      Mental Status: He is alert and oriented to person, place, and time.      Gait: Gait normal.   Psychiatric:         Mood and Affect: Mood normal.         Behavior: Behavior normal.         Thought Content: Thought content normal.         Cognition and Memory: Cognition normal.         Judgment: Judgment normal.         Lab Results   Component Value Date    WBC 7.38 05/09/2023    RBC 5.32 05/09/2023    HGB 16.6 05/09/2023    HCT 48.4 05/09/2023    MCV 91.0 05/09/2023    MCH 31.2 05/09/2023    MCHC 34.3 05/09/2023    RDW 12.9 05/09/2023    RDWSD 44.1 05/09/2023    MPV 8.7 05/09/2023     05/09/2023    NEUTRORELPCT 41.8 (L) 01/24/2018    LYMPHORELPCT 46.9 (H) 01/24/2018    MONORELPCT 8.2 01/24/2018    EOSRELPCT 1.5 01/24/2018    BASORELPCT 1.3 01/24/2018    AUTOIGPER 0.3 01/24/2018    NEUTROABS 2.88 01/24/2018    LYMPHSABS 3.22 01/24/2018    MONOSABS 0.56 01/24/2018    EOSABS 0.10 01/24/2018    BASOSABS 0.09 01/24/2018    AUTOIGNUM 0.02 01/24/2018     Lab Results   Component Value Date    GLUCOSE 86 06/06/2023    BUN 8 06/06/2023    CREATININE 0.97 06/06/2023    EGFRIFNONA 81 06/07/2021    EGFRIFAFRI 93 06/07/2021    BCR 8 (L) 06/06/2023    K 4.5 06/06/2023     CO2 28 06/06/2023    CALCIUM 9.4 06/06/2023    PROTENTOTREF 6.1 06/06/2023    ALBUMIN 4.3 06/06/2023    LABIL2 2.4 (H) 06/06/2023    AST 22 06/06/2023    ALT 32 06/06/2023      Lab Results   Component Value Date    CHLPL 129 06/06/2023    TRIG 194 (H) 06/06/2023    HDL 37 (L) 06/06/2023    VLDL 32 06/06/2023    LDL 60 06/06/2023     Lab Results   Component Value Date    TSH 1.730 08/07/2019     Lab Results   Component Value Date    HGBA1C 5.6 08/07/2019         Assessment    Problem List Items Addressed This Visit       Asthma    Current Assessment & Plan     Stable.  Continue Pulmicort daily.  Continue Albuterol inhaler as needed.         Influenza A - Primary    Current Assessment & Plan     Increase intake of clear liquids and rest.  Try plain Mucinex to thin secretions.  Continue Flonase daily.  Avoid decongestant due to elevated blood pressure.  May try over the counter Delsym as needed for cough.          Other Visit Diagnoses       Cough, unspecified type        Relevant Orders    POCT SARS-CoV-2 Antigen MAT + Flu (Completed)             Return if symptoms worsen or fail to improve.         COVID-19 Precautions - Patient was compliant in wearing a mask. When I saw the patient, I used appropriate personal protective equipment (PPE) including N95 mask, gloves, and eye shield (standard procedure).  Hand hygiene was completed before and after seeing the patient.

## 2024-11-06 NOTE — PROGRESS NOTES
"Follow Up [facility] Note     Chief Complaint:  NITA     Primary Care Physician: Terrell Palma MD    Interval History:   The patient is a 49 y.o. male who I last saw 2024 and that note was reviewed.  The patient reports he is doing well without new complaints.  He goes to bed 11 PM gets out of bed at 5:30 AM.  The main reason that he is awakened is due to air leak.  He continues to work dayshift only.    The patient continues to take Ambien CR 12.5 mg at bedtime    Review of Systems:    A complete review of systems was done and all were negative with the exception of some nasal congestion and postnasal drip, occasional cough and wheezing and some shortness of breath with exertion, and occasional ear pain    Social History:    Social History     Socioeconomic History    Marital status:    Tobacco Use    Smoking status: Former     Current packs/day: 0.00     Average packs/day: 2.0 packs/day for 22.0 years (44.0 ttl pk-yrs)     Types: Cigarettes     Start date: 1987     Quit date: 2002     Years since quittin.3     Passive exposure: Past    Smokeless tobacco: Never    Tobacco comments:     I have been smoke free for 19 years as of 19.   Vaping Use    Vaping status: Never Used   Substance and Sexual Activity    Alcohol use: Yes     Alcohol/week: 2.0 standard drinks of alcohol     Types: 2 Shots of liquor per week     Comment: Socially    Drug use: No    Sexual activity: Yes     Partners: Female     Birth control/protection: None       Allergies:  Patient has no known allergies.     Medication Review: His list was reviewed.      Vital Signs:    Vitals:    24 0928   Pulse: 88   SpO2: 96%   Weight: 110 kg (243 lb)   Height: 185.4 cm (73\")     Body mass index is 32.06 kg/m².    Physical Exam:    Constitutional:  Well developed 49 y.o. male that appears in no apparent distress.  Awake & oriented times 3.  Normal mood with normal recent and remote memory and normal " judgement.  Eyes:  Conjunctivae normal.  Oropharynx: Previously, moist mucous membranes without exudate and a large tongue and normal uvula and class III Mallampati airway.    Self-administered Cuney Sleepiness Scale test results: 2, previously 3  0-5 Lower normal daytime sleepiness  6-10 Higher normal daytime sleepiness  11-12 Mild, 13-15 Moderate, & 16-24 Severe excessive daytime sleepiness     Downloaded PAP Data Evaluated For Therapeutic Response and Compliance:  DME is [employer] and he uses a large F20 AirTouch.  Downloads between 8/7 and 11/4/2024 compliance 78%.  The patient has a second DreamStation 2 device and therefore his compliance is 100%.  Average usage is 6 hours and 22 minutes.  Average AHI is normal at 5 with a borderline leak.  Average auto CPAP pressure is 14.5 and his ResMed auto CPAP is 10-16.    I have reviewed the above results and compared them with the patient's last downloads and reviewed with the patient.    Impression:   Moderate obstructive sleep apnea by overnight polysomnogram 6/19/2005, weight 244 pounds, adequately treated with ResMed auto CPAP and is secondary device, DreamStation 2 auto CPAP. The patient appears to be at goal with good compliance and usage. The patient has no complaints of hypersomnolence.  Circadian rhythm sleep disorder, shiftwork type, early morning awakening  Psychophysiologic insomnia adequately treated with Ambien CR 12.5 mg 1/2 to 1 tablet at bedtime    Plan:  Good sleep hygiene measures should be maintained.  Weight loss would be beneficial in this patient who is obese by Body mass index is 32.06 kg/m².      After evaluating the patient and assessing results available, the patient is benefiting from the treatment being provided.     The patient will continue ResMed auto CPAP.  Potential side effects of not using PAP therapy reviewed and addressed as needed.  After clinical evaluation and review of downloads, I recommend no changes to the patient's pressures.   A new prescription will be sent to the patient's DME.    The patient continues to take Ambien CR 12.5 mg at bedtime. Mateo reviewed and there is no irregularities. It is noted that the patient takes gabapentin, tramadol, and testosterone.     I answered all of the patient's questions.  The patient will call the [facility] for any problems and will follow up in 6 months.      Ronni Kong MD  Sleep Medicine  11/06/24  09:37 EST

## 2024-11-06 NOTE — PATIENT INSTRUCTIONS
Increase intake of clear liquids and rest.  Try plain Mucinex to thin secretions.  Continue Flonase daily.  Avoid decongestant due to elevated blood pressure.  May try over the counter Delsym as needed for cough.  Follow up if symptoms persist or worsen.

## 2024-11-08 PROBLEM — J10.1 INFLUENZA A: Status: ACTIVE | Noted: 2024-11-08

## 2024-11-08 RX ORDER — FLUTICASONE PROPIONATE 50 MCG
2 SPRAY, SUSPENSION (ML) NASAL DAILY
COMMUNITY

## 2024-11-08 NOTE — ASSESSMENT & PLAN NOTE
Increase intake of clear liquids and rest.  Try plain Mucinex to thin secretions.  Continue Flonase daily.  Avoid decongestant due to elevated blood pressure.  May try over the counter Delsym as needed for cough.

## 2024-11-11 RX ORDER — SELENIUM SULFIDE 2.5 MG/100ML
LOTION TOPICAL
Qty: 120 ML | Refills: 5 | Status: SHIPPED | OUTPATIENT
Start: 2024-11-11

## 2024-11-14 ENCOUNTER — OFFICE VISIT (OUTPATIENT)
Dept: ORTHOPEDIC SURGERY | Facility: CLINIC | Age: 49
End: 2024-11-14
Payer: COMMERCIAL

## 2024-11-14 VITALS — TEMPERATURE: 98.4 F | WEIGHT: 241 LBS | HEIGHT: 72 IN | BODY MASS INDEX: 32.64 KG/M2

## 2024-11-14 DIAGNOSIS — M17.0 PRIMARY OSTEOARTHRITIS OF BOTH KNEES: ICD-10-CM

## 2024-11-14 DIAGNOSIS — R52 PAIN: Primary | ICD-10-CM

## 2024-11-14 RX ORDER — LIDOCAINE HYDROCHLORIDE 10 MG/ML
2 INJECTION, SOLUTION EPIDURAL; INFILTRATION; INTRACAUDAL; PERINEURAL
Status: COMPLETED | OUTPATIENT
Start: 2024-11-14 | End: 2024-11-14

## 2024-11-14 RX ORDER — METHYLPREDNISOLONE ACETATE 80 MG/ML
1 INJECTION, SUSPENSION INTRA-ARTICULAR; INTRALESIONAL; INTRAMUSCULAR; SOFT TISSUE
Status: COMPLETED | OUTPATIENT
Start: 2024-11-14 | End: 2024-11-14

## 2024-11-14 RX ADMIN — METHYLPREDNISOLONE ACETATE 1 ML: 80 INJECTION, SUSPENSION INTRA-ARTICULAR; INTRALESIONAL; INTRAMUSCULAR; SOFT TISSUE at 15:55

## 2024-11-14 RX ADMIN — LIDOCAINE HYDROCHLORIDE 2 ML: 10 INJECTION, SOLUTION EPIDURAL; INFILTRATION; INTRACAUDAL; PERINEURAL at 15:55

## 2024-11-14 NOTE — PROGRESS NOTES
Patient: Arelis Munson  YOB: 1975  Date of Service: 11/14/2024    Chief Complaints:   Chief Complaint   Patient presents with    Left Knee - Pain    Right Knee - Pain       Subjective: Pt presents with pain in both knees and desires conservative treatment. Pt had recent flu and has recovered. Pain is worse going up and down steps and getting up from a seated position.     History of Present Illness: Pt is seen in the office today with complaints of   Chief Complaint   Patient presents with    Left Knee - Pain    Right Knee - Pain   .  KNEE: TIMING:  The pain is described as ACUTE on CHRONIC.  LOCATION: medial joint line tenderness. AGGRAVATING FACTORS:  Is worsened by prolonged standing, sitting, walking and squatting activities.  ASSOCIATED SYMPTOMS:  swelling, tenderness, and aching.    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     Allergies: No Known Allergies    Medications:   Home Medications:  Current Outpatient Medications on File Prior to Visit   Medication Sig    albuterol sulfate  (90 Base) MCG/ACT inhaler 2 puffs every 4 hours as needed for SOA or wheezing. Generic.    Ascorbic Acid (VITAMIN C PO) Take  by mouth. HOLD PER MD INSTR    aspirin 81 MG EC tablet Take 1 tablet by mouth Daily. HOLD PER MD INSTR    azelastine (ASTELIN) 0.1 % nasal spray 2 sprays into the nostril(s) as directed by provider 2 (Two) Times a Day. Use in each nostril as directed    B Complex Vitamins (VITAMIN B COMPLEX PO) Take  by mouth Daily. HOLD PRIOR TO SURGERY    budesonide (Pulmicort Flexhaler) 180 MCG/ACT inhaler Use 1 or 2 inhalations each AM. Rinse and spit after use.    Cholecalciferol (VITAMIN D3) 5000 UNITS capsule capsule Take 1 capsule by mouth Daily. HOLD PRIOR TO SURGERY    desonide (DESOWEN) 0.05 % cream Apply  topically to the appropriate area as directed See Admin Instructions. Apply to the affected areas three times daily    diphenhydrAMINE HCl (BENADRYL ALLERGY PO) Take 1 tablet by mouth  "Daily.    docusate sodium (COLACE) 250 MG capsule Take 1 capsule by mouth Daily.    esomeprazole (nexIUM) 40 MG capsule Take 1 capsule by mouth Every Night.    etodolac (LODINE) 500 MG tablet Take 1 tablet by mouth 2 (Two) Times a Day.    ezetimibe-simvastatin (VYTORIN) 10-40 MG per tablet Take 1 tablet by mouth Every Night.    FIBER PO Take  by mouth.    fluticasone (FLONASE) 50 MCG/ACT nasal spray Administer 2 sprays into the nostril(s) as directed by provider Daily.    gabapentin (NEURONTIN) 800 MG tablet Take 1 tablet by mouth 3 (Three) Times a Day.    ketoconazole (NIZORAL) 2 % cream Apply  topically to the appropriate area as directed Daily.    melatonin 1 MG tablet Take 3 tablets by mouth Every Night. HOLD PRIOR TO SURGERY    montelukast (SINGULAIR) 10 MG tablet Take 1 tablet by mouth Every Night.    Multiple Vitamin (MULTI VITAMIN DAILY PO) Take  by mouth Daily. HOLD PER MD JOAQUIN    olopatadine (PATANASE) 0.6 % solution nasal solution 2 sprays by Each Nare route 2 (Two) Times a Day.    Potassium 99 MG tablet Take  by mouth.    rizatriptan MLT (MAXALT-MLT) 10 MG disintegrating tablet DISSOLVE 1 TABLET BY MOUTH AT ONSET OF HEADACHE; MAY REPEAT 1 TABLET IN 2 HOURS IF NEEDED.    SALINE NASAL SPRAY NA into each nostril.    selenium sulfide (SELSUN) 2.5 % lotion APPLY TO AFFECTED AREA(S) DAILY AS NEEDED FOR DANDRUFF AS DIRECTED    sildenafil (VIAGRA) 100 MG tablet Take 1 tablet by mouth As Needed for Erectile Dysfunction.    Syringe 22G X 1\" 3 ML misc Inject 1 Needle into the appropriate muscle as directed by prescriber Every 7 (Seven) Days.    Testosterone Cypionate (DEPOTESTOTERONE CYPIONATE) 200 MG/ML injection INJECT 1 ML INTRAMUSCULARLY AS DIRECTED EVERY 14 DAYS    traMADol (ULTRAM) 50 MG tablet Take 1 tablet by mouth Every 6 (Six) Hours As Needed for Moderate Pain.    zolpidem CR (Ambien CR) 12.5 MG CR tablet Take 1 tablet by mouth At Night As Needed for Sleep.    cyclobenzaprine (FLEXERIL) 10 MG tablet TAKE " ONE TABLET BY MOUTH THREE TIMES A DAY AS NEEDED FOR MUSCLE SPASMS (Patient not taking: Reported on 11/14/2024)    Diclofenac Sodium (VOLTAREN) 1 % gel gel Apply 4 g topically to the appropriate area as directed 4 (Four) Times a Day. (Patient not taking: Reported on 11/6/2024)     No current facility-administered medications on file prior to visit.     Current Medications:  Scheduled Meds:  Continuous Infusions:No current facility-administered medications for this visit.    PRN Meds:.    I have reviewed the patient's medical history in detail and updated the computerized patient record.  Review and summarization of old records include:    Past Medical History:   Diagnosis Date    Allergic July 2001    Seasonal    Anemia     Arthritis     OSTEO    Asthma     Chronic pain disorder 07/01/2021    COVID-19 04/2021    CTS (carpal tunnel syndrome)     Not since surgery    ED (erectile dysfunction)     Elevated cholesterol     Environmental allergies     Erectile dysfunction     Fracture, fibula     RIGHT AND ANKLE    Gastroesophageal reflux disease without esophagitis 07/11/2016    HL (hearing loss) [facility]    Getting Worse Especially in Right Ear    Joint pain 09/01/2002    Low back pain     Migraine with aura and without status migrainosus, not intractable 07/11/2016    NITA on CPAP 06/19/2005    Overnight polysomnogram.  Weight 244 pounds.  Moderate NITA with AHI 15 events per hour.  No sleep-related hypoxia.  Treated with auto CPAP.    Pain and swelling of toe of right foot     Peripheral neuropathy     Seasonal allergies     Testosterone deficiency 07/11/2016        Past Surgical History:   Procedure Laterality Date    ANKLE ARTHROSCOPY Right 01/26/2018    Procedure: RT ANKLE ARTHROSCOPY SUBCHONDROPLASTY TALUS ;  Surgeon: Lebron Spivey Jr., MD;  [place] [facility] [place] [facility] [place];  Service:     ANKLE LIGAMENT RECONSTRUCTION Right 01/26/2018    Procedure: HARDWARE REMOVAL POSS LATERAL LIGAMENT RECONSTRUCTION ;  Surgeon: Lebron ALLEN  Patric Mondragon MD;  [place]  [facility];  Service:     CARPAL TUNNEL RELEASE Right     COLONOSCOPY N/A 2023    Procedure: COLONOSCOPY to cecum and TI:;  Surgeon: Je Eddy MD;  [facility];  Service: Gastroenterology;  Laterality: N/A;  pre:  screening  post:  diverticulosis, hemorrhoids    CUBITAL TUNNEL RELEASE Right     NERVE RELEASE    EAR BIOPSY Right     MYRINGOTOMY W/BONES REPLACED INNER EAR    EPIDURAL BLOCK      FRACTURE SURGERY      Dr. Lebron Spivey    KNEE SURGERY Right     MENISCUS REPAIR    LUMBAR DISCECTOMY Right 2023    Procedure: Lumbar Microscopic Discectomy, right lumbar 3 lumbar 4;  Surgeon: Jovon Doran MD;  [place] [facility] MAIN OR;  Service: Neurosurgery;  Laterality: Right;    NOSE SURGERY      ORIF FIBULA FRACTURE Right 2017    ANKLE INCLUDED    ORTHOPEDIC SURGERY      Dr. Tyrell Eddy    SINUS SURGERY      Dr. Vilchis    TONSILLECTRAO      As a kid        Social History     Occupational History    Not on file   Tobacco Use    Smoking status: Former     Current packs/day: 0.00     Average packs/day: 2.0 packs/day for 22.0 years (44.0 ttl pk-yrs)     Types: Cigarettes     Start date: 1987     Quit date: 2002     Years since quittin.3     Passive exposure: Past    Smokeless tobacco: Never    Tobacco comments:     I have been smoke free for 19 years as of 19.   Vaping Use    Vaping status: Never Used   Substance and Sexual Activity    Alcohol use: Yes     Alcohol/week: 2.0 standard drinks of alcohol     Types: 2 Shots of liquor per week     Comment: Socially    Drug use: No    Sexual activity: Yes     Partners: Female     Birth control/protection: None      Social History     Social History Narrative    Not on file        Family History   Problem Relation Age of Onset    Lung cancer Mother     Cancer Mother          of lung, breast, and brain cancer.    Lung cancer Father     Cancer Father          of lung cancer.     Colon cancer Maternal Grandfather     Malig Hyperthermia Neg Hx        ROS: 14 point review of systems was performed and was negative except for documented findings in HPI and today's encounter.     Allergies: No Known Allergies  Constitutional:  Denies fever, shaking or chills   Eyes:  Denies change in visual acuity   HENT:  Denies nasal congestion or sore throat   Respiratory:  Denies cough or shortness of breath   Cardiovascular:  Denies chest pain or severe LE edema   GI:  Denies abdominal pain, nausea, vomiting, bloody stools or diarrhea   Musculoskeletal:  Numbness, tingling, or loss of motor function only as noted above in history of present illness.  : Denies painful urination or hematuria  Integument:  Denies rash, lesion or ulceration   Neurologic:  Denies headache or focal weakness  Endocrine:  Denies lymphadenopathy  Psych:  Denies confusion or change in mental status   Hem:  Denies active bleeding      Physical Exam: 49 y.o. male  Wt Readings from Last 3 Encounters:   11/14/24 109 kg (241 lb)   11/06/24 109 kg (241 lb 6.4 oz)   11/06/24 110 kg (243 lb)       Body mass index is 32.69 kg/m².  Facility age limit for growth %osvaldo is 20 years.  Vitals:    11/14/24 1529   Temp: 98.4 °F (36.9 °C)     Vital signs reviewed.   General Appearance:    Alert, cooperative, in no acute distress                  Eyes: conjunctiva clear  ENT: external ears and nose atraumatic  CV: no peripheral edema  Resp: normal respiratory effort  Skin: no rashes or wounds; normal turgor  Psych: mood and affect appropriate  Lymph: no nodes appreciated  Neuro: gross sensation intact  Vascular:  Palpable peripheral pulse in noted extremity  Musculoskeletal Extremities: KNEE Exam: medial and lateral joint line tenderness with crepitation, synovitis, swelling, and joint effusion bilateral knee.      Diagnostic Data:  Imaging done today and discussed with the patient:    Indication: pain related symptoms,  Views: 3V AP, LAT & 40 degree  PA bilateral knee(s)   Findings: advanced arthritis  Comparison views: viewed last xray done in the office.      Procedure:  Large Joint Arthrocentesis: R knee  Date/Time: 11/14/2024 3:55 PM  Consent given by: patient  Site marked: site marked  Timeout: Immediately prior to procedure a time out was called to verify the correct patient, procedure, equipment, support staff and site/side marked as required   Supporting Documentation  Indications: pain   Procedure Details  [place] knee - R knee  Preparation: Patient was prepped and draped in the usual sterile fashion  Needle gauge: 21G.  Medications administered: 1 mL methylPREDNISolone acetate 80 MG/ML; 2 mL lidocaine PF 1% 1 %  Patient tolerance: patient tolerated the procedure well with no immediate complications      Large Joint Arthrocentesis: L knee  Date/Time: 11/14/2024 3:55 PM  Consent given by: patient  Site marked: site marked  Timeout: Immediately prior to procedure a time out was called to verify the correct patient, procedure, equipment, support staff and site/side marked as required   Supporting Documentation  Indications: pain   Procedure Details  [place] knee - L knee  Preparation: Patient was prepped and draped in the usual sterile fashion  Needle gauge: 21G.  Medications administered: 1 mL methylPREDNISolone acetate 80 MG/ML; 2 mL lidocaine PF 1% 1 %  Patient tolerance: patient tolerated the procedure well with no immediate complications        Assessment:     ICD-10-CM ICD-9-CM   1. Pain  R52 780.96   2. Primary osteoarthritis of both knees  M17.0 715.16       Plan:   Follow up as indicated.  Ice, elevate, and rest as needed.  The diagnosis(es), natural history, pathophysiology and treatment for diagnosis(es) were discussed. Opportunity given and questions answered.  Biomechanics of pertinent body areas discussed.  When appropriate, the use of ambulatory aids discussed.  EXERCISES:  Advice on benefits of, and types of regular/moderate exercise  pertaining to orthopedic diagnosis(es).  MEDICATIONS:  The risks, benefits, warnings,side effects and alternatives of medications discussed.  Inflammation/pain control; with cold, heat, elevation and/or liniments discussed as appropriate  Cortisone Injection. See procedure note.  PT referral offered and declined.  Handouts given for home physical therapy exercises  MEDICAL RECORDS reviewed from other provider(s) for past and current medical history pertinent to this complaint.    11/14/2024

## 2024-12-05 ENCOUNTER — OFFICE VISIT (OUTPATIENT)
Dept: FAMILY MEDICINE CLINIC | Facility: CLINIC | Age: 49
End: 2024-12-05
Payer: COMMERCIAL

## 2024-12-05 VITALS
OXYGEN SATURATION: 94 % | DIASTOLIC BLOOD PRESSURE: 70 MMHG | WEIGHT: 247 LBS | HEART RATE: 71 BPM | HEIGHT: 72 IN | SYSTOLIC BLOOD PRESSURE: 120 MMHG | TEMPERATURE: 98 F | BODY MASS INDEX: 33.46 KG/M2

## 2024-12-05 DIAGNOSIS — K21.9 GASTROESOPHAGEAL REFLUX DISEASE WITHOUT ESOPHAGITIS: ICD-10-CM

## 2024-12-05 DIAGNOSIS — G89.29 CHRONIC RIGHT-SIDED LOW BACK PAIN WITH RIGHT-SIDED SCIATICA: ICD-10-CM

## 2024-12-05 DIAGNOSIS — M51.16 LUMBAR DISC HERNIATION WITH RADICULOPATHY: ICD-10-CM

## 2024-12-05 DIAGNOSIS — Z91.09 ENVIRONMENTAL ALLERGIES: ICD-10-CM

## 2024-12-05 DIAGNOSIS — H66.91 RIGHT OTITIS MEDIA, UNSPECIFIED OTITIS MEDIA TYPE: Primary | ICD-10-CM

## 2024-12-05 DIAGNOSIS — G89.29 CHRONIC MIDLINE LOW BACK PAIN WITHOUT SCIATICA: ICD-10-CM

## 2024-12-05 DIAGNOSIS — M54.41 CHRONIC RIGHT-SIDED LOW BACK PAIN WITH RIGHT-SIDED SCIATICA: ICD-10-CM

## 2024-12-05 DIAGNOSIS — M17.0 ARTHRITIS OF BOTH KNEES: ICD-10-CM

## 2024-12-05 DIAGNOSIS — Z28.21 INFLUENZA VACCINATION DECLINED: ICD-10-CM

## 2024-12-05 DIAGNOSIS — E34.9 TESTOSTERONE DEFICIENCY: ICD-10-CM

## 2024-12-05 DIAGNOSIS — E78.00 ELEVATED CHOLESTEROL: ICD-10-CM

## 2024-12-05 DIAGNOSIS — M54.50 CHRONIC MIDLINE LOW BACK PAIN WITHOUT SCIATICA: ICD-10-CM

## 2024-12-05 PROBLEM — J10.1 INFLUENZA A: Status: RESOLVED | Noted: 2024-11-08 | Resolved: 2024-12-05

## 2024-12-05 PROCEDURE — 99214 OFFICE O/P EST MOD 30 MIN: CPT | Performed by: INTERNAL MEDICINE

## 2024-12-05 RX ORDER — ESOMEPRAZOLE MAGNESIUM 40 MG/1
40 CAPSULE, DELAYED RELEASE ORAL NIGHTLY
Qty: 30 CAPSULE | Refills: 5 | Status: SHIPPED | OUTPATIENT
Start: 2024-12-05

## 2024-12-05 RX ORDER — EZETIMIBE AND SIMVASTATIN 10; 40 MG/1; MG/1
1 TABLET ORAL NIGHTLY
Qty: 30 TABLET | Refills: 5 | Status: SHIPPED | OUTPATIENT
Start: 2024-12-05

## 2024-12-05 RX ORDER — MONTELUKAST SODIUM 10 MG/1
10 TABLET ORAL NIGHTLY
Qty: 30 TABLET | Refills: 5 | Status: SHIPPED | OUTPATIENT
Start: 2024-12-05

## 2024-12-05 RX ORDER — ETODOLAC 500 MG/1
500 TABLET, FILM COATED ORAL 2 TIMES DAILY
Qty: 180 TABLET | Refills: 3 | Status: SHIPPED | OUTPATIENT
Start: 2024-12-05

## 2024-12-05 RX ORDER — TRAMADOL HYDROCHLORIDE 50 MG/1
50 TABLET ORAL EVERY 6 HOURS PRN
Qty: 120 TABLET | Refills: 1 | Status: SHIPPED | OUTPATIENT
Start: 2024-12-05

## 2024-12-05 RX ORDER — TESTOSTERONE CYPIONATE 200 MG/ML
100 INJECTION, SOLUTION INTRAMUSCULAR WEEKLY
Qty: 2 ML | Refills: 2 | Status: SHIPPED | OUTPATIENT
Start: 2024-12-05

## 2024-12-05 RX ORDER — GABAPENTIN 800 MG/1
800 TABLET ORAL 3 TIMES DAILY
Qty: 90 TABLET | Refills: 0 | Status: SHIPPED | OUTPATIENT
Start: 2024-12-05

## 2024-12-05 RX ORDER — AMOXICILLIN 500 MG/1
500 CAPSULE ORAL 3 TIMES DAILY
Qty: 30 CAPSULE | Refills: 0 | Status: SHIPPED | OUTPATIENT
Start: 2024-12-05

## 2024-12-05 NOTE — PROGRESS NOTES
Subjective   Arelis Munson is a 49 y.o. male.     Chief Complaint   Patient presents with    Hyperlipidemia       Hyperlipidemia  Associated symptoms include myalgias. Pertinent negatives include no chest pain.      History of GERD, osteoarthritis of knees, migraine headaches, lumbar disc herniation with radiculopathy status post lumbar surgery, asthma, NITA on CPAP, testosterone deficiency.  Currently is followed by orthopedics, pain management, neuro spine, urology and pulmonology.      Since had flu 2 weeks ago the right ear continues to hurt and difficulty hearing and fluid in ear.     History of hypogonadism previously followed by urology and was on testosterone cypionate 200 mg/mL 1 cc IM every 2 weeks.  Last labs done on 3/29/24 and within the goal range.  Has been doing well and is wanting to change so his testosterone management and injections are through primary are and not the urologist.  It was determined okay by the urologist for us to take over at this time with serial labs every 6 months.  Last refill picked up on 5/22/2024..     History of asthma and NITA on auto CPAP.  Is on Singulair 10 mg daily along with Pulmicort Flexhaler 180 mcg per actuation 1 to 2 puffs every morning and albuterol inhaler as needed.     History of migraine headaches and using maxalt as needed.     History of hyperlipidemia.  Is currently utilizing as a Ezetimibe-simvastatin 10-40 mg daily.  Currently without any problems or side effects.  Last lab done on 1/29/2024.     Has persistent intermittent left hand numbness and itching feeling.  Has history of cervical bulging discs and no real neck pain.     Had back pain and was receiving injections through pain management.  Subsequently, had lumbar discectomy 5/17/23.  Has also has knee OA and pain issues.  Will be having knee surgery in future but has to wait till back healed.  Currently is using diclofenac gel 4 times a day, etodolac 500 mg twice daily, gabapentin 800 mg 3  times daily (last filled 7/19/2023 #90 tab), and tramadol 50 mg every 8 hours as needed (last filled on 7/19/2023 #90 tabs).      History of obstructive sleep apnea being followed by Dr. Kong of sleep medicine and on CPAP.  Doing well no changes with most recent visit on 12/13/2023 with sleep specialist.    The following portions of the patient's history were reviewed and updated as appropriate: allergies, current medications, past family history, past medical history, past social history, past surgical history and problem list.    Depression Screen:      3/28/2024     4:18 PM   PHQ-2/PHQ-9 Depression Screening   Retired Little Interest or Pleasure in Doing Things 0-->not at all   Retired Feeling Down, Depressed or Hopeless 0-->not at all   Retired PHQ-9: Brief Depression Severity Measure Score 0       Past Medical History:   Diagnosis Date    Allergic July 2001    Seasonal    Anemia     Arthritis     OSTEO    Asthma     Chronic pain disorder 07/01/2021    COVID-19 04/2021    CTS (carpal tunnel syndrome)     Not since surgery    ED (erectile dysfunction)     Elevated cholesterol     Environmental allergies     Erectile dysfunction     Fracture, fibula     RIGHT AND ANKLE    Gastroesophageal reflux disease without esophagitis 07/11/2016    HL (hearing loss) [facility]    Getting Worse Especially in Right Ear    Joint pain 09/01/2002    Low back pain     Migraine with aura and without status migrainosus, not intractable 07/11/2016    NITA on CPAP 06/19/2005    Overnight polysomnogram.  Weight 244 pounds.  Moderate NITA with AHI 15 events per hour.  No sleep-related hypoxia.  Treated with auto CPAP.    Pain and swelling of toe of right foot     Peripheral neuropathy     Seasonal allergies     Testosterone deficiency 07/11/2016       Past Surgical History:   Procedure Laterality Date    ANKLE ARTHROSCOPY Right 01/26/2018    Procedure: RT ANKLE ARTHROSCOPY SUBCHONDROPLASTY TALUS ;  Surgeon: Lebron Spivey Jr., MD;   [place]  [facility];  Service:     ANKLE LIGAMENT RECONSTRUCTION Right 2018    Procedure: HARDWARE REMOVAL POSS LATERAL LIGAMENT RECONSTRUCTION ;  Surgeon: Lebron Spivey Jr., MD;  [place]  [facility];  Service:     CARPAL TUNNEL RELEASE Right     COLONOSCOPY N/A 2023    Procedure: COLONOSCOPY to cecum and TI:;  Surgeon: Je Eddy MD;  [place]  [facility];  Service: Gastroenterology;  Laterality: N/A;  pre:  screening  post:  diverticulosis, hemorrhoids    CUBITAL TUNNEL RELEASE Right     NERVE RELEASE    EAR BIOPSY Right     MYRINGOTOMY W/BONES REPLACED INNER EAR    EPIDURAL BLOCK      FRACTURE SURGERY      Dr. Lebron Spivey    KNEE SURGERY Right     MENISCUS REPAIR    LUMBAR DISCECTOMY Right 2023    Procedure: Lumbar Microscopic Discectomy, right lumbar 3 lumbar 4;  Surgeon: Jovon Doran MD;  [place] [facility];  Service: Neurosurgery;  Laterality: Right;    NOSE SURGERY      ORIF FIBULA FRACTURE Right 2017    ANKLE INCLUDED    ORTHOPEDIC SURGERY      Dr. Tyrell Eddy    SINUS SURGERY      Dr. Vilchis    TONSILLECTOMY      As a kid       Family History   Problem Relation Age of Onset    Lung cancer Mother     Cancer Mother          of lung, breast, and brain cancer.    Lung cancer Father     Cancer Father          of lung cancer.    Colon cancer Maternal Grandfather     Malig Hyperthermia Neg Hx        Social History     Socioeconomic History    Marital status:    Tobacco Use    Smoking status: Former     Current packs/day: 0.00     Average packs/day: 2.0 packs/day for 22.0 years (44.0 ttl pk-yrs)     Types: Cigarettes     Start date: 1987     Quit date: 2002     Years since quittin.4     Passive exposure: Past    Smokeless tobacco: Never    Tobacco comments:     I have been smoke free for 19 years as of 19.   Vaping Use    Vaping status: Never Used   Substance and Sexual Activity    Alcohol use: Yes     Alcohol/week: 2.0  standard drinks of alcohol     Types: 2 Shots of liquor per week     Comment: Socially    Drug use: No    Sexual activity: Yes     Partners: Female     Birth control/protection: None       Current Outpatient Medications   Medication Sig Dispense Refill    albuterol sulfate  (90 Base) MCG/ACT inhaler 2 puffs every 4 hours as needed for SOA or wheezing. Generic. 3 g 1    Ascorbic Acid (VITAMIN C PO) Take  by mouth. HOLD PER MD INSTR      aspirin 81 MG EC tablet Take 1 tablet by mouth Daily. HOLD PER MD INSTR      azelastine (ASTELIN) 0.1 % nasal spray 2 sprays into the nostril(s) as directed by provider 2 (Two) Times a Day. Use in each nostril as directed 30 mL 11    B Complex Vitamins (VITAMIN B COMPLEX PO) Take  by mouth Daily. HOLD PRIOR TO SURGERY      budesonide (Pulmicort Flexhaler) 180 MCG/ACT inhaler Use 1 or 2 inhalations each AM. Rinse and spit after use. 2 each 3    Cholecalciferol (VITAMIN D3) 5000 UNITS capsule capsule Take 1 capsule by mouth Daily. HOLD PRIOR TO SURGERY      cyclobenzaprine (FLEXERIL) 10 MG tablet TAKE ONE TABLET BY MOUTH THREE TIMES A DAY AS NEEDED FOR MUSCLE SPASMS 60 tablet 3    desonide (DESOWEN) 0.05 % cream Apply  topically to the appropriate area as directed See Admin Instructions. Apply to the affected areas three times daily 60 each 6    Diclofenac Sodium (VOLTAREN) 1 % gel gel Apply 4 g topically to the appropriate area as directed 4 (Four) Times a Day. 100 g 3    diphenhydrAMINE HCl (BENADRYL ALLERGY PO) Take 1 tablet by mouth Daily.      docusate sodium (COLACE) 250 MG capsule Take 1 capsule by mouth Daily.      esomeprazole (nexIUM) 40 MG capsule Take 1 capsule by mouth Every Night. 30 capsule 5    etodolac (LODINE) 500 MG tablet Take 1 tablet by mouth 2 (Two) Times a Day. 180 tablet 3    ezetimibe-simvastatin (VYTORIN) 10-40 MG per tablet Take 1 tablet by mouth Every Night. 30 tablet 5    FIBER PO Take  by mouth.      fluticasone (FLONASE) 50 MCG/ACT nasal spray  "Administer 2 sprays into the nostril(s) as directed by provider Daily.      gabapentin (NEURONTIN) 800 MG tablet Take 1 tablet by mouth 3 (Three) Times a Day. 90 tablet 0    ketoconazole (NIZORAL) 2 % cream Apply  topically to the appropriate area as directed Daily. 60 g 3    melatonin 1 MG tablet Take 3 tablets by mouth Every Night. HOLD PRIOR TO SURGERY      montelukast (SINGULAIR) 10 MG tablet Take 1 tablet by mouth Every Night. 30 tablet 5    Multiple Vitamin (MULTI VITAMIN DAILY PO) Take  by mouth Daily. HOLD PER MD NUÑEZ      olopatadine (PATANASE) 0.6 % solution nasal solution 2 sprays by Each Nare route 2 (Two) Times a Day. 30.5 g 5    Potassium 99 MG tablet Take  by mouth.      rizatriptan MLT (MAXALT-MLT) 10 MG disintegrating tablet DISSOLVE 1 TABLET BY MOUTH AT ONSET OF HEADACHE; MAY REPEAT 1 TABLET IN 2 HOURS IF NEEDED. 18 tablet 0    SALINE NASAL SPRAY NA into each nostril.      selenium sulfide (SELSUN) 2.5 % lotion APPLY TO AFFECTED AREA(S) DAILY AS NEEDED FOR DANDRUFF AS DIRECTED 120 mL 5    sildenafil (VIAGRA) 100 MG tablet Take 1 tablet by mouth As Needed for Erectile Dysfunction. 5 tablet 5    Syringe 22G X 1\" 3 ML misc Inject 1 Needle into the appropriate muscle as directed by prescriber Every 7 (Seven) Days. 20 each 3    Testosterone Cypionate (DEPOTESTOTERONE CYPIONATE) 200 MG/ML injection Inject 0.5 mL into the appropriate muscle as directed by prescriber 1 (One) Time Per Week. 2 mL 2    traMADol (ULTRAM) 50 MG tablet Take 1 tablet by mouth Every 6 (Six) Hours As Needed for Moderate Pain. 120 tablet 1    zolpidem CR (Ambien CR) 12.5 MG CR tablet Take 1 tablet by mouth At Night As Needed for Sleep. 90 tablet 1    amoxicillin (AMOXIL) 500 MG capsule Take 1 capsule by mouth 3 (Three) Times a Day. 30 capsule 0     No current facility-administered medications for this visit.       Review of Systems   Constitutional:  Negative for chills, diaphoresis, fatigue and fever.   HENT:  Positive for ear " "pain and hearing loss. Negative for congestion, sore throat and swollen glands.    Respiratory:  Negative for cough and chest tightness.    Cardiovascular:  Negative for chest pain and leg swelling.   Gastrointestinal:  Negative for abdominal pain, constipation, diarrhea, nausea, vomiting and GERD.   Genitourinary:  Negative for dysuria and urgency.   Musculoskeletal:  Positive for myalgias and neck pain.   Skin:  Negative for dry skin and rash.   Neurological:  Negative for weakness and numbness.       Objective   /70 (BP [place] Right arm, Patient Position: Sitting, Cuff Size: Adult)   Pulse 71   Temp 98 °F (36.7 °C) (Temporal)   Ht 182.9 cm (72\")   Wt 112 kg (247 lb)   SpO2 94%   BMI 33.50 kg/m²     Physical Exam  HENT:      Right Ear: Ear canal and external ear normal. There is no impacted cerumen.      Left Ear: Tympanic membrane, ear canal and external ear normal. There is no impacted cerumen.      Ears:      Comments: Right TM with extensive scarring with erythema and post TM fluid and a blister on mid TM.        Recent Results (from the past 12 weeks)   POCT SARS-CoV-2 Antigen MAT + Flu    Collection Time: 11/06/24  2:28 PM    Specimen: Swab   Result Value Ref Range    SARS Antigen Not Detected Not Detected, Presumptive Negative    Influenza A Antigen MAT Detected (A) Not Detected    Influenza B Antigen MAT Not Detected Not Detected    Internal Control Passed Passed    Lot Number [id number]     Expiration Date 09/04/2025      Assessment & Plan   Diagnoses and all orders for this visit:    1. Right otitis media, unspecified otitis media type (Primary)  -     amoxicillin (AMOXIL) 500 MG capsule; Take 1 capsule by mouth 3 (Three) Times a Day.  Dispense: 30 capsule; Refill: 0    2. Gastroesophageal reflux disease without esophagitis  -     esomeprazole (nexIUM) 40 MG capsule; Take 1 capsule by mouth Every Night.  Dispense: 30 capsule; Refill: 5    3. Arthritis of both knees  -     etodolac (LODINE) " 500 MG tablet; Take 1 tablet by mouth 2 (Two) Times a Day.  Dispense: 180 tablet; Refill: 3    4. Elevated cholesterol  -     ezetimibe-simvastatin (VYTORIN) 10-40 MG per tablet; Take 1 tablet by mouth Every Night.  Dispense: 30 tablet; Refill: 5    5. Lumbar disc herniation with radiculopathy  -     gabapentin (NEURONTIN) 800 MG tablet; Take 1 tablet by mouth 3 (Three) Times a Day.  Dispense: 90 tablet; Refill: 0  -     traMADol (ULTRAM) 50 MG tablet; Take 1 tablet by mouth Every 6 (Six) Hours As Needed for Moderate Pain.  Dispense: 120 tablet; Refill: 1    6. Chronic right-sided low back pain with right-sided sciatica  -     gabapentin (NEURONTIN) 800 MG tablet; Take 1 tablet by mouth 3 (Three) Times a Day.  Dispense: 90 tablet; Refill: 0  -     traMADol (ULTRAM) 50 MG tablet; Take 1 tablet by mouth Every 6 (Six) Hours As Needed for Moderate Pain.  Dispense: 120 tablet; Refill: 1    7. Environmental allergies  -     montelukast (SINGULAIR) 10 MG tablet; Take 1 tablet by mouth Every Night.  Dispense: 30 tablet; Refill: 5    8. Testosterone deficiency  -     Testosterone Cypionate (DEPOTESTOTERONE CYPIONATE) 200 MG/ML injection; Inject 0.5 mL into the appropriate muscle as directed by prescriber 1 (One) Time Per Week.  Dispense: 2 mL; Refill: 2    9. Influenza vaccination declined    Multiple medical problems reviewed and discussed with patient.  Unfortunately because of the holidays his lab was closed when he tried to get his labs done.  He will be getting those done supposedly this weekend and we will review them when they return and adjust based upon the results.  In the meantime continue the current medications.  We discussed the risk of the medications include but not limited to the gabapentin and tramadol and testosterone.  MARY BETH run and reviewed.  Risks of the medication include but are not limited to fatigue, somnolence, increased risk of falls, constipation, allergic reaction, dependence, and  addiction.           Dictated utilizing Dragon Dictation

## 2024-12-06 RX ORDER — CYCLOBENZAPRINE HCL 10 MG
10 TABLET ORAL 3 TIMES DAILY PRN
Qty: 60 TABLET | Refills: 3 | Status: SHIPPED | OUTPATIENT
Start: 2024-12-06

## 2024-12-13 DIAGNOSIS — F51.04 PSYCHOPHYSIOLOGICAL INSOMNIA: ICD-10-CM

## 2024-12-13 DIAGNOSIS — G47.33 OSA ON CPAP: ICD-10-CM

## 2024-12-13 RX ORDER — ZOLPIDEM TARTRATE 12.5 MG/1
12.5 TABLET, FILM COATED, EXTENDED RELEASE ORAL NIGHTLY PRN
Qty: 90 TABLET | Refills: 1 | Status: SHIPPED | OUTPATIENT
Start: 2024-12-13

## 2024-12-13 RX ORDER — SILDENAFIL 100 MG/1
100 TABLET, FILM COATED ORAL AS NEEDED
Qty: 5 TABLET | Refills: 5 | Status: SHIPPED | OUTPATIENT
Start: 2024-12-13

## 2024-12-13 NOTE — TELEPHONE ENCOUNTER
LOV                   12/5/2024  NOV                   (around 3/5/2025)   Last refill             12/22/23  Protocol              met

## 2025-01-15 ENCOUNTER — TELEPHONE (OUTPATIENT)
Dept: FAMILY MEDICINE CLINIC | Facility: CLINIC | Age: 50
End: 2025-01-15

## 2025-01-15 NOTE — TELEPHONE ENCOUNTER
Caller: Stephanie Munson    Relationship: Emergency Contact    Best call back number: [phone]       What was the call regarding: PATIENT HAD A WORK PHYSICAL YESTERDAY AND THEY ARE NEEDING A FORM FILLED OUT FOR HIS CONTROLLED SUBSTANCES AND THEY NEED TO BE SEEN BY DR. WALTER WITHIN 20 DAYS TO FILL OUT FORM. THEY WOULD LIKE TO BE WORKED IN. PLEASE ADVISE

## 2025-01-21 ENCOUNTER — OFFICE VISIT (OUTPATIENT)
Dept: FAMILY MEDICINE CLINIC | Facility: CLINIC | Age: 50
End: 2025-01-21
Payer: COMMERCIAL

## 2025-01-21 VITALS
OXYGEN SATURATION: 96 % | DIASTOLIC BLOOD PRESSURE: 90 MMHG | BODY MASS INDEX: 33.18 KG/M2 | HEART RATE: 77 BPM | WEIGHT: 245 LBS | HEIGHT: 72 IN | SYSTOLIC BLOOD PRESSURE: 130 MMHG

## 2025-01-21 DIAGNOSIS — R20.0 NUMBNESS AND TINGLING IN LEFT HAND: ICD-10-CM

## 2025-01-21 DIAGNOSIS — M25.561 BILATERAL CHRONIC KNEE PAIN: Primary | ICD-10-CM

## 2025-01-21 DIAGNOSIS — G47.26 CIRCADIAN RHYTHM SLEEP DISORDER, SHIFT WORK TYPE: ICD-10-CM

## 2025-01-21 DIAGNOSIS — G89.29 BILATERAL CHRONIC KNEE PAIN: Primary | ICD-10-CM

## 2025-01-21 DIAGNOSIS — M54.50 LUMBAR PAIN: ICD-10-CM

## 2025-01-21 DIAGNOSIS — M17.0 ARTHRITIS OF BOTH KNEES: ICD-10-CM

## 2025-01-21 DIAGNOSIS — M25.562 BILATERAL CHRONIC KNEE PAIN: Primary | ICD-10-CM

## 2025-01-21 DIAGNOSIS — M17.0 PRIMARY OSTEOARTHRITIS OF BOTH KNEES: ICD-10-CM

## 2025-01-21 DIAGNOSIS — R20.2 NUMBNESS AND TINGLING IN LEFT HAND: ICD-10-CM

## 2025-01-21 PROCEDURE — 99213 OFFICE O/P EST LOW 20 MIN: CPT | Performed by: INTERNAL MEDICINE

## 2025-01-21 NOTE — PROGRESS NOTES
Subjective   Arelis Munson is a 49 y.o. male.     Chief Complaint   Patient presents with    Needs a letter for work       History of Present Illness     History of GERD, osteoarthritis of knees, migraine headaches, lumbar disc herniation with radiculopathy status post lumbar surgery, asthma, NITA on CPAP, testosterone deficiency.  Currently is followed by orthopedics, pain management, neuro spine, urology and pulmonology.      Patient had annual work physical and noted is taking tramadol and gabapentin.  Physician is requiring note stating that medications will not be taken prior to or during work hours.     History of hypogonadism previously followed by urology and was on testosterone cypionate 200 mg/mL 1 cc IM every 2 weeks.  Last labs done on 3/29/24 and within the goal range.  Has been doing well and is wanting to change so his testosterone management and injections are through primary are and not the urologist.  It was determined okay by the urologist for us to take over at this time with serial labs every 6 months.  Last refill picked up on 5/22/2024..     History of asthma and NITA on auto CPAP.  Is on Singulair 10 mg daily along with Pulmicort Flexhaler 180 mcg per actuation 1 to 2 puffs every morning and albuterol inhaler as needed.     History of migraine headaches and using maxalt as needed.     History of hyperlipidemia.  Is currently utilizing as a Ezetimibe-simvastatin 10-40 mg daily.  Currently without any problems or side effects.  Last lab done on 1/29/2024.     Has persistent intermittent left hand numbness and itching feeling.  Has history of cervical bulging discs and no real neck pain.     Had back pain and was receiving injections through pain management.  Subsequently, had lumbar discectomy 5/17/23.  Has also has knee OA and pain issues.  Will be having knee surgery in future but has to wait till back healed.  Currently is using diclofenac gel 4 times a day, etodolac 500 mg twice daily,  gabapentin 800 mg 3 times daily (last filled 1/16/25 #90 tab), and tramadol 50 mg every 8 hours as needed (last filled on 1/16/25 #90 tabs).      History of obstructive sleep apnea being followed by Dr. Kong of sleep medicine and on CPAP.  Doing well no changes with most recent visit on 12/13/2023 with sleep specialist.  The following portions of the patient's history were reviewed and updated as appropriate: allergies, current medications, past family history, past medical history, past social history, past surgical history and problem list.    Depression Screen:      1/21/2025     4:04 PM   PHQ-2/PHQ-9 Depression Screening   Little interest or pleasure in doing things Not at all   Feeling down, depressed, or hopeless Not at all   How difficult have these problems made it for you to do your work, take care of things at home, or get along with other people? Not difficult at all       Past Medical History:   Diagnosis Date    Allergic July 2001    Seasonal    Anemia     Arthritis     OSTEO    Asthma     Chronic pain disorder 07/01/2021    COVID-19 04/2021    CTS (carpal tunnel syndrome)     Not since surgery    ED (erectile dysfunction)     Elevated cholesterol     Environmental allergies     Erectile dysfunction     Fracture, fibula     RIGHT AND ANKLE    Gastroesophageal reflux disease without esophagitis 07/11/2016    HL (hearing loss) [facility]    Getting Worse Especially in Right Ear    Joint pain 09/01/2002    Low back pain     Migraine with aura and without status migrainosus, not intractable 07/11/2016    NITA on CPAP 06/19/2005    Overnight polysomnogram.  Weight 244 pounds.  Moderate NITA with AHI 15 events per hour.  No sleep-related hypoxia.  Treated with auto CPAP.    Pain and swelling of toe of right foot     Peripheral neuropathy     Seasonal allergies     Testosterone deficiency 07/11/2016       Past Surgical History:   Procedure Laterality Date    ANKLE ARTHROSCOPY Right 01/26/2018    Procedure:  RT ANKLE ARTHROSCOPY SUBCHONDROPLASTY TALUS ;  Surgeon: Lebron Spivey Jr., MD;  [place]  [facility];  Service:     ANKLE LIGAMENT RECONSTRUCTION Right 2018    Procedure: HARDWARE REMOVAL POSS LATERAL LIGAMENT RECONSTRUCTION ;  Surgeon: Lebron Spivey Jr., MD;  [place]  [facility];  Service:     CARPAL TUNNEL RELEASE Right     COLONOSCOPY N/A 2023    Procedure: COLONOSCOPY to cecum and TI:;  Surgeon: eJ Eddy MD;  [place] [facility];  Service: Gastroenterology;  Laterality: N/A;  pre:  screening  post:  diverticulosis, hemorrhoids    CUBITAL TUNNEL RELEASE Right     NERVE RELEASE    EAR BIOPSY Right     MYRINGOTOMY W/BONES REPLACED INNER EAR    EPIDURAL BLOCK      FRACTURE SURGERY      Dr. Lebron Spivey    KNEE SURGERY Right     MENISCUS REPAIR    LUMBAR DISCECTOMY Right 2023    Procedure: Lumbar Microscopic Discectomy, right lumbar 3 lumbar 4;  Surgeon: Jovon Doran MD;  [place] [facility] MAIN OR;  Service: Neurosurgery;  Laterality: Right;    NOSE SURGERY      ORIF FIBULA FRACTURE Right 2017    ANKLE INCLUDED    ORTHOPEDIC SURGERY      Dr. Tyrell Eddy    SINUS SURGERY      Dr. Vilchis    TONSILLECTOMY      As a kid       Family History   Problem Relation Age of Onset    Lung cancer Mother     Cancer Mother          of lung, breast, and brain cancer.    Lung cancer Father     Cancer Father          of lung cancer.    Colon cancer Maternal Grandfather     Malig Hyperthermia Neg Hx        Social History     Socioeconomic History    Marital status:    Tobacco Use    Smoking status: Former     Current packs/day: 0.00     Average packs/day: 2.0 packs/day for 22.0 years (44.0 ttl pk-yrs)     Types: Cigarettes     Start date: 1987     Quit date: 2002     Years since quittin.5     Passive exposure: Past    Smokeless tobacco: Never    Tobacco comments:     I have been smoke free for 19 years as of 19.   Vaping Use    Vaping status: Never  Used   Substance and Sexual Activity    Alcohol use: Yes     Alcohol/week: 2.0 standard drinks of alcohol     Types: 2 Shots of liquor per week     Comment: Socially    Drug use: No    Sexual activity: Yes     Partners: Female     Birth control/protection: None       Current Outpatient Medications   Medication Sig Dispense Refill    albuterol sulfate  (90 Base) MCG/ACT inhaler 2 puffs every 4 hours as needed for SOA or wheezing. Generic. 3 g 1    amoxicillin (AMOXIL) 500 MG capsule Take 1 capsule by mouth 3 (Three) Times a Day. 30 capsule 0    Ascorbic Acid (VITAMIN C PO) Take  by mouth. HOLD PER MD INSTR      aspirin 81 MG EC tablet Take 1 tablet by mouth Daily. HOLD PER MD INSTR      azelastine (ASTELIN) 0.1 % nasal spray 2 sprays into the nostril(s) as directed by provider 2 (Two) Times a Day. Use in each nostril as directed 30 mL 11    B Complex Vitamins (VITAMIN B COMPLEX PO) Take  by mouth Daily. HOLD PRIOR TO SURGERY      budesonide (Pulmicort Flexhaler) 180 MCG/ACT inhaler Use 1 or 2 inhalations each AM. Rinse and spit after use. 2 each 3    Cholecalciferol (VITAMIN D3) 5000 UNITS capsule capsule Take 1 capsule by mouth Daily. HOLD PRIOR TO SURGERY      cyclobenzaprine (FLEXERIL) 10 MG tablet TAKE ONE TABLET BY MOUTH THREE TIMES A DAY AS NEEDED FOR MUSCLE SPASMS 60 tablet 3    desonide (DESOWEN) 0.05 % cream Apply  topically to the appropriate area as directed See Admin Instructions. Apply to the affected areas three times daily 60 each 6    Diclofenac Sodium (VOLTAREN) 1 % gel gel Apply 4 g topically to the appropriate area as directed 4 (Four) Times a Day. 100 g 3    diphenhydrAMINE HCl (BENADRYL ALLERGY PO) Take 1 tablet by mouth Daily.      docusate sodium (COLACE) 250 MG capsule Take 1 capsule by mouth Daily.      esomeprazole (nexIUM) 40 MG capsule Take 1 capsule by mouth Every Night. 30 capsule 5    etodolac (LODINE) 500 MG tablet Take 1 tablet by mouth 2 (Two) Times a Day. 180 tablet 3     "ezetimibe-simvastatin (VYTORIN) 10-40 MG per tablet Take 1 tablet by mouth Every Night. 30 tablet 5    FIBER PO Take  by mouth.      fluticasone (FLONASE) 50 MCG/ACT nasal spray Administer 2 sprays into the nostril(s) as directed by provider Daily.      gabapentin (NEURONTIN) 800 MG tablet Take 1 tablet by mouth 3 (Three) Times a Day. 90 tablet 0    ketoconazole (NIZORAL) 2 % cream Apply  topically to the appropriate area as directed Daily. 60 g 3    melatonin 1 MG tablet Take 3 tablets by mouth Every Night. HOLD PRIOR TO SURGERY      montelukast (SINGULAIR) 10 MG tablet Take 1 tablet by mouth Every Night. 30 tablet 5    Multiple Vitamin (MULTI VITAMIN DAILY PO) Take  by mouth Daily. HOLD PER MD NUÑEZ      olopatadine (PATANASE) 0.6 % solution nasal solution 2 sprays by Each Nare route 2 (Two) Times a Day. 30.5 g 5    Potassium 99 MG tablet Take  by mouth.      rizatriptan MLT (MAXALT-MLT) 10 MG disintegrating tablet DISSOLVE 1 TABLET BY MOUTH AT ONSET OF HEADACHE; MAY REPEAT 1 TABLET IN 2 HOURS IF NEEDED. 18 tablet 0    SALINE NASAL SPRAY NA into each nostril.      selenium sulfide (SELSUN) 2.5 % lotion APPLY TO AFFECTED AREA(S) DAILY AS NEEDED FOR DANDRUFF AS DIRECTED 120 mL 5    sildenafil (VIAGRA) 100 MG tablet TAKE 1 TABLET BY MOUTH  AS NEEDED FOR ERECTILE DYSFUNCTION 5 tablet 5    Syringe 22G X 1\" 3 ML misc Inject 1 Needle into the appropriate muscle as directed by prescriber Every 7 (Seven) Days. 20 each 3    Testosterone Cypionate (DEPOTESTOTERONE CYPIONATE) 200 MG/ML injection Inject 0.5 mL into the appropriate muscle as directed by prescriber 1 (One) Time Per Week. 2 mL 2    traMADol (ULTRAM) 50 MG tablet Take 1 tablet by mouth Every 6 (Six) Hours As Needed for Moderate Pain. 120 tablet 1    zolpidem CR (Ambien CR) 12.5 MG CR tablet Take 1 tablet by mouth At Night As Needed for Sleep. 90 tablet 1     No current facility-administered medications for this visit.       Review of Systems   Constitutional:  " "Negative for activity change, appetite change, chills, diaphoresis, fatigue, fever, unexpected weight gain and unexpected weight loss.   HENT:  Negative for congestion, nosebleeds, rhinorrhea, sore throat, swollen glands, trouble swallowing and voice change.    Eyes:  Negative for visual disturbance.   Respiratory:  Negative for cough, chest tightness, shortness of breath and wheezing.    Cardiovascular:  Negative for chest pain, palpitations and leg swelling.   Gastrointestinal:  Negative for abdominal pain, blood in stool, constipation, diarrhea, nausea, vomiting, GERD and indigestion.   Genitourinary:  Negative for dysuria, frequency and hematuria.   Musculoskeletal:  Negative for arthralgias, back pain, myalgias and neck pain.   Skin:  Negative for rash and wound.   Neurological:  Negative for dizziness, tremors, weakness, light-headedness, numbness, headache and memory problem.   Hematological:  Negative for adenopathy. Does not bruise/bleed easily.   Psychiatric/Behavioral:  Negative for sleep disturbance and depressed mood. The patient is not nervous/anxious.        Objective   /90 (Patient Position: Sitting, Cuff Size: Adult)   Pulse 77   Ht 182.9 cm (72.01\")   Wt 111 kg (245 lb)   SpO2 96%   BMI 33.22 kg/m²     Physical Exam  Vitals and nursing note reviewed.   Constitutional:       General: He is not in acute distress.     Appearance: He is well-developed. He is not diaphoretic.   HENT:      Head: Normocephalic and atraumatic.      Right Ear: External ear normal.      Left Ear: External ear normal.      Nose: Nose normal.   Eyes:      Conjunctiva/sclera: Conjunctivae normal.      Pupils: Pupils are equal, round, and reactive to light.   Neck:      Thyroid: No thyromegaly.      Trachea: No tracheal deviation.   Cardiovascular:      Rate and Rhythm: Normal rate and regular rhythm.      Heart sounds: Normal heart sounds. No murmur heard.     No friction rub. No gallop.   Pulmonary:      Effort: " Pulmonary effort is normal. No respiratory distress.      Breath sounds: Normal breath sounds.   Abdominal:      General: Bowel sounds are normal.      Palpations: Abdomen is soft. There is no mass.      Tenderness: There is no abdominal tenderness. There is no guarding.   Musculoskeletal:         General: Normal range of motion.      Cervical back: Normal range of motion and neck supple.   Lymphadenopathy:      Cervical: No cervical adenopathy.   Skin:     General: Skin is warm and dry.      Capillary Refill: Capillary refill takes less than 2 seconds.      Findings: No rash.   Neurological:      Mental Status: He is alert and oriented to person, place, and time.      Motor: No abnormal muscle tone.      Deep Tendon Reflexes: Reflexes normal.   Psychiatric:         Behavior: Behavior normal.         Thought Content: Thought content normal.         Judgment: Judgment normal.         Recent Results (from the past 12 weeks)   POCT SARS-CoV-2 Antigen MAT + Flu    Collection Time: 11/06/24  2:28 PM    Specimen: Swab   Result Value Ref Range    SARS Antigen Not Detected Not Detected, Presumptive Negative    Influenza A Antigen MAT Detected (A) Not Detected    Influenza B Antigen MAT Not Detected Not Detected    Internal Control Passed Passed    Lot Number [id number]     Expiration Date 09/04/2025    Comprehensive Metabolic Panel    Collection Time: 12/07/24  8:44 AM    Specimen: Blood   Result Value Ref Range    Glucose 92 70 - 99 mg/dL    BUN 11 6 - 24 mg/dL    Creatinine 1.08 0.76 - 1.27 mg/dL    EGFR Result 83 >59 mL/min/1.73    BUN/Creatinine Ratio 10 9 - 20    Sodium 143 134 - 144 mmol/L    Potassium 4.7 3.5 - 5.2 mmol/L    Chloride 103 96 - 106 mmol/L    Total CO2 26 20 - 29 mmol/L    Calcium 9.0 8.7 - 10.2 mg/dL    Total Protein 6.6 6.0 - 8.5 g/dL    Albumin 4.6 3.8 - 4.9 g/dL    Globulin 2.0 1.5 - 4.5 g/dL    Total Bilirubin 0.4 0.0 - 1.2 mg/dL    Alkaline Phosphatase 53 44 - 121 IU/L    AST (SGOT) 36 0 - 40 IU/L     ALT (SGPT) 83 (H) 0 - 44 IU/L   CBC & Differential    Collection Time: 12/07/24  8:44 AM    Specimen: Blood   Result Value Ref Range    WBC 7.3 3.4 - 10.8 x10E3/uL    RBC 5.56 4.14 - 5.80 x10E6/uL    Hemoglobin 16.9 13.0 - 17.7 g/dL    Hematocrit 51.9 (H) 37.5 - 51.0 %    MCV 93 79 - 97 fL    MCH 30.4 26.6 - 33.0 pg    MCHC 32.6 31.5 - 35.7 g/dL    RDW 14.0 11.6 - 15.4 %    Platelets 283 150 - 450 x10E3/uL    Neutrophil Rel % 38 Not Estab. %    Lymphocyte Rel % 49 Not Estab. %    Monocyte Rel % 9 Not Estab. %    Eosinophil Rel % 3 Not Estab. %    Basophil Rel % 1 Not Estab. %    Neutrophils Absolute 2.7 1.4 - 7.0 x10E3/uL    Lymphocytes Absolute 3.6 (H) 0.7 - 3.1 x10E3/uL    Monocytes Absolute 0.6 0.1 - 0.9 x10E3/uL    Eosinophils Absolute 0.2 0.0 - 0.4 x10E3/uL    Basophils Absolute 0.1 0.0 - 0.2 x10E3/uL    Immature Granulocyte Rel % 0 Not Estab. %    Immature Grans Absolute 0.0 0.0 - 0.1 x10E3/uL   Lipid Panel    Collection Time: 12/07/24  8:44 AM    Specimen: Blood   Result Value Ref Range    Total Cholesterol 141 100 - 199 mg/dL    Triglycerides 120 0 - 149 mg/dL    HDL Cholesterol 49 >39 mg/dL    VLDL Cholesterol Warren 21 5 - 40 mg/dL    LDL Chol Calc (NIH) 71 0 - 99 mg/dL   Testosterone (Free & Total), LC / MS    Collection Time: 12/07/24  8:44 AM    Specimen: Blood   Result Value Ref Range    Testosterone, Total 1,657.9 (H) 264.0 - 916.0 ng/dL    Testosterone, Free 24.2 (H) 7.2 - 24.0 pg/mL   PSA Screen    Collection Time: 12/07/24  8:44 AM    Specimen: Blood   Result Value Ref Range    PSA 0.4 0.0 - 4.0 ng/mL     Assessment & Plan   Diagnoses and all orders for this visit:    1. Bilateral chronic knee pain (Primary)    2. Primary osteoarthritis of both knees    3. Lumbar pain    4. Arthritis of both knees    5. Numbness and tingling in left hand    6. Circadian rhythm sleep disorder, shift work type        History reviewed with the patient.  We discussed his medications beyond just the gabapentin and  "tramadol.  Patient is not taking these at work and is not taking these for several hours before work.  Letter given to patient stating : \"Mr Arelis Munson is currently prescribed gabapentin, benadryl, cyclobenzaprine, zolpidem and tramadol as needed and medications will not be taken prior to or during work hours. These are as needed medications. Instructed once again not to take prior to or during work or when operating machinery.\"  Patient is to continue his current medications as noted in chart.  Will be following up in 3 months.  MARY BETH run and reviewed.  Risks of the medication include but are not limited to fatigue, somnolence, increased risk of falls, constipation, allergic reaction, dependence, and addiction         Dictated utilizing Dragon Dictation     "

## 2025-01-21 NOTE — LETTER
January 21, 2025     Patient: Arelis Munson   YOB: 1975   Date of Visit: 1/21/2025       To Whom It May Concern:    Mr Arelis Munson is currently prescribed gabapentin, benadryl, cyclobenzaprine, zolpidem and tramadol as needed and medications will not be taken prior to or during work hours.  These are as needed medications.  Instructed once again not to take prior to or during work or when operating machinery.         Sincerely,        Terrell Palma MD    CC: No Recipients

## 2025-02-18 ENCOUNTER — CLINICAL SUPPORT (OUTPATIENT)
Dept: ORTHOPEDIC SURGERY | Facility: CLINIC | Age: 50
End: 2025-02-18
Payer: COMMERCIAL

## 2025-02-18 VITALS — HEIGHT: 73 IN | BODY MASS INDEX: 32.47 KG/M2 | TEMPERATURE: 97.5 F | WEIGHT: 245 LBS

## 2025-02-18 DIAGNOSIS — M17.0 PRIMARY OSTEOARTHRITIS OF BOTH KNEES: Primary | ICD-10-CM

## 2025-02-18 RX ORDER — METHYLPREDNISOLONE ACETATE 80 MG/ML
80 INJECTION, SUSPENSION INTRA-ARTICULAR; INTRALESIONAL; INTRAMUSCULAR; SOFT TISSUE
Status: COMPLETED | OUTPATIENT
Start: 2025-02-18 | End: 2025-02-18

## 2025-02-18 RX ADMIN — METHYLPREDNISOLONE ACETATE 80 MG: 80 INJECTION, SUSPENSION INTRA-ARTICULAR; INTRALESIONAL; INTRAMUSCULAR; SOFT TISSUE at 15:37

## 2025-02-18 NOTE — PROGRESS NOTES
2/18/2025    Arelis Munson is here today for worsening knee pain. Pt has undergone injection of the knee in the past with good resolution of symptoms. Pt is requesting a repeat injection.     KNEE Injection Procedure Note:    Large Joint Arthrocentesis: L knee  Date/Time: 2/18/2025 3:37 PM  Consent given by: patient  Site marked: site marked  Timeout: Immediately prior to procedure a time out was called to verify the correct patient, procedure, equipment, support staff and site/side marked as required   Supporting Documentation  Indications: pain and joint swelling   Procedure Details  [place] knee - L knee  Preparation: Patient was prepped and draped in the usual sterile fashion  Needle gauge: 21 G.  Approach: anterolateral  Medications administered: 2 mL lidocaine (cardiac); 80 mg methylPREDNISolone acetate 80 MG/ML  Patient tolerance: patient tolerated the procedure well with no immediate complications      Large Joint Arthrocentesis: R knee  Date/Time: 2/18/2025 3:37 PM  Consent given by: patient  Site marked: site marked  Timeout: Immediately prior to procedure a time out was called to verify the correct patient, procedure, equipment, support staff and site/side marked as required   Supporting Documentation  Indications: pain and joint swelling   Procedure Details  Location: knee - R knee  Preparation: Patient was prepped and draped in the usual sterile fashion  Needle gauge: 21 G.  Approach: anterolateral  Medications administered: 2 mL lidocaine (cardiac); 80 mg methylPREDNISolone acetate 80 MG/ML  Patient tolerance: patient tolerated the procedure well with no immediate complications      At the conclusion of the injection I discussed the importance of continued quad strengthening exercises on a daily basis. I will see the patient back if the symptoms should fail to improve or worsen.    Kalyani Eddy, APRN  2/18/2025

## 2025-02-24 DIAGNOSIS — E34.9 TESTOSTERONE DEFICIENCY: Primary | ICD-10-CM

## 2025-02-24 DIAGNOSIS — Z51.81 ENCOUNTER FOR MEDICATION MONITORING: ICD-10-CM

## 2025-02-25 ENCOUNTER — TELEPHONE (OUTPATIENT)
Dept: FAMILY MEDICINE CLINIC | Facility: CLINIC | Age: 50
End: 2025-02-25
Payer: COMMERCIAL

## 2025-02-25 NOTE — TELEPHONE ENCOUNTER
Spoke to pt wife Stephanie who is on the  verbal that the Labs have been ordered per patients request.

## 2025-03-05 DIAGNOSIS — E34.9 TESTOSTERONE DEFICIENCY: ICD-10-CM

## 2025-03-05 RX ORDER — TESTOSTERONE CYPIONATE 200 MG/ML
100 INJECTION, SOLUTION INTRAMUSCULAR WEEKLY
Qty: 2 ML | Refills: 2 | Status: SHIPPED | OUTPATIENT
Start: 2025-03-05

## 2025-03-06 ENCOUNTER — OFFICE VISIT (OUTPATIENT)
Dept: FAMILY MEDICINE CLINIC | Facility: CLINIC | Age: 50
End: 2025-03-06
Payer: COMMERCIAL

## 2025-03-06 VITALS
DIASTOLIC BLOOD PRESSURE: 70 MMHG | SYSTOLIC BLOOD PRESSURE: 108 MMHG | WEIGHT: 246.6 LBS | BODY MASS INDEX: 32.68 KG/M2 | HEIGHT: 73 IN | OXYGEN SATURATION: 94 % | HEART RATE: 84 BPM

## 2025-03-06 DIAGNOSIS — E34.9 TESTOSTERONE DEFICIENCY: Primary | ICD-10-CM

## 2025-03-06 DIAGNOSIS — R73.03 PREDIABETES: ICD-10-CM

## 2025-03-06 PROCEDURE — 99214 OFFICE O/P EST MOD 30 MIN: CPT | Performed by: INTERNAL MEDICINE

## 2025-03-06 NOTE — PROGRESS NOTES
Subjective   Arelis Munson is a 50 y.o. male.     Chief Complaint   Patient presents with    Med Refill     And follow up on testosterone        History of Present Illness     History of GERD, osteoarthritis of knees, migraine headaches, lumbar disc herniation with radiculopathy status post lumbar surgery, asthma, NITA on CPAP, testosterone deficiency.  Currently is followed by orthopedics, pain management, neuro spine, urology and pulmonology.      History of hypogonadism previously followed by urology and was on testosterone cypionate 200 mg/mL 0.5 cc IM every 3 weeks.  Last testosterone level performed on 2/27/2025 noted a total testosterone of 161 and free testosterone of 0.5 and previous on 12/7/2024 of total testosterone 1657 and a free testosterone of 24.2.  Last refill picked up on 2/4/2025..Patient thinks the first elevated level may have been related to timing of the injection.     History of asthma and NITA on auto CPAP.  Is on Singulair 10 mg daily along with Pulmicort Flexhaler 180 mcg per actuation 1 to 2 puffs every morning and albuterol inhaler as needed.     History of migraine headaches and using maxalt as needed.     History of hyperlipidemia.  Is currently utilizing as a Ezetimibe-simvastatin 10-40 mg daily.  Currently without any problems or side effects.  Last lab done on 1/29/2024.     Has persistent intermittent left hand numbness and itching feeling.  Has history of cervical bulging discs and no real neck pain.     Had back pain and was receiving injections through pain management.  Subsequently, had lumbar discectomy 5/17/23.  Has also has knee OA and pain issues.  Will be having knee surgery in future but has to wait till back healed.  Currently is using diclofenac gel 4 times a day, etodolac 500 mg twice daily, gabapentin 800 mg 3 times daily (last filled 2/19/2025 #90 tab), and tramadol 50 mg every 8 hours as needed (last filled on 2/19/2025 #90 tabs).  Last compliance panel performed on  2/27/2025.     History of obstructive sleep apnea being followed by Dr. Kong of sleep medicine and on CPAP.  Doing well no changes with most recent visit on 12/13/2023 with sleep specialist.  Currently using the zolpidem from Sleep medicine    The following portions of the patient's history were reviewed and updated as appropriate: allergies, current medications, past family history, past medical history, past social history, past surgical history and problem list.    Depression Screen:      1/21/2025     4:04 PM   PHQ-2/PHQ-9 Depression Screening   Little interest or pleasure in doing things Not at all   Feeling down, depressed, or hopeless Not at all   How difficult have these problems made it for you to do your work, take care of things at home, or get along with other people? Not difficult at all       Past Medical History:   Diagnosis Date    Allergic July 2001    Seasonal    Anemia     Arthritis     OSTEO    Asthma     Chronic pain disorder 07/01/2021    COVID-19 04/2021    CTS (carpal tunnel syndrome)     Not since surgery    ED (erectile dysfunction)     Elevated cholesterol     Environmental allergies     Erectile dysfunction     Fracture, fibula     RIGHT AND ANKLE    Gastroesophageal reflux disease without esophagitis 07/11/2016    HL (hearing loss) [facility]    Getting Worse Especially in Right Ear    Joint pain 09/01/2002    Low back pain     Migraine with aura and without status migrainosus, not intractable 07/11/2016    NITA on CPAP 06/19/2005    Overnight polysomnogram.  Weight 244 pounds.  Moderate NITA with AHI 15 events per hour.  No sleep-related hypoxia.  Treated with auto CPAP.    Pain and swelling of toe of right foot     Peripheral neuropathy     Seasonal allergies     Testosterone deficiency 07/11/2016       Past Surgical History:   Procedure Laterality Date    ANKLE ARTHROSCOPY Right 01/26/2018    Procedure: RT ANKLE ARTHROSCOPY SUBCHONDROPLASTY TALUS ;  Surgeon: Lebron Spivey Jr., MD;   [place]  [facility];  Service:     ANKLE LIGAMENT RECONSTRUCTION Right 2018    Procedure: HARDWARE REMOVAL POSS LATERAL LIGAMENT RECONSTRUCTION ;  Surgeon: Lebron Spivey Jr., MD;  [place]  [facility];  Service:     CARPAL TUNNEL RELEASE Right     COLONOSCOPY N/A 2023    Procedure: COLONOSCOPY to cecum and TI:;  Surgeon: Je Eddy MD;  Location:  [facility];  Service: Gastroenterology;  Laterality: N/A;  pre:  screening  post:  diverticulosis, hemorrhoids    CUBITAL TUNNEL RELEASE Right     NERVE RELEASE    EAR BIOPSY Right     MYRINGOTOMY W/BONES REPLACED INNER EAR    EPIDURAL BLOCK      FRACTURE SURGERY      Dr. Lebron Spivey    KNEE SURGERY Right     MENISCUS REPAIR    LUMBAR DISCECTOMY Right 2023    Procedure: Lumbar Microscopic Discectomy, right lumbar 3 lumbar 4;  Surgeon: Jovon Doran MD;  [facility];  Service: Neurosurgery;  Laterality: Right;    NOSE SURGERY      ORIF FIBULA FRACTURE Right 2017    ANKLE INCLUDED    ORTHOPEDIC SURGERY      Dr. Tyrell Eddy    SINUS SURGERY      Dr. Vilchis    TONSILLECTOMY      As a kid       Family History   Problem Relation Age of Onset    Lung cancer Mother     Cancer Mother          of lung, breast, and brain cancer.    Lung cancer Father     Cancer Father          of lung cancer.    Colon cancer Maternal Grandfather     Malig Hyperthermia Neg Hx        Social History     Socioeconomic History    Marital status:    Tobacco Use    Smoking status: Former     Current packs/day: 0.00     Average packs/day: 2.0 packs/day for 22.0 years (44.0 ttl pk-yrs)     Types: Cigarettes     Start date: 1987     Quit date: 2002     Years since quittin.6     Passive exposure: Past    Smokeless tobacco: Never    Tobacco comments:     I have been smoke free for 19 years as of 19.   Vaping Use    Vaping status: Never Used   Substance and Sexual Activity    Alcohol use: Yes     Alcohol/week: 2.0  standard drinks of alcohol     Types: 2 Shots of liquor per week     Comment: Socially    Drug use: No    Sexual activity: Yes     Partners: Female     Birth control/protection: None       Current Outpatient Medications   Medication Sig Dispense Refill    albuterol sulfate  (90 Base) MCG/ACT inhaler 2 puffs every 4 hours as needed for SOA or wheezing. Generic. 3 g 1    Ascorbic Acid (VITAMIN C PO) Take  by mouth. HOLD PER MD INSTR      aspirin 81 MG EC tablet Take 1 tablet by mouth Daily. HOLD PER MD INSTR      azelastine (ASTELIN) 0.1 % nasal spray 2 sprays into the nostril(s) as directed by provider 2 (Two) Times a Day. Use in each nostril as directed 30 mL 11    B Complex Vitamins (VITAMIN B COMPLEX PO) Take  by mouth Daily. HOLD PRIOR TO SURGERY      budesonide (Pulmicort Flexhaler) 180 MCG/ACT inhaler Use 1 or 2 inhalations each AM. Rinse and spit after use. 2 each 3    Cholecalciferol (VITAMIN D3) 5000 UNITS capsule capsule Take 1 capsule by mouth Daily. HOLD PRIOR TO SURGERY      cyclobenzaprine (FLEXERIL) 10 MG tablet TAKE ONE TABLET BY MOUTH THREE TIMES A DAY AS NEEDED FOR MUSCLE SPASMS 60 tablet 3    desonide (DESOWEN) 0.05 % cream Apply  topically to the appropriate area as directed See Admin Instructions. Apply to the affected areas three times daily 60 each 6    Diclofenac Sodium (VOLTAREN) 1 % gel gel Apply 4 g topically to the appropriate area as directed 4 (Four) Times a Day. 100 g 3    diphenhydrAMINE HCl (BENADRYL ALLERGY PO) Take 1 tablet by mouth Daily.      docusate sodium (COLACE) 250 MG capsule Take 1 capsule by mouth Daily.      esomeprazole (nexIUM) 40 MG capsule Take 1 capsule by mouth Every Night. 30 capsule 5    etodolac (LODINE) 500 MG tablet Take 1 tablet by mouth 2 (Two) Times a Day. 180 tablet 3    ezetimibe-simvastatin (VYTORIN) 10-40 MG per tablet Take 1 tablet by mouth Every Night. 30 tablet 5    FIBER PO Take  by mouth.      fluticasone (FLONASE) 50 MCG/ACT nasal spray  "Administer 2 sprays into the nostril(s) as directed by provider Daily.      gabapentin (NEURONTIN) 800 MG tablet Take 1 tablet by mouth 3 (Three) Times a Day. 90 tablet 0    ketoconazole (NIZORAL) 2 % cream Apply  topically to the appropriate area as directed Daily. 60 g 3    melatonin 1 MG tablet Take 3 tablets by mouth Every Night. HOLD PRIOR TO SURGERY      montelukast (SINGULAIR) 10 MG tablet Take 1 tablet by mouth Every Night. 30 tablet 5    Multiple Vitamin (MULTI VITAMIN DAILY PO) Take  by mouth Daily. HOLD PER MD NUÑEZ      olopatadine (PATANASE) 0.6 % solution nasal solution 2 sprays by Each Nare route 2 (Two) Times a Day. 30.5 g 5    Potassium 99 MG tablet Take  by mouth.      rizatriptan MLT (MAXALT-MLT) 10 MG disintegrating tablet DISSOLVE 1 TABLET BY MOUTH AT ONSET OF HEADACHE; MAY REPEAT 1 TABLET IN 2 HOURS IF NEEDED. 18 tablet 0    SALINE NASAL SPRAY NA into each nostril.      selenium sulfide (SELSUN) 2.5 % lotion APPLY TO AFFECTED AREA(S) DAILY AS NEEDED FOR DANDRUFF AS DIRECTED 120 mL 5    sildenafil (VIAGRA) 100 MG tablet TAKE 1 TABLET BY MOUTH  AS NEEDED FOR ERECTILE DYSFUNCTION 5 tablet 5    Syringe 22G X 1\" 3 ML misc Inject 1 Needle into the appropriate muscle as directed by prescriber Every 7 (Seven) Days. 20 each 3    Testosterone Cypionate (DEPOTESTOTERONE CYPIONATE) 200 MG/ML injection Inject 0.5 mL into the appropriate muscle as directed by prescriber 1 (One) Time Per Week. 2 mL 2    traMADol (ULTRAM) 50 MG tablet Take 1 tablet by mouth Every 6 (Six) Hours As Needed for Moderate Pain. 120 tablet 1    zolpidem CR (Ambien CR) 12.5 MG CR tablet Take 1 tablet by mouth At Night As Needed for Sleep. 90 tablet 1     No current facility-administered medications for this visit.       Review of Systems   Constitutional:  Positive for fatigue. Negative for activity change, appetite change, chills, diaphoresis, fever, unexpected weight gain and unexpected weight loss.   HENT:  Negative for congestion, " "nosebleeds, rhinorrhea, sore throat, swollen glands, trouble swallowing and voice change.    Eyes:  Negative for visual disturbance.   Respiratory:  Negative for cough, chest tightness, shortness of breath and wheezing.    Cardiovascular:  Negative for chest pain, palpitations and leg swelling.   Gastrointestinal:  Negative for abdominal pain, blood in stool, constipation, diarrhea, nausea, vomiting, GERD and indigestion.   Genitourinary:  Positive for decreased libido. Negative for dysuria, frequency and hematuria.   Musculoskeletal:  Positive for myalgias. Negative for arthralgias, back pain and neck pain.   Skin:  Negative for rash and wound.   Neurological:  Positive for numbness. Negative for dizziness, tremors, weakness, light-headedness, headache and memory problem.   Hematological:  Negative for adenopathy. Does not bruise/bleed easily.   Psychiatric/Behavioral:  Negative for sleep disturbance and depressed mood. The patient is not nervous/anxious.        Objective   /70 (BP Location: Left arm, Patient Position: Sitting, Cuff Size: Large Adult)   Pulse 84   Ht 185.4 cm (72.99\")   Wt 112 kg (246 lb 9.6 oz)   SpO2 94%   BMI 32.54 kg/m²     Physical Exam  Vitals and nursing note reviewed.   Constitutional:       General: He is not in acute distress.     Appearance: He is well-developed. He is not diaphoretic.   HENT:      Head: Normocephalic and atraumatic.      Right Ear: External ear normal.      Left Ear: External ear normal.      Nose: Nose normal.   Eyes:      Conjunctiva/sclera: Conjunctivae normal.      Pupils: Pupils are equal, round, and reactive to light.   Neck:      Thyroid: No thyromegaly.      Trachea: No tracheal deviation.   Cardiovascular:      Rate and Rhythm: Normal rate and regular rhythm.      Heart sounds: Normal heart sounds. No murmur heard.     No friction rub. No gallop.   Pulmonary:      Effort: Pulmonary effort is normal. No respiratory distress.      Breath sounds: Normal " breath sounds.   Abdominal:      General: Bowel sounds are normal.      Palpations: Abdomen is soft. There is no mass.      Tenderness: There is no abdominal tenderness. There is no guarding.   Musculoskeletal:         General: Normal range of motion.      Cervical back: Normal range of motion and neck supple.   Lymphadenopathy:      Cervical: No cervical adenopathy.   Skin:     General: Skin is warm and dry.      Capillary Refill: Capillary refill takes less than 2 seconds.      Findings: No rash.   Neurological:      Mental Status: He is alert and oriented to person, place, and time.      Motor: No abnormal muscle tone.      Deep Tendon Reflexes: Reflexes normal.   Psychiatric:         Behavior: Behavior normal.         Thought Content: Thought content normal.         Judgment: Judgment normal.         Recent Results (from the past 12 weeks)   Testosterone (Free & Total), LC / MS    Collection Time: 02/27/25  3:36 PM    Specimen: Blood   Result Value Ref Range    Testosterone, Total 161.7 (L) 264.0 - 916.0 ng/dL    Testosterone, Free 0.5 (L) 7.2 - 24.0 pg/mL   CBC & Differential    Collection Time: 02/27/25  3:36 PM    Specimen: Blood   Result Value Ref Range    WBC 7.2 3.4 - 10.8 x10E3/uL    RBC 4.80 4.14 - 5.80 x10E6/uL    Hemoglobin 14.9 13.0 - 17.7 g/dL    Hematocrit 45.6 37.5 - 51.0 %    MCV 95 79 - 97 fL    MCH 31.0 26.6 - 33.0 pg    MCHC 32.7 31.5 - 35.7 g/dL    RDW 13.0 11.6 - 15.4 %    Platelets 294 150 - 450 x10E3/uL    Neutrophil Rel % 40 Not Estab. %    Lymphocyte Rel % 47 Not Estab. %    Monocyte Rel % 9 Not Estab. %    Eosinophil Rel % 3 Not Estab. %    Basophil Rel % 1 Not Estab. %    Neutrophils Absolute 2.9 1.4 - 7.0 x10E3/uL    Lymphocytes Absolute 3.4 (H) 0.7 - 3.1 x10E3/uL    Monocytes Absolute 0.7 0.1 - 0.9 x10E3/uL    Eosinophils Absolute 0.2 0.0 - 0.4 x10E3/uL    Basophils Absolute 0.1 0.0 - 0.2 x10E3/uL    Immature Granulocyte Rel % 0 Not Estab. %    Immature Grans Absolute 0.0 0.0 - 0.1  x10E3/uL   Compliance Drug Analysis, Ur - Urine, Clean Catch    Collection Time: 02/27/25  3:37 PM    Specimen: Urine, Clean Catch   Result Value Ref Range    Report Summary FINAL      Assessment & Plan   Diagnoses and all orders for this visit:    1. Testosterone deficiency (Primary)    2. Prediabetes    We discussed his testosterone injections.  He is now at every 3 weeks however we will increase to every 2 weeks.  He will follow-up in 3 months we will recheck his testosterone level in between and see if we have to go further up in his frequency of his injections.  We discussed that he does have prediabetes.  We will continue to monitor.      I spent 32 minutes caring for Arelis on this date of service. This time includes time spent by me in the following activities:preparing for the visit, reviewing tests, obtaining and/or reviewing a separately obtained history, performing a medically appropriate examination and/or evaluation , counseling and educating the patient/family/caregiver, ordering medications, tests, or procedures, and documenting information in the medical record     Dictated utilizing Dragon Dictation

## 2025-03-07 ENCOUNTER — TELEPHONE (OUTPATIENT)
Dept: FAMILY MEDICINE CLINIC | Facility: CLINIC | Age: 50
End: 2025-03-07
Payer: COMMERCIAL

## 2025-03-09 DIAGNOSIS — M51.16 LUMBAR DISC HERNIATION WITH RADICULOPATHY: ICD-10-CM

## 2025-03-09 DIAGNOSIS — G89.29 CHRONIC MIDLINE LOW BACK PAIN WITHOUT SCIATICA: ICD-10-CM

## 2025-03-09 DIAGNOSIS — M54.50 CHRONIC MIDLINE LOW BACK PAIN WITHOUT SCIATICA: ICD-10-CM

## 2025-03-09 DIAGNOSIS — G89.29 CHRONIC RIGHT-SIDED LOW BACK PAIN WITH RIGHT-SIDED SCIATICA: ICD-10-CM

## 2025-03-09 DIAGNOSIS — M54.41 CHRONIC RIGHT-SIDED LOW BACK PAIN WITH RIGHT-SIDED SCIATICA: ICD-10-CM

## 2025-03-11 RX ORDER — TRAMADOL HYDROCHLORIDE 50 MG/1
50 TABLET ORAL EVERY 6 HOURS PRN
Qty: 120 TABLET | Refills: 2 | Status: SHIPPED | OUTPATIENT
Start: 2025-03-11

## 2025-03-11 RX ORDER — GABAPENTIN 800 MG/1
800 TABLET ORAL 3 TIMES DAILY
Qty: 90 TABLET | Refills: 2 | Status: SHIPPED | OUTPATIENT
Start: 2025-03-11

## 2025-03-12 ENCOUNTER — PRIOR AUTHORIZATION (OUTPATIENT)
Dept: FAMILY MEDICINE CLINIC | Facility: CLINIC | Age: 50
End: 2025-03-12
Payer: COMMERCIAL

## 2025-04-23 ENCOUNTER — OFFICE VISIT (OUTPATIENT)
Dept: SLEEP MEDICINE | Facility: HOSPITAL | Age: 50
End: 2025-04-23
Payer: COMMERCIAL

## 2025-04-23 VITALS — BODY MASS INDEX: 32.47 KG/M2 | HEIGHT: 73 IN | WEIGHT: 245 LBS | OXYGEN SATURATION: 94 % | HEART RATE: 90 BPM

## 2025-04-23 DIAGNOSIS — G47.26 CIRCADIAN RHYTHM SLEEP DISORDER, SHIFT WORK TYPE: ICD-10-CM

## 2025-04-23 DIAGNOSIS — F51.04 PSYCHOPHYSIOLOGICAL INSOMNIA: Primary | ICD-10-CM

## 2025-04-23 DIAGNOSIS — G47.33 OSA ON CPAP: ICD-10-CM

## 2025-04-23 PROCEDURE — 99213 OFFICE O/P EST LOW 20 MIN: CPT | Performed by: INTERNAL MEDICINE

## 2025-04-23 PROCEDURE — G0463 HOSPITAL OUTPT CLINIC VISIT: HCPCS

## 2025-04-23 NOTE — PROGRESS NOTES
"[facility]  Follow Up Sleep Disorders Center Note     Chief Complaint:  NITA     Primary Care Physician: Terrell Palma MD    Interval History:   The patient is a 50 y.o. male who I last saw 2024 and that note was reviewed.  The patient reports he is doing well without new complaints.  He goes to bed 11 PM and waking up at night.    The patient continues to take Ambien CR 12.5 mg at bedtime.    Review of Systems:    A complete review of systems was done and all were negative with the exception of some nasal congestion and postnasal drip, occasional wheezing, occasional ear pain and some ADAM    Social History:    Social History     Socioeconomic History    Marital status:    Tobacco Use    Smoking status: Former     Current packs/day: 0.00     Average packs/day: 2.0 packs/day for 22.0 years (44.0 ttl pk-yrs)     Types: Cigarettes     Start date: 1987     Quit date: 2002     Years since quittin.8     Passive exposure: Past    Smokeless tobacco: Never    Tobacco comments:     I have been smoke free for 19 years as of 19.   Vaping Use    Vaping status: Never Used   Substance and Sexual Activity    Alcohol use: Yes     Alcohol/week: 2.0 standard drinks of alcohol     Types: 2 Shots of liquor per week     Comment: Socially    Drug use: No    Sexual activity: Yes     Partners: Female     Birth control/protection: None       Allergies:  Patient has no known allergies.     Medication Review:  Reviewed.      Vital Signs:    Vitals:    25 0750   Pulse: 90   SpO2: 94%   Weight: 111 kg (245 lb)   Height: 185.4 cm (72.99\")     Body mass index is 32.33 kg/m².    Physical Exam:    Constitutional:  Well developed 50 y.o. male that appears in no apparent distress.  Awake & oriented times 3.  Normal mood with normal recent and remote memory and normal judgement.  Eyes:  Conjunctivae normal.  Oropharynx: Previously, moist mucous membranes without exudate and a large tongue and normal " uvula and class III Mallampati airway    Self-administered Canon Sleepiness Scale test results: 6  0-5 Lower normal daytime sleepiness  6-10 Higher normal daytime sleepiness  11-12 Mild, 13-15 Moderate, & 16-24 Severe excessive daytime sleepiness     Downloaded PAP Data Evaluated For Therapeutic Response and Compliance:  DME is [employer] and the patient uses a large F20 AirTouch.  Downloads between 1/23 and 4/22/2025 compliance 90%.  The patient also uses a DreamStation 2 device in his camper and therefore compliance is 100%.  Average usage is 6 hours and 12 minutes.  Average AHI is borderline abnormal 7.6 but obstructive apnea index is normal.  The patient does have a leak.  Average auto CPAP pressure is 11.9 and his ResMed auto CPAP is 10-16    I have reviewed the above results and compared them with the patient's last downloads and reviewed with the patient.    Impression:   Moderate obstructive sleep apnea by overnight polysomnogram 6/19/2005, weight 244 pounds, adequately treated with ResMed auto CPAP and he uses a second device in his camper, DreamStation 2 auto CPAP. The patient appears to be at goal with good compliance and usage. The patient has no complaints of hypersomnolence.  Circadian rhythm sleep disorder, shiftwork type, early morning awakening  Psychophysiologic insomnia adequately treated with Ambien CR 12.5 mg 1/2 to 1 tablet at bedtime    Plan:  Good sleep hygiene measures should be maintained.  Weight loss would be beneficial in this patient who is obese by Body mass index is 32.33 kg/m².      After evaluating the patient and assessing results available, the patient is benefiting from the treatment being provided.     The patient will continue ResMed auto CPAP.  Potential side effects of not using PAP therapy reviewed and addressed as needed.  After clinical evaluation and review of downloads, I recommend no changes to the patient's pressures.  A new prescription will be sent to the patient's  DME.    The patient continues to take Ambien CR 12.5 mg at bedtime. Mateo reviewed and there is no irregularities. It is noted that the patient takes gabapentin, tramadol, and testosterone.     I answered all of the patient's questions.  The patient will call the Sleep Disorder Center for any problems and will follow up in 6 months.      Ronni Kong MD  Sleep Medicine  04/23/25  08:03 EDT

## 2025-05-15 DIAGNOSIS — E78.00 ELEVATED CHOLESTEROL: ICD-10-CM

## 2025-05-15 DIAGNOSIS — E34.9 TESTOSTERONE DEFICIENCY: Primary | ICD-10-CM

## 2025-05-22 ENCOUNTER — OFFICE VISIT (OUTPATIENT)
Dept: ORTHOPEDIC SURGERY | Facility: CLINIC | Age: 50
End: 2025-05-22
Payer: COMMERCIAL

## 2025-05-22 VITALS — BODY MASS INDEX: 32.34 KG/M2 | HEIGHT: 73 IN | WEIGHT: 244 LBS | TEMPERATURE: 99.8 F

## 2025-05-22 DIAGNOSIS — M17.0 PRIMARY OSTEOARTHRITIS OF BOTH KNEES: ICD-10-CM

## 2025-05-22 DIAGNOSIS — R52 PAIN: Primary | ICD-10-CM

## 2025-05-22 RX ORDER — METHYLPREDNISOLONE ACETATE 80 MG/ML
80 INJECTION, SUSPENSION INTRA-ARTICULAR; INTRALESIONAL; INTRAMUSCULAR; SOFT TISSUE
Status: COMPLETED | OUTPATIENT
Start: 2025-05-22 | End: 2025-05-22

## 2025-05-22 RX ADMIN — METHYLPREDNISOLONE ACETATE 80 MG: 80 INJECTION, SUSPENSION INTRA-ARTICULAR; INTRALESIONAL; INTRAMUSCULAR; SOFT TISSUE at 16:09

## 2025-05-22 RX ADMIN — METHYLPREDNISOLONE ACETATE 80 MG: 80 INJECTION, SUSPENSION INTRA-ARTICULAR; INTRALESIONAL; INTRAMUSCULAR; SOFT TISSUE at 16:08

## 2025-05-22 NOTE — PROGRESS NOTES
5/22/2025    Arelis Munson is here today for worsening knee pain. Pt has undergone injection of the knee in the past with good resolution of symptoms. Pt is requesting a repeat injection.     KNEE Injection Procedure Note:    Large Joint Arthrocentesis: L knee  Date/Time: 5/22/2025 4:08 PM  Consent given by: patient  Site marked: site marked  Timeout: Immediately prior to procedure a time out was called to verify the correct patient, procedure, equipment, support staff and site/side marked as required   Supporting Documentation  Indications: pain and joint swelling   Procedure Details  Location: knee - L knee  Preparation: Patient was prepped and draped in the usual sterile fashion  Needle gauge: 21 G.  Approach: anterolateral  Medications administered: 2 mL lidocaine (cardiac); 80 mg methylPREDNISolone acetate 80 MG/ML  Patient tolerance: patient tolerated the procedure well with no immediate complications      Large Joint Arthrocentesis: R knee  Date/Time: 5/22/2025 4:09 PM  Consent given by: patient  Site marked: site marked  Timeout: Immediately prior to procedure a time out was called to verify the correct patient, procedure, equipment, support staff and site/side marked as required   Supporting Documentation  Indications: pain and joint swelling   Procedure Details  Location: knee - R knee  Preparation: Patient was prepped and draped in the usual sterile fashion  Needle gauge: 21 G.  Approach: anterolateral  Medications administered: 2 mL lidocaine (cardiac); 80 mg methylPREDNISolone acetate 80 MG/ML  Patient tolerance: patient tolerated the procedure well with no immediate complications    3 views of both knees done for pain show tricompartmental osteoarthritis worse in the medial compartment and patellofemoral  At the conclusion of the injection I discussed the importance of continued quad strengthening exercises on a daily basis. I will see the patient back if the symptoms should fail to improve or  worsen.    Kalyani Eddy, APRN  5/22/2025

## 2025-05-27 RX ORDER — SELENIUM SULFIDE 2.5 MG/100ML
LOTION TOPICAL
Qty: 120 ML | Refills: 5 | Status: SHIPPED | OUTPATIENT
Start: 2025-05-27

## 2025-05-27 RX ORDER — SILDENAFIL 100 MG/1
100 TABLET, FILM COATED ORAL AS NEEDED
Qty: 5 TABLET | Refills: 5 | Status: SHIPPED | OUTPATIENT
Start: 2025-05-27

## 2025-05-27 NOTE — TELEPHONE ENCOUNTER
Rx Refill Note  Requested Prescriptions     Pending Prescriptions Disp Refills    selenium sulfide (SELSUN) 2.5 % lotion [Pharmacy Med Name: SELENIUM SULFIDE 2.5% LOTION] 120 mL 5     Sig: APPLY TO AFFECTED AREA(S) DAILY AS NEEDED FOR DANDRUFF AS DIRECTED    sildenafil (VIAGRA) 100 MG tablet [Pharmacy Med Name: SILDENAFIL 100 MG TABLET] 5 tablet 5     Sig: TAKE 1 TABLET BY MOUTH AS NEEDED FOR ERECTILE DYSFUNCTION      Last office visit with prescribing clinician: 3/6/2025   Last telemedicine visit with prescribing clinician: Visit date not found   Next office visit with prescribing clinician: 6/12/2025                         Would you like a call back once the refill request has been completed: [] Yes [] No    If the office needs to give you a call back, can they leave a voicemail: [] Yes [] No    Tanya Gibson MA  05/27/25, 10:53 EDT   98

## 2025-06-12 ENCOUNTER — TELEMEDICINE (OUTPATIENT)
Dept: FAMILY MEDICINE CLINIC | Facility: CLINIC | Age: 50
End: 2025-06-12
Payer: COMMERCIAL

## 2025-06-12 DIAGNOSIS — K21.9 GASTROESOPHAGEAL REFLUX DISEASE WITHOUT ESOPHAGITIS: ICD-10-CM

## 2025-06-12 DIAGNOSIS — E78.00 ELEVATED CHOLESTEROL: ICD-10-CM

## 2025-06-12 DIAGNOSIS — E34.9 TESTOSTERONE DEFICIENCY: ICD-10-CM

## 2025-06-12 DIAGNOSIS — Z91.09 ENVIRONMENTAL ALLERGIES: ICD-10-CM

## 2025-06-12 DIAGNOSIS — M54.50 CHRONIC MIDLINE LOW BACK PAIN WITHOUT SCIATICA: ICD-10-CM

## 2025-06-12 DIAGNOSIS — G89.29 CHRONIC MIDLINE LOW BACK PAIN WITHOUT SCIATICA: ICD-10-CM

## 2025-06-12 PROCEDURE — 99214 OFFICE O/P EST MOD 30 MIN: CPT | Performed by: INTERNAL MEDICINE

## 2025-06-12 RX ORDER — EZETIMIBE AND SIMVASTATIN 10; 40 MG/1; MG/1
1 TABLET ORAL NIGHTLY
Qty: 30 TABLET | Refills: 5 | Status: SHIPPED | OUTPATIENT
Start: 2025-06-12

## 2025-06-12 RX ORDER — CYCLOBENZAPRINE HCL 10 MG
10 TABLET ORAL 3 TIMES DAILY PRN
Qty: 60 TABLET | Refills: 3 | Status: SHIPPED | OUTPATIENT
Start: 2025-06-12

## 2025-06-12 RX ORDER — ESOMEPRAZOLE MAGNESIUM 40 MG/1
40 CAPSULE, DELAYED RELEASE ORAL NIGHTLY
Qty: 30 CAPSULE | Refills: 5 | Status: SHIPPED | OUTPATIENT
Start: 2025-06-12

## 2025-06-12 RX ORDER — CYCLOBENZAPRINE HCL 10 MG
10 TABLET ORAL 3 TIMES DAILY PRN
Qty: 60 TABLET | Refills: 3 | OUTPATIENT
Start: 2025-06-12

## 2025-06-12 RX ORDER — MONTELUKAST SODIUM 10 MG/1
10 TABLET ORAL NIGHTLY
Qty: 30 TABLET | Refills: 5 | Status: SHIPPED | OUTPATIENT
Start: 2025-06-12

## 2025-06-12 RX ORDER — TESTOSTERONE CYPIONATE 200 MG/ML
200 INJECTION, SOLUTION INTRAMUSCULAR
Qty: 2 ML | Refills: 3 | Status: SHIPPED | OUTPATIENT
Start: 2025-06-12

## 2025-06-12 NOTE — PROGRESS NOTES
Subjective   Arelis Munson is a 50 y.o. male.     Chief Complaint   Patient presents with    testosterone follow up       History of Present Illness   You have chosen to receive care through a telehealth visit.      Patient presents today for telehealth service. This service was conducted via audio/visual with [employer] conferencing.     This provider is located in office in [facility]. Patient is being seen remotely via telehealth at home in (specify location of the patient), and stated they are in a secure environment for this session.   The patient’s condition being diagnosed/treated is appropriate for telemedicine.   Do you consent to use a video/audio connection for your medical care today? 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  Currently is followed by orthopedics, pain management, neuro spine, urology and pulmonology.      History of hypogonadism previously followed by urology and was on testosterone cypionate 200 mg/mL 0.5 cc IM every 3 weeks.  Last testosterone level performed on 2/27/2025 noted a total testosterone of 161 and free testosterone of 0.5 and previous on 12/7/2024 of total testosterone 1657 and a free testosterone of 24.2.  Last refill picked up on 5/28/2025.  Patient thinks the first elevated level may have been related to timing of the injection.     History of asthma and NITA on auto CPAP.  Is on Singulair 10 mg daily along with Pulmicort Flexhaler 180 mcg per actuation 1 to 2 puffs every morning and albuterol inhaler as needed.     History of migraine headaches and using maxalt as needed.     History of hyperlipidemia.  Is currently utilizing as a Ezetimibe-simvastatin 10-40 mg daily.  Currently without any problems or side effects.  Last lab done on 6/5/25.     Has persistent intermittent left hand numbness and itching feeling.  Has  history of cervical bulging discs and no real neck pain.     Had back pain and was receiving injections through pain management.  Subsequently, had lumbar discectomy 5/17/23.  Has also has knee OA and pain issues.  Will be having knee surgery in future but has to wait till back healed.  Currently is using diclofenac gel 4 times a day, etodolac 500 mg twice daily, gabapentin 800 mg 3 times daily (last filled 2/19/2025 #90 tab), and tramadol 50 mg every 8 hours as needed (last filled on 2/19/2025 #90 tabs).  Last compliance panel performed on 2/27/2025.     History of obstructive sleep apnea being followed by Dr. Kong of sleep medicine and on CPAP.  Doing well no changes with most recent visit on 12/13/2023 with sleep specialist.  Currently using the zolpidem from Sleep medicine    The following portions of the patient's history were reviewed and updated as appropriate: allergies, current medications, past family history, past medical history, past social history, past surgical history and problem list.    Depression Screen:      1/21/2025     4:04 PM   PHQ-2/PHQ-9 Depression Screening   Little interest or pleasure in doing things Not at all   Feeling down, depressed, or hopeless Not at all   How difficult have these problems made it for you to do your work, take care of things at home, or get along with other people? Not difficult at all       Past Medical History:   Diagnosis Date    Allergic July 2001    Seasonal    Anemia     Arthritis     OSTEO    Asthma     Chronic pain disorder 07/01/2021    COVID-19 04/2021    CTS (carpal tunnel syndrome)     Not since surgery    ED (erectile dysfunction)     Elevated cholesterol     Environmental allergies     Erectile dysfunction     Fracture, fibula     RIGHT AND ANKLE    Gastroesophageal reflux disease without esophagitis 07/11/2016    HL (hearing loss) [facility]    Getting Worse Especially in Right Ear    Joint pain 09/01/2002    Low back pain     Migraine with aura  and without status migrainosus, not intractable 2016    NITA on CPAP 2005    Overnight polysomnogram.  Weight 244 pounds.  Moderate NITA with AHI 15 events per hour.  No sleep-related hypoxia.  Treated with auto CPAP.    Pain and swelling of toe of right foot     Peripheral neuropathy     Seasonal allergies     Testosterone deficiency 2016       Past Surgical History:   Procedure Laterality Date    ANKLE ARTHROSCOPY Right 2018    Procedure: RT ANKLE ARTHROSCOPY SUBCHONDROPLASTY TALUS ;  Surgeon: Lebron Spivey Jr., MD;  [place]  [facility];  Service:     ANKLE LIGAMENT RECONSTRUCTION Right 2018    Procedure: HARDWARE REMOVAL POSS LATERAL LIGAMENT RECONSTRUCTION ;  Surgeon: Lebron Spivey Jr., MD;  [place]  [facility];  Service:     CARPAL TUNNEL RELEASE Right     COLONOSCOPY N/A 2023    Procedure: COLONOSCOPY to cecum and TI:;  Surgeon: Je Eddy MD;  Location: [facility] END[facility]COPY;  Service: Gastroenterology;  Laterality: N/A;  pre:  screening  post:  diverticulosis, hemorrhoids    CUBITAL TUNNEL RELEASE Right     NERVE RELEASE    EAR BIOPSY Right     MYRINGOTOMY W/BONES REPLACED INNER EAR    EPIDURAL BLOCK      FRACTURE SURGERY      Dr. Lebron Spivey    KNEE SURGERY Right     MENISCUS REPAIR    LUMBAR DISCECTOMY Right 2023    Procedure: Lumbar Microscopic Discectomy, right lumbar 3 lumbar 4;  Surgeon: Jovon Doran MD;  Location: [facility] MAIN OR;  Service: Neurosurgery;  Laterality: Right;    NOSE SURGERY      ORIF FIBULA FRACTURE Right 2017    ANKLE INCLUDED    ORTHOPEDIC SURGERY      Dr. Tyrell Eddy    SINUS SURGERY      Dr. Vilchis    TONSILLECTOMY      As a kid       Family History   Problem Relation Age of Onset    Lung cancer Mother     Cancer Mother          of lung, breast, and brain cancer.    Lung cancer Father     Cancer Father          of lung cancer.    Colon cancer Maternal Grandfather     Malig Hyperthermia Neg Hx        Social  History     Socioeconomic History    Marital status:    Tobacco Use    Smoking status: Former     Current packs/day: 0.00     Average packs/day: 2.0 packs/day for 22.0 years (44.0 ttl pk-yrs)     Types: Cigarettes     Start date: 1987     Quit date: 2002     Years since quittin.9     Passive exposure: Past    Smokeless tobacco: Never    Tobacco comments:     I have been smoke free for 19 years as of 19.   Vaping Use    Vaping status: Never Used   Substance and Sexual Activity    Alcohol use: Yes     Alcohol/week: 2.0 standard drinks of alcohol     Types: 2 Shots of liquor per week     Comment: Socially    Drug use: No    Sexual activity: Yes     Partners: Female     Birth control/protection: None       Current Outpatient Medications   Medication Sig Dispense Refill    cyclobenzaprine (FLEXERIL) 10 MG tablet Take 1 tablet by mouth 3 (Three) Times a Day As Needed for Muscle Spasms. 60 tablet 3    esomeprazole (nexIUM) 40 MG capsule Take 1 capsule by mouth Every Night. 30 capsule 5    ezetimibe-simvastatin (VYTORIN) 10-40 MG per tablet Take 1 tablet by mouth Every Night. 30 tablet 5    montelukast (SINGULAIR) 10 MG tablet Take 1 tablet by mouth Every Night. 30 tablet 5    Testosterone Cypionate (DEPOTESTOTERONE CYPIONATE) 200 MG/ML injection Inject 1 mL into the appropriate muscle as directed by prescriber Every 14 (Fourteen) Days. 2 mL 3    albuterol sulfate  (90 Base) MCG/ACT inhaler 2 puffs every 4 hours as needed for SOA or wheezing. Generic. 3 g 1    Ascorbic Acid (VITAMIN C PO) Take  by mouth. HOLD PER MD INSTR      aspirin 81 MG EC tablet Take 1 tablet by mouth Daily. HOLD PER MD INSTR      azelastine (ASTELIN) 0.1 % nasal spray 2 sprays into the nostril(s) as directed by provider 2 (Two) Times a Day. Use in each nostril as directed 30 mL 11    B Complex Vitamins (VITAMIN B COMPLEX PO) Take  by mouth Daily. HOLD PRIOR TO SURGERY      budesonide (Pulmicort Flexhaler) 180 MCG/ACT  "inhaler Use 1 or 2 inhalations each AM. Rinse and spit after use. 2 each 3    Cholecalciferol (VITAMIN D3) 5000 UNITS capsule capsule Take 1 capsule by mouth Daily. HOLD PRIOR TO SURGERY      desonide (DESOWEN) 0.05 % cream Apply  topically to the appropriate area as directed See Admin Instructions. Apply to the affected areas three times daily 60 each 6    Diclofenac Sodium (VOLTAREN) 1 % gel gel Apply 4 g topically to the appropriate area as directed 4 (Four) Times a Day. 100 g 3    diphenhydrAMINE HCl (BENADRYL ALLERGY PO) Take 1 tablet by mouth Daily.      docusate sodium (COLACE) 250 MG capsule Take 1 capsule by mouth Daily.      etodolac (LODINE) 500 MG tablet Take 1 tablet by mouth 2 (Two) Times a Day. 180 tablet 3    FIBER PO Take  by mouth.      fluticasone (FLONASE) 50 MCG/ACT nasal spray Administer 2 sprays into the nostril(s) as directed by provider Daily.      gabapentin (NEURONTIN) 800 MG tablet TAKE 1 TABLET BY MOUTH 3 TIMES A DAY 90 tablet 2    ketoconazole (NIZORAL) 2 % cream Apply  topically to the appropriate area as directed Daily. 60 g 3    melatonin 1 MG tablet Take 3 tablets by mouth Every Night. HOLD PRIOR TO SURGERY      Multiple Vitamin (MULTI VITAMIN DAILY PO) Take  by mouth Daily. HOLD PER MD NUÑEZ      olopatadine (PATANASE) 0.6 % solution nasal solution 2 sprays by Each Nare route 2 (Two) Times a Day. 30.5 g 5    Potassium 99 MG tablet Take  by mouth.      rizatriptan MLT (MAXALT-MLT) 10 MG disintegrating tablet DISSOLVE 1 TABLET BY MOUTH AT ONSET OF HEADACHE; MAY REPEAT 1 TABLET IN 2 HOURS IF NEEDED. 18 tablet 0    SALINE NASAL SPRAY NA into each nostril.      selenium sulfide (SELSUN) 2.5 % lotion APPLY TO AFFECTED AREA(S) DAILY AS NEEDED FOR DANDRUFF AS DIRECTED 120 mL 5    sildenafil (VIAGRA) 100 MG tablet TAKE 1 TABLET BY MOUTH AS NEEDED FOR ERECTILE DYSFUNCTION 5 tablet 5    Syringe 22G X 1\" 3 ML misc Inject 1 Needle into the appropriate muscle as directed by prescriber Every 7 " (Seven) Days. 20 each 3    traMADol (ULTRAM) 50 MG tablet TAKE 1 TABLET BY MOUTH EVERY 6 HOURS AS NEEDED FOR MODERATE PAIN 120 tablet 2    zolpidem CR (Ambien CR) 12.5 MG CR tablet Take 1 tablet by mouth At Night As Needed for Sleep. 90 tablet 1     No current facility-administered medications for this visit.       Review of Systems   Constitutional:  Positive for fatigue. Negative for activity change, appetite change, fever, unexpected weight gain and unexpected weight loss.   HENT:  Negative for nosebleeds, rhinorrhea, trouble swallowing and voice change.    Eyes:  Negative for visual disturbance.   Respiratory:  Negative for cough, chest tightness, shortness of breath and wheezing.    Cardiovascular:  Negative for chest pain, palpitations and leg swelling.   Gastrointestinal:  Negative for abdominal pain, blood in stool, constipation, diarrhea, nausea, vomiting, GERD and indigestion.   Genitourinary:  Positive for decreased libido. Negative for dysuria, frequency and hematuria.   Musculoskeletal:  Negative for arthralgias, back pain and myalgias.   Skin:  Negative for rash and wound.   Neurological:  Negative for dizziness, tremors, weakness, light-headedness, numbness, headache and memory problem.   Hematological:  Negative for adenopathy. Does not bruise/bleed easily.   Psychiatric/Behavioral:  Negative for sleep disturbance and depressed mood. The patient is not nervous/anxious.        Objective   There were no vitals taken for this visit.    Physical Exam   Constitutional: He appears well-developed and well-nourished. No distress.   HENT:   Head: Normocephalic and atraumatic.   Pulmonary/Chest: Effort normal.  No respiratory distress.  Neurological: He is alert.   Skin: He is not diaphoretic.   Psychiatric: He has a normal mood and affect. He mood appears normal. His behavior is normal. Thought content is normal. He does not express abnormal judgement.       Recent Results (from the past 12 weeks)   CBC &  Differential    Collection Time: 06/05/25  3:37 PM    Specimen: Blood   Result Value Ref Range    WBC 7.9 3.4 - 10.8 x10E3/uL    RBC 4.91 4.14 - 5.80 x10E6/uL    Hemoglobin 15.2 13.0 - 17.7 g/dL    Hematocrit 47.5 37.5 - 51.0 %    MCV 97 79 - 97 fL    MCH 31.0 26.6 - 33.0 pg    MCHC 32.0 31.5 - 35.7 g/dL    RDW 13.1 11.6 - 15.4 %    Platelets 306 150 - 450 x10E3/uL    Neutrophil Rel % 50 Not Estab. %    Lymphocyte Rel % 42 Not Estab. %    Monocyte Rel % 6 Not Estab. %    Eosinophil Rel % 1 Not Estab. %    Basophil Rel % 1 Not Estab. %    Neutrophils Absolute 3.9 1.4 - 7.0 x10E3/uL    Lymphocytes Absolute 3.3 (H) 0.7 - 3.1 x10E3/uL    Monocytes Absolute 0.5 0.1 - 0.9 x10E3/uL    Eosinophils Absolute 0.1 0.0 - 0.4 x10E3/uL    Basophils Absolute 0.1 0.0 - 0.2 x10E3/uL    Immature Granulocyte Rel % 0 Not Estab. %    Immature Grans Absolute 0.0 0.0 - 0.1 x10E3/uL   Comprehensive Metabolic Panel    Collection Time: 06/05/25  3:37 PM    Specimen: Blood   Result Value Ref Range    Glucose 102 (H) 70 - 99 mg/dL    BUN 15 6 - 24 mg/dL    Creatinine 1.09 0.76 - 1.27 mg/dL    EGFR Result 82 >59 mL/min/1.73    BUN/Creatinine Ratio 14 9 - 20    Sodium 143 134 - 144 mmol/L    Potassium 4.7 3.5 - 5.2 mmol/L    Chloride 101 96 - 106 mmol/L    Total CO2 26 20 - 29 mmol/L    Calcium 9.8 8.7 - 10.2 mg/dL    Total Protein 6.9 6.0 - 8.5 g/dL    Albumin 4.9 3.8 - 4.9 g/dL    Globulin 2.0 1.5 - 4.5 g/dL    Total Bilirubin 0.3 0.0 - 1.2 mg/dL    Alkaline Phosphatase 68 44 - 121 IU/L    AST (SGOT) 30 0 - 40 IU/L    ALT (SGPT) 67 (H) 0 - 44 IU/L   Testosterone (Free & Total), LC / MS    Collection Time: 06/05/25  3:37 PM    Specimen: Blood   Result Value Ref Range    Testosterone, Total 171.3 (L) 264.0 - 916.0 ng/dL    Testosterone, Free 0.4 (L) 7.2 - 24.0 pg/mL   Lipid Panel    Collection Time: 06/05/25  3:37 PM    Specimen: Blood   Result Value Ref Range    Total Cholesterol 140 100 - 199 mg/dL    Triglycerides 179 (H) 0 - 149 mg/dL    HDL  Cholesterol 57 >39 mg/dL    VLDL Cholesterol Warren 29 5 - 40 mg/dL    LDL Chol Calc (NIH) 54 0 - 99 mg/dL     Assessment & Plan   Diagnoses and all orders for this visit:    1. Testosterone deficiency  -     Testosterone Cypionate (DEPOTESTOTERONE CYPIONATE) 200 MG/ML injection; Inject 1 mL into the appropriate muscle as directed by prescriber Every 14 (Fourteen) Days.  Dispense: 2 mL; Refill: 3  -     Testosterone (Free & Total), LC / MS; Future    2. Chronic midline low back pain without sciatica  -     cyclobenzaprine (FLEXERIL) 10 MG tablet; Take 1 tablet by mouth 3 (Three) Times a Day As Needed for Muscle Spasms.  Dispense: 60 tablet; Refill: 3    3. Environmental allergies  -     montelukast (SINGULAIR) 10 MG tablet; Take 1 tablet by mouth Every Night.  Dispense: 30 tablet; Refill: 5    4. Elevated cholesterol  -     ezetimibe-simvastatin (VYTORIN) 10-40 MG per tablet; Take 1 tablet by mouth Every Night.  Dispense: 30 tablet; Refill: 5    5. Gastroesophageal reflux disease without esophagitis  -     esomeprazole (nexIUM) 40 MG capsule; Take 1 capsule by mouth Every Night.  Dispense: 30 capsule; Refill: 5      Reviewed most recent testosterone levels which are persistently low.  Low testosterone level so will increase the dosing to 200 mg every 14 days and recheck the levels in 3 months..    Video visit completed.       I spent 32 minutes caring for Arelis on this date of service. This time includes time spent by me in the following activities:preparing for the visit, reviewing tests, obtaining and/or reviewing a separately obtained history, performing a medically appropriate examination and/or evaluation , counseling and educating the patient/family/caregiver, ordering medications, tests, or procedures, and documenting information in the medical record

## 2025-06-26 DIAGNOSIS — G89.29 CHRONIC RIGHT-SIDED LOW BACK PAIN WITH RIGHT-SIDED SCIATICA: ICD-10-CM

## 2025-06-26 DIAGNOSIS — M54.41 CHRONIC RIGHT-SIDED LOW BACK PAIN WITH RIGHT-SIDED SCIATICA: ICD-10-CM

## 2025-06-26 DIAGNOSIS — M51.16 LUMBAR DISC HERNIATION WITH RADICULOPATHY: ICD-10-CM

## 2025-06-27 DIAGNOSIS — G47.33 OSA ON CPAP: ICD-10-CM

## 2025-06-27 DIAGNOSIS — F51.04 PSYCHOPHYSIOLOGICAL INSOMNIA: ICD-10-CM

## 2025-06-27 RX ORDER — TRAMADOL HYDROCHLORIDE 50 MG/1
50 TABLET ORAL
Qty: 120 TABLET | Refills: 0 | Status: SHIPPED | OUTPATIENT
Start: 2025-06-27

## 2025-06-27 RX ORDER — GABAPENTIN 800 MG/1
800 TABLET ORAL 3 TIMES DAILY
Qty: 90 TABLET | Refills: 0 | Status: SHIPPED | OUTPATIENT
Start: 2025-06-27

## 2025-06-27 RX ORDER — ZOLPIDEM TARTRATE 12.5 MG/1
12.5 TABLET, FILM COATED, EXTENDED RELEASE ORAL NIGHTLY PRN
Qty: 90 TABLET | Refills: 1 | Status: SHIPPED | OUTPATIENT
Start: 2025-06-27

## 2025-06-29 DIAGNOSIS — E78.00 ELEVATED CHOLESTEROL: ICD-10-CM

## 2025-06-30 RX ORDER — EZETIMIBE AND SIMVASTATIN 10; 40 MG/1; MG/1
1 TABLET ORAL NIGHTLY
Qty: 30 TABLET | Refills: 5 | OUTPATIENT
Start: 2025-06-30

## 2025-07-29 DIAGNOSIS — M51.16 LUMBAR DISC HERNIATION WITH RADICULOPATHY: ICD-10-CM

## 2025-07-29 DIAGNOSIS — G89.29 CHRONIC RIGHT-SIDED LOW BACK PAIN WITH RIGHT-SIDED SCIATICA: ICD-10-CM

## 2025-07-29 DIAGNOSIS — M54.41 CHRONIC RIGHT-SIDED LOW BACK PAIN WITH RIGHT-SIDED SCIATICA: ICD-10-CM

## 2025-07-30 RX ORDER — TRAMADOL HYDROCHLORIDE 50 MG/1
50 TABLET ORAL
Qty: 120 TABLET | Refills: 0 | Status: SHIPPED | OUTPATIENT
Start: 2025-07-30

## 2025-07-30 RX ORDER — GABAPENTIN 800 MG/1
800 TABLET ORAL 3 TIMES DAILY
Qty: 90 TABLET | Refills: 0 | Status: SHIPPED | OUTPATIENT
Start: 2025-07-30

## 2025-08-22 DIAGNOSIS — G89.29 CHRONIC RIGHT-SIDED LOW BACK PAIN WITH RIGHT-SIDED SCIATICA: ICD-10-CM

## 2025-08-22 DIAGNOSIS — M54.41 CHRONIC RIGHT-SIDED LOW BACK PAIN WITH RIGHT-SIDED SCIATICA: ICD-10-CM

## 2025-08-22 DIAGNOSIS — M51.16 LUMBAR DISC HERNIATION WITH RADICULOPATHY: ICD-10-CM

## 2025-08-25 DIAGNOSIS — E34.9 TESTOSTERONE DEFICIENCY: Primary | ICD-10-CM

## 2025-08-26 RX ORDER — GABAPENTIN 800 MG/1
800 TABLET ORAL 3 TIMES DAILY
Qty: 90 TABLET | Refills: 0 | Status: SHIPPED | OUTPATIENT
Start: 2025-08-26

## 2025-08-26 RX ORDER — TRAMADOL HYDROCHLORIDE 50 MG/1
50 TABLET ORAL EVERY 6 HOURS PRN
Qty: 120 TABLET | Refills: 0 | Status: SHIPPED | OUTPATIENT
Start: 2025-08-26

## (undated) DEVICE — NEEDLE, QUINCKE, 20GX3.5": Brand: MEDLINE

## (undated) DEVICE — PK ORTHO MINOR TOWER 40

## (undated) DEVICE — BNDG ELAS ELITE V/CLOSE 6IN 5YD LF STRL

## (undated) DEVICE — STCKNT IMPERV 12IN STRL

## (undated) DEVICE — BNDG ESMARK STRL 6INX12FT LF

## (undated) DEVICE — EXTENSION SET, MALE LUER LOCK ADAPTER WITH RETRACTABLE COLLAR

## (undated) DEVICE — BLD DISSCT COOL CUT SJ 3MM 7CM

## (undated) DEVICE — GUHL ANKLE DISTRACTOR FOOT STRAPS,                                    STERILE, LATEX FREE BOX OF 6

## (undated) DEVICE — 3M™ STERI-STRIP™ ANTIMICROBIAL SKIN CLOSURES 1/2 INCH X 4 INCHES 50/CARTON 4 CARTONS/CASE A1847: Brand: 3M™ STERI-STRIP™

## (undated) DEVICE — GLV SURG BIOGEL LTX PF 8

## (undated) DEVICE — DISPOSABLE BIPOLAR CABLE 12FT. (3.6M): Brand: KIRWAN

## (undated) DEVICE — COTTON BALLS, DOUBLE STRUNG: Brand: DEROYAL

## (undated) DEVICE — TOWEL,OR,DSP,ST,BLUE,STD,4/PK,20PK/CS: Brand: MEDLINE

## (undated) DEVICE — DRP C/ARMOR

## (undated) DEVICE — GLV SURG SIGNATURE ESSENTIAL PF LTX SZ7.5

## (undated) DEVICE — KT ORCA ORCAPOD DISP STRL

## (undated) DEVICE — NDL SPINE 22G 31/2IN BLK

## (undated) DEVICE — GLV SURG BIOGEL LTX PF 7 1/2

## (undated) DEVICE — SUT ETHLN 2/0 PS 18IN 585H

## (undated) DEVICE — SUT ETHLN 3/0 PS1 18IN 1663H

## (undated) DEVICE — INTENDED FOR TISSUE SEPARATION, AND OTHER PROCEDURES THAT REQUIRE A SHARP SURGICAL BLADE TO PUNCTURE OR CUT.: Brand: BARD-PARKER ® CARBON RIB-BACK BLADES

## (undated) DEVICE — SMOKE EVACUATION TUBING WITH 7/8 IN TO 1/4 IN REDUCER: Brand: BUFFALO FILTER

## (undated) DEVICE — SYR CONTRL PRESS/LO FIX/M/LL W/THMB/RNG 10ML

## (undated) DEVICE — THIN OFFSET (13.0 X 0.38 X 39.0MM)

## (undated) DEVICE — NEEDLE, QUINCKE 22GX3.5": Brand: MEDLINE INDUSTRIES, INC.

## (undated) DEVICE — APPL CHLORAPREP W/TINT 26ML ORNG

## (undated) DEVICE — DISPOSABLE TOURNIQUET CUFF SINGLE BLADDER, SINGLE PORT AND QUICK CONNECT CONNECTOR: Brand: COLOR CUFF

## (undated) DEVICE — ACCY PA700 LUBRICANT DIFFUSER MR7 4 PACK: Brand: MIDAS REX

## (undated) DEVICE — DRP C/ARM 41X74IN

## (undated) DEVICE — PK NEURO SPINE 40

## (undated) DEVICE — PROXIMATE RH ROTATING HEAD SKIN STAPLERS (35 WIDE) CONTAINS 35 STAINLESS STEEL STAPLES: Brand: PROXIMATE

## (undated) DEVICE — SUT PDS 2 0 CT1 27IN CLR Z259H

## (undated) DEVICE — SUT VIC UD BR COAT OS/4 0 27IN SLVR J694H

## (undated) DEVICE — PK ARTHSCP 40

## (undated) DEVICE — SOL ISO/ALC RUB 70PCT 4OZ

## (undated) DEVICE — NDL SPINE 18G 31/2IN PNK

## (undated) DEVICE — GLV SURG SENSICARE PI LF PF 7.5 GRN STRL

## (undated) DEVICE — TUBING, SUCTION, 1/4" X 10', STRAIGHT: Brand: MEDLINE

## (undated) DEVICE — SPNG GZ WOVN 4X4IN 12PLY 10/BX STRL

## (undated) DEVICE — TOOL MR8-14MH30 MR8 14CM MATCH 3MM: Brand: MIDAS REX MR8

## (undated) DEVICE — ADAPT CLN BIOGUARD AIR/H2O DISP

## (undated) DEVICE — DRAPE,U/ SHT,SPLIT,PLAS,STERIL: Brand: MEDLINE

## (undated) DEVICE — UNDERCAST PADDING: Brand: DEROYAL

## (undated) DEVICE — PAD,ABDOMINAL,8"X10",ST,LF: Brand: MEDLINE

## (undated) DEVICE — CANN O2 ETCO2 FITS ALL CONN CO2 SMPL A/ 7IN DISP LF

## (undated) DEVICE — TBG PUMP ARTHSCP MAIN AR6400 16FT

## (undated) DEVICE — LN SMPL CO2 SHTRM SD STREAM W/M LUER

## (undated) DEVICE — EMERALD ARTHROSCOPY SHEET: Brand: CONVERTORS

## (undated) DEVICE — ANTIBACTERIAL UNDYED BRAIDED (POLYGLACTIN 910), SYNTHETIC ABSORBABLE SUTURE: Brand: COATED VICRYL

## (undated) DEVICE — SYRINGE, LUER LOCK, 60ML: Brand: MEDLINE

## (undated) DEVICE — PETROLATUM DRESSING. FINE MESH GAUZE IMPREGNATED WITH 3% BISMUTH TRIBROMOPHENATE  IN A PETROLATUM BLEND.: Brand: XEROFORM PETROLATUM

## (undated) DEVICE — DRP MICROSCOPE 4 BINOCULAR CV 54X150IN

## (undated) DEVICE — SKIN PREP TRAY W/CHG: Brand: MEDLINE INDUSTRIES, INC.

## (undated) DEVICE — SUT VIC 3/0 X1 27IN J458H

## (undated) DEVICE — PENCL ES MEGADINE EZ/CLEAN BUTN W/HOLSTR 10FT

## (undated) DEVICE — CONN TBG Y 5 IN 1 LF STRL

## (undated) DEVICE — DRSNG GZ CURAD XEROFORM NONADHS 5X9IN STRL

## (undated) DEVICE — ENCORE® LATEX ORTHO SIZE 8, STERILE LATEX POWDER-FREE SURGICAL GLOVE: Brand: ENCORE

## (undated) DEVICE — SPLNT PLSTR ORTHO 5IN

## (undated) DEVICE — UNDYED BRAIDED (POLYGLACTIN 910), SYNTHETIC ABSORBABLE SUTURE: Brand: COATED VICRYL

## (undated) DEVICE — SENSR O2 OXIMAX FNGR A/ 18IN NONSTR

## (undated) DEVICE — APPL CHLORAPREP HI/LITE 26ML ORNG

## (undated) DEVICE — GLV SURG TRIUMPH CLASSIC PF LTX 8 STRL

## (undated) DEVICE — PATIENT RETURN ELECTRODE, SINGLE-USE, CONTACT QUALITY MONITORING, ADULT, WITH 9FT CORD, FOR PATIENTS WEIGING OVER 33LBS. (15KG): Brand: MEGADYNE